# Patient Record
Sex: MALE | Race: WHITE | Employment: OTHER | URBAN - METROPOLITAN AREA
[De-identification: names, ages, dates, MRNs, and addresses within clinical notes are randomized per-mention and may not be internally consistent; named-entity substitution may affect disease eponyms.]

---

## 2017-02-24 ENCOUNTER — APPOINTMENT (OUTPATIENT)
Dept: CT IMAGING | Facility: CLINIC | Age: 82
DRG: 315 | End: 2017-02-24
Attending: EMERGENCY MEDICINE
Payer: MEDICARE

## 2017-02-24 ENCOUNTER — APPOINTMENT (OUTPATIENT)
Dept: GENERAL RADIOLOGY | Facility: CLINIC | Age: 82
DRG: 315 | End: 2017-02-24
Attending: EMERGENCY MEDICINE
Payer: MEDICARE

## 2017-02-24 ENCOUNTER — TRANSFERRED RECORDS (OUTPATIENT)
Dept: HEALTH INFORMATION MANAGEMENT | Facility: CLINIC | Age: 82
End: 2017-02-24

## 2017-02-24 ENCOUNTER — HOSPITAL ENCOUNTER (INPATIENT)
Facility: CLINIC | Age: 82
LOS: 3 days | Discharge: HOME | DRG: 315 | End: 2017-02-28
Attending: EMERGENCY MEDICINE | Admitting: INTERNAL MEDICINE
Payer: MEDICARE

## 2017-02-24 DIAGNOSIS — I48.0 PAROXYSMAL ATRIAL FIBRILLATION (H): ICD-10-CM

## 2017-02-24 DIAGNOSIS — R06.09 DYSPNEA ON EXERTION: ICD-10-CM

## 2017-02-24 LAB
ABO + RH BLD: NORMAL
ABO + RH BLD: NORMAL
ALBUMIN SERPL-MCNC: 3.5 G/DL (ref 3.4–5)
ALBUMIN UR-MCNC: 10 MG/DL
ALP SERPL-CCNC: 71 U/L (ref 40–150)
ALT SERPL W P-5'-P-CCNC: 15 U/L (ref 0–70)
ANION GAP SERPL CALCULATED.3IONS-SCNC: 8 MMOL/L (ref 3–14)
APPEARANCE UR: CLEAR
AST SERPL W P-5'-P-CCNC: 23 U/L (ref 0–45)
BASOPHILS # BLD AUTO: 0 10E9/L (ref 0–0.2)
BASOPHILS NFR BLD AUTO: 0.6 %
BILIRUB SERPL-MCNC: 1.3 MG/DL (ref 0.2–1.3)
BILIRUB UR QL STRIP: NEGATIVE
BLD GP AB SCN SERPL QL: NORMAL
BLOOD BANK CMNT PATIENT-IMP: NORMAL
BUN SERPL-MCNC: 17 MG/DL (ref 7–30)
CALCIUM SERPL-MCNC: 9 MG/DL (ref 8.5–10.1)
CHLORIDE SERPL-SCNC: 108 MMOL/L (ref 94–109)
CO2 SERPL-SCNC: 25 MMOL/L (ref 20–32)
COLOR UR AUTO: YELLOW
CREAT BLD-MCNC: 0.9 MG/DL (ref 0.66–1.25)
CREAT SERPL-MCNC: 0.9 MG/DL (ref 0.66–1.25)
D DIMER PPP FEU-MCNC: 1 UG/ML FEU (ref 0–0.5)
DIFFERENTIAL METHOD BLD: ABNORMAL
EOSINOPHIL # BLD AUTO: 0.2 10E9/L (ref 0–0.7)
EOSINOPHIL NFR BLD AUTO: 3 %
ERYTHROCYTE [DISTWIDTH] IN BLOOD BY AUTOMATED COUNT: 15.8 % (ref 10–15)
GFR SERPL CREATININE-BSD FRML MDRD: 81 ML/MIN/1.7M2
GFR SERPL CREATININE-BSD FRML MDRD: 81 ML/MIN/1.7M2
GLUCOSE SERPL-MCNC: 84 MG/DL (ref 70–99)
GLUCOSE UR STRIP-MCNC: NEGATIVE MG/DL
HCT VFR BLD AUTO: 35.7 % (ref 40–53)
HGB BLD-MCNC: 11.2 G/DL (ref 13.3–17.7)
HGB UR QL STRIP: NEGATIVE
IMM GRANULOCYTES # BLD: 0 10E9/L (ref 0–0.4)
IMM GRANULOCYTES NFR BLD: 0.4 %
INR PPP: 2.69 (ref 0.86–1.14)
INTERPRETATION ECG - MUSE: NORMAL
KETONES UR STRIP-MCNC: NEGATIVE MG/DL
LEUKOCYTE ESTERASE UR QL STRIP: ABNORMAL
LYMPHOCYTES # BLD AUTO: 0.9 10E9/L (ref 0.8–5.3)
LYMPHOCYTES NFR BLD AUTO: 17.2 %
MCH RBC QN AUTO: 30.1 PG (ref 26.5–33)
MCHC RBC AUTO-ENTMCNC: 31.4 G/DL (ref 31.5–36.5)
MCV RBC AUTO: 96 FL (ref 78–100)
MONOCYTES # BLD AUTO: 0.5 10E9/L (ref 0–1.3)
MONOCYTES NFR BLD AUTO: 10.3 %
NEUTROPHILS # BLD AUTO: 3.4 10E9/L (ref 1.6–8.3)
NEUTROPHILS NFR BLD AUTO: 68.5 %
NITRATE UR QL: NEGATIVE
NRBC # BLD AUTO: 0 10*3/UL
NRBC BLD AUTO-RTO: 0 /100
NT-PROBNP SERPL-MCNC: 787 PG/ML (ref 0–1800)
PH UR STRIP: 6.5 PH (ref 5–7)
PLATELET # BLD AUTO: 124 10E9/L (ref 150–450)
POTASSIUM SERPL-SCNC: 4 MMOL/L (ref 3.4–5.3)
PROT SERPL-MCNC: 7.9 G/DL (ref 6.8–8.8)
RBC # BLD AUTO: 3.72 10E12/L (ref 4.4–5.9)
RBC #/AREA URNS AUTO: 4 /HPF (ref 0–2)
SODIUM SERPL-SCNC: 141 MMOL/L (ref 133–144)
SP GR UR STRIP: 1 (ref 1–1.03)
SPECIMEN EXP DATE BLD: NORMAL
SQUAMOUS #/AREA URNS AUTO: <1 /HPF (ref 0–1)
TROPONIN I BLD-MCNC: 0.01 UG/L (ref 0–0.1)
TROPONIN I SERPL-MCNC: NORMAL UG/L (ref 0–0.04)
TROPONIN I SERPL-MCNC: NORMAL UG/L (ref 0–0.04)
TSH SERPL DL<=0.005 MIU/L-ACNC: 2.52 MU/L (ref 0.4–4)
URN SPEC COLLECT METH UR: ABNORMAL
UROBILINOGEN UR STRIP-MCNC: 2 MG/DL (ref 0–2)
WBC # BLD AUTO: 4.9 10E9/L (ref 4–11)
WBC #/AREA URNS AUTO: 5 /HPF (ref 0–2)

## 2017-02-24 PROCEDURE — 94640 AIRWAY INHALATION TREATMENT: CPT

## 2017-02-24 PROCEDURE — 25000125 ZZHC RX 250: Performed by: EMERGENCY MEDICINE

## 2017-02-24 PROCEDURE — 84443 ASSAY THYROID STIM HORMONE: CPT | Performed by: EMERGENCY MEDICINE

## 2017-02-24 PROCEDURE — 84484 ASSAY OF TROPONIN QUANT: CPT

## 2017-02-24 PROCEDURE — 25000132 ZZH RX MED GY IP 250 OP 250 PS 637: Mod: GY | Performed by: EMERGENCY MEDICINE

## 2017-02-24 PROCEDURE — 86901 BLOOD TYPING SEROLOGIC RH(D): CPT | Performed by: EMERGENCY MEDICINE

## 2017-02-24 PROCEDURE — 86850 RBC ANTIBODY SCREEN: CPT | Performed by: EMERGENCY MEDICINE

## 2017-02-24 PROCEDURE — A9270 NON-COVERED ITEM OR SERVICE: HCPCS | Mod: GY | Performed by: EMERGENCY MEDICINE

## 2017-02-24 PROCEDURE — 83880 ASSAY OF NATRIURETIC PEPTIDE: CPT | Performed by: EMERGENCY MEDICINE

## 2017-02-24 PROCEDURE — 99285 EMERGENCY DEPT VISIT HI MDM: CPT | Mod: 25

## 2017-02-24 PROCEDURE — 25500064 ZZH RX 255 OP 636: Performed by: EMERGENCY MEDICINE

## 2017-02-24 PROCEDURE — 85025 COMPLETE CBC W/AUTO DIFF WBC: CPT | Performed by: EMERGENCY MEDICINE

## 2017-02-24 PROCEDURE — 40000275 ZZH STATISTIC RCP TIME EA 10 MIN

## 2017-02-24 PROCEDURE — 71260 CT THORAX DX C+: CPT

## 2017-02-24 PROCEDURE — G0378 HOSPITAL OBSERVATION PER HR: HCPCS

## 2017-02-24 PROCEDURE — 84484 ASSAY OF TROPONIN QUANT: CPT | Performed by: EMERGENCY MEDICINE

## 2017-02-24 PROCEDURE — 81001 URINALYSIS AUTO W/SCOPE: CPT | Performed by: EMERGENCY MEDICINE

## 2017-02-24 PROCEDURE — 25000128 H RX IP 250 OP 636: Performed by: EMERGENCY MEDICINE

## 2017-02-24 PROCEDURE — A9270 NON-COVERED ITEM OR SERVICE: HCPCS | Performed by: HOSPITALIST

## 2017-02-24 PROCEDURE — 85610 PROTHROMBIN TIME: CPT | Performed by: EMERGENCY MEDICINE

## 2017-02-24 PROCEDURE — 85379 FIBRIN DEGRADATION QUANT: CPT | Performed by: EMERGENCY MEDICINE

## 2017-02-24 PROCEDURE — 93005 ELECTROCARDIOGRAM TRACING: CPT

## 2017-02-24 PROCEDURE — 86900 BLOOD TYPING SEROLOGIC ABO: CPT | Performed by: EMERGENCY MEDICINE

## 2017-02-24 PROCEDURE — A9270 NON-COVERED ITEM OR SERVICE: HCPCS | Mod: GY | Performed by: PHYSICIAN ASSISTANT

## 2017-02-24 PROCEDURE — 25000132 ZZH RX MED GY IP 250 OP 250 PS 637: Mod: GY | Performed by: PHYSICIAN ASSISTANT

## 2017-02-24 PROCEDURE — 84484 ASSAY OF TROPONIN QUANT: CPT | Performed by: PHYSICIAN ASSISTANT

## 2017-02-24 PROCEDURE — 82565 ASSAY OF CREATININE: CPT

## 2017-02-24 PROCEDURE — 71020 XR CHEST 2 VW: CPT

## 2017-02-24 PROCEDURE — 25000132 ZZH RX MED GY IP 250 OP 250 PS 637: Performed by: HOSPITALIST

## 2017-02-24 PROCEDURE — 80053 COMPREHEN METABOLIC PANEL: CPT | Performed by: EMERGENCY MEDICINE

## 2017-02-24 PROCEDURE — 36415 COLL VENOUS BLD VENIPUNCTURE: CPT | Performed by: PHYSICIAN ASSISTANT

## 2017-02-24 PROCEDURE — 84443 ASSAY THYROID STIM HORMONE: CPT | Performed by: PHYSICIAN ASSISTANT

## 2017-02-24 PROCEDURE — 99220 ZZC INITIAL OBSERVATION CARE,LEVL III: CPT | Performed by: PHYSICIAN ASSISTANT

## 2017-02-24 RX ORDER — METOPROLOL SUCCINATE 25 MG/1
25 TABLET, EXTENDED RELEASE ORAL 2 TIMES DAILY
Status: DISCONTINUED | OUTPATIENT
Start: 2017-02-24 | End: 2017-02-25

## 2017-02-24 RX ORDER — DIVALPROEX SODIUM 250 MG/1
250 TABLET, DELAYED RELEASE ORAL AT BEDTIME
Status: DISCONTINUED | OUTPATIENT
Start: 2017-02-24 | End: 2017-02-28 | Stop reason: HOSPADM

## 2017-02-24 RX ORDER — ALUMINA, MAGNESIA, AND SIMETHICONE 2400; 2400; 240 MG/30ML; MG/30ML; MG/30ML
15-30 SUSPENSION ORAL EVERY 4 HOURS PRN
Status: DISCONTINUED | OUTPATIENT
Start: 2017-02-24 | End: 2017-02-28 | Stop reason: HOSPADM

## 2017-02-24 RX ORDER — TORSEMIDE 10 MG/1
10 TABLET ORAL DAILY
Status: DISCONTINUED | OUTPATIENT
Start: 2017-02-25 | End: 2017-02-28 | Stop reason: HOSPADM

## 2017-02-24 RX ORDER — LIDOCAINE 40 MG/G
CREAM TOPICAL
Status: DISCONTINUED | OUTPATIENT
Start: 2017-02-24 | End: 2017-02-28 | Stop reason: HOSPADM

## 2017-02-24 RX ORDER — NALOXONE HYDROCHLORIDE 0.4 MG/ML
.1-.4 INJECTION, SOLUTION INTRAMUSCULAR; INTRAVENOUS; SUBCUTANEOUS
Status: DISCONTINUED | OUTPATIENT
Start: 2017-02-24 | End: 2017-02-28 | Stop reason: HOSPADM

## 2017-02-24 RX ORDER — ACETAMINOPHEN 325 MG/1
650 TABLET ORAL EVERY 4 HOURS PRN
Status: DISCONTINUED | OUTPATIENT
Start: 2017-02-24 | End: 2017-02-28 | Stop reason: HOSPADM

## 2017-02-24 RX ORDER — LIDOCAINE 40 MG/G
CREAM TOPICAL
Status: DISCONTINUED | OUTPATIENT
Start: 2017-02-24 | End: 2017-02-24

## 2017-02-24 RX ORDER — DIVALPROEX SODIUM 250 MG/1
250 TABLET, DELAYED RELEASE ORAL AT BEDTIME
COMMUNITY
End: 2018-11-04

## 2017-02-24 RX ORDER — IPRATROPIUM BROMIDE AND ALBUTEROL SULFATE 2.5; .5 MG/3ML; MG/3ML
3 SOLUTION RESPIRATORY (INHALATION) ONCE
Status: COMPLETED | OUTPATIENT
Start: 2017-02-24 | End: 2017-02-24

## 2017-02-24 RX ORDER — ASPIRIN 81 MG/1
81 TABLET ORAL DAILY
Status: DISCONTINUED | OUTPATIENT
Start: 2017-02-25 | End: 2017-02-25

## 2017-02-24 RX ORDER — ASPIRIN 81 MG/1
162 TABLET, CHEWABLE ORAL ONCE
Status: COMPLETED | OUTPATIENT
Start: 2017-02-24 | End: 2017-02-24

## 2017-02-24 RX ORDER — LEVOTHYROXINE SODIUM 75 UG/1
150 TABLET ORAL
Status: DISCONTINUED | OUTPATIENT
Start: 2017-02-25 | End: 2017-02-28 | Stop reason: HOSPADM

## 2017-02-24 RX ORDER — TERAZOSIN 5 MG/1
5 CAPSULE ORAL
Status: DISCONTINUED | OUTPATIENT
Start: 2017-02-24 | End: 2017-02-26

## 2017-02-24 RX ORDER — MULTIPLE VITAMINS W/ MINERALS TAB 9MG-400MCG
1 TAB ORAL 2 TIMES DAILY
COMMUNITY
End: 2018-09-01

## 2017-02-24 RX ORDER — SIMVASTATIN 20 MG
40 TABLET ORAL
Status: DISCONTINUED | OUTPATIENT
Start: 2017-02-24 | End: 2017-02-28 | Stop reason: HOSPADM

## 2017-02-24 RX ORDER — IOPAMIDOL 755 MG/ML
500 INJECTION, SOLUTION INTRAVASCULAR ONCE
Status: COMPLETED | OUTPATIENT
Start: 2017-02-24 | End: 2017-02-24

## 2017-02-24 RX ORDER — NITROGLYCERIN 0.4 MG/1
0.4 TABLET SUBLINGUAL EVERY 5 MIN PRN
Status: DISCONTINUED | OUTPATIENT
Start: 2017-02-24 | End: 2017-02-28 | Stop reason: HOSPADM

## 2017-02-24 RX ORDER — ACETAMINOPHEN 650 MG/1
650 SUPPOSITORY RECTAL EVERY 4 HOURS PRN
Status: DISCONTINUED | OUTPATIENT
Start: 2017-02-24 | End: 2017-02-28 | Stop reason: HOSPADM

## 2017-02-24 RX ADMIN — OMEPRAZOLE 40 MG: 20 CAPSULE, DELAYED RELEASE ORAL at 21:15

## 2017-02-24 RX ADMIN — IPRATROPIUM BROMIDE AND ALBUTEROL SULFATE 3 ML: .5; 3 SOLUTION RESPIRATORY (INHALATION) at 13:11

## 2017-02-24 RX ADMIN — RANITIDINE HYDROCHLORIDE 150 MG: 150 TABLET, FILM COATED ORAL at 12:11

## 2017-02-24 RX ADMIN — TERAZOSIN HYDROCHLORIDE ANHYDROUS 5 MG: 5 CAPSULE ORAL at 21:15

## 2017-02-24 RX ADMIN — METOPROLOL SUCCINATE 25 MG: 25 TABLET, EXTENDED RELEASE ORAL at 21:15

## 2017-02-24 RX ADMIN — SIMVASTATIN 40 MG: 20 TABLET, FILM COATED ORAL at 21:15

## 2017-02-24 RX ADMIN — METFORMIN HYDROCHLORIDE 1000 MG: 500 TABLET ORAL at 21:14

## 2017-02-24 RX ADMIN — WARFARIN SODIUM 1.5 MG: 3 TABLET ORAL at 21:19

## 2017-02-24 RX ADMIN — IOPAMIDOL 76 ML: 755 INJECTION, SOLUTION INTRAVENOUS at 12:38

## 2017-02-24 RX ADMIN — ASPIRIN 81 MG 162 MG: 81 TABLET ORAL at 18:59

## 2017-02-24 RX ADMIN — SODIUM CHLORIDE 88 ML: 9 INJECTION, SOLUTION INTRAVENOUS at 12:39

## 2017-02-24 ASSESSMENT — ENCOUNTER SYMPTOMS
VOMITING: 0
DIZZINESS: 1
DIFFICULTY URINATING: 0
BLOOD IN STOOL: 0
SHORTNESS OF BREATH: 1
CHEST TIGHTNESS: 1
COUGH: 0

## 2017-02-24 NOTE — ED NOTES
Went to [facility] to discuss why feeling so tired with dyspnea upon exertion recently.  Feeling weak, SOB for weeks. C/O abdominal gas and belching. C/O chest pressure. Clinic suggested that patient go to ED to evaluation.

## 2017-02-24 NOTE — ED NOTES
Pt ambulated to bathroom with assist of 2 to obtain a UA.  Very weak.  SOB. Pale.  Sats 94% on average post walk.

## 2017-02-24 NOTE — ED PROVIDER NOTES
History     Chief Complaint:  Shortness of breath    HPI   Sean Brunner is a 82 year old anticoagulated male who presents with fatigue and shortness of breath with exertion. He is here today because the shortness of breath with exertion now limits ability to perform activities of daily living. The patient states that he has been feeling fatigued for the past month, and states that he has been having increased chest tightness and pressure that causes him to belch. This has been worsening lately, and he states that it is worse with laying down. It is unclear if this symptom is aggravated with exertion however he did report this to his PMD leading to referral to the ED. His physician told him to use Gas X, and he states this works for a few days at a time.  Over the past week the patient states that he becomes short of breath with walking around his apartment. He states that if he stands up too fast he becomes dizzy and has to grab something so he does not fall over. The patient also reports chronic leg swelling that has been worsening over the last few days but that it is not unusual for this to improve and worsen. Today, the patient presented to his clinic for evaluation of these symptoms and after receiving an EKG he was sent to the emergency department. He denies any vomiting, cough, black or bloody stools, new urinary symptoms, or any other pertinent symptoms. He also denies any recent changes in medication.     Allergies:  The patient has no known drug allergies.    Medications:    Norco  Colace  Metformin  Prilosec  Demadex  Vitamin B12  Toprol  Synthroid  Hytrin  Zocor     Past Medical History:    A Fib  Diabetes  Thyroid disease  Cellulitis  PE  AGE  SBO  Cor pulmonale  STEWART  Cardiomyopathy  Morbid obesity  Vitamin B 12 deficiency  LV dysfunction  Low BP    Past Surgical History:    History reviewed.  No significant past surgical history.     Family History:    History reviewed.  No significant family  history.     Social History:  Relationship status:   Tobacco use: Neg  Alcohol use: Neg  The patient presents with his wife.      Review of Systems   Respiratory: Positive for chest tightness and shortness of breath. Negative for cough.    Cardiovascular: Positive for leg swelling.   Gastrointestinal: Negative for blood in stool and vomiting.   Genitourinary: Negative for difficulty urinating.   Neurological: Positive for dizziness.   All other systems reviewed and are negative.    Physical Exam   First Vitals:  BP: 119/72  Pulse: 75  Temp: 98.1  F (36.7  C)  Resp: 20  SpO2: 98 %    Physical Exam    Nursing note and vitals reviewed.    Constitutional: Pleasant and well groomed.          HENT: No facial asymmetry   Mouth/Throat: Oropharynx is without swelling or erythema. Oral mucosa moist.    Eyes: Conjunctivae normal are normal. No scleral icterus. Both pupils are round and reactive but his right is a smaller than his left    Neck: Neck supple.   Cardiovascular: Split S2, Systolic murmur. Normal rate, irregular rhythm, intact distal pulses.    Pulmonary/Chest: Effort normal, diminished breath sounds.   Abdominal: Soft.  No distension. There is no tenderness.   Musculoskeletal:  No edema, No calf tenderness. Asymmetric lower extremity edema, left greater than right with hyperpigment. Upper and lower extremities strength intact.   Neurological:Alert. Coordination normal.   Skin: Skin is warm and dry.   Psychiatric: Normal mood and affect.     Emergency Department Course   ECG @ 1057  Indication: Shortness of breath  Rate 68 bpm.   HI interval * ms.   QRS duration 98 ms.   QT/QTc 390/414 ms.   P-R-T axes 49.  Notes:  Atrial fibrillation. Low voltage QRS. Cannot rule out anterior infarct, age undetermined. Abnormal ECG. No STEMI @ 1100  Time read 1100    Imaging:  Radiographic findings were communicated with the patient, family and Admitting MD who voiced understanding of the findings.  Chest CT,  With contrast  only, per radiology:  1. There is no pulmonary embolus, aortic aneurysm or dissection.  2. Coronary artery atherosclerotic calcification. Cardiomegaly.  3. Small bilateral pleural effusions.  4. Probable hepatic cirrhosis. Small amount of ascites.    Chest XR, per radiology:  Mildly enlarged cardiac silhouette. No focal airspace disease. No significant pleural effusion. No pneumothorax.    Laboratory:  CBC: WBC 4.9, HGB 11.2 (L),  (L)   CMP: WNL (Creatinine 0.90)  1109: Troponin I: <0.015   1114: Troponin POCT: 0.01  1109: INR: 2.69 (H)  UA: Clear, yellow urine: Albumin: 10 (A), Leukocyte esterase: Small (A), WBC: 5 (H), RBC: 4 (H), o/w WNL  BNP: 787  ABO/Rh type and screen: ABO: A, Rh: Pos  Creatinine POCT: Creatinine 0.9, GFR 81  D dimer: 1.0 (H)    Interventions:  1211: Zantac, 150 mg, PO  1311: Duoneb, 3 mL, Nebulization    Emergency Department Course:  Nursing notes and vitals reviewed.  I performed an exam of the patient as documented above.  The above workup was undertaken.  1236: I rechecked the patient and discussed results.  1307: I rechecked the patient.   1400: I rechecked the patient who has no change in his lung exam.   1422: I consulted Dr. Lara of the hospital service who agreed to admission.     Findings and plan explained to the Patient and spouse who consents to admission. Discussed the patient with Dr. Lara, who will admit the patient to a obs bed for further monitoring, evaluation, and treatment.    Impression & Plan      Medical Decision Making:  Sean Brunner is a 82 year old male who presents with worsening dyspnea on exertion and fatigue over the last few weeks with significant worsening of the dyspnea on exertion over the last few days such that activities of daily living are limited. Differential diagnosis was broad and included but was not limited to anginal equivalent, acute coronary syndrome, pneumonia, pulmonary embolism, congestive heart failure, and occult infection. ED  evaluation is as noted above and thus far non diagnostic. I did tri a neb in the emergency department without any improvement in his symptoms. He performed an attempted ambulation which he became obviously extremely dyspneic again although not significantly hypoxic. I think transfer to observation for continued evaluation, monitoring , and management is warranted with serial troponin for possible anginal equivalent or acute coronary syndrome. Spoke with Bertin Lara who has accepted ongoing care of the patient.     Diagnosis:    ICD-10-CM    1. Dyspnea on exertion R06.09 ABO/Rh type and screen     UA with Microscopic   2. Paroxysmal atrial fibrillation (H) I48.0        Disposition:  Admit to a obs bed under the care of Dr. Lara.    ISanket, am serving as a scribe on 2/24/2017 at 11:08 AM to personally document services performed by Danelle Block, based on my observations and the provider's statements to me.    [facility] EMERGENCY DEPARTMENT       Danelle Block MD  02/25/17 1629

## 2017-02-24 NOTE — IP AVS SNAPSHOT
[facility] [address] [facility]    [address]    [facility] [address]    Phone:  [phone]    Fax:  [phone]                                       After Visit Summary   2/24/2017    Sean Brunner    MRN: [id number]           After Visit Summary Signature Page     I have received my discharge instructions, and my questions have been answered. I have discussed any challenges I see with this plan with the nurse or doctor.    ..........................................................................................................................................  Patient/Patient Representative Signature      ..........................................................................................................................................  Patient Representative Print Name and Relationship to Patient    ..................................................               ................................................  Date                                            Time    ..........................................................................................................................................  Reviewed by Signature/Title    ...................................................              ..............................................  Date                                                            Time

## 2017-02-24 NOTE — IP AVS SNAPSHOT
MRN:[id number]                      After Visit Summary   2/24/2017    Sean Brunner    MRN: [id number]           Thank you!     Thank you for choosing [facility] for your care. Our goal is always to provide you with excellent care. Hearing back from our patients is one way we can continue to improve our services. Please take a few minutes to complete the written survey that you may receive in the mail after you visit. If you would like to speak to someone directly about your visit please contact Patient Relations at [phone]. Thank you!          Patient Information     Date Of Birth          9/13/1934        About your hospital stay     You were admitted on:  February 24, 2017 You last received care in the:  [place] [address] [facility]    You were discharged on:  February 28, 2017        Reason for your hospital stay       Admitted for SOB secondary to Afib and cor pulmonale                  Who to Call     For medical emergencies, please call 911.  For non-urgent questions about your medical care, please call your primary care provider or clinic, [phone]          Attending Provider     Provider Specialty    Danelle Block MD Emergency Medicine    Bertin Lara MD Internal Medicine    Layla Ospina DO Internal Medicine       Primary Care Provider Office Phone # Fax #    Oscar Parekh [phone]       [place] [address]        Follow-up Appointments     Follow-up and recommended labs and tests        Follow up with primary care provider, Oscar Parekh, within 7 days to evaluate medication change, to evaluate treatment change and for hospital follow- up.  The following labs/tests are recommended: repeat INR.  Follow up with cardiology as schedule with Dr Joyce and Dr Adkins for R side HEart evaluation, pulmonary hypertension and cor pulmonale as  outlined by Dr Nielsen in earlier consultation note                  Your next 10 appointments already scheduled     Apr 04, 2017  9:00 AM CDT   Pulmonary Hypertension Return with Julian Joyce MD   [facility] ([facility])    [address] [place] [id number] [phone]              Further instructions from your care team       Wound care to LLE: daily or every other day depending on amount of drainage  1. Shower and/ or cleanse wound with MicroKlenz and pat dry  2. Apply moisturizing cream around wound bed and leg  3. Cut strip of AquaCel Ag to cover the wounds  4. Cover with ABD or other dry pad and secure with stretch stockinette or roll gauze/ tape  5. When wounds heal and dry, discontinue use of AquaCel Ag and keep clean and store dry for future use      Warfarin Instruction     You have started taking a medicine called warfarin. This is a blood-thinning medicine (anticoagulant). It helps prevent and treat blood clots.      Before leaving the hospital, make sure you know how much to take and how long to take it.      You will need regular blood tests to make sure your blood is clotting safely. It is very important to see your doctor for regular blood tests.    Talk to your doctor before taking any new medicine (this includes over-the-counter drugs and herbal products). Many medicines can interact with warfarin. This may cause more bleeding or too much clotting.     Eating a lot of vitamin K--found in green, leafy vegetables--can change the way warfarin works in your body. Do NOT avoid these foods. Instead, try to eat the same amount each day.     Bleeding is the most common side-effect of warfarin. You may notice bleeding gums, a bloody nose, bruises and bleeding longer when you cut yourself. See a doctor at once if:   o You cough up blood  o You find blood in your stool (poop)  o You have a deep cut, or a cut that bleeds longer  "than 10 minutes   o You have a bad cut, hard fall, accident or hit your head (go to urgent care or the emergency room).    For women who can get pregnant: This medicine can harm an unborn baby. Be very careful not to get pregnant while taking this medicine. If you think you might be pregnant, call your doctor right away.    For more information, read \"Guide to Warfarin Therapy,  the booklet you received in the hospital.        Pending Results     No orders found from 2017 to 2017.            Statement of Approval     Ordered          17 1038  I have reviewed and agree with all the recommendations and orders detailed in this document.  EFFECTIVE NOW     Approved and electronically signed by:  Armando Daugherty MD             Admission Information     Date & Time Provider Department Dept. Phone    2017 Layla Ospina,  [place] [address] [facility] [phone]      Your Vitals Were     Blood Pressure Pulse Temperature Respirations Height Weight    109/58 (BP Location: Right arm) 71 98.1  F (36.7  C) (Axillary) 16 1.753 m (5' 9\") 100.9 kg (222 lb 6 oz)    Pulse Oximetry BMI (Body Mass Index)                96% 32.84 kg/m2          MyChart Information     [employer]! lets you send messages to your doctor, view your test results, renew your prescriptions, schedule appointments and more. To sign up, go to [email or web address] . Click on \"Log in\" on the left side of the screen, which will take you to the Welcome page. Then click on \"Sign up Now\" on the right side of the page.     You will be asked to enter the access code listed below, as well as some personal information. Please follow the directions to create your username and password.     Your access code is: [email or web address]  Expires: 2017 10:45 AM     Your access code will  in 90 days. If you need help or a new code, please call your [place] clinic or [phone].        Care EveryWhere ID     This is your Care " EveryWhere ID. This could be used by other organizations to access your [place] medical records  [id number]           Review of your medicines      START taking        Dose / Directions    digoxin 125 MCG tablet   Commonly known as:  LANOXIN   Used for:  Paroxysmal atrial fibrillation (H)        Dose:  125 mcg   Take 1 tablet (125 mcg) by mouth daily   Quantity:  30 tablet   Refills:  0         CONTINUE these medicines which have NOT CHANGED        Dose / Directions    blood glucose monitoring lancets   Used for:  Diabetes mellitus, type 2 (H)        by Lancet route 2 times daily. Use to test blood sugar twice daily or as directed.   Quantity:  102 each   Refills:  prn       cyanocobalamin 1000 MCG tablet   Commonly known as:  vitamin  B-12        Dose:  3000 mcg   Take 3,000 mcg by mouth every morning   Refills:  0       divalproex 250 MG EC tablet   Commonly known as:  DEPAKOTE        Dose:  250 mg   Take 250 mg by mouth At Bedtime   Refills:  0       METFORMIN HCL PO        Dose:  1000 mg   Take 1,000 mg by mouth daily (with dinner)   Refills:  0       metoprolol 25 MG 24 hr tablet   Commonly known as:  TOPROL-XL        Dose:  25 mg   Take 25 mg by mouth 2 times daily   Refills:  0       multivitamin, therapeutic with minerals Tabs tablet        Dose:  1 tablet   Take 1 tablet by mouth 2 times daily   Refills:  0       OMEPRAZOLE PO        Dose:  40 mg   Take 40 mg by mouth every evening   Refills:  0       simvastatin 40 MG tablet   Commonly known as:  ZOCOR        Dose:  40 mg   Take 40 mg by mouth daily (with dinner)   Refills:  0       SYNTHROID 150 MCG tablet   Generic drug:  levothyroxine        Dose:  150 mcg   Take 150 mcg by mouth every morning (before breakfast)   Refills:  0       terazosin 5 MG capsule   Commonly known as:  HYTRIN        Dose:  5 mg   Take 5 mg by mouth daily (with dinner)   Refills:  0       torsemide 10 MG tablet   Commonly known as:  DEMADEX   Used for:  Primary pulmonary  hypertension (H)        Dose:  10 mg   Take 1 tablet (10 mg) by mouth daily   Quantity:  90 tablet   Refills:  0       WARFARIN SODIUM PO        Take by mouth every evening 1 mg Mon, Thu, and 1.5 mg all other days   Refills:  0            Where to get your medicines      These medications were sent to [facility] [place], [place] - [address] [facility]  [address] [facility] [address]     Phone:  [phone]     digoxin 125 MCG tablet                Protect others around you: Learn how to safely use, store and throw away your medicines at www.disposemymeds.org.             Medication List: This is a list of all your medications and when to take them. Check marks below indicate your daily home schedule. Keep this list as a reference.      Medications           Morning Afternoon Evening Bedtime As Needed    blood glucose monitoring lancets   by Lancet route 2 times daily. Use to test blood sugar twice daily or as directed.                                      cyanocobalamin 1000 MCG tablet   Commonly known as:  vitamin  B-12   Take 3,000 mcg by mouth every morning                                   digoxin 125 MCG tablet   Commonly known as:  LANOXIN   Take 1 tablet (125 mcg) by mouth daily   Last time this was given:  125 mcg on 2/28/2017 10:18 AM                                   divalproex 250 MG EC tablet   Commonly known as:  DEPAKOTE   Take 250 mg by mouth At Bedtime   Last time this was given:  250 mg on 2/27/2017  9:17 PM                                   METFORMIN HCL PO   Take 1,000 mg by mouth daily (with dinner)   Last time this was given:  1,000 mg on 2/27/2017  4:07 PM                                   metoprolol 25 MG 24 hr tablet   Commonly known as:  TOPROL-XL   Take 25 mg by mouth 2 times daily   Last time this was given:  25 mg on 2/28/2017  8:11 AM                                      multivitamin, therapeutic with minerals Tabs tablet   Take 1 tablet by mouth 2 times  daily                                      OMEPRAZOLE PO   Take 40 mg by mouth every evening   Last time this was given:  40 mg on 2/27/2017  7:45 PM                                   simvastatin 40 MG tablet   Commonly known as:  ZOCOR   Take 40 mg by mouth daily (with dinner)   Last time this was given:  40 mg on 2/27/2017  4:07 PM                                   SYNTHROID 150 MCG tablet   Take 150 mcg by mouth every morning (before breakfast)   Last time this was given:  150 mcg on 2/28/2017  5:53 AM   Generic drug:  levothyroxine                                   terazosin 5 MG capsule   Commonly known as:  HYTRIN   Take 5 mg by mouth daily (with dinner)   Last time this was given:  5 mg on 2/26/2017  6:11 PM                                   torsemide 10 MG tablet   Commonly known as:  DEMADEX   Take 1 tablet (10 mg) by mouth daily   Last time this was given:  10 mg on 2/28/2017  8:11 AM                                   WARFARIN SODIUM PO   Take by mouth every evening 1 mg Mon, Thu, and 1.5 mg all other days   Last time this was given:  1 mg on 2/27/2017  5:18 PM                                             More Information        Patient Education    Digoxin Oral capsule, liquid filled    Digoxin Oral solution    Digoxin Oral tablet    Digoxin Solution for injection  Digoxin Oral tablet  What is this medicine?  DIGOXIN (di JOX in) is used to treat congestive heart failure and heart rhythm problems.  This medicine may be used for other purposes; ask your health care provider or pharmacist if you have questions.  What should I tell my health care provider before I take this medicine?  They need to know if you have any of these conditions:    certain heart rhythm disorders    heart disease or recent heart attack    kidney or liver disease    an unusual or allergic reaction to digoxin, other medicines, foods, dyes, or preservatives    pregnant or trying to get pregnant    breast-feeding  How should I use this  medicine?  Take this medicine by mouth with a glass of water. Follow the directions on the prescription label. Take your doses at regular intervals. Do not take your medicine more often than directed.  Talk to your pediatrician regarding the use of this medicine in children. Special care may be needed.  Overdosage: If you think you have taken too much of this medicine contact a poison control center or emergency room at once.  NOTE: This medicine is only for you. Do not share this medicine with others.  What if I miss a dose?  If you miss a dose, take it as soon as you can. If it is almost time for your next dose, take only that dose. Do not take double or extra doses.  What may interact with this medicine?    activated charcoal    albuterol    alprazolam    antacids    antiviral medicines for HIV or AIDS like ritonavir and saquinavir    calcium    certain antibiotics like azithromycin, clarithromycin, erythromycin, gentamicin, neomycin, trimethoprim, and tetracycline    certain medicines for blood pressure, heart disease, irregular heart beat    certain medicines for cancer    certain medicines for cholesterol like atorvastatin, cholestyramine, and colestipol    certain medicines for diabetes, like acarbose, exenatide, miglitol, and metformin    certain medicines for fungal infections like ketoconazole and itraconazole    certain medicines for stomach problems like omeprazole, esomeprazole, lansoprazole, rabeprazole, metoclopramide, and sucralfate    cyclosporine    diphenoxylate    epinephrine    kaolin; pectin    nefazodone    NSAIDS, medicines for pain and inflammation, like celecoxib, ibuprofen, or naproxen    penicillamine    phenytoin    propantheline    quinine    phenytoin    rifampin    succinylcholine    Chavez's Wort    sulfasalazine    teriparatide    thyroid hormones    tolvaptan  This list may not describe all possible interactions. Give your health care provider a list of all the medicines, herbs,  non-prescription drugs, or dietary supplements you use. Also tell them if you smoke, drink alcohol, or use illegal drugs. Some items may interact with your medicine.  What should I watch for while using this medicine?  Visit your doctor or health care professional for regular checks on your progress. Do not stop taking this medicine without the advice of your doctor or health care professional, even if you feel better. Do not change the brand you are taking, other brands may affect you differently.  Check your heart rate and blood pressure regularly while you are taking this medicine. Ask your doctor or health care professional what your heart rate and blood pressure should be, and when you should contact him or her. Your doctor or health care professional also may schedule regular blood tests and electrocardiograms to check your progress.  Watch your diet. Less digoxin may be absorbed from the stomach if you have a diet high in bran fiber.  Do not treat yourself for coughs, colds or allergies without asking your doctor or health care professional for advice. Some ingredients can increase possible side effects.  What side effects may I notice from receiving this medicine?  Side effects that you should report to your doctor or health care professional as soon as possible:    allergic reactions like skin rash, itching or hives, swelling of the face, lips, or tongue    changes in behavior, mood, or mental ability    changes in vision    confusion    fast, irregular heartbeat    feeling faint or lightheaded, falls    headache    nausea, vomiting    unusual bleeding, bruising    unusually weak or tired  Side effects that usually do not require medical attention (report to your doctor or health care professional if they continue or are bothersome):    breast enlargement in men and women    diarrhea  This list may not describe all possible side effects. Call your doctor for medical advice about side effects. You may report  side effects to FDA at 5-897-FDA-9202.  Where should I keep my medicine?  Keep out of the reach of children.  Store at room temperature between 15 and 30 degrees C (59 and 86 degrees F). Protect from light and moisture. Throw away any unused medicine after the expiration date.  NOTE:This sheet is a summary. It may not cover all possible information. If you have questions about this medicine, talk to your doctor, pharmacist, or health care provider. Copyright  2016 [employer] Standard                Right-Sided Congestive Heart Failure    The heart is a large muscle that pumps blood throughout the body. Blood carries oxygen to all the organs (including the brain), muscles, and skin. After the body takes the oxygen out of the blood, the blood returns to the heart. The right side of the heart collects that blood and pumps it to the lungs to get fresh oxygen. This oxygen-rich blood from the lungs then returns to the left side of the heart, where it is pumped back out to the rest of the body and the brain, starting the process all over.  Heart failure (HF) occurs when the heart muscle is weakened. This can occur after a heart attack or with disease of the coronary arteries that affects the heart over time, and in turn affects the pumping action of the heart.  When the right side of the heart is weakened, it can t handle the blood it is getting from the rest of the body. This blood returns to the heart through veins. When too much pressure builds up in the veins, fluid leaks out into the tissues. Gravity then causes that fluid to spread to those parts of the body that are the lowest. So one of the first symptoms of heart failure is swelling in the feet and ankles. If the condition worsens, the swelling can even go up past the knees. In more severe cases, the liver may also become congested with extra fluid.  Causes of right-sided heart failure    Coronary artery disease    Heart attack in the past (heart attack is also known as  acute myocardial infarction, or AMI)    High blood pressure, particularly in the pulmonary circulation    Damaged heart valve    Diabetes    Obesity    Cigarette smoking    Alcohol abuse    Increased pressure of the lungs weakening the right side of the heart  Treatment  Heart failure is a chronic condition. There is no cure. The purpose of medical treatment is to improve the pumping action of the heart, and remove excess water and fluid from the body. A number of medicines can help reach this goal, improve symptoms, and prevent the heart from becoming weaker. In some cases of severe heart failure, mechanical devices can be implanted to help with the heart's pumping function. Another major goal is to better treat the causes of heart failure, such as diabetes, high blood pressure, and your lifestyle.  Home care    Check your weight every day. A sudden increase in weight gain could mean worsening heart failure.    Use the same scale every day.    Weigh yourself at the same time every day.    Make sure the scale is on the floor, not on a rug.    Keep a record of your weight every day so your healthcare provider can see it. If you are not given a log sheet for this, keep a separate journal for this purpose.     Cut back on how much salt (sodium) you eat:    Your healthcare provider will tell you  what level of salt you can have daily, usually 2,000 mg or less.    Avoid high-salt foods. These include olives, pickles, smoked meats, processed foods, and salted potato chips.    Don't add salt to your food at the table. Use only small amounts of salt when cooking.    Follow your healthcare provider's recommendations about how much fluid you should have.    Stop smoking.    Cut back on the amount of alcohol you drink.    Lose weight if you are overweight. The excess weight adds a lot of stress on the workload of the heart.    Stay active. Talk with your provider about an exercise program that is safe for your heart.    Keep  your feet elevated to reduce swelling. Ask your provider about support hose as a preventive treatment for daytime leg swelling.    Follow your healthcare provider's instructions closely.  Besides taking your medicine as instructed, an important part of treatment is lifestyle changes. These include diet, physical activity, stopping smoking, and weight control.  Improve your diet. Often in the hospital, people are given a heart healthy diet. This includes more fresh foods, lower fat, less processed foods, and lower salt.  Follow-up care  Follow up with your healthcare provider, or as advised. Make sure to keep any appointments that were made for you. This can help better control heart failure.  If an X-ray was done, you will be told of any new findings that may affect your care.  Call 911  Call 911 if you:    Become severely short of breath    Feel lightheaded, or feel like you might pass out or faint    Have chest pain or discomfort that's different than usual, the medicines your provider told you to use for this don't help, or the pain lasts longer than 10 to 15 minutes    You suddenly develop a rapid heart rate  When to seek medical advice  Call your healthcare provider right away if you have any of these signs. They may mean your heart failure is getting worse:    Sudden weight gain. This means more than 2 or more pounds in 1 day or 5 pounds in 1 week, or whatever weight gain you were told to report by your provider.    Trouble breathing not related to being active    New or increased swelling of your legs or ankles    Swelling or pain in your abdomen    Breathing trouble at night. This means waking up short of breath or needing more pillows to elevate your upper body to breathe.    Frequent coughing that doesn t go away    Feeling much more tired than usual    8610-9915 [employer]. [address] [place] [address], [place] [address]. All rights reserved. This information is not intended as a substitute for  professional medical care. Always follow your healthcare professional's instructions.

## 2017-02-25 ENCOUNTER — APPOINTMENT (OUTPATIENT)
Dept: CARDIOLOGY | Facility: CLINIC | Age: 82
DRG: 315 | End: 2017-02-25
Attending: PHYSICIAN ASSISTANT
Payer: MEDICARE

## 2017-02-25 LAB
ANION GAP SERPL CALCULATED.3IONS-SCNC: 9 MMOL/L (ref 3–14)
BUN SERPL-MCNC: 18 MG/DL (ref 7–30)
CALCIUM SERPL-MCNC: 8.8 MG/DL (ref 8.5–10.1)
CHLORIDE SERPL-SCNC: 106 MMOL/L (ref 94–109)
CO2 SERPL-SCNC: 24 MMOL/L (ref 20–32)
CREAT SERPL-MCNC: 0.95 MG/DL (ref 0.66–1.25)
GFR SERPL CREATININE-BSD FRML MDRD: 76 ML/MIN/1.7M2
GLUCOSE SERPL-MCNC: 141 MG/DL (ref 70–99)
HGB BLD-MCNC: 10.3 G/DL (ref 13.3–17.7)
INR PPP: 2.95 (ref 0.86–1.14)
MAGNESIUM SERPL-MCNC: 1.8 MG/DL (ref 1.6–2.3)
POTASSIUM SERPL-SCNC: 4.3 MMOL/L (ref 3.4–5.3)
SODIUM SERPL-SCNC: 139 MMOL/L (ref 133–144)

## 2017-02-25 PROCEDURE — 80048 BASIC METABOLIC PNL TOTAL CA: CPT | Performed by: INTERNAL MEDICINE

## 2017-02-25 PROCEDURE — 85018 HEMOGLOBIN: CPT | Performed by: INTERNAL MEDICINE

## 2017-02-25 PROCEDURE — 25500064 ZZH RX 255 OP 636: Performed by: INTERNAL MEDICINE

## 2017-02-25 PROCEDURE — A9270 NON-COVERED ITEM OR SERVICE: HCPCS | Mod: GY | Performed by: INTERNAL MEDICINE

## 2017-02-25 PROCEDURE — 25000132 ZZH RX MED GY IP 250 OP 250 PS 637: Mod: GY | Performed by: PHYSICIAN ASSISTANT

## 2017-02-25 PROCEDURE — A9270 NON-COVERED ITEM OR SERVICE: HCPCS | Mod: GY | Performed by: PHYSICIAN ASSISTANT

## 2017-02-25 PROCEDURE — 25000132 ZZH RX MED GY IP 250 OP 250 PS 637: Mod: GY | Performed by: INTERNAL MEDICINE

## 2017-02-25 PROCEDURE — 83735 ASSAY OF MAGNESIUM: CPT | Performed by: INTERNAL MEDICINE

## 2017-02-25 PROCEDURE — 25000128 H RX IP 250 OP 636: Performed by: INTERNAL MEDICINE

## 2017-02-25 PROCEDURE — 36415 COLL VENOUS BLD VENIPUNCTURE: CPT | Performed by: PHYSICIAN ASSISTANT

## 2017-02-25 PROCEDURE — 36415 COLL VENOUS BLD VENIPUNCTURE: CPT | Performed by: INTERNAL MEDICINE

## 2017-02-25 PROCEDURE — 12000000 ZZH R&B MED SURG/OB

## 2017-02-25 PROCEDURE — 96374 THER/PROPH/DIAG INJ IV PUSH: CPT

## 2017-02-25 PROCEDURE — 99233 SBSQ HOSP IP/OBS HIGH 50: CPT | Performed by: INTERNAL MEDICINE

## 2017-02-25 PROCEDURE — 93306 TTE W/DOPPLER COMPLETE: CPT | Mod: 26 | Performed by: INTERNAL MEDICINE

## 2017-02-25 PROCEDURE — G0378 HOSPITAL OBSERVATION PER HR: HCPCS

## 2017-02-25 PROCEDURE — 40000264 ECHO COMPLETE WITH OPTISON

## 2017-02-25 PROCEDURE — 85610 PROTHROMBIN TIME: CPT | Performed by: PHYSICIAN ASSISTANT

## 2017-02-25 RX ORDER — METOPROLOL SUCCINATE 25 MG/1
25 TABLET, EXTENDED RELEASE ORAL EVERY MORNING
Status: DISCONTINUED | OUTPATIENT
Start: 2017-02-26 | End: 2017-02-28 | Stop reason: HOSPADM

## 2017-02-25 RX ORDER — METOPROLOL SUCCINATE 25 MG/1
25 TABLET, EXTENDED RELEASE ORAL ONCE
Status: DISCONTINUED | OUTPATIENT
Start: 2017-02-25 | End: 2017-02-25

## 2017-02-25 RX ORDER — TRAMADOL HYDROCHLORIDE 50 MG/1
50 TABLET ORAL EVERY 6 HOURS PRN
Status: DISCONTINUED | OUTPATIENT
Start: 2017-02-25 | End: 2017-02-28 | Stop reason: HOSPADM

## 2017-02-25 RX ORDER — METOPROLOL SUCCINATE 25 MG/1
25 TABLET, EXTENDED RELEASE ORAL 2 TIMES DAILY
Status: DISCONTINUED | OUTPATIENT
Start: 2017-02-25 | End: 2017-02-25

## 2017-02-25 RX ORDER — DIGOXIN 0.25 MG/ML
250 INJECTION INTRAMUSCULAR; INTRAVENOUS
Status: DISCONTINUED | OUTPATIENT
Start: 2017-02-25 | End: 2017-02-26

## 2017-02-25 RX ORDER — DIGOXIN 125 MCG
125 TABLET ORAL DAILY
Status: DISCONTINUED | OUTPATIENT
Start: 2017-02-26 | End: 2017-02-26

## 2017-02-25 RX ORDER — DIGOXIN 0.25 MG/ML
500 INJECTION INTRAMUSCULAR; INTRAVENOUS ONCE
Status: COMPLETED | OUTPATIENT
Start: 2017-02-25 | End: 2017-02-25

## 2017-02-25 RX ORDER — METOPROLOL SUCCINATE 50 MG/1
50 TABLET, EXTENDED RELEASE ORAL DAILY
Status: DISCONTINUED | OUTPATIENT
Start: 2017-02-26 | End: 2017-02-25

## 2017-02-25 RX ORDER — METOPROLOL SUCCINATE 50 MG/1
50 TABLET, EXTENDED RELEASE ORAL EVERY EVENING
Status: DISCONTINUED | OUTPATIENT
Start: 2017-02-25 | End: 2017-02-28 | Stop reason: HOSPADM

## 2017-02-25 RX ORDER — METOPROLOL SUCCINATE 25 MG/1
25 TABLET, EXTENDED RELEASE ORAL DAILY
Status: DISCONTINUED | OUTPATIENT
Start: 2017-02-25 | End: 2017-02-25

## 2017-02-25 RX ADMIN — METOPROLOL SUCCINATE 50 MG: 50 TABLET, EXTENDED RELEASE ORAL at 20:11

## 2017-02-25 RX ADMIN — LEVOTHYROXINE SODIUM 150 MCG: 75 TABLET ORAL at 09:02

## 2017-02-25 RX ADMIN — DIGOXIN 250 MCG: 0.25 INJECTION INTRAMUSCULAR; INTRAVENOUS at 21:57

## 2017-02-25 RX ADMIN — DIVALPROEX SODIUM 250 MG: 250 TABLET, DELAYED RELEASE ORAL at 21:57

## 2017-02-25 RX ADMIN — METFORMIN HYDROCHLORIDE 1000 MG: 500 TABLET ORAL at 17:16

## 2017-02-25 RX ADMIN — OMEPRAZOLE 40 MG: 20 CAPSULE, DELAYED RELEASE ORAL at 20:11

## 2017-02-25 RX ADMIN — SIMVASTATIN 40 MG: 20 TABLET, FILM COATED ORAL at 17:16

## 2017-02-25 RX ADMIN — Medication 1.5 MG: at 17:16

## 2017-02-25 RX ADMIN — TERAZOSIN HYDROCHLORIDE ANHYDROUS 5 MG: 5 CAPSULE ORAL at 17:16

## 2017-02-25 RX ADMIN — METOPROLOL SUCCINATE 25 MG: 25 TABLET, EXTENDED RELEASE ORAL at 09:05

## 2017-02-25 RX ADMIN — TRAMADOL HYDROCHLORIDE 50 MG: 50 TABLET, COATED ORAL at 05:05

## 2017-02-25 RX ADMIN — HUMAN ALBUMIN MICROSPHERES AND PERFLUTREN 4 ML: 10; .22 INJECTION, SOLUTION INTRAVENOUS at 11:36

## 2017-02-25 RX ADMIN — DIGOXIN 500 MCG: 0.25 INJECTION INTRAMUSCULAR; INTRAVENOUS at 15:51

## 2017-02-25 RX ADMIN — TRAMADOL HYDROCHLORIDE 50 MG: 50 TABLET, COATED ORAL at 15:49

## 2017-02-25 RX ADMIN — TORSEMIDE 10 MG: 10 TABLET ORAL at 09:02

## 2017-02-25 NOTE — PROGRESS NOTES
[facility]  Hospitalist Progress Note  Name: Sean Brunner    MRN: [id number]  Provider:  Layla Ospina DO    Initial presenting complaint/issue to hospital (Diagnosis): SOB with exertion x 1 month         Assessment and Plan:      Summary of Stay: Sean Brunner is a 82 year old male admitted on 2/24/2017 with SOB x 1 month     Problem List:   1.  SOB with exertion  Most likely d/t  afib with RVR - SOB with exertion  When he walked today he was SOB and his HR was in the 130's  No PE or PNA on CT of the chest     Echo reviewed minimally changed from 2012 - chronic right heart failure  No clear angina symptoms - but still also in the differential and would probably benefit from an outpt  Troponin on admission negative  Stress test (lexiscan), as well, once rate has been stabilized.  Will need outpt cardiology f/u  Home diuretic change to daily since admission - probably has some mild pulmonary congestion  From #2    2.  A fib with RVR  Chronic a fib - heart rate controlled at rest and uncontrolled (and symptomatic)  with ambulation  The fast rate appears to be contributing to his symptoms and not the rhythm itself.  SBP is low-normal ---so will start digoxin (IV digoxin load and then daily dose in am)  Continue current metoprolol dose  Continue to walk with nursing tid in halls and monitor hr and symptoms    If able to improve rate-control with new digoxin, anticipate d/c home tomorrow with close outpt   F/u.  If still poorly controlled, will consider cardiology consult.    3.  Hiatal hernia and symptomatic GERD  Elevate head of bed  Continue home meds    4.  Chronic anti-coagulation - secondary to # 2  Pharmacy to help manage chronic daily couamdin dosing.    5.  Chronic headaches  Continue home meds    6.  DM  Continue metformin and switch to diabetic diet    DVT Prophylaxis:  -  coumadin  Code Status: Full Code  Discharge Dispo: home   Estimated Disch Date / # of Days until Discharge:  "hopefully 2/26/17        Interval History:      Pt seen with wife in room.  Has been SOB with exertion and activity for \"about a month\".  Went to clinic and found to be in a fib at rest (rate-controlled) and then sent to ER.  He feels well at rest----had reproducible symptoms that correlate with RVR on tele this am.  No CP.  No f/c, n/v.  A lot of \"burping\" and epigastric discomfort when he sits down.                  Physical Exam:      Last Vital Signs:  Temp: 96.1  F (35.6  C) Temp src: Oral BP: 112/61 Pulse: 68 Heart Rate: 80 Resp: 24 SpO2: 95 % O2 Device: None (Room air)       GEN:  Alert, oriented x 3, comfortable, NAD, [place]  HEENT:  Normocephalic/atraumatic, PERRL, no scleral icterus, no nasal discharge, mouth moist, no oral ulcers or thrush noted.  NECK: no clear thyromegaly or JVD  LA: none palpable in the cervical/clavicular area bilaterally  CV:  Distant, irregular rate and rhythm, no loud murmur to ausc.  S1 + S2 noted, no S3 or S4.  LUNGS: mild bibasilar rales.  No clear rhonchi or wheezing auscultated bilaterally.  No costal retractions bilaterally.  Symmetric chest rise on inhalation noted.  ABD:  Active bowel sounds, soft, obese, non-tender/non-distended.  No rebound/guarding/rigidity.  No masses palpated.  No obvious HSM to exam.  EXT:  Chronic venous status discoloration bilaterally and a chronic stasis ulcer on the left.  Left leg chronically>right leg.  Trace edema bilaterally, no clear cyanosis bilaterally. No joint synovitis noted.  No calf-tenderness or asymmetry noted.  SKIN:  Dry to touch, no rashes or jaundice noted.  PSYCH:  Mood appropriate, Not tearful or depressed.  Maintains direct eye contact.         Medications:      All current medications were reviewed.         Data:      All new lab and imaging data was reviewed.   Labs:    Recent Labs  Lab 02/24/17  1131 02/24/17  1109   NA  --  141   POTASSIUM  --  4.0   CHLORIDE  --  108   CO2  --  25   ANIONGAP  --  8   GLC  --  84   BUN  --  " 17   CR  --  0.90   GFRESTIMATED 81 81   GFRESTBLACK >90 >90African American GFR Calc   TYSON  --  9.0   magnesium - pending    Recent Labs  Lab 02/24/17  1109   DD 1.0*       Recent Labs  Lab 02/25/17  0640 02/24/17  1109   INR 2.95* 2.69*       Recent Labs  Lab 02/24/17  1716 02/24/17  1114 02/24/17  1109   TROPONIN  --  0.01  --    TROPI <0.015The 99th percentile for upper reference range is 0.045 ug/L.  Troponin values in the range of 0.045 - 0.120 ug/L may be associated with risks of adverse clinical events.  --  <0.015The 99th percentile for upper reference range is 0.045 ug/L.  Troponin values in the range of 0.045 - 0.120 ug/L may be associated with risks of adverse clinical events.       Recent Labs  Lab 02/24/17  1109   TSH 2.52       Recent Labs  Lab 02/24/17  1340   COLOR Yellow   APPEARANCE Clear   URINEGLC Negative   URINEBILI Negative   URINEKETONE Negative   SG 1.005   UBLD Negative   URINEPH 6.5   PROTEIN 10*   NITRITE Negative   LEUKEST Small*   RBCU 4*   WBCU 5*      Recent Imaging:   No results found for this or any previous visit (from the past 24 hour(s)).

## 2017-02-25 NOTE — PLAN OF CARE
Problem: Discharge Planning  Goal: Discharge Planning (Adult, OB, Behavioral, Peds)  Outcome: Improving  PRIMARY DIAGNOSIS: Chest tightness  OUTPATIENT/OBSERVATION GOALS TO BE MET BEFORE DISCHARGE:     1. Negative Serial Troponin Yes, negative x2  2. Resolution of chest pain Yes  3. Pain status: Denies chest pain but c/o migraine  4. Negative stress test Yes  5. Stable vital signs Yes  6. ADLs back to baseline?  Yes  7. Activity and level of assistance: Up with standby assistance.  8. Barriers to discharge noted No  9.Interpretation of rhythm per telemetry tech: Afib rate controlled HR 74     Vitals stable, telemetry monitoring. IV Digoxin given. Echo unremarkable. Prn Tramadol given for migraine. Will ambulate patient after dinner tonight and monitor HR.

## 2017-02-25 NOTE — UTILIZATION REVIEW
[facility] Utilization Review  Admission Status; Secondary Review Determination     Admission Date: 2/24/2017 10:51 AM      Under the authority of the Utilization Management Committee, the utilization review process indicated a secondary review on the above patient.  The review outcome is based on review of the medical records, discussions with staff, and applying clinical experience noted on the date of the review.        (X)      Inpatient Status Appropriate - This patient's medical care is consistent with medical management for inpatient care and reasonable inpatient medical practice.          RATIONALE FOR DETERMINATION   Sean Brunner is a 82 year old male with past medical history of coronary artery disease and atrial fibrillation, who presented to the emergency room with complaints of shortness of breath. He is registered to observation with atrial fibrillation with rapid ventricular response for rate control, rule out acute coronary syndrome and supportive cares. However, his heart rate remains in 130s with minimal ambulation, therefore therapy is being advanced to intravenous digoxin load followed by orals since his blood pressure remains borderline. Given co-morbidities and coronary artery disease, he remains high risk for adverse outcome. The severity of illness, intensity of service provided, expected length of stay more than two midnights and risk for adverse outcome make the care complex, high risk and appropriate for hospital admission. Jennifer Walton PA-C will kindly admit as inpatient.             The definitions of Inpatient Status and Observation Status used in making the determination above are those provided in the CMS Coverage Manual, Chapter 1 and Chapter 6, section 70.4.      Sincerely,       Cha Ruiz MD, MS  Physician Advisor  Utilization Review-[place]    Phone: [phone]

## 2017-02-25 NOTE — PROGRESS NOTES
ROOM #225    Living Situation (if not independent, order SW consult): House with wife  Facility name:     Activity level at baseline:Ind  Activity level on admit: SBA with walker    Is patient a falls risk? Yes  Reason for falls risk:  Mobility  Falls armband on? YES,   Within Arm's Reach? Yes   Bed alarm turned on?   YES,  Personal alarm in place and turned on?   Not applicable, in bed    Patient registered to observation; given Patient Bill of Rights; given the opportunity to ask questions about observation status and their plan of care.  Patient has been oriented to the observation room, bathroom and call light is in place.    : Dona wife or Mp son, see demographics

## 2017-02-25 NOTE — PLAN OF CARE
Problem: Discharge Planning  Goal: Discharge Planning (Adult, OB, Behavioral, Peds)  Outcome: Improving  PRIMARY DIAGNOSIS: IYER/Chest tightness  OUTPATIENT/OBSERVATION GOALS TO BE MET BEFORE DISCHARGE:     1. Negative Serial Troponin Yes, negative x 2  2. Resolution of chest pain Yes  3. Pain status: Pain free.  4. Negative stress test N/A  5. Stable vital signs Yes  6. ADLs back to baseline?  Yes  7. Activity and level of assistance: Up with standby assistance.  8. Barriers to discharge noted No  9.Interpretation of rhythm per telemetry tech: A-fib W CVR  HR in 70s         Is patient a falls risk? Yes Reason for falls risk: Mobility  Falls armband on? YES,   Within Arm's Reach? Yes   Bed alarm turned on? YES,  Personal alarm in place and turned on? Not applicable, in bed     Patient resting in bed, continues to deny chest tightness/pain.  Denies SOB at rest.  Continues to have IYER.  VSS.  Ambulates with SBA, but tends to hunch over. Walker recommended for safety. Patient refused walker when ambulating to the bathroom.  LLE continues to have edema +2, chronic issue. Tele monitoring in place. Echo test scheduled. Will continue to monitor, assess, and offer supportive cares.

## 2017-02-25 NOTE — PLAN OF CARE
Problem: Discharge Planning  Goal: Discharge Planning (Adult, OB, Behavioral, Peds)  PRIMARY DIAGNOSIS: IYER/Chest tightness  OUTPATIENT/OBSERVATION GOALS TO BE MET BEFORE DISCHARGE:     1. Negative Serial Troponin Yes, negative x 2  2. Resolution of chest pain Yes  3. Pain status: Pain free.  4. Negative stress test N/A  5. Stable vital signs Yes  6. ADLs back to baseline?  Yes  7. Activity and level of assistance: Up with standby assistance.  8. Barriers to discharge noted No  9.Interpretation of rhythm per telemetry tech: A-fib in 70s     Continues to deny chest tightness.  Denies SOB at rest.  Continues to have IYER.  Sating 95-97% on RA.  Ambulates with SBA, but tends to hunch over, so needs walker for safety.  LLE continues to have edema +2, chronic issue.
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
## 2017-02-25 NOTE — PHARMACY-ADMISSION MEDICATION HISTORY
Admission medication history interview status for this patient is complete. See [facility] admission navigator for allergy information, prior to admission medications and immunization status.     Medication history interview source(s):Patient and wife  Medication history resources (including written lists, pill bottles, clinic record): wife read off pill bottle info from home via phone  Primary pharmacy:[facility]    Changes made to PTA medication list:  Added: Depakote (per pt is for migraines), MVI w/ minerals, and warfarin  Deleted: docusate, Norco, Lactobacillus  Changed: n/a    Actions taken by pharmacist (provider contacted, etc):None     Additional medication history information:None    Medication reconciliation/reorder completed by provider prior to medication history? Yes    For patients on insulin therapy: No       Prior to Admission medications    Medication Sig Last Dose Taking? Auth Provider   OMEPRAZOLE PO Take 40 mg by mouth every evening 2/23/2017 at pm Yes Unknown, Entered By History   multivitamin, therapeutic with minerals (THERA-VIT-M) TABS tablet Take 1 tablet by mouth 2 times daily 2/24/2017 at am Yes Unknown, Entered By History   WARFARIN SODIUM PO Take by mouth every evening 1 mg Mon, Thu, and 1.5 mg all other days 2/23/2017 at pm Yes Unknown, Entered By History   divalproex (DEPAKOTE) 250 MG EC tablet Take 250 mg by mouth At Bedtime 2/23/2017 at hs Yes Unknown, Entered By History   METFORMIN HCL PO Take 1,000 mg by mouth daily (with dinner) 2/23/2017 at pm Yes Unknown, Entered By History   torsemide (DEMADEX) 10 MG tablet Take 1 tablet (10 mg) by mouth daily 2/24/2017 at am Yes Julian Joyce MD   cyanocolbalamin (VITAMIN  B-12) 1000 MCG tablet Take 3,000 mcg by mouth every morning 2/24/2017 at am Yes Unknown, Entered By History   metoprolol (TOPROL-XL) 25 MG 24 hr tablet Take 25 mg by mouth 2 times daily 2/24/2017 at am Yes Unknown, Entered By History   levothyroxine  (SYNTHROID) 150 MCG tablet Take 150 mcg by mouth every morning (before breakfast)  2/24/2017 at am Yes Reported, Patient   terazosin (HYTRIN) 5 MG capsule Take 5 mg by mouth daily (with dinner)  2/23/2017 at HS Yes Reported, Patient   simvastatin (ZOCOR) 40 MG tablet Take 40 mg by mouth daily (with dinner)  2/23/2017 at HS Yes Reported, Patient   ACCU-CHEK MULTICLIX LANCETS MISC by Lancet route 2 times daily. Use to test blood sugar twice daily or as directed.   Oscar Parekh MD

## 2017-02-25 NOTE — PLAN OF CARE
Problem: Discharge Planning  Goal: Discharge Planning (Adult, OB, Behavioral, Peds)  Outcome: Improving  PRIMARY DIAGNOSIS: IYER/Chest tightness  OUTPATIENT/OBSERVATION GOALS TO BE MET BEFORE DISCHARGE:     1. Negative Serial Troponin Yes, negative x 2  2. Resolution of chest pain Yes  3. Pain status: NO chest pain but has a migraine  4. Negative stress test: Echo scheduled  5. Stable vital signs Yes  6. ADLs back to baseline?  Yes  7. Activity and level of assistance: Up with standby assistance.  8. Barriers to discharge noted No  9.Interpretation of rhythm per telemetry tech: A-fib   HR in 70s         Is patient a falls risk? Yes Reason for falls risk: Mobility  Falls armband on? YES,   Within Arm's Reach? Yes   Bed alarm turned on? YES,  Personal alarm in place and turned on? Not applicable, in bed     Patient continues to deny chest tightness/pain, and SOB at rest. VSS.  Ambulates with SBA,  LLE continues to have edema +2, chronic issue. Tele monitoring in place. Echo test scheduled. C/o of migraine. Hospitalist paged to see if patient can get an order for tramadol. Patient states this is what he takes at home. Will continue to monitor, assess, and offer supportive cares.

## 2017-02-25 NOTE — PLAN OF CARE
Problem: Goal Outcome Summary  Goal: Goal Outcome Summary  Outcome: No Change  Problem: Discharge Planning  Goal: Discharge Planning (Adult, OB, Behavioral, Peds)  Outcome: Improving  PRIMARY DIAGNOSIS: IYER/Chest tightness  OUTPATIENT/OBSERVATION GOALS TO BE MET BEFORE DISCHARGE:      1. Negative Serial Troponin Yes, negative x 2  2. Resolution of chest pain Yes  3. Pain status: NO chest pain but has a migraine  4. Negative stress test: Echo scheduled  5. Stable vital signs Yes  6. ADLs back to baseline? Yes  7. Activity and level of assistance: Up with standby assistance.  8. Barriers to discharge noted No  9.Interpretation of rhythm per telemetry tech: A-fib HR in 70s          Is patient a falls risk? Yes Reason for falls risk: Mobility  Falls armband on? YES,   Within Arm's Reach? Yes   Bed alarm turned on? YES,  Personal alarm in place and turned on? Not applicable, in bed      Patient continues to deny chest tightness/pain, and SOB at rest. VSS. Ambulates with SBA, LLE continues to have edema +2, chronic issue. Tele monitoring in place. Echo test scheduled. Patient states this is what he takes at home. Will continue to monitor, assess, and offer supportive cares.

## 2017-02-26 LAB
ANION GAP SERPL CALCULATED.3IONS-SCNC: 9 MMOL/L (ref 3–14)
BUN SERPL-MCNC: 19 MG/DL (ref 7–30)
CALCIUM SERPL-MCNC: 8.7 MG/DL (ref 8.5–10.1)
CHLORIDE SERPL-SCNC: 104 MMOL/L (ref 94–109)
CO2 SERPL-SCNC: 28 MMOL/L (ref 20–32)
CREAT SERPL-MCNC: 1.03 MG/DL (ref 0.66–1.25)
GFR SERPL CREATININE-BSD FRML MDRD: 69 ML/MIN/1.7M2
GLUCOSE BLDC GLUCOMTR-MCNC: 118 MG/DL (ref 70–99)
GLUCOSE BLDC GLUCOMTR-MCNC: 120 MG/DL (ref 70–99)
GLUCOSE BLDC GLUCOMTR-MCNC: 86 MG/DL (ref 70–99)
GLUCOSE SERPL-MCNC: 85 MG/DL (ref 70–99)
INR PPP: 2.92 (ref 0.86–1.14)
POTASSIUM SERPL-SCNC: 4.2 MMOL/L (ref 3.4–5.3)
SODIUM SERPL-SCNC: 141 MMOL/L (ref 133–144)

## 2017-02-26 PROCEDURE — 00000146 ZZHCL STATISTIC GLUCOSE BY METER IP

## 2017-02-26 PROCEDURE — 36415 COLL VENOUS BLD VENIPUNCTURE: CPT | Performed by: INTERNAL MEDICINE

## 2017-02-26 PROCEDURE — A9270 NON-COVERED ITEM OR SERVICE: HCPCS | Mod: GY | Performed by: PHYSICIAN ASSISTANT

## 2017-02-26 PROCEDURE — 80048 BASIC METABOLIC PNL TOTAL CA: CPT | Performed by: INTERNAL MEDICINE

## 2017-02-26 PROCEDURE — 85610 PROTHROMBIN TIME: CPT | Performed by: INTERNAL MEDICINE

## 2017-02-26 PROCEDURE — 25000132 ZZH RX MED GY IP 250 OP 250 PS 637: Mod: GY | Performed by: INTERNAL MEDICINE

## 2017-02-26 PROCEDURE — 12000007 ZZH R&B INTERMEDIATE

## 2017-02-26 PROCEDURE — A9270 NON-COVERED ITEM OR SERVICE: HCPCS | Mod: GY | Performed by: INTERNAL MEDICINE

## 2017-02-26 PROCEDURE — 25000132 ZZH RX MED GY IP 250 OP 250 PS 637: Mod: GY | Performed by: PHYSICIAN ASSISTANT

## 2017-02-26 PROCEDURE — 99233 SBSQ HOSP IP/OBS HIGH 50: CPT | Performed by: INTERNAL MEDICINE

## 2017-02-26 RX ORDER — DEXTROSE MONOHYDRATE 25 G/50ML
25-50 INJECTION, SOLUTION INTRAVENOUS
Status: DISCONTINUED | OUTPATIENT
Start: 2017-02-26 | End: 2017-02-28 | Stop reason: HOSPADM

## 2017-02-26 RX ORDER — DIGOXIN 125 MCG
125 TABLET ORAL DAILY
Status: DISCONTINUED | OUTPATIENT
Start: 2017-02-27 | End: 2017-02-28 | Stop reason: HOSPADM

## 2017-02-26 RX ORDER — TERAZOSIN 5 MG/1
5 CAPSULE ORAL
Status: DISCONTINUED | OUTPATIENT
Start: 2017-02-26 | End: 2017-02-28 | Stop reason: HOSPADM

## 2017-02-26 RX ORDER — NICOTINE POLACRILEX 4 MG
15-30 LOZENGE BUCCAL
Status: DISCONTINUED | OUTPATIENT
Start: 2017-02-26 | End: 2017-02-28 | Stop reason: HOSPADM

## 2017-02-26 RX ADMIN — METOPROLOL SUCCINATE 25 MG: 25 TABLET, EXTENDED RELEASE ORAL at 08:28

## 2017-02-26 RX ADMIN — TERAZOSIN HYDROCHLORIDE ANHYDROUS 5 MG: 5 CAPSULE ORAL at 18:11

## 2017-02-26 RX ADMIN — DIGOXIN 125 MCG: 0.12 TABLET ORAL at 08:28

## 2017-02-26 RX ADMIN — METFORMIN HYDROCHLORIDE 1000 MG: 500 TABLET ORAL at 18:11

## 2017-02-26 RX ADMIN — SIMVASTATIN 40 MG: 20 TABLET, FILM COATED ORAL at 18:11

## 2017-02-26 RX ADMIN — Medication 1.5 MG: at 18:11

## 2017-02-26 RX ADMIN — OMEPRAZOLE 40 MG: 20 CAPSULE, DELAYED RELEASE ORAL at 20:08

## 2017-02-26 RX ADMIN — DIVALPROEX SODIUM 250 MG: 250 TABLET, DELAYED RELEASE ORAL at 21:24

## 2017-02-26 RX ADMIN — TORSEMIDE 10 MG: 10 TABLET ORAL at 08:28

## 2017-02-26 RX ADMIN — LEVOTHYROXINE SODIUM 150 MCG: 75 TABLET ORAL at 08:28

## 2017-02-26 ASSESSMENT — ACTIVITIES OF DAILY LIVING (ADL)
DRESS: 0-->INDEPENDENT
RETIRED_COMMUNICATION: 0-->UNDERSTANDS/COMMUNICATES WITHOUT DIFFICULTY
AMBULATION: 0-->INDEPENDENT
FALL_HISTORY_WITHIN_LAST_SIX_MONTHS: YES
RETIRED_EATING: 0-->INDEPENDENT
TOILETING: 0-->INDEPENDENT
NUMBER_OF_TIMES_PATIENT_HAS_FALLEN_WITHIN_LAST_SIX_MONTHS: 1
COGNITION: 0 - NO COGNITION ISSUES REPORTED
SWALLOWING: 0-->SWALLOWS FOODS/LIQUIDS WITHOUT DIFFICULTY
TRANSFERRING: 0-->INDEPENDENT
BATHING: 0-->INDEPENDENT

## 2017-02-26 NOTE — PLAN OF CARE
Problem: Discharge Planning  Goal: Discharge Planning (Adult, OB, Behavioral, Peds)  Outcome: Improving  PRIMARY DIAGNOSIS: Chest tightness  OUTPATIENT/OBSERVATION GOALS TO BE MET BEFORE DISCHARGE:      1. Negative Serial Troponin Yes, negative x2  2. Resolution of chest pain Yes  3. Pain status: Pain free  4. Negative stress test Yes  5. Stable vital signs Yes  6. ADLs back to baseline? Yes  7. Activity and level of assistance: Up with standby assistance.  8. Barriers to discharge noted No  9.Interpretation of rhythm per telemetry tech: Afib rate controlled HR 75      Vitals stable, on telemetry monitoring, afib rate controlled. Denies pain. Walked in halls, pt still feeling winded and tired while walking, HR varied between 59-96, mainly staying in the 70's/80's. Patient is now inpatient status. Plan for a few more doses of IV Digoxin and start oral Digoxin in the morning.

## 2017-02-26 NOTE — PLAN OF CARE
Problem: Discharge Planning  Goal: Discharge Planning (Adult, OB, Behavioral, Peds)  Outcome: No Change  PRIMARY DIAGNOSIS: Chest tightness  OUTPATIENT/OBSERVATION GOALS TO BE MET BEFORE DISCHARGE:      1. Negative Serial Troponin Yes, negative x2  2. Resolution of chest pain Yes  3. Pain status: Pain free  4. Negative stress test Yes  5. Stable vital signs Yes  6. ADLs back to baseline? Yes  7. Activity and level of assistance: Up with standby assistance.  8. Barriers to discharge noted No  9.Interpretation of rhythm per telemetry tech: Afib rate controlled HR 75      Patients vitals stable, on telemetry monitoring, afib rate controlled. Denies pain. Patient still feeling winded and tired while walking long distance.  HR varied between 70-85.. Patient is now inpatient status.  IV Digoxin 0200 scheduled does held by hospitalist. Patient HR 73. Patient denies SOB at rest and HR has been stable in the 70's. Will continue with scheduled dose at 0800. Will continue to monitor, assess, and offer supportive cares.

## 2017-02-26 NOTE — PROGRESS NOTES
[facility]  Hospitalist Progress Note  Name: Sean Brunner    MRN: [id number]  Provider:  Layla Ospina DO    Initial presenting complaint/issue to hospital (Diagnosis): SOB with exertion         Assessment and Plan:      Summary of Stay: Sean Brunner is a 82 year old male admitted on 2/24/2017 with SOB with exertion--progressively worsening in the last 3-4 wks    Problem List:   1.  SOB with exertion  Most likely d/t afib with RVR - SOB with exertion  When he walked yesterday he was SOB and his HR was in the 130's  I have asked nursing to walk again this am and monitor HR and symtpoms  No PE or PNA on CT of the chest     It is possible that his symptoms are stable angina.  I have reviewed CTA - 2011---normal coronary arteries, mild-moderate CAD in the LAD  Order placed for lexiscan cardiac stress test in the am      2.  A fib with RVR  Chronic a fib - heart rate controlled at rest and uncontrolled (and symptomatic)  with ambulation yesterday  Electrolytes and TSH within normal limits  He was loaded with IV digoxin yesterday and started on po digoxin this am  Will order digoxin level in the am    His home metoprolol dose was also increased yesterday from toprol xl 25mg po bid to   25mg am/50mg pm.     He has tolerated this new medication and increased dose of chronic med well overnight  Will continue to monitor HR/BP closely on tele and in the hospital setting  He does follow with Dr. Leary, [facility] heart ---but has not seen since 2012 -at that time was on toprol xl 50mg/25mg    The fast rate appears to be contributing to his symptoms and not the rhythm itself.    He is on chronic anti-coagulation with warfarin    Echo yesterday reviewed - see report below - normal EF, severe right heart dilatation, bilateral atrial enlargement, and RV pressure and volume overload.    3.  Hiatal hernia and symptomatic GERD  Elevate head of bed  Continue home meds    4.  Chronic anti-coagulation -  "secondary to # 2  Pharmacy to help manage chronic daily couamdin dosing.    5.  Chronic headaches  Continue home meds    6.  DM  Continue metformin and switch to diabetic diet  Will add SSI prn    7.  Chronic left leg edema  Secondary to crush ankle injury on that side  No acute change per family or patient    DVT Prophylaxis:  -  coumadin  Code Status: Full Code  Discharge Dispo: home  Estimated Disch Date / # of Days until Discharge: 2-3 days        Interval History:      \"I am tired\".  His SOB with exertion is much improved last night.  NO CP.  Still tired with prolonged ambulation---but dyspnea better and he was able to ambulate for further distances.  Not yet up in halls today. NO F/C, N/V - appetite good.  HA \"ok\".  No new visual changes or other complaints         Physical Exam:      Last Vital Signs:  Temp: 98.4  F (36.9  C) Temp src: Oral BP: 104/53 Pulse: 74 Heart Rate: 74 Resp: 16 SpO2: 94 % O2 Device: None (Room air)       GEN: more fatigued appearing today---but easily arousable. Alert, oriented x 3, comfortable, NAD, [place]  HEENT:  Normocephalic/atraumatic, PERRL, no scleral icterus, no nasal discharge, mouth moist.  NECK: no clear thyromegaly or JVD  LA: none palpable in the cervical/clavicular area bilaterally  CV:  Distant, irregular rate and rhythm, 2/6 sys murmur to ausc.  S1 + S2 noted, no S3 or S4.  LUNGS: less extensive bibasilar rales.  No clear rhonchi or wheezing auscultated bilaterally.  No costal retractions bilaterally.  Symmetric chest rise on inhalation noted.  ABD:  Very active bowel sounds, soft, obese, non-tender/non-distended.  No rebound/guarding/rigidity.  No masses palpated.  No obvious HSM to exam.  EXT:  Chronic venous status discoloration bilaterally and a chronic stasis ulcer on the left (covered today).  Left leg chronically>right leg.  Trace edema bilaterally, no clear cyanosis bilaterally. No joint synovitis noted.  No calf-tenderness or asymmetry noted.  SKIN:  Dry to " touch, no rashes or jaundice noted.  PSYCH:  Mood appropriate, Not tearful or depressed.  Maintains direct eye contact.         Medications:      All current medications were reviewed.         Data:      All new lab and imaging data was reviewed.   Labs:    Recent Labs  Lab 02/26/17  0630 02/25/17  1410 02/24/17  1131 02/24/17  1109    139  --  141   POTASSIUM 4.2 4.3  --  4.0   CHLORIDE 104 106  --  108   CO2 28 24  --  25   ANIONGAP 9 9  --  8   GLC 85 141*  --  84   BUN 19 18  --  17   CR 1.03 0.95  --  0.90   GFRESTIMATED 69 76 81 81   GFRESTBLACK 84 >90African American GFR Calc >90 >90African American GFR Calc   TYSON 8.7 8.8  --  9.0   magnesium - pending    Recent Labs  Lab 02/24/17  1109   DD 1.0*       Recent Labs  Lab 02/26/17  0630 02/25/17  0640 02/24/17  1109   INR 2.92* 2.95* 2.69*       Recent Labs  Lab 02/24/17  1716 02/24/17  1114 02/24/17  1109   TROPONIN  --  0.01  --    TROPI <0.015The 99th percentile for upper reference range is 0.045 ug/L.  Troponin values in the range of 0.045 - 0.120 ug/L may be associated with risks of adverse clinical events.  --  <0.015The 99th percentile for upper reference range is 0.045 ug/L.  Troponin values in the range of 0.045 - 0.120 ug/L may be associated with risks of adverse clinical events.       Recent Labs  Lab 02/24/17  1109   TSH 2.52       Recent Labs  Lab 02/24/17  1340   COLOR Yellow   APPEARANCE Clear   URINEGLC Negative   URINEBILI Negative   URINEKETONE Negative   SG 1.005   UBLD Negative   URINEPH 6.5   PROTEIN 10*   NITRITE Negative   LEUKEST Small*   RBCU 4*   WBCU 5*      Recent Imaging:   ECHO - 2/25/17  The left ventricle is normal in size. There is normal left ventricular wall  thickness. Left ventricular systolic function is normal. The visual ejection  fraction is estimated at 55-60%. Flattened septum is consistent with RV  pressure/volume overload. There is no thrombus seen in the left ventricle.  The right ventricle is severely dilated.  The right ventricular systolic  function is mild to moderately reduced.  The left atrium is moderately dilated. The right atrium is severely dilated.  There is severe (4+) tricuspid regurgitation. The right ventricular systolic  pressure is approximated at 30.9 mmHg plus the right atrial pressure. This  most likely represents an underestimation of the true RVSP.  Mild aortic stenosis.  In direct comparison to the previous study dated 11/07/2012, there has been a  mild interval increase in right ventricular size and deterioration in right  ventricular systolic function.  No results found for this or any previous visit (from the past 24 hour(s)).

## 2017-02-26 NOTE — PLAN OF CARE
Problem: Goal Outcome Summary  Goal: Goal Outcome Summary  Outcome: No Change  Problem: Goal Outcome Summary  Goal: Goal Outcome Summary  Outcome: No Change  Problem: Discharge Planning  Goal: Discharge Planning (Adult, OB, Behavioral, Peds)  Outcome: No Change  PRIMARY DIAGNOSIS: Chest tightness  OUTPATIENT/OBSERVATION GOALS TO BE MET BEFORE DISCHARGE:      1. Negative Serial Troponin Yes, negative x2  2. Resolution of chest pain Yes  3. Pain status: Pain free  4. Negative stress test Yes  5. Stable vital signs Yes  6. ADLs back to baseline? Yes  7. Activity and level of assistance: Up with standby assistance.  8. Barriers to discharge noted No  9.Interpretation of rhythm per telemetry tech: Afib rate controlled HR 75      Patients vitals stable, on telemetry monitoring, afib rate controlled. Denies pain. Patient still feeling winded and tired while walking long distance. Patient is now inpatient status. Patient denies SOB at rest and HR has been stable in the 70's while at rest. HR did dip to mid 50s with ambulation in [place]. Oral digoxin given. Will continue to monitor, assess, and offer supportive cares.

## 2017-02-26 NOTE — PLAN OF CARE
Problem: Discharge Planning  Goal: Discharge Planning (Adult, OB, Behavioral, Peds)  Outcome: Improving  PRIMARY DIAGNOSIS: Chest tightness  OUTPATIENT/OBSERVATION GOALS TO BE MET BEFORE DISCHARGE:      1. Negative Serial Troponin Yes, negative x2  2. Resolution of chest pain Yes  3. Pain status: Pain free  4. Negative stress test Yes  5. Stable vital signs Yes  6. ADLs back to baseline? Yes  7. Activity and level of assistance: Up with standby assistance.  8. Barriers to discharge noted No  9.Interpretation of rhythm per telemetry tech: Afib rate controlled HR 75      Patients vitals stable, on telemetry monitoring, afib rate controlled. Denies pain. Patient still feeling winded and tired while walking long distance.  HR varied between 70-85.. Patient is now inpatient status. Plan for a few more doses of IV Digoxin and start oral Digoxin in the morning. Will continue to monitor, assess, and offer supportive cares.

## 2017-02-26 NOTE — PROGRESS NOTES
Patient ambulated in halls, oxygen sats 95-96% on room air, HR varying between 59 and 96, for the most part stayed in the 70s-80's. Pt still feeling winded and tired with activity.

## 2017-02-26 NOTE — PROGRESS NOTES
Walked with patient in hallways, Sba with portable pulse ox.   Pt became sob, tired, hanging on to the wall.   O2 94% heart rate was 80's for the most part but did drop to 56 once's. Pt stated he did better yesterday. Rn notified

## 2017-02-26 NOTE — PLAN OF CARE
Problem: Goal Outcome Summary  Goal: Goal Outcome Summary  Outcome: No Change  Problem: Discharge Planning  Goal: Discharge Planning (Adult, OB, Behavioral, Peds)  Outcome: No Change  PRIMARY DIAGNOSIS: Chest tightness  OUTPATIENT/OBSERVATION GOALS TO BE MET BEFORE DISCHARGE:      1. Negative Serial Troponin Yes, negative x2  2. Resolution of chest pain Yes  3. Pain status: Pain free  4. Negative stress test Yes  5. Stable vital signs Yes  6. ADLs back to baseline? Yes  7. Activity and level of assistance: Up with standby assistance.  8. Barriers to discharge noted No  9.Interpretation of rhythm per telemetry tech: Afib rate controlled HR 75      Patients vitals stable, on telemetry monitoring, afib rate controlled. Denies pain. Patient still feeling winded and tired while walking long distance. HR varied between 70-85.. Patient is now inpatient status. Patient denies SOB at rest and HR has been stable in the 70's. Oral digoxin given. Will continue to monitor, assess, and offer supportive cares.

## 2017-02-27 ENCOUNTER — APPOINTMENT (OUTPATIENT)
Dept: NUCLEAR MEDICINE | Facility: CLINIC | Age: 82
DRG: 315 | End: 2017-02-27
Attending: INTERNAL MEDICINE
Payer: MEDICARE

## 2017-02-27 LAB
ANION GAP SERPL CALCULATED.3IONS-SCNC: 9 MMOL/L (ref 3–14)
BASOPHILS # BLD AUTO: 0 10E9/L (ref 0–0.2)
BASOPHILS NFR BLD AUTO: 0.7 %
BUN SERPL-MCNC: 17 MG/DL (ref 7–30)
CALCIUM SERPL-MCNC: 9 MG/DL (ref 8.5–10.1)
CHLORIDE SERPL-SCNC: 102 MMOL/L (ref 94–109)
CO2 SERPL-SCNC: 30 MMOL/L (ref 20–32)
CREAT SERPL-MCNC: 0.93 MG/DL (ref 0.66–1.25)
DIFFERENTIAL METHOD BLD: ABNORMAL
DIGOXIN SERPL-MCNC: 1 UG/L (ref 0.5–2)
EOSINOPHIL # BLD AUTO: 0.1 10E9/L (ref 0–0.7)
EOSINOPHIL NFR BLD AUTO: 4.3 %
ERYTHROCYTE [DISTWIDTH] IN BLOOD BY AUTOMATED COUNT: 15.2 % (ref 10–15)
GFR SERPL CREATININE-BSD FRML MDRD: 78 ML/MIN/1.7M2
GLUCOSE BLDC GLUCOMTR-MCNC: 100 MG/DL (ref 70–99)
GLUCOSE BLDC GLUCOMTR-MCNC: 82 MG/DL (ref 70–99)
GLUCOSE BLDC GLUCOMTR-MCNC: 91 MG/DL (ref 70–99)
GLUCOSE BLDC GLUCOMTR-MCNC: 98 MG/DL (ref 70–99)
GLUCOSE SERPL-MCNC: 79 MG/DL (ref 70–99)
HCT VFR BLD AUTO: 33.7 % (ref 40–53)
HGB BLD-MCNC: 11 G/DL (ref 13.3–17.7)
IMM GRANULOCYTES # BLD: 0 10E9/L (ref 0–0.4)
IMM GRANULOCYTES NFR BLD: 0.3 %
INR PPP: 2.93 (ref 0.86–1.14)
LYMPHOCYTES # BLD AUTO: 0.8 10E9/L (ref 0.8–5.3)
LYMPHOCYTES NFR BLD AUTO: 26.9 %
MCH RBC QN AUTO: 30.6 PG (ref 26.5–33)
MCHC RBC AUTO-ENTMCNC: 32.6 G/DL (ref 31.5–36.5)
MCV RBC AUTO: 94 FL (ref 78–100)
MONOCYTES # BLD AUTO: 0.5 10E9/L (ref 0–1.3)
MONOCYTES NFR BLD AUTO: 15.1 %
NEUTROPHILS # BLD AUTO: 1.6 10E9/L (ref 1.6–8.3)
NEUTROPHILS NFR BLD AUTO: 52.7 %
NRBC # BLD AUTO: 0 10*3/UL
NRBC BLD AUTO-RTO: 0 /100
PLATELET # BLD AUTO: 98 10E9/L (ref 150–450)
POTASSIUM SERPL-SCNC: 3.7 MMOL/L (ref 3.4–5.3)
RBC # BLD AUTO: 3.59 10E12/L (ref 4.4–5.9)
SODIUM SERPL-SCNC: 141 MMOL/L (ref 133–144)
WBC # BLD AUTO: 3.1 10E9/L (ref 4–11)

## 2017-02-27 PROCEDURE — 93017 CV STRESS TEST TRACING ONLY: CPT

## 2017-02-27 PROCEDURE — 93018 CV STRESS TEST I&R ONLY: CPT | Performed by: INTERNAL MEDICINE

## 2017-02-27 PROCEDURE — A9270 NON-COVERED ITEM OR SERVICE: HCPCS | Mod: GY | Performed by: INTERNAL MEDICINE

## 2017-02-27 PROCEDURE — 25000132 ZZH RX MED GY IP 250 OP 250 PS 637: Mod: GY | Performed by: INTERNAL MEDICINE

## 2017-02-27 PROCEDURE — A9270 NON-COVERED ITEM OR SERVICE: HCPCS | Mod: GY | Performed by: PHYSICIAN ASSISTANT

## 2017-02-27 PROCEDURE — 12000007 ZZH R&B INTERMEDIATE

## 2017-02-27 PROCEDURE — 80048 BASIC METABOLIC PNL TOTAL CA: CPT | Performed by: INTERNAL MEDICINE

## 2017-02-27 PROCEDURE — 80162 ASSAY OF DIGOXIN TOTAL: CPT | Performed by: INTERNAL MEDICINE

## 2017-02-27 PROCEDURE — 99222 1ST HOSP IP/OBS MODERATE 55: CPT | Performed by: INTERNAL MEDICINE

## 2017-02-27 PROCEDURE — 25000128 H RX IP 250 OP 636: Performed by: INTERNAL MEDICINE

## 2017-02-27 PROCEDURE — 25000132 ZZH RX MED GY IP 250 OP 250 PS 637: Mod: GY | Performed by: PHYSICIAN ASSISTANT

## 2017-02-27 PROCEDURE — 99232 SBSQ HOSP IP/OBS MODERATE 35: CPT | Performed by: INTERNAL MEDICINE

## 2017-02-27 PROCEDURE — 78452 HT MUSCLE IMAGE SPECT MULT: CPT | Mod: 26 | Performed by: INTERNAL MEDICINE

## 2017-02-27 PROCEDURE — 85610 PROTHROMBIN TIME: CPT | Performed by: INTERNAL MEDICINE

## 2017-02-27 PROCEDURE — 34300033 ZZH RX 343: Performed by: INTERNAL MEDICINE

## 2017-02-27 PROCEDURE — 36415 COLL VENOUS BLD VENIPUNCTURE: CPT | Performed by: INTERNAL MEDICINE

## 2017-02-27 PROCEDURE — 85025 COMPLETE CBC W/AUTO DIFF WBC: CPT | Performed by: INTERNAL MEDICINE

## 2017-02-27 PROCEDURE — 78452 HT MUSCLE IMAGE SPECT MULT: CPT

## 2017-02-27 PROCEDURE — 00000146 ZZHCL STATISTIC GLUCOSE BY METER IP

## 2017-02-27 PROCEDURE — 93016 CV STRESS TEST SUPVJ ONLY: CPT | Performed by: INTERNAL MEDICINE

## 2017-02-27 PROCEDURE — A9502 TC99M TETROFOSMIN: HCPCS | Performed by: INTERNAL MEDICINE

## 2017-02-27 RX ORDER — WARFARIN SODIUM 1 MG/1
1 TABLET ORAL
Status: COMPLETED | OUTPATIENT
Start: 2017-02-27 | End: 2017-02-27

## 2017-02-27 RX ORDER — REGADENOSON 0.08 MG/ML
INJECTION, SOLUTION INTRAVENOUS
Status: DISPENSED
Start: 2017-02-27 | End: 2017-02-27

## 2017-02-27 RX ORDER — REGADENOSON 0.08 MG/ML
0.4 INJECTION, SOLUTION INTRAVENOUS ONCE
Status: COMPLETED | OUTPATIENT
Start: 2017-02-27 | End: 2017-02-27

## 2017-02-27 RX ADMIN — REGADENOSON 0.4 MG: 0.08 INJECTION, SOLUTION INTRAVENOUS at 08:46

## 2017-02-27 RX ADMIN — DIVALPROEX SODIUM 250 MG: 250 TABLET, DELAYED RELEASE ORAL at 21:17

## 2017-02-27 RX ADMIN — OMEPRAZOLE 40 MG: 20 CAPSULE, DELAYED RELEASE ORAL at 19:45

## 2017-02-27 RX ADMIN — METFORMIN HYDROCHLORIDE 1000 MG: 500 TABLET ORAL at 16:07

## 2017-02-27 RX ADMIN — DIGOXIN 125 MCG: 125 TABLET ORAL at 10:43

## 2017-02-27 RX ADMIN — SIMVASTATIN 40 MG: 20 TABLET, FILM COATED ORAL at 16:07

## 2017-02-27 RX ADMIN — TETROFOSMIN 11 MCI.: 0.23 INJECTION, POWDER, LYOPHILIZED, FOR SOLUTION INTRAVENOUS at 07:12

## 2017-02-27 RX ADMIN — METOPROLOL SUCCINATE 50 MG: 50 TABLET, EXTENDED RELEASE ORAL at 19:45

## 2017-02-27 RX ADMIN — TETROFOSMIN 32 MCI.: 0.23 INJECTION, POWDER, LYOPHILIZED, FOR SOLUTION INTRAVENOUS at 09:41

## 2017-02-27 RX ADMIN — METOPROLOL SUCCINATE 25 MG: 25 TABLET, EXTENDED RELEASE ORAL at 10:42

## 2017-02-27 RX ADMIN — LEVOTHYROXINE SODIUM 150 MCG: 75 TABLET ORAL at 10:46

## 2017-02-27 RX ADMIN — TORSEMIDE 10 MG: 10 TABLET ORAL at 10:42

## 2017-02-27 RX ADMIN — WARFARIN SODIUM 1 MG: 1 TABLET ORAL at 17:18

## 2017-02-27 NOTE — PLAN OF CARE
Problem: Goal Outcome Summary  Goal: Goal Outcome Summary  4878-0141: VSS, afebrile.  Denies pain.  AAOx4. Tolerates diet, denies nausea. Up SBA.  Tele reads afib CVR.  Evening BG- 120. PIV SL. Urinating well. 3+  chronic edema to LLE r/t crush injury, small wound present to L shin covered with foam mepilex, pt states he recently scraped or hit something with the extremity. Weak distal pulses to LLE, greater than 3 second cap refil, warm to touch. LS- diminished. Continue with POC.      3927-4609: VSS, afebrile.  Denies pain, denies nausea. Tele reads afib CVR.  0200 BG- 91.  LS-diminished.  Foam mepilex intact, CMS intact to LLE. No other changes overnight. Plan for lexiscan today.

## 2017-02-27 NOTE — CONSULTS
CARDIOLOGY CONSULTATION      CONSULTATION REQUESTED BY:  Dr. Daugherty.      REASON FOR CARDIOLOGY CONSULT:  Shortness of breath with exertion, severe TR.      CHIEF COMPLAINT:  Dyspnea on exertion.      HISTORY OF PRESENT ILLNESS:  Mr. Sean Brunner is a very pleasant 82-year-old gentleman with past medical history of known moderately severe tricuspid regurgitation, cor pulmonale, moderate nonobstructive coronary artery disease on coronary CT angiogram in 2011, chronic atrial fibrillation, on Coumadin for stroke prophylaxis, history of pulmonary embolism, history of morbid obesity and has lost about 100 pounds over the last 4-5 years, obstructive sleep apnea on intermittent CPAP, hypothyroidism, diabetes, BPH who has been admitted with dyspnea on exertion for the last 2-3 months.  Cardiology was consulted because echocardiogram showing severe tricuspid regurgitation.  To be noted, the patient has been seen by Dr. Adkins and then by Dr. Joyce mainly for pulmonary hypertension and significant tricuspid regurgitation.  He has been found to have cor pulmonale.  He was found to have moderately obstructive lung disease on PFTs in 2011.  Dr. Joyce has seen him in the past and at that time it was decided that he does not need right heart catheterization.  The patient tells me for the last 2-3 months he has been having dyspnea on exertion; for example, climbing stairs or walking more than a block he gets short of breath.  No chest discomfort.  No orthopnea.  He may have noticed about 10 pounds of weight gain over the last 2 months or so.  He intermittently takes torsemide, but has not been very compliant with it.  He also has sleep apnea but tells me that he has issues with the CPAP mask and he feels as if the air is rushing out of his left ear so he has not been using it often.  He had a CT chest done this admission, did not show any evidence of pulmonary embolism.  Echocardiogram done this admission as well as in  2012 were personally reviewed by me.  In both echocardiogram tricuspid regurgitation is 3-4+ with moderately dilated RV( now appears severely dilated) and RV function appears mild to moderately reduced in both studies.  IVC is quite enlarged in present study.  RVSP is estimated but TR jet is about 31 mmHg plus RA.  LVEF appears normal and there is flattened septum consistent with both RV pressure and volume overload.  Remarkably on labs, his kidney functions appear normal and liver enzymes also appear normal.      REVIEW OF SYSTEMS:  A complete review of systems was performed and was negative except as above in HPI.      PAST MEDICAL HISTORY:   1.  History of already known moderately severe tricuspid regurgitation, moderately enlarged RV with mildly reduced RV systolic function.   2.  Cor pulmonale.   3.  Moderately obstructive disease on PFTs in 2011.   4.  Moderate coronary artery disease on CT coronary angiogram in 2011.   5.  Chronic atrial fibrillation on Coumadin.   6.  History of PE.   7.  Obstructive sleep apnea with intermittent use of CPAP   8.  Obesity and according to patient, he has lost about 100 pounds over the last 4-5 years.      SOCIAL HISTORY:  No tobacco abuse.  The patient used to work in construction, lives in [place].      FAMILY HISTORY:  Reviewed and noncontributory.      ALLERGIES:  No known drug allergies.      MEDICATIONS:  Digoxin 125 mcg daily, Levothyroxine 150 mcg daily, metformin, Toprol-XL 25 mg in the morning and 50 mg in the evening, torsemide 10 mg daily, Coumadin, Terazosin.      PHYSICAL EXAMINATION:   VITAL SIGNS:  Blood pressure 126/65, heart rate 77 irregular, respiratory rate 16, 95% on room air.   GENERAL:  The patient appears pleasant, comfortable.   NECK:  JVP up to angle of jaw.   CARDIOVASCULAR:  There is grade 2/6 systolic murmur heard over the left lower sternal border, no S3, S4, rub or gallop.     RESPIRATORY:  Bilaterally decreased air entry.   ABDOMEN:   Obese, soft, nontender.   EXTREMITIES:  Chronic venous stasis changes bilaterally with about 1+ pitting pedal edema.   NEUROLOGIC:  Alert.   PSYCH:  Normal affect.   SKIN:  Chronic venous stasis changes on lower extremity.   HEENT:  No pallor or icterus.        EKG was personally reviewed by me and it shows atrial fibrillation with ventricular rate of 68 beats per minute.   Echocardiogram.  Images personally reviewed and compared to 2012 echocardiogram.  Overall, both significant TR and enlarged RV was seen previous study with evidence of RV volume and pressure overload in both studies.  RV appears slightly bigger than compared to last study and RV systolic function appears slightly reduced as compared to last study.  Normal LVEF.      CT chest without any evidence of PE.      BMP appears normal.  CBC shows WBC of 3.1, hemoglobin of 11, platelets of 98,000, troponin less than 0.015 twice, NT proBNP within normal limits of 787.  INR 2.93.      Lexiscan stress test done today shows normal perfusion with LVEF of 76%.      ASSESSMENT AND PLAN:  A very delightful 82-year-old gentleman with a history of cor pulmonale with history of known significant tricuspid regurgitation and RV dilatation with reduced RV systolic function with normal LVEF.  Other comorbidities of obstructive sleep apnea with intermittent use of CPAP, obesity with about 100 pounds weight loss over the last 5-6 years or so, chronic atrial fibrillation with [id number] score of 4 on Coumadin for stroke prophylaxis, history of PE, moderate nonobstructive coronary artery disease on coronary CT angiogram with negative Lexiscan stress test this admission who is now admitted with progressive dyspnea on exertion for the last few months.  These symptoms can certainly be from cor pulmonale/lung disease/sleep apnea.  The patient has been seen by Dr. Adkins and Dr. Joyce.  I recommend that patient use his CPAP in a more compliant way and also recommend  outpatient PFT to evaluate for lung disease and I think the patient may benefit from a right heart catheterization and I will set up a followup with Dr. Joyce to discuss this.  I recommend continuing torsemide given the evidence of elevated CVP, both on examination with elevated JVP as well as dilated IVC on echocardiogram.  His ventricular rates appear reasonably well controlled on digoxin and beta blocker.   1.  Progressive dyspnea on exertion could be due to underlying cor pulmonale/right sided congestive heart failure in the setting of cor pulmonale/severe TR.  He has been seen by Dr. Adkins and then Dr. Joyce for this.   2.  Chronic atrial fibrillation.  Ventricular rate is well controlled on Coumadin for stroke prophylaxis.   3.  Moderate coronary artery disease with a negative Lexiscan stress test.   4.  Obesity.  Has lost about 100 pounds in the last several years.   5.  History of pulmonary embolism with recent CT chest showing no PE.   6.  Hiatal hernia.      RECOMMENDATIONS:   1.  Continue torsemide.   2.  Recommend more compliance with CPAP for sleep apnea.   3.  Outpatient PFTs.   4.  Follow up with his primary cardiologist, Dr. Joyce to consider right heart catheterization to more accurately estimate PA pressure, and to see if he can qualify for any PH therapy.  In case of absence of pulmonary hypertension tricuspid valve surgery can be considered, although clinically it looks like that he has history of cor pulmonale with evidence of pulmonary hypertension in the past.   5.  Continue Coumadin and beta blocker.   6.  Plan was extensively discussed with patient.      Thank you for involving me in the care of Mr. Brunner.  Cardiology will sign off.  Please call with any questions.         DANNI DE LEON MD             D: 2017 12:45   T: 2017 13:35   MT:       Name:     MAYITO BRUNNER   MRN:      [id number]        Account:       [id number]   :      1934            Consult Date:  02/27/2017      Document: [id number]       cc: rAmando Daugherty MD

## 2017-02-27 NOTE — PLAN OF CARE
Problem: Goal Outcome Summary  Goal: Goal Outcome Summary  Outcome: Improving  Pt progressing well. VSS. A&OX4. Tele A-fib CVR. Had baltazar scan, results in chart. Blood sugar was 79 and 98. 3+ edema to left leg, 2+ edema to right leg. Ambulates with standby assist.

## 2017-02-27 NOTE — PLAN OF CARE
Problem: Goal Outcome Summary  Goal: Goal Outcome Summary  Outcome: No Change  3p - 7p Tx from obs on day shift. A fib RVR. Now HR around 60. Plan for lexiscan tomorrow. C/o being tired. SOB with exertion.

## 2017-02-27 NOTE — PROGRESS NOTES
Pre-procedure:    Initial vital signs: /64, HR 72, RR 18  Allergies: reviewed   Rhythm: A. fib  Medications taken within 48 hours of procedure: none   Last Caffeine: nighttime  Lung sounds: CTA, no wheezing, crackles or rtx  Health History (COPD, Asthma, etc): hx of PE    Procedure: Lexiscan  Reaction/symptoms after receiving Zoë injection: Shortness of breath    Vital Signs:/58, HR 84, RR 20    Reversal agent: N/A    Post:   Resolution of symptoms?: YES  Vital signs: /54, HR 73, RR 18  Walk: NO  Return to Radiology

## 2017-02-27 NOTE — CONSULTS
Consult dictated (#[id number])    1. H/o cor pulmonale with know 3-4+ TR and RV dysfunction in past- slight increase in RV size and decline in RV systolic function compared to 2011 echo  2. Chronic afib- VR well controlled, on coumadin  3. Moderate obstruction on PFT 2011  4. STEWART- no compliant with CPAP  5. H/o PE- no PE on CT chest this admission  6. Non obstructive CAD CT cor angio 2011, negative stress test this admission  7. Obesity- lost 100 lbs over last several years    Recommendations  - outpatient PFT  - more compliance with cpap  -outpatient review with primary cardiologist Dr Joyce for consideration of RHC to evaluate pulmonary hypertension  - continue torsemide, coumadin, BB.    Discussed with patient.  Cardiology will sign off. Please call with questions.

## 2017-02-27 NOTE — PROGRESS NOTES
"[facility]  Hospitalist Progress Note  Armando Daugherty MD, MD 02/27/2017  (Text Page)  Reason for Stay (Diagnosis): increasing SOB         Assessment and Plan:      Summary of Stay: Sean Brunner is a 82 year old male admitted on 2/24/2017 with increasing SOB upon exertion for the past several weeks    Problem List:   1. SOB upon exertion- earlier thought process was possible angina equivalent. LExiscan today showed no definitive signs of ischemia. He was digitalized and now on daily digoxin as it was noted that his Afib gets RVR during ambulation  2. Severe TR 4+ and severe RV dilatation with moderately reduced RV systolic function- likely this is the cause of his worsening SOB. Will ask formal cardiology opinion about further management if any.  3. Afib on coumadin- now on digoxin and on BB  4. Chronic leg edema from prior crush ankle injury- on chronic diuretics  5. DM type 2- on metformin  6. Hiatal hernia and GERD    Continue inpatient tele-monitor care.  Monitor glucose levels.    DVT Prophylaxis: Warfarin  Code Status: Full Code  Discharge Dispo: home  Estimated Disch Date / # of Days until Disch: 24-36 hours        Interval History (Subjective):      Assumed care today.  Seen and examined  He has no ongoing complaints. Slept ok.  Tolerated lexisan earlier.  Remained afebrile                  Physical Exam:      Last Vital Signs:  /65 (BP Location: Right arm)  Pulse 77  Temp 98.1  F (36.7  C) (Oral)  Resp 16  Ht 1.753 m (5' 9\")  Wt 100.9 kg (222 lb 6 oz)  SpO2 95%  BMI 32.84 kg/m2       Wt Readings from Last 1 Encounters:   02/27/17 100.9 kg (222 lb 6 oz)     Vitals:    02/24/17 1528 02/27/17 0650   Weight: 107.2 kg (236 lb 4.8 oz) 100.9 kg (222 lb 6 oz)       Constitutional: Awake, alert, cooperative, no apparent distress   Respiratory: Clear to auscultation bilaterally, no crackles or wheezing   Cardiovascular: Regular rate and rhythm, normal S1 and S2, and no murmur noted "   Abdomen: Normal bowel sounds, soft, non-distended, non-tender   Skin: No rashes, no cyanosis, dry to touch   Neuro: Alert and oriented x3, no weakness, spontaneous and coherent speech   Extremities: No edema, normal range of motion   Other(s): Euthymic mood, not agitated       All other systems: Negative          Medications:      All current medications were reviewed with changes reflected in problem list.         Data:      All new lab and imaging data was reviewed.   Labs:    Recent Labs  Lab 02/27/17  0630 02/26/17  0630 02/25/17  1410    141 139   POTASSIUM 3.7 4.2 4.3   CHLORIDE 102 104 106   CO2 30 28 24   ANIONGAP 9 9 9   GLC 79 85 141*   BUN 17 19 18   CR 0.93 1.03 0.95   GFRESTIMATED 78 69 76   GFRESTBLACK >90African American GFR Calc 84 >90African American GFR Calc   TYSON 9.0 8.7 8.8       Recent Labs  Lab 02/27/17  0630 02/25/17  1410 02/24/17  1109   WBC 3.1*  --  4.9   HGB 11.0* 10.3* 11.2*   HCT 33.7*  --  35.7*   MCV 94  --  96   PLT 98*  --  124*       Recent Labs  Lab 02/27/17  1101 02/27/17  0630 02/27/17  0210 02/26/17  2123 02/26/17  1806 02/26/17  1155 02/26/17  0630 02/25/17  1410 02/24/17  1109   GLC  --  79  --   --   --   --  85 141* 84   BGM 98  --  91 120* 86 118*  --   --   --        Recent Labs  Lab 02/27/17  0630 02/26/17  0630 02/25/17  0640   INR 2.93* 2.92* 2.95*       Recent Labs  Lab 02/24/17  1716 02/24/17  1114 02/24/17  1109   TROPONIN  --  0.01  --    TROPI <0.015The 99th percentile for upper reference range is 0.045 ug/L.  Troponin values in the range of 0.045 - 0.120 ug/L may be associated with risks of adverse clinical events.  --  <0.015The 99th percentile for upper reference range is 0.045 ug/L.  Troponin values in the range of 0.045 - 0.120 ug/L may be associated with risks of adverse clinical events.      Imaging:   Results for orders placed or performed during the hospital encounter of 02/24/17   XR Chest 2 Views    Narrative    XR CHEST 2 VW 2/24/2017 12:07  PM    COMPARISON: 1/6/2015    HISTORY: Chest pain and dyspnea.      Impression    IMPRESSION: Mildly enlarged cardiac silhouette. No focal airspace  disease. No significant pleural effusion. No pneumothorax.    JENIFFER JULIEN   CT Chest Pulmonary Embolism w Contrast    Narrative    CT CHEST PULMONARY EMBOLISM WITH CONTRAST 2/24/2017 12:48 PM      HISTORY: Chest pain.     TECHNIQUE: CT chest with intravenous contrast using the pulmonary  embolus protocol. Radiation dose for this scan was reduced using  automated exposure control, adjustment of the mA and/or kV according  to patient size, or iterative reconstruction technique. 76 mL  Isovue-370     COMPARISON: 4/13/2011    FINDINGS: Evaluation of the pulmonary arterial system shows no  evidence of embolus. The thoracic aorta is calcified and slightly  tortuous. No aortic aneurysm or dissection. There are coronary artery  atherosclerotic calcifications. The heart is enlarged. There is no  mediastinal, hilar or axillary lymph node enlargement. There are a few  tiny calcified granulomas in the right upper lobe. Mild dependent  atelectasis bilaterally. A few bands of scar or atelectasis  bilaterally. Small bilateral pleural effusions. Images through the  upper abdomen are remarkable for small amount of ascites. The inferior  vena cava is very large which may be due to right heart dysfunction.  The liver has a slightly nodular contour suggesting cirrhosis.      Impression    IMPRESSION:  1. There is no pulmonary embolus, aortic aneurysm or dissection.  2. Coronary artery atherosclerotic calcification. Cardiomegaly.  3. Small bilateral pleural effusions.  4. Probable hepatic cirrhosis. Small amount of ascites.    JEANNINE WONG MD   NM MPI w Lexiscan    Narrative    GATED MYOCARDIAL PERFUSION SCINTIGRAPHY WITH INTRAVENOUS PHARMACOLOGIC  VASODILATATION LEXISCAN -ONE DAY STUDY     2/27/2017 9:42 AM  MAYITO ANSARI  82 years  Male  9/13/1934.    Indication/Clinical History:  Shortness of breath, chest pain    Impression  1.  Myocardial perfusion imaging using single isotope technique  demonstrated no evidence of ischemia or infarction.   2. Gated images demonstrated normal size left ventricle with normal  wall motion.  The left ventricular systolic function is normal at 76%.  3. Compared to the prior study from  .    Procedure  Pharmacologic stress testing was performed with Lexiscan at a rate of  0.08 mg/ml rapid bolus injection, for 15 seconds, 0.4 mg/5ml  intravenously. Low-level exercise was not performed along with the  vasodilator infusion.  The heart rate was 6767 at baseline and bj to  82 beats per minute during the Lexiscan infusion. The rest blood  pressure was 118/64 mmHg and was 118/57 mm Hg during Lexiscan  infusion. The patient experienced no chest pain  during the test.    Myocardial perfusion imaging was performed at rest, approximately 45  minutes after the injection intravenously of 11 mCi of Tc-99m Myoview.  At peak pharmacologic effect, 10-20 seconds after Lexiscan,  the  patient was injected intravenously with 32 mCi of  Tc-99m Myoview. The  post-stress tomographic imaging was performed approximately 60 minutes  after stress.    EKG Findings  The resting EKG demonstrated sinus rhythm with poor R wave progression  in the low voltage pattern.  The stress EKG demonstrated no EKG  changes suggestive of ischemia.    Tomographic Findings  Overall, the study quality is fair. Mild visceral tracer artifact is  noted . On the stress images, mild inferior count reduction is noted.  On the rest images, mild inferior count reduction is seen and likely  due to diaphragmatic attenuation and visceral tracer artifact . Gated  images demonstrated normal size left ventricle with normal wall  motion. The left ventricular ejection fraction was calculated to be  76%. TID was absent.    AFIA WEST MD

## 2017-02-28 VITALS
BODY MASS INDEX: 32.94 KG/M2 | HEIGHT: 69 IN | SYSTOLIC BLOOD PRESSURE: 109 MMHG | OXYGEN SATURATION: 96 % | RESPIRATION RATE: 16 BRPM | TEMPERATURE: 98.1 F | WEIGHT: 222.38 LBS | DIASTOLIC BLOOD PRESSURE: 58 MMHG | HEART RATE: 71 BPM

## 2017-02-28 LAB
GLUCOSE BLDC GLUCOMTR-MCNC: 112 MG/DL (ref 70–99)
GLUCOSE BLDC GLUCOMTR-MCNC: 141 MG/DL (ref 70–99)
GLUCOSE BLDC GLUCOMTR-MCNC: 93 MG/DL (ref 70–99)
INR PPP: 3.04 (ref 0.86–1.14)

## 2017-02-28 PROCEDURE — 36415 COLL VENOUS BLD VENIPUNCTURE: CPT | Performed by: INTERNAL MEDICINE

## 2017-02-28 PROCEDURE — 40000901 ZZH STATISTIC WOC PT EDUCATION, 0-15 MIN

## 2017-02-28 PROCEDURE — 99212 OFFICE O/P EST SF 10 MIN: CPT

## 2017-02-28 PROCEDURE — A9270 NON-COVERED ITEM OR SERVICE: HCPCS | Mod: GY | Performed by: INTERNAL MEDICINE

## 2017-02-28 PROCEDURE — 25000132 ZZH RX MED GY IP 250 OP 250 PS 637: Mod: GY | Performed by: INTERNAL MEDICINE

## 2017-02-28 PROCEDURE — 00000146 ZZHCL STATISTIC GLUCOSE BY METER IP

## 2017-02-28 PROCEDURE — 40000893 ZZH STATISTIC PT IP EVAL DEFER: Performed by: PHYSICAL THERAPIST

## 2017-02-28 PROCEDURE — A9270 NON-COVERED ITEM OR SERVICE: HCPCS | Mod: GY | Performed by: PHYSICIAN ASSISTANT

## 2017-02-28 PROCEDURE — 25000132 ZZH RX MED GY IP 250 OP 250 PS 637: Mod: GY | Performed by: PHYSICIAN ASSISTANT

## 2017-02-28 PROCEDURE — 99239 HOSP IP/OBS DSCHRG MGMT >30: CPT | Performed by: INTERNAL MEDICINE

## 2017-02-28 PROCEDURE — 85610 PROTHROMBIN TIME: CPT | Performed by: INTERNAL MEDICINE

## 2017-02-28 RX ORDER — DIGOXIN 125 MCG
125 TABLET ORAL DAILY
Qty: 30 TABLET | Refills: 0 | Status: SHIPPED | OUTPATIENT
Start: 2017-02-28 | End: 2017-04-04

## 2017-02-28 RX ORDER — WARFARIN SODIUM 1 MG/1
1 TABLET ORAL
Status: DISCONTINUED | OUTPATIENT
Start: 2017-02-28 | End: 2017-02-28 | Stop reason: HOSPADM

## 2017-02-28 RX ADMIN — LEVOTHYROXINE SODIUM 150 MCG: 75 TABLET ORAL at 05:53

## 2017-02-28 RX ADMIN — METOPROLOL SUCCINATE 25 MG: 25 TABLET, EXTENDED RELEASE ORAL at 08:11

## 2017-02-28 RX ADMIN — ACETAMINOPHEN 650 MG: 325 TABLET, FILM COATED ORAL at 08:20

## 2017-02-28 RX ADMIN — DIGOXIN 125 MCG: 125 TABLET ORAL at 10:18

## 2017-02-28 RX ADMIN — TORSEMIDE 10 MG: 10 TABLET ORAL at 08:11

## 2017-02-28 RX ADMIN — TRAMADOL HYDROCHLORIDE 50 MG: 50 TABLET, COATED ORAL at 05:53

## 2017-02-28 NOTE — PLAN OF CARE
Problem: Goal Outcome Summary  Goal: Goal Outcome Summary  Outcome: No Change  VSS, AAOx4. SBA.  LS-dim, IYER.  Cardiology recommending outpatient PFTs, compliance with CPAP, outpatient review with primary cardiologist, continue torsemide, couamdin and BB.  Education provided to pt regarding digoxin.  Discharge in 24-36h.  Pt had lexiscan td. Tele- afib CVR.

## 2017-02-28 NOTE — PLAN OF CARE
Problem: Cardiac: Heart Failure (Adult)  Goal: Signs and Symptoms of Listed Potential Problems Will be Absent or Manageable (Cardiac: Heart Failure)  Signs and symptoms of listed potential problems will be absent or manageable by discharge/transition of care (reference Cardiac: Heart Failure (Adult) CPG).  Outcome: Adequate for Discharge Date Met:  02/28/17  VSS, A/O x4, Afib CVR, SOB upon exertion, Sats stable on RA, Heart failure and Dig education reviewed with patient and spouse, WOC RN consulted and addressed LLE wound, HA resolved this AM with tramadol and Tylenol, Cards FU 2 weeks after DC for evaluation of Right sided HF and Pulmonary hypertension.       This note along with Assessment was reviewed and verified with this RN CJC. Agree with assessment and d/c       Medication Dig filled here, given to patient.  Pt instructed to follow up with sleep doctor regarding fit of CPAP mask.

## 2017-02-28 NOTE — PROGRESS NOTES
Focus: LLE wound  D: per MD note: 82 year old male with a PMH significant for chronic atrial fibrillation on coumadin, hx of cor pulmonale complicated by tricuspid regurgitation and venous stasis, mild to moderate CAD (noted in LAD but not cx or RCA on cardiac CTA in 2012), hypothyroidism, remote hx of DVT and PE, HLD, BPH, and likely STEWART who presents with chest pressure and IYER.   WOC nurse consulted for wound care to LLE anterior leg from old crush injury. Wound mid anterior has 5 small superficial open wounds each about 1 x1 x 0.1-0.2cm pink soft mushy red dermis, drainage is yellow to green tinged, no odor after cleansing, no pain, hemosiderin staining from foot to knee.   I: 30 min for assessment and dressing, 15 min education and discussion POC  A/P: chronic wound, new traumatic injury PTA to LLE, chronic changes consistent with traumatic vascular injury and new acute traumatic injury present on admission. Drainage had green to yellow color, mushy wound bed recommend silver alginate or hydrofiber dressing.    Wound care to LLE: daily or every other day depending on drainage  1. Shower or cleanse wound with MicroKlenz and pat dry  2. Apply moisturizing cream around wound bed and leg  3. Cut strip of AquaCel Ag to cover the wounds  4. Cover with ABD or other dry pad and secure with stretch stockinette or roll gauze/ tape  5. When wounds heal and dry, discontinue use of AquaCel Ag

## 2017-02-28 NOTE — DISCHARGE SUMMARY
[facility]  Discharge Summary  Name: Sean Brunner    MRN: [id number]  YOB: 1934    Age: 82 year old  Date of Discharge:  2/28/2017  2:13 PM  Date of Admission: 2/24/2017  Primary Care Provider: Oscar Parekh  Discharge Physician:  Armando Daugherty MD  Discharging Service:  Hospitalist      Discharge Diagnosis:  Shortness of breath upon exertion secondary to acute on chronic R sided Heart failure with cor pulmonale  Severe TR  Chronic Afib on anticoagulation  CAD  DM type 2  GERD     Other Diagnosis:  Past Medical History   Diagnosis Date     A-fib (H)      Diabetes mellitus (H)      Low BP      Thyroid disease           Discharge Disposition:  Discharged to home     Allergies:  No Known Allergies     Discharge Medications:   Discharge Medication List as of 2/28/2017  1:31 PM      START taking these medications    Details   digoxin (LANOXIN) 125 MCG tablet Take 1 tablet (125 mcg) by mouth daily, Disp-30 tablet, R-0, E-Prescribe         CONTINUE these medications which have NOT CHANGED    Details   OMEPRAZOLE PO Take 40 mg by mouth every evening, Historical      multivitamin, therapeutic with minerals (THERA-VIT-M) TABS tablet Take 1 tablet by mouth 2 times daily, Historical      WARFARIN SODIUM PO Take by mouth every evening 1 mg Mon, Thu, and 1.5 mg all other days, Historical      divalproex (DEPAKOTE) 250 MG EC tablet Take 250 mg by mouth At Bedtime, Historical      METFORMIN HCL PO Take 1,000 mg by mouth daily (with dinner), Historical      torsemide (DEMADEX) 10 MG tablet Take 1 tablet (10 mg) by mouth daily, Disp-90 tablet, R-0, E-PrescribeFuture refill must come from PMD. Dr Oscar Parekh      cyanocolbalamin (VITAMIN  B-12) 1000 MCG tablet Take 3,000 mcg by mouth every morning, Historical      metoprolol (TOPROL-XL) 25 MG 24 hr tablet Take 25 mg by mouth 2 times daily, Historical      levothyroxine (SYNTHROID) 150 MCG tablet Take 150 mcg by mouth every  "morning (before breakfast) , Historical      terazosin (HYTRIN) 5 MG capsule Take 5 mg by mouth daily (with dinner) , Historical      simvastatin (ZOCOR) 40 MG tablet Take 40 mg by mouth daily (with dinner) , Historical      ACCU-CHEK MULTICLIX LANCETS MISC by Lancet route 2 times daily. Use to test blood sugar twice daily or as directed., Disp-102 each, R-prn, E-Prescribe              Condition on Discharge:  Discharge condition: Stable   Discharge vitals: Blood pressure 109/58, pulse 71, temperature 98.1  F (36.7  C), temperature source Axillary, resp. rate 16, height 1.753 m (5' 9\"), weight 100.9 kg (222 lb 6 oz), SpO2 96 %.   Code status on discharge: Full Code     History of Present Illness:  See detailed admission note for full details.        Significant Physical Exam Findings Day of Discharge:  HEENT; Atraumatic, normocephalic, pinkish conjuctiva, pupils bilateral reactive   Skin: warm and moist, no rashes  Lungs: equal chest expansion, clear to auscultation, no wheezes, no stridor, no crackles,   Heart: normal rate, normal rhythm, no rubs or gallops. Systolic murmur 2/6  Abdomen: normal bowel sounds, no tenderness, no peritoneal signs, no guarding  Extremities: no deformities,wounds seen on left anterior shin, venous stasis on both LE  Neuro; follow commands, alert and oriented x3, spontaneous speech, coherent, moves all extremities spontaneously  Psych; no hallucination, euthymic mood, not agitated        Procedures other than Imaging:  none     Imaging:  Results for orders placed or performed during the hospital encounter of 02/24/17   XR Chest 2 Views    Narrative    XR CHEST 2 VW 2/24/2017 12:07 PM    COMPARISON: 1/6/2015    HISTORY: Chest pain and dyspnea.      Impression    IMPRESSION: Mildly enlarged cardiac silhouette. No focal airspace  disease. No significant pleural effusion. No pneumothorax.    JENIFFER JULIEN   CT Chest Pulmonary Embolism w Contrast    Narrative    CT CHEST PULMONARY EMBOLISM WITH " CONTRAST 2/24/2017 12:48 PM      HISTORY: Chest pain.     TECHNIQUE: CT chest with intravenous contrast using the pulmonary  embolus protocol. Radiation dose for this scan was reduced using  automated exposure control, adjustment of the mA and/or kV according  to patient size, or iterative reconstruction technique. 76 mL  Isovue-370     COMPARISON: 4/13/2011    FINDINGS: Evaluation of the pulmonary arterial system shows no  evidence of embolus. The thoracic aorta is calcified and slightly  tortuous. No aortic aneurysm or dissection. There are coronary artery  atherosclerotic calcifications. The heart is enlarged. There is no  mediastinal, hilar or axillary lymph node enlargement. There are a few  tiny calcified granulomas in the right upper lobe. Mild dependent  atelectasis bilaterally. A few bands of scar or atelectasis  bilaterally. Small bilateral pleural effusions. Images through the  upper abdomen are remarkable for small amount of ascites. The inferior  vena cava is very large which may be due to right heart dysfunction.  The liver has a slightly nodular contour suggesting cirrhosis.      Impression    IMPRESSION:  1. There is no pulmonary embolus, aortic aneurysm or dissection.  2. Coronary artery atherosclerotic calcification. Cardiomegaly.  3. Small bilateral pleural effusions.  4. Probable hepatic cirrhosis. Small amount of ascites.    JEANNINE WONG MD   NM MPI w Lexiscan    Narrative    GATED MYOCARDIAL PERFUSION SCINTIGRAPHY WITH INTRAVENOUS PHARMACOLOGIC  VASODILATATION LEXISCAN -ONE DAY STUDY     2/27/2017 9:42 AM  MAYITO ANSARI  82 years  Male  9/13/1934.    Indication/Clinical History: Shortness of breath, chest pain    Impression  1.  Myocardial perfusion imaging using single isotope technique  demonstrated no evidence of ischemia or infarction.   2. Gated images demonstrated normal size left ventricle with normal  wall motion.  The left ventricular systolic function is normal at 76%.  3.  Compared to the prior study from  .    Procedure  Pharmacologic stress testing was performed with Lexiscan at a rate of  0.08 mg/ml rapid bolus injection, for 15 seconds, 0.4 mg/5ml  intravenously. Low-level exercise was not performed along with the  vasodilator infusion.  The heart rate was 6767 at baseline and bj to  82 beats per minute during the Lexiscan infusion. The rest blood  pressure was 118/64 mmHg and was 118/57 mm Hg during Lexiscan  infusion. The patient experienced no chest pain  during the test.    Myocardial perfusion imaging was performed at rest, approximately 45  minutes after the injection intravenously of 11 mCi of Tc-99m Myoview.  At peak pharmacologic effect, 10-20 seconds after Lexiscan,  the  patient was injected intravenously with 32 mCi of  Tc-99m Myoview. The  post-stress tomographic imaging was performed approximately 60 minutes  after stress.    EKG Findings  The resting EKG demonstrated sinus rhythm with poor R wave progression  in the low voltage pattern.  The stress EKG demonstrated no EKG  changes suggestive of ischemia.    Tomographic Findings  Overall, the study quality is fair. Mild visceral tracer artifact is  noted . On the stress images, mild inferior count reduction is noted.  On the rest images, mild inferior count reduction is seen and likely  due to diaphragmatic attenuation and visceral tracer artifact . Gated  images demonstrated normal size left ventricle with normal wall  motion. The left ventricular ejection fraction was calculated to be  76%. TID was absent.    AFIA WEST MD        Consultations:  Consultation during this admission received from cardiology.     Recent Lab Results:    Recent Labs  Lab 02/27/17  0630 02/25/17  1410 02/24/17  1109   WBC 3.1*  --  4.9   HGB 11.0* 10.3* 11.2*   HCT 33.7*  --  35.7*   MCV 94  --  96   PLT 98*  --  124*     No results for input(s): CULT in the last 168 hours.    Recent Labs  Lab 02/27/17  0630 02/26/17  0630  02/25/17  1410  02/24/17  1109    141 139  --  141   POTASSIUM 3.7 4.2 4.3  --  4.0   CHLORIDE 102 104 106  --  108   CO2 30 28 24  --  25   ANIONGAP 9 9 9  --  8   GLC 79 85 141*  --  84   BUN 17 19 18  --  17   CR 0.93 1.03 0.95  --  0.90   GFRESTIMATED 78 69 76  < > 81   GFRESTBLACK >90African American GFR Calc 84 >90African American GFR Calc  < > >90African American GFR Calc   TYSON 9.0 8.7 8.8  --  9.0   MAG  --   --  1.8  --   --    PROTTOTAL  --   --   --   --  7.9   ALBUMIN  --   --   --   --  3.5   BILITOTAL  --   --   --   --  1.3   ALKPHOS  --   --   --   --  71   AST  --   --   --   --  23   ALT  --   --   --   --  15   < > = values in this interval not displayed.    Recent Labs  Lab 02/28/17  1137 02/28/17  0741 02/28/17  0229 02/27/17  2115 02/27/17  1715  02/27/17  0630  02/26/17  0630 02/25/17  1410 02/24/17  1109   GLC  --   --   --   --   --   --  79  --  85 141* 84   BGM 93 141* 112* 82 100*  < >  --   < >  --   --   --    < > = values in this interval not displayed.  No results for input(s): LACT in the last 168 hours.    Recent Labs  Lab 02/24/17  1716 02/24/17  1114 02/24/17  1109   TROPONIN  --  0.01  --    TROPI <0.015The 99th percentile for upper reference range is 0.045 ug/L.  Troponin values in the range of 0.045 - 0.120 ug/L may be associated with risks of adverse clinical events.  --  <0.015The 99th percentile for upper reference range is 0.045 ug/L.  Troponin values in the range of 0.045 - 0.120 ug/L may be associated with risks of adverse clinical events.       Recent Labs  Lab 02/24/17  1340   COLOR Yellow   APPEARANCE Clear   URINEGLC Negative   URINEBILI Negative   URINEKETONE Negative   SG 1.005   UBLD Negative   URINEPH 6.5   PROTEIN 10*   NITRITE Negative   LEUKEST Small*   RBCU 4*   WBCU 5*          Pending Results:    Unresulted Labs Ordered in the Past 30 Days of this Admission     No orders found from 12/26/2016 to 2/25/2017.           Discharge Instructions and  Follow-Up:   Discharge diet:   Active Diet Order      Combination Diet 4656-9917 Calories: Moderate Consistent CHO (4-6 CHO units/meal); Low Saturated Fat Na <2400mg Diet   Discharge activity: Activity as tolerated   Discharge follow-up: 1-2 weeks with PCP, as scheduled with cardiology as planned with Dr Adkins and Dr Joyce regarding R sided HF, pulmonary hypertension   Outpatient therapy: None    Other instructions: None      Hospital Course:  82 year old male with known hx of chronic Afib, hypertension and cor pulmonale presented for increasing SOB most pronounced during exertion. Earlier was admitted in the observation unit and transferred to inpatient telemetry unit.  1. SOB upon exertion- earlier thought process was possible angina equivalent. LExiscan today showed no definitive signs of ischemia. He was digitalized and now on daily digoxin as it was noted that his Afib gets RVR during ambulation. I believe this is most likely from acute on chronic R sided HF with cor pulmonale. He is known to have this finding several years ago. PE was ruled out. Appreciate cardiology evaluation. F/u care set-up as outlined in the cardiology consultation notes. He has no ongoing complaints and no hypoxia seen. He feel much better and requesting for home discharge.  2. Severe TR 4+ and severe RV dilatation with moderately reduced RV systolic function- likely this is the cause of his worsening SOB.   3. Afib on coumadin- now on digoxin (sent home with it) and continue on his home BB  4. Chronic leg edema from prior crush ankle injury- on chronic diuretics  5. DM type 2- on metformin  6. Hiatal hernia and GERD     Total time spent in face to face contact with the patient and coordinating discharge was:  > 30 Minutes.

## 2017-02-28 NOTE — PHARMACY-ANTICOAGULATION SERVICE
Clinical Pharmacy- Warfarin Discharge Note  This patient is currently on warfarin for the treatment of Atrial fibrillation.  INR Goal= 2-3      Anticoagulation Dose History     Recent Dosing and Labs Latest Ref Rng & Units 1/7/2015 1/8/2015 2/24/2017 2/25/2017 2/26/2017 2/27/2017 2/28/2017    Warfarin 1 mg - - - - - - 1 mg -    Warfarin 1.5 mg - - - 1.5 mg 1.5 mg 1.5 mg - -    INR 0.86 - 1.14 1.48(H) 1.61(H) 2.69(H) 2.95(H) 2.92(H) 2.93(H) 3.04(H)            Agree with discharging the patient on pta warfarin regimen of 1 mg MonThur, 1.5 mg [id number].   INR to be checked within the week.

## 2017-02-28 NOTE — PLAN OF CARE
"Problem: Goal Outcome Summary  Goal: Goal Outcome Summary  PT: Orders received, PT screen completed. Pt IND with bed mobility, transfers and ambulation x200'.  Denies falls history.  Unable to assess O2 sats as sensor cord missing in room and at machine in hallway.  Pt c/o of \"slight shortness of breath\" last ~25' but reported feeling back to baseline upon returning to bed.  No further skilled PT needs identified.  Will complete order.    Pt may benefit from OP Cardiac Rehab to increase activity tolerance.  "

## 2017-02-28 NOTE — DISCHARGE INSTRUCTIONS
Wound care to LLE: daily or every other day depending on amount of drainage  1. Shower and/ or cleanse wound with MicroKlenz and pat dry  2. Apply moisturizing cream around wound bed and leg  3. Cut strip of AquaCel Ag to cover the wounds  4. Cover with ABD or other dry pad and secure with stretch stockinette or roll gauze/ tape  5. When wounds heal and dry, discontinue use of AquaCel Ag and keep clean and store dry for future use

## 2017-03-01 ENCOUNTER — CARE COORDINATION (OUTPATIENT)
Dept: CARDIOLOGY | Facility: CLINIC | Age: 82
End: 2017-03-01

## 2017-03-01 NOTE — PROGRESS NOTES
Called patient to discuss any post hospital d/c questions he may have, review medication changes, and confirm f/u appts. Patient denied any questions regarding new medications or changes to some of his current medications that he was taking prior to admission. Patient denied any SOB, chest pain, or light headedness. RN confirmed with patient that he has an apt scheduled on 4/4/17 with Dr. Joyce. Patient advised to call clinic with any cardiac related questions or concerns prior to his apt on 4/4/17. Patient verbalized understanding and agreed with plan.

## 2017-03-13 ENCOUNTER — CARE COORDINATION (OUTPATIENT)
Dept: CARDIOLOGY | Facility: CLINIC | Age: 82
End: 2017-03-13

## 2017-03-13 NOTE — PROGRESS NOTES
"Patient's wife called as patient has been \"dizzy\" and having \"shaking.\"  He was admitted to [facility] on 2/24/17 and discharged on 3/2/17.  He was admitted for SOB upon exertion.  Patient was discharged on digoxin.  This is a new medication for the patient.  Asked wife if patient takes his blood pressure at home.  She states yes.  Asked what it was running.  She states \"it is always low.\"  Asked her how low was low?  She could not find his documentation and the patient was sleeping.  Wife was not able to find documentation of patient's current heart rates.  She was not able to coordinate if the dizziness was associated with the \"shaking.\"  Discussed with her that the patient should be evaluated for side effects of the new medication, digoxin.  She will have patient go back to [facility] as it is closer to their home.  I will forward to Dr. Joyce's office for review.  "

## 2017-04-01 NOTE — PROGRESS NOTES
Julian Joyce M.D.  Cardiovascular Medicine    I personally saw and examined this patient, discussed care with housestaff and other consultants, reviewed current laboratories and imaging studies, and conveyed impression and diagnostic/therapeutic plan to patient.      April 4, 2017      Dear Dr. Parekh    We had the opportunity to see Mr. Brunner subsequent to his recent discharge.        Problem List  1. H/o cor pulmonale with know 3-4+ TR and RV dysfunction in past- slight increase in RV size and decline in RV systolic function compared to 2011 echo  2. Chronic afib- VR well controlled, on coumadin  3. Moderate obstruction on PFT 2011  4. STEWART- no compliant with CPAP  5. H/o PE- no PE on CT chest this admission  6. Non obstructive CAD CT cor angio 2011, negative stress test this admission  7. Obesity- lost 100 lbs over last several years    PAST MEDICAL HISTORY:  Past Medical History:   Diagnosis Date     A-fib (H)      Diabetes mellitus (H)      Low BP      Thyroid disease      Wt Readings from Last 24 Encounters:   04/04/17 93 kg (205 lb 1.6 oz)   02/27/17 100.9 kg (222 lb 6 oz)   01/08/15 96.6 kg (213 lb)   12/13/14 98.7 kg (217 lb 8 oz)   12/27/13 100.7 kg (222 lb)   05/17/12 117.9 kg (260 lb)       FAMILY HISTORY:  No family history on file.      SOCIAL HISTORY:  Social History     Social History     Marital status:      Spouse name: N/A     Number of children: N/A     Years of education: N/A     Social History Main Topics     Smoking status: Never Smoker     Smokeless tobacco: Never Used     Alcohol use No     Drug use: No     Sexual activity: Not on file     Other Topics Concern     Not on file     Social History Narrative       CURRENT MEDICATIONS:  Current Outpatient Prescriptions   Medication Sig Dispense Refill     digoxin (LANOXIN) 125 MCG tablet Take 1 tablet (125 mcg) by mouth daily 30 tablet 0     OMEPRAZOLE PO Take 40 mg by mouth every evening       multivitamin, therapeutic with minerals  (THERA-VIT-M) TABS tablet Take 1 tablet by mouth 2 times daily       WARFARIN SODIUM PO Take by mouth every evening 1 mg Mon, Thu, and 1.5 mg all other days       divalproex (DEPAKOTE) 250 MG EC tablet Take 250 mg by mouth At Bedtime       METFORMIN HCL PO Take 1,000 mg by mouth daily (with dinner)       torsemide (DEMADEX) 10 MG tablet Take 1 tablet (10 mg) by mouth daily 90 tablet 0     cyanocolbalamin (VITAMIN  B-12) 1000 MCG tablet Take 3,000 mcg by mouth every morning       metoprolol (TOPROL-XL) 25 MG 24 hr tablet Take 25 mg by mouth 2 times daily       levothyroxine (SYNTHROID) 150 MCG tablet Take 150 mcg by mouth every morning (before breakfast)        terazosin (HYTRIN) 5 MG capsule Take 5 mg by mouth daily (with dinner)        simvastatin (ZOCOR) 40 MG tablet Take 40 mg by mouth daily (with dinner)        ACCU-CHEK MULTICLIX LANCETS MISC by Lancet route 2 times daily. Use to test blood sugar twice daily or as directed. 102 each prn     Wt Readings from Last 24 Encounters:   02/27/17 100.9 kg (222 lb 6 oz)   01/08/15 96.6 kg (213 lb)   12/13/14 98.7 kg (217 lb 8 oz)   12/27/13 100.7 kg (222 lb)   05/17/12 117.9 kg (260 lb)     ROS:   10 point ROS negative except HPI    EXAM:  There were no vitals taken for this visit.  Home weight  General: appears comfortable, alert and articulate  Head: normocephalic, atraumatic  Eyes: anicteric sclera, EOMI  Neck: no adenopathy  Orophyarynx: moist mucosa, no lesions, dentition intact  Heart: regular, S1/S2, murmur TR, gallop, rub, estimated JVP 8  Lungs: clear, no rales or wheezing  Abdomen: soft, non-tender, bowel sounds present, no hepatosplenomegaly  Extremities: no clubbing, cyanosis or edema  Neurological: normal speech and affect, no gross motor deficits    Labs:  CBC RESULTS:  Lab Results   Component Value Date    WBC 3.1 (L) 02/27/2017    RBC 3.59 (L) 02/27/2017    HGB 11.0 (L) 02/27/2017    HCT 33.7 (L) 02/27/2017    MCV 94 02/27/2017    MCH 30.6 02/27/2017     MCHC 32.6 02/27/2017    RDW 15.2 (H) 02/27/2017    PLT 98 (L) 02/27/2017       CMP RESULTS:  Lab Results   Component Value Date     02/27/2017    POTASSIUM 3.7 02/27/2017    CHLORIDE 102 02/27/2017    CO2 30 02/27/2017    ANIONGAP 9 02/27/2017    GLC 79 02/27/2017    BUN 17 02/27/2017    CR 0.93 02/27/2017    GFRESTIMATED 78 02/27/2017    GFRESTBLACK >90   GFR Calc   02/27/2017    TYSON 9.0 02/27/2017    BILITOTAL 1.3 02/24/2017    ALBUMIN 3.5 02/24/2017    ALKPHOS 71 02/24/2017    ALT 15 02/24/2017    AST 23 02/24/2017        INR RESULTS:  Lab Results   Component Value Date    INR 3.04 (H) 02/28/2017       No components found for: CK  Lab Results   Component Value Date    MAG 1.8 02/25/2017     Lab Results   Component Value Date    NTBNPI 787 02/24/2017       Echocardiogram (2/24/2017):  Interpretation Summary     The left ventricle is normal in size. There is normal left ventricular wall  thickness. Left ventricular systolic function is normal. The visual ejection  fraction is estimated at 55-60%. Flattened septum is consistent with RV  pressure/volume overload. There is no thrombus seen in the left ventricle.  The right ventricle is severely dilated. The right ventricular systolic  function is mild to moderately reduced.  The left atrium is moderately dilated. The right atrium is severely dilated.  There is severe (4+) tricuspid regurgitation. The right ventricular systolic  pressure is approximated at 30.9 mmHg plus the right atrial pressure. This  most likely represents an underestimation of the true RVSP.  Mild aortic stenosis.  In direct comparison to the previous study dated 11/07/2012, there has been a  mild interval increase in right ventricular size and deterioration in right  ventricular systolic function.    Left Ventricle  The left ventricle is normal in size. There is normal left ventricular wall  thickness. Left ventricular systolic function is normal. The visual  ejection  fraction is estimated at 55-60%. Flattened septum is consistent with RV  pressure/volume overload. There is no thrombus seen in the left ventricle.     Right Ventricle  The right ventricle is severely dilated. The right ventricular systolic  function is mild to moderately reduced.     Atria  The left atrium is moderately dilated. The right atrium is severely dilated.     Mitral Valve  The mitral valve leaflets are mildly thickened. There is mild mitral annular  calcification. There is trace to mild mitral regurgitation.        Tricuspid Valve  There is severe (4+) tricuspid regurgitation. The right ventricular systolic  pressure is approximated at 30.9 mmHg plus the right atrial pressure. This  most likely represents an underestimation of the true RVSP.     Aortic Valve  There is moderate trileaflet aortic sclerosis. There is trace aortic  regurgitation. Mild valvular aortic stenosis. The peak AoV pressure gradient  is 19.2 mmHg. The mean AoV pressure gradient is 11.0 mmHg. The calculated  aortic valve are is 1.5 cm^2.     Pulmonic Valve  There is mild (1+) pulmonic valvular regurgitation. There is no pulmonic  valvular stenosis.     Vessels  The aortic root is normal size. Dilated inferior vena cava.      NM MPI w Lexiscan (2/24/2017):  Indication/Clinical History: Shortness of breath, chest pain     Impression  1. Myocardial perfusion imaging using single isotope technique  demonstrated no evidence of ischemia or infarction.   2. Gated images demonstrated normal size left ventricle with normal  wall motion. The left ventricular systolic function is normal at 76%.  3. Compared to the prior study from  .    EKG Findings  The resting EKG demonstrated sinus rhythm with poor R wave progression  in the low voltage pattern. The stress EKG demonstrated no EKG  changes suggestive of ischemia.     Tomographic Findings  Overall, the study quality is fair. Mild visceral tracer artifact is  noted . On the stress  images, mild inferior count reduction is noted.  On the rest images, mild inferior count reduction is seen and likely  due to diaphragmatic attenuation and visceral tracer artifact . Gated  images demonstrated normal size left ventricle with normal wall  motion. The left ventricular ejection fraction was calculated to be  76%. TID was absent.      CT Abdomen and Pelvis (5/16/2016):  IMPRESSION:   1. A mildly enlarged right inguinal lymph node has increased in size  since the previous exam.  2. Previously noted mesenteric and retroperitoneal lymph nodes have  decreased slightly in size since the previous exam.  3. Scattered nodular and curvilinear opacities in the right lower lobe  of the lung are new since the previous exam and could be related to  atelectasis or infection. Please clinically correlate. Followup is  recommended to ensure resolution.  4. No other significant interval change compared to 4/23/2015.     Radiation dose for this scan was reduced using automated exposure  control, adjustment of the mA and/or kV according to patient size, or  iterative reconstruction technique.      Assessment and Plan:       Positional dizziness likely related to ears and terazosin     Poor compliance with STEWART secondary to blowing out of ears - seems unlike as no history of TM perforation, however, concerned about voice and ears and may wish to see ENT    Restore digoxin 0.125 QOD    Labs drawn today    Discussed VQ lung scan and right heart catherization to assess cor pulmonale, potential PH, and qualification for PH medications and or balloon pulmonary artery dilation.    CC: Dieter

## 2017-04-04 ENCOUNTER — TELEPHONE (OUTPATIENT)
Dept: CARDIOLOGY | Facility: CLINIC | Age: 82
End: 2017-04-04

## 2017-04-04 ENCOUNTER — OFFICE VISIT (OUTPATIENT)
Dept: CARDIOLOGY | Facility: CLINIC | Age: 82
End: 2017-04-04
Payer: COMMERCIAL

## 2017-04-04 VITALS
SYSTOLIC BLOOD PRESSURE: 112 MMHG | DIASTOLIC BLOOD PRESSURE: 62 MMHG | WEIGHT: 205.1 LBS | HEART RATE: 72 BPM | BODY MASS INDEX: 30.38 KG/M2 | OXYGEN SATURATION: 100 % | HEIGHT: 69 IN

## 2017-04-04 DIAGNOSIS — I36.1 NON-RHEUMATIC TRICUSPID VALVE INSUFFICIENCY: ICD-10-CM

## 2017-04-04 DIAGNOSIS — I42.9 CARDIOMYOPATHY (H): ICD-10-CM

## 2017-04-04 DIAGNOSIS — I27.81 COR PULMONALE (H): ICD-10-CM

## 2017-04-04 DIAGNOSIS — I48.91 ATRIAL FIBRILLATION (H): Primary | ICD-10-CM

## 2017-04-04 DIAGNOSIS — I50.23 ACUTE ON CHRONIC SYSTOLIC CONGESTIVE HEART FAILURE (H): ICD-10-CM

## 2017-04-04 DIAGNOSIS — I27.20 PULMONARY HYPERTENSION (H): Primary | ICD-10-CM

## 2017-04-04 LAB
ALBUMIN SERPL-MCNC: 3.4 G/DL (ref 3.4–5)
ALP SERPL-CCNC: 81 U/L (ref 40–150)
ALT SERPL W P-5'-P-CCNC: 22 U/L (ref 0–70)
ANION GAP SERPL CALCULATED.3IONS-SCNC: 7 MMOL/L (ref 3–14)
AST SERPL W P-5'-P-CCNC: 28 U/L (ref 0–45)
BILIRUB SERPL-MCNC: 0.7 MG/DL (ref 0.2–1.3)
BUN SERPL-MCNC: 16 MG/DL (ref 7–30)
CALCIUM SERPL-MCNC: 9.4 MG/DL (ref 8.5–10.1)
CHLORIDE SERPL-SCNC: 103 MMOL/L (ref 94–109)
CO2 SERPL-SCNC: 31 MMOL/L (ref 20–32)
CREAT SERPL-MCNC: 1.04 MG/DL (ref 0.66–1.25)
GFR SERPL CREATININE-BSD FRML MDRD: 68 ML/MIN/1.7M2
GLUCOSE SERPL-MCNC: 95 MG/DL (ref 70–99)
INR PPP: 3.08 (ref 0.86–1.14)
NT-PROBNP SERPL-MCNC: 1357 PG/ML (ref 0–450)
POTASSIUM SERPL-SCNC: 4 MMOL/L (ref 3.4–5.3)
PROT SERPL-MCNC: 8.3 G/DL (ref 6.8–8.8)
SODIUM SERPL-SCNC: 141 MMOL/L (ref 133–144)

## 2017-04-04 PROCEDURE — 85610 PROTHROMBIN TIME: CPT | Performed by: INTERNAL MEDICINE

## 2017-04-04 PROCEDURE — 36415 COLL VENOUS BLD VENIPUNCTURE: CPT | Performed by: INTERNAL MEDICINE

## 2017-04-04 PROCEDURE — 99204 OFFICE O/P NEW MOD 45 MIN: CPT | Performed by: INTERNAL MEDICINE

## 2017-04-04 PROCEDURE — 80053 COMPREHEN METABOLIC PANEL: CPT | Performed by: INTERNAL MEDICINE

## 2017-04-04 PROCEDURE — 83880 ASSAY OF NATRIURETIC PEPTIDE: CPT | Performed by: INTERNAL MEDICINE

## 2017-04-04 RX ORDER — POTASSIUM CHLORIDE 1500 MG/1
20 TABLET, EXTENDED RELEASE ORAL
Status: CANCELLED | OUTPATIENT
Start: 2017-04-04

## 2017-04-04 RX ORDER — LIDOCAINE 40 MG/G
CREAM TOPICAL
Status: CANCELLED | OUTPATIENT
Start: 2017-04-04

## 2017-04-04 RX ORDER — DIGOXIN 125 MCG
TABLET ORAL
Qty: 30 TABLET | Refills: 1 | Status: SHIPPED | OUTPATIENT
Start: 2017-04-04 | End: 2017-07-26

## 2017-04-04 RX ORDER — SODIUM CHLORIDE 9 MG/ML
1000 INJECTION, SOLUTION INTRAVENOUS CONTINUOUS
Status: CANCELLED | OUTPATIENT
Start: 2017-04-04

## 2017-04-04 NOTE — LETTER
April 7, 2017       TO: Sean Brunner  [address] [facility] [id number]       Dear Mr. Brunner,    We are writing to inform you of your test results.    Your test results fall within the expected range(s) or remain unchanged from previous results.  Please continue with current treatment plan.    Resulted Orders   Comprehensive metabolic panel   Result Value Ref Range    Sodium 141 133 - 144 mmol/L    Potassium 4.0 3.4 - 5.3 mmol/L    Chloride 103 94 - 109 mmol/L    Carbon Dioxide 31 20 - 32 mmol/L    Anion Gap 7 3 - 14 mmol/L    Glucose 95 70 - 99 mg/dL    Urea Nitrogen 16 7 - 30 mg/dL    Creatinine 1.04 0.66 - 1.25 mg/dL    GFR Estimate 68 >60 mL/min/1.7m2      Comment:      Non  GFR Calc    GFR Estimate If Black 83 >60 mL/min/1.7m2      Comment:       GFR Calc    Calcium 9.4 8.5 - 10.1 mg/dL    Bilirubin Total 0.7 0.2 - 1.3 mg/dL    Albumin 3.4 3.4 - 5.0 g/dL    Protein Total 8.3 6.8 - 8.8 g/dL    Alkaline Phosphatase 81 40 - 150 U/L    ALT 22 0 - 70 U/L    AST 28 0 - 45 U/L   N terminal pro BNP outpatient   Result Value Ref Range    N-Terminal Pro Bnp 1357 (H) 0 - 450 pg/mL      Comment:      Reference range shown and results flagged as abnormal are for the outpatient,   non acute settings. Establishing a baseline value for each individual patient   is useful for follow-up.  Suggested inpatient cut points for confirming diagnosis of CHF in an acute   setting are:   >450 pg/mL (age 18 to less than 50)   >900 pg/mL (age 50 to less than 75)   >1800 pg/mL (75 yrs and older)  An inpatient or emergency department NT-proPBNP <300 pg/mL effectively rules   out   acute CHF, with 99% negative predictive value.     INR   Result Value Ref Range    INR 3.08 (H) 0.86 - 1.14       Julian Joyce MD

## 2017-04-04 NOTE — TELEPHONE ENCOUNTER
Called Raimundo at [facility] back and varafied the dose. Dr. Joyce aware of dose sent and aware of interactions.

## 2017-04-04 NOTE — LETTER
4/4/2017    Oscar Parekh MD  [address] [address]    RE: Sean GIL Myesha       Dear Colleague,    I had the pleasure of seeing Sean Brunner in the [facility].    Julian Joyce M.D.  Cardiovascular Medicine    I personally saw and examined this patient, discussed care with housestaff and other consultants, reviewed current laboratories and imaging studies, and conveyed impression and diagnostic/therapeutic plan to patient.      April 4, 2017      Dear Dr. Parekh    We had the opportunity to see Mr. Brunner subsequent to his recent discharge.        Problem List  1. H/o cor pulmonale with know 3-4+ TR and RV dysfunction in past- slight increase in RV size and decline in RV systolic function compared to 2011 echo  2. Chronic afib- VR well controlled, on coumadin  3. Moderate obstruction on PFT 2011  4. STEWART- no compliant with CPAP  5. H/o PE- no PE on CT chest this admission  6. Non obstructive CAD CT cor angio 2011, negative stress test this admission  7. Obesity- lost 100 lbs over last several years    PAST MEDICAL HISTORY:  Past Medical History:   Diagnosis Date     A-fib (H)      Diabetes mellitus (H)      Low BP      Thyroid disease      Wt Readings from Last 24 Encounters:   04/04/17 93 kg (205 lb 1.6 oz)   02/27/17 100.9 kg (222 lb 6 oz)   01/08/15 96.6 kg (213 lb)   12/13/14 98.7 kg (217 lb 8 oz)   12/27/13 100.7 kg (222 lb)   05/17/12 117.9 kg (260 lb)       FAMILY HISTORY:  No family history on file.      SOCIAL HISTORY:  Social History     Social History     Marital status:      Spouse name: N/A     Number of children: N/A     Years of education: N/A     Social History Main Topics     Smoking status: Never Smoker     Smokeless tobacco: Never Used     Alcohol use No     Drug use: No     Sexual activity: Not on file     Other Topics Concern     Not on file     Social History Narrative       CURRENT  MEDICATIONS:  Current Outpatient Prescriptions   Medication Sig Dispense Refill     digoxin (LANOXIN) 125 MCG tablet Take 1 tablet (125 mcg) by mouth daily 30 tablet 0     OMEPRAZOLE PO Take 40 mg by mouth every evening       multivitamin, therapeutic with minerals (THERA-VIT-M) TABS tablet Take 1 tablet by mouth 2 times daily       WARFARIN SODIUM PO Take by mouth every evening 1 mg Mon, Thu, and 1.5 mg all other days       divalproex (DEPAKOTE) 250 MG EC tablet Take 250 mg by mouth At Bedtime       METFORMIN HCL PO Take 1,000 mg by mouth daily (with dinner)       torsemide (DEMADEX) 10 MG tablet Take 1 tablet (10 mg) by mouth daily 90 tablet 0     cyanocolbalamin (VITAMIN  B-12) 1000 MCG tablet Take 3,000 mcg by mouth every morning       metoprolol (TOPROL-XL) 25 MG 24 hr tablet Take 25 mg by mouth 2 times daily       levothyroxine (SYNTHROID) 150 MCG tablet Take 150 mcg by mouth every morning (before breakfast)        terazosin (HYTRIN) 5 MG capsule Take 5 mg by mouth daily (with dinner)        simvastatin (ZOCOR) 40 MG tablet Take 40 mg by mouth daily (with dinner)        ACCU-CHEK MULTICLIX LANCETS MISC by Lancet route 2 times daily. Use to test blood sugar twice daily or as directed. 102 each prn     Wt Readings from Last 24 Encounters:   02/27/17 100.9 kg (222 lb 6 oz)   01/08/15 96.6 kg (213 lb)   12/13/14 98.7 kg (217 lb 8 oz)   12/27/13 100.7 kg (222 lb)   05/17/12 117.9 kg (260 lb)     ROS:   10 point ROS negative except HPI    EXAM:  There were no vitals taken for this visit.  Home weight  General: appears comfortable, alert and articulate  Head: normocephalic, atraumatic  Eyes: anicteric sclera, EOMI  Neck: no adenopathy  Orophyarynx: moist mucosa, no lesions, dentition intact  Heart: regular, S1/S2, murmur TR, gallop, rub, estimated JVP 8  Lungs: clear, no rales or wheezing  Abdomen: soft, non-tender, bowel sounds present, no hepatosplenomegaly  Extremities: no clubbing, cyanosis or  edema  Neurological: normal speech and affect, no gross motor deficits    Labs:  CBC RESULTS:  Lab Results   Component Value Date    WBC 3.1 (L) 02/27/2017    RBC 3.59 (L) 02/27/2017    HGB 11.0 (L) 02/27/2017    HCT 33.7 (L) 02/27/2017    MCV 94 02/27/2017    MCH 30.6 02/27/2017    MCHC 32.6 02/27/2017    RDW 15.2 (H) 02/27/2017    PLT 98 (L) 02/27/2017       CMP RESULTS:  Lab Results   Component Value Date     02/27/2017    POTASSIUM 3.7 02/27/2017    CHLORIDE 102 02/27/2017    CO2 30 02/27/2017    ANIONGAP 9 02/27/2017    GLC 79 02/27/2017    BUN 17 02/27/2017    CR 0.93 02/27/2017    GFRESTIMATED 78 02/27/2017    GFRESTBLACK >90   GFR Calc   02/27/2017    TYSON 9.0 02/27/2017    BILITOTAL 1.3 02/24/2017    ALBUMIN 3.5 02/24/2017    ALKPHOS 71 02/24/2017    ALT 15 02/24/2017    AST 23 02/24/2017        INR RESULTS:  Lab Results   Component Value Date    INR 3.04 (H) 02/28/2017       No components found for: CK  Lab Results   Component Value Date    MAG 1.8 02/25/2017     Lab Results   Component Value Date    NTBNPI 787 02/24/2017       Echocardiogram (2/24/2017):  Interpretation Summary     The left ventricle is normal in size. There is normal left ventricular wall  thickness. Left ventricular systolic function is normal. The visual ejection  fraction is estimated at 55-60%. Flattened septum is consistent with RV  pressure/volume overload. There is no thrombus seen in the left ventricle.  The right ventricle is severely dilated. The right ventricular systolic  function is mild to moderately reduced.  The left atrium is moderately dilated. The right atrium is severely dilated.  There is severe (4+) tricuspid regurgitation. The right ventricular systolic  pressure is approximated at 30.9 mmHg plus the right atrial pressure. This  most likely represents an underestimation of the true RVSP.  Mild aortic stenosis.  In direct comparison to the previous study dated 11/07/2012, there has been a  mild  interval increase in right ventricular size and deterioration in right  ventricular systolic function.    Left Ventricle  The left ventricle is normal in size. There is normal left ventricular wall  thickness. Left ventricular systolic function is normal. The visual ejection  fraction is estimated at 55-60%. Flattened septum is consistent with RV  pressure/volume overload. There is no thrombus seen in the left ventricle.     Right Ventricle  The right ventricle is severely dilated. The right ventricular systolic  function is mild to moderately reduced.     Atria  The left atrium is moderately dilated. The right atrium is severely dilated.     Mitral Valve  The mitral valve leaflets are mildly thickened. There is mild mitral annular  calcification. There is trace to mild mitral regurgitation.        Tricuspid Valve  There is severe (4+) tricuspid regurgitation. The right ventricular systolic  pressure is approximated at 30.9 mmHg plus the right atrial pressure. This  most likely represents an underestimation of the true RVSP.     Aortic Valve  There is moderate trileaflet aortic sclerosis. There is trace aortic  regurgitation. Mild valvular aortic stenosis. The peak AoV pressure gradient  is 19.2 mmHg. The mean AoV pressure gradient is 11.0 mmHg. The calculated  aortic valve are is 1.5 cm^2.     Pulmonic Valve  There is mild (1+) pulmonic valvular regurgitation. There is no pulmonic  valvular stenosis.     Vessels  The aortic root is normal size. Dilated inferior vena cava.      NM MPI w Lexiscan (2/24/2017):  Indication/Clinical History: Shortness of breath, chest pain     Impression  1. Myocardial perfusion imaging using single isotope technique  demonstrated no evidence of ischemia or infarction.   2. Gated images demonstrated normal size left ventricle with normal  wall motion. The left ventricular systolic function is normal at 76%.  3. Compared to the prior study from  .    EKG Findings  The resting EKG  demonstrated sinus rhythm with poor R wave progression  in the low voltage pattern. The stress EKG demonstrated no EKG  changes suggestive of ischemia.     Tomographic Findings  Overall, the study quality is fair. Mild visceral tracer artifact is  noted . On the stress images, mild inferior count reduction is noted.  On the rest images, mild inferior count reduction is seen and likely  due to diaphragmatic attenuation and visceral tracer artifact . Gated  images demonstrated normal size left ventricle with normal wall  motion. The left ventricular ejection fraction was calculated to be  76%. TID was absent.      CT Abdomen and Pelvis (5/16/2016):  IMPRESSION:   1. A mildly enlarged right inguinal lymph node has increased in size  since the previous exam.  2. Previously noted mesenteric and retroperitoneal lymph nodes have  decreased slightly in size since the previous exam.  3. Scattered nodular and curvilinear opacities in the right lower lobe  of the lung are new since the previous exam and could be related to  atelectasis or infection. Please clinically correlate. Followup is  recommended to ensure resolution.  4. No other significant interval change compared to 4/23/2015.     Radiation dose for this scan was reduced using automated exposure  control, adjustment of the mA and/or kV according to patient size, or  iterative reconstruction technique.      Assessment and Plan:       Positional dizziness likely related to ears and terazosin     Poor compliance with STEWART secondary to blowing out of ears - seems unlike as no history of TM perforation, however, concerned about voice and ears and may wish to see ENT    Restore digoxin 0.125 QOD    Labs drawn today    Discussed VQ lung scan and right heart catherization to assess cor pulmonale, potential PH, and qualification for PH medications and or balloon pulmonary artery dilation.    CC: Dieter    Thank you for allowing me to participate in the care of  your patient.    Sincerely,     Julian Joyce MD     [facility]

## 2017-04-04 NOTE — PATIENT INSTRUCTIONS
Medication Changes:  Digoxin (Lanoxin) 125 mcg every other day    Patient Instructions:    Check-In  Time Check-In Location Estimated Length Procedure   Name         60-90 minutes Right Heart Catheterization**     Procedure Preparations & Instructions     This is an invasive procedure that DOES require preparation:    - Nothing to eat for 6 hours   - You may have clear liquids up until the time of your procedure  - A ride should be arranged for you in the instance you are unable to drive home, however you should be able to function as you normally would after the procedure     *For Patients with Diabetes: ? DO NOT take any oral diabetic medication, short-acting diabetes medications/insulin, humalog or regular insulin the morning of your test  ? Take   dose of long-acting insulin (Lantus, Levemir) the day of your test  ? Remember to  bring your glucometer and insulin with you to take after your test if needed     *For Patients on anticoagulants: ? Hold Coumadin 3 days prior to procedure       Check-In  Time Check-In Location Estimated Length Procedure   Name         60 minutes VQ/Lung Perfusion Scan**   Procedure Preparations & Instructions     This is a non-invasive procedure and does NOT require any preparation       Follow up Appointment Information:  1 month follow up    For scheduling at [facility] please call [phone]  For scheduling at the [facility] please call [phone]  We are located on the second floor [address] at the [facility].  Our address is     [facility] [address],   [address]  [place]  [address]    Thank you for allowing us to be a part of your care here at the [facility]    If you have questions or concerns please contact us at:    Porsha Dawn, RN      Nurse Coordinator       Pulmonary Hypertension     [facility]   [phone])[phone]  (F)[phone]    ** Please note that you will NOT receive a reminder call regarding your  scheduled testing, reminder calls are for provider appointments only.  If you are scheduled for testing within the [facility] system you may receive a call regarding pre-registration for billing purposes only.**     Remember to weigh yourself daily after voiding and before you consume any food or beverages and log the numbers.  If you have gained/lost 2 pounds overnight or 5 pounds in a week contact us immediately for medication adjustments or further instructions.

## 2017-04-04 NOTE — MR AVS SNAPSHOT
After Visit Summary   4/4/2017    Sean Brunner    MRN: [id number]           Patient Information     Date Of Birth          9/13/1934        Visit Information        Provider Department      4/4/2017 9:00 AM Julian Joyce MD [facility] [place]        Today's Diagnoses     Pulmonary hypertension (H)    -  1    Cardiomyopathy (H)        Cor pulmonale (H)        Acute on chronic systolic congestive heart failure (H)        Non-rheumatic tricuspid valve insufficiency          Care Instructions    Medication Changes:  Digoxin (Lanoxin) 125 mcg every other day    Patient Instructions:    Check-In  Time Check-In Location Estimated Length Procedure   Name         60-90 minutes Right Heart Catheterization**     Procedure Preparations & Instructions     This is an invasive procedure that DOES require preparation:    - Nothing to eat for 6 hours   - You may have clear liquids up until the time of your procedure  - A ride should be arranged for you in the instance you are unable to drive home, however you should be able to function as you normally would after the procedure     *For Patients with Diabetes: ? DO NOT take any oral diabetic medication, short-acting diabetes medications/insulin, humalog or regular insulin the morning of your test  ? Take   dose of long-acting insulin (Lantus, Levemir) the day of your test  ? Remember to  bring your glucometer and insulin with you to take after your test if needed     *For Patients on anticoagulants: ? Hold Coumadin 3 days prior to procedure       Check-In  Time Check-In Location Estimated Length Procedure   Name         60 minutes VQ/Lung Perfusion Scan**   Procedure Preparations & Instructions     This is a non-invasive procedure and does NOT require any preparation       Follow up Appointment Information:  1 month follow up    For scheduling at [facility] please call [phone]  For scheduling at the [facility] please  call [phone]  We are located on the second floor [address] at the [facility].  Our address is     [address],   [address]  [place]  [address]    Thank you for allowing us to be a part of your care here at the [facility]    If you have questions or concerns please contact us at:    Porsha Dawn, RN      Nurse Coordinator       Pulmonary Hypertension     [facility]   [phone])[phone]  (F)[phone]    ** Please note that you will NOT receive a reminder call regarding your scheduled testing, reminder calls are for provider appointments only.  If you are scheduled for testing within the [facility] system you may receive a call regarding pre-registration for billing purposes only.**     Remember to weigh yourself daily after voiding and before you consume any food or beverages and log the numbers.  If you have gained/lost 2 pounds overnight or 5 pounds in a week contact us immediately for medication adjustments or further instructions.         Follow-ups after your visit        Follow-up notes from your care team     Return in about 4 weeks (around 5/2/2017) for with Isiah, Return PH.      Future tests that were ordered for you today     Open Future Orders        Priority Expected Expires Ordered    V/Q Scan STAT 4/7/2017 4/4/2018 4/4/2017    XR Chest 2 Views Routine 4/4/2017 4/4/2018 4/4/2017    Heart Cath Right heart cath Routine  4/4/2018 4/4/2017            Who to contact     If you have questions or need follow up information about today's clinic visit or your schedule please contact [facility] [place] directly at [phone].  Normal or non-critical lab and imaging results will be communicated to you by MyChart, letter or phone within 4 business days after the clinic has received the results. If you do not hear from us within 7 days, please contact the clinic through MyChart or phone. If you have a  "critical or abnormal lab result, we will notify you by phone as soon as possible.  Submit refill requests through [employer] or call your pharmacy and they will forward the refill request to us. Please allow 3 business days for your refill to be completed.          Additional Information About Your Visit        [employer] Information     [employer] lets you send messages to your doctor, view your test results, renew your prescriptions, schedule appointments and more. To sign up, go to [email or web address] . Click on \"Log in\" on the left side of the screen, which will take you to the Welcome page. Then click on \"Sign up Now\" on the right side of the page.     You will be asked to enter the access code listed below, as well as some personal information. Please follow the directions to create your username and password.     Your access code is: [email or web address]  Expires: 2017 11:45 AM     Your access code will  in 90 days. If you need help or a new code, please call your [place] clinic or [phone].        Care EveryWhere ID     This is your Care EveryWhere ID. This could be used by other organizations to access your [place] medical records  [id number]        Your Vitals Were     Pulse Height Pulse Oximetry BMI (Body Mass Index)          72 1.753 m (5' 9\") 100% 30.29 kg/m2         Blood Pressure from Last 3 Encounters:   17 112/62   17 109/58   01/08/15 (!) 88/53    Weight from Last 3 Encounters:   17 93 kg (205 lb 1.6 oz)   17 100.9 kg (222 lb 6 oz)   01/08/15 96.6 kg (213 lb)              We Performed the Following     Comprehensive metabolic panel     INR     N terminal pro BNP outpatient          Today's Medication Changes          These changes are accurate as of: 17 10:13 AM.  If you have any questions, ask your nurse or doctor.               Start taking these medicines.        Dose/Directions    digoxin 125 MCG tablet   Commonly known as:  LANOXIN   Used for:  Cardiomyopathy " (H), Cor pulmonale (H), Acute on chronic systolic congestive heart failure (H), Non-rheumatic tricuspid valve insufficiency   Started by:  Julian Joyce MD        Take one pill every other day.   Quantity:  30 tablet   Refills:  1            Where to get your medicines      These medications were sent to [facility] - [place], [place] - [address]  [address], [facility] [address]     Phone:  [phone]     digoxin 125 MCG tablet                Primary Care Provider Office Phone # Fax #    Bunnytom Cory Parekh [phone]       [address] [address]        Thank you!     Thank you for choosing [facility] [place]  for your care. Our goal is always to provide you with excellent care. Hearing back from our patients is one way we can continue to improve our services. Please take a few minutes to complete the written survey that you may receive in the mail after your visit with us. Thank you!             Your Updated Medication List - Protect others around you: Learn how to safely use, store and throw away your medicines at www.disposemymeds.org.          This list is accurate as of: 4/4/17 10:13 AM.  Always use your most recent med list.                   Brand Name Dispense Instructions for use    blood glucose monitoring lancets     102 each    by Lancet route 2 times daily. Use to test blood sugar twice daily or as directed.       cyanocobalamin 1000 MCG tablet    vitamin  B-12     Take 3,000 mcg by mouth every morning       digoxin 125 MCG tablet    LANOXIN    30 tablet    Take one pill every other day.       divalproex 250 MG EC tablet    DEPAKOTE     Take 250 mg by mouth At Bedtime       METFORMIN HCL PO      Take 1,000 mg by mouth daily (with dinner)       metoprolol 25 MG 24 hr tablet    TOPROL-XL     Take 25 mg by mouth 2 times daily       multivitamin, therapeutic with  minerals Tabs tablet      Take 1 tablet by mouth 2 times daily       OMEPRAZOLE PO      Take 40 mg by mouth every evening       simvastatin 40 MG tablet    ZOCOR     Take 40 mg by mouth daily (with dinner)       SYNTHROID 150 MCG tablet   Generic drug:  levothyroxine      Take 150 mcg by mouth every morning (before breakfast)       terazosin 5 MG capsule    HYTRIN     Take 5 mg by mouth daily (with dinner)       torsemide 10 MG tablet    DEMADEX    90 tablet    Take 1 tablet (10 mg) by mouth daily       WARFARIN SODIUM PO      Take by mouth every evening 1 mg Mon, Thu, and 1.5 mg all other days

## 2017-04-04 NOTE — NURSING NOTE
Med Reconcile: Reviewed and verified all current medications with the patient. The updated medication list was printed and given to the patient.  Return Appointment: Patient given instructions regarding scheduling next clinic visit. Patient demonstrated understanding of this information and agreed to call with further questions or concerns.  Right Heart Catheterization: Patient was instructed regarding right heart catheterization, including discussion of the procedure, preparation, intra-procedural steps, and recovery at home. Patient demonstrated understanding of this information and agreed to call with further questions or concerns.  Patient stated he understood all health information given and agreed to call with further questions or concerns.     Medication Changes:  Digoxin (Lanoxin) 125 mcg every other day    Patient Instructions:    Check-In  Time Check-In Location Estimated Length Procedure   Name         60-90 minutes Right Heart Catheterization**     Procedure Preparations & Instructions     This is an invasive procedure that DOES require preparation:    - Nothing to eat for 6 hours   - You may have clear liquids up until the time of your procedure  - A ride should be arranged for you in the instance you are unable to drive home, however you should be able to function as you normally would after the procedure     *For Patients with Diabetes: ? DO NOT take any oral diabetic medication, short-acting diabetes medications/insulin, humalog or regular insulin the morning of your test  ? Take   dose of long-acting insulin (Lantus, Levemir) the day of your test  ? Remember to  bring your glucometer and insulin with you to take after your test if needed     *For Patients on anticoagulants: ? Hold Coumadin 3 days prior to procedure       Check-In  Time Check-In Location Estimated Length Procedure   Name         60 minutes VQ/Lung Perfusion Scan**   Procedure Preparations & Instructions     This is a non-invasive  procedure and does NOT require any preparation       Follow up Appointment Information:  1 month follow up

## 2017-04-04 NOTE — TELEPHONE ENCOUNTER
Type of call: Medication Question    There are multiple medication questions:  Please advise.      Medication: Digoxin  Pharmacy: [facility] ([facility])  bhanu Eubanks, [facility] pharmacy ([facility] [employer])  [phone]    Medication Questions:  Digoxin:  1. The current dose listed is considered a low dose:  Was this intentional?    Interactions:  1. Torsemide: increased risk of arrhythmia  2. Zocor: increased levels of dig  3.  Synthroid:  Decreased effects of dig    Is Dr. Joyce aware of these?    Message routed to Dr. Joyce's nurse.

## 2017-04-05 ENCOUNTER — HOSPITAL ENCOUNTER (OUTPATIENT)
Dept: NUCLEAR MEDICINE | Facility: CLINIC | Age: 82
Setting detail: NUCLEAR MEDICINE
End: 2017-04-05
Attending: INTERNAL MEDICINE
Payer: MEDICARE

## 2017-04-05 ENCOUNTER — HOSPITAL ENCOUNTER (OUTPATIENT)
Dept: GENERAL RADIOLOGY | Facility: CLINIC | Age: 82
Discharge: HOME | End: 2017-04-05
Attending: INTERNAL MEDICINE | Admitting: INTERNAL MEDICINE
Payer: MEDICARE

## 2017-04-05 DIAGNOSIS — I27.20 PULMONARY HYPERTENSION (H): ICD-10-CM

## 2017-04-05 PROCEDURE — 27210210 NM LUNG SCAN VENTILATION AND PERFUSION

## 2017-04-05 PROCEDURE — A9540 TC99M MAA: HCPCS | Performed by: INTERNAL MEDICINE

## 2017-04-05 PROCEDURE — A9567 TECHNETIUM TC-99M AEROSOL: HCPCS | Performed by: INTERNAL MEDICINE

## 2017-04-05 PROCEDURE — 34300033 ZZH RX 343: Performed by: INTERNAL MEDICINE

## 2017-04-05 RX ADMIN — KIT FOR THE PREPARATION OF TECHNETIUM TC 99M PENTETATE 69 MCI.: 20 INJECTION, POWDER, LYOPHILIZED, FOR SOLUTION INTRAVENOUS; RESPIRATORY (INHALATION) at 14:46

## 2017-04-05 RX ADMIN — Medication 3.3 MCI.: at 14:03

## 2017-04-11 ENCOUNTER — APPOINTMENT (OUTPATIENT)
Dept: CARDIOLOGY | Facility: CLINIC | Age: 82
End: 2017-04-11
Attending: INTERNAL MEDICINE
Payer: MEDICARE

## 2017-04-11 ENCOUNTER — HOSPITAL ENCOUNTER (OUTPATIENT)
Facility: CLINIC | Age: 82
Discharge: HOME | End: 2017-04-11
Attending: INTERNAL MEDICINE | Admitting: INTERNAL MEDICINE
Payer: MEDICARE

## 2017-04-11 VITALS
OXYGEN SATURATION: 97 % | DIASTOLIC BLOOD PRESSURE: 58 MMHG | RESPIRATION RATE: 14 BRPM | WEIGHT: 201.94 LBS | HEIGHT: 69 IN | TEMPERATURE: 98.3 F | BODY MASS INDEX: 29.91 KG/M2 | SYSTOLIC BLOOD PRESSURE: 101 MMHG

## 2017-04-11 DIAGNOSIS — I27.20 PULMONARY HYPERTENSION (H): ICD-10-CM

## 2017-04-11 DIAGNOSIS — Z98.890 STATUS POST CORONARY ANGIOGRAM: Primary | ICD-10-CM

## 2017-04-11 LAB
CO2 BLD-SCNC: 27 MMOL/L (ref 21–28)
INR PPP: 2.36 (ref 0.86–1.14)
LACTATE BLD-SCNC: 0.4 MMOL/L (ref 0.7–2.1)
OXYHGB MFR BLD: 70 %
PCO2 BLD: 43 MM HG (ref 35–45)
PH BLD: 7.4 PH (ref 7.35–7.45)
PO2 BLD: 35 MM HG (ref 80–105)
SAO2 % BLDA FROM PO2: 67 % (ref 92–100)
SPECIMEN SITE: NORMAL

## 2017-04-11 PROCEDURE — 99153 MOD SED SAME PHYS/QHP EA: CPT | Performed by: INTERNAL MEDICINE

## 2017-04-11 PROCEDURE — 85610 PROTHROMBIN TIME: CPT | Performed by: INTERNAL MEDICINE

## 2017-04-11 PROCEDURE — 93451 RIGHT HEART CATH: CPT

## 2017-04-11 PROCEDURE — 99153 MOD SED SAME PHYS/QHP EA: CPT

## 2017-04-11 PROCEDURE — 25000125 ZZHC RX 250

## 2017-04-11 PROCEDURE — 82803 BLOOD GASES ANY COMBINATION: CPT | Mod: XU

## 2017-04-11 PROCEDURE — 25000128 H RX IP 250 OP 636

## 2017-04-11 PROCEDURE — 83605 ASSAY OF LACTIC ACID: CPT

## 2017-04-11 PROCEDURE — 99152 MOD SED SAME PHYS/QHP 5/>YRS: CPT

## 2017-04-11 PROCEDURE — 99152 MOD SED SAME PHYS/QHP 5/>YRS: CPT | Performed by: INTERNAL MEDICINE

## 2017-04-11 PROCEDURE — 27210946 ZZH KIT HC TOTES DISP CR8

## 2017-04-11 PROCEDURE — 27210827 ZZH KIT ACIST INJECTOR CR6

## 2017-04-11 PROCEDURE — 4A023N6 MEASUREMENT OF CARDIAC SAMPLING AND PRESSURE, RIGHT HEART, PERCUTANEOUS APPROACH: ICD-10-PCS | Performed by: INTERNAL MEDICINE

## 2017-04-11 PROCEDURE — 82810 BLOOD GASES O2 SAT ONLY: CPT | Performed by: INTERNAL MEDICINE

## 2017-04-11 PROCEDURE — 93451 RIGHT HEART CATH: CPT | Mod: 26 | Performed by: INTERNAL MEDICINE

## 2017-04-11 PROCEDURE — 27210741 ZZH BALLOON TIP PRESSURE CR6

## 2017-04-11 PROCEDURE — 25000125 ZZHC RX 250: Performed by: INTERNAL MEDICINE

## 2017-04-11 PROCEDURE — 25000128 H RX IP 250 OP 636: Performed by: INTERNAL MEDICINE

## 2017-04-11 RX ORDER — NICARDIPINE HYDROCHLORIDE 2.5 MG/ML
100 INJECTION INTRAVENOUS
Status: DISCONTINUED | OUTPATIENT
Start: 2017-04-11 | End: 2017-04-11 | Stop reason: HOSPADM

## 2017-04-11 RX ORDER — DOPAMINE HYDROCHLORIDE 160 MG/100ML
2-20 INJECTION, SOLUTION INTRAVENOUS CONTINUOUS PRN
Status: DISCONTINUED | OUTPATIENT
Start: 2017-04-11 | End: 2017-04-11 | Stop reason: HOSPADM

## 2017-04-11 RX ORDER — LIDOCAINE 40 MG/G
CREAM TOPICAL
Status: DISCONTINUED | OUTPATIENT
Start: 2017-04-11 | End: 2017-04-11 | Stop reason: HOSPADM

## 2017-04-11 RX ORDER — BUPIVACAINE HYDROCHLORIDE 2.5 MG/ML
1-10 INJECTION, SOLUTION EPIDURAL; INFILTRATION; INTRACAUDAL
Status: DISCONTINUED | OUTPATIENT
Start: 2017-04-11 | End: 2017-04-11 | Stop reason: HOSPADM

## 2017-04-11 RX ORDER — LORAZEPAM 2 MG/ML
.5-2 INJECTION INTRAMUSCULAR EVERY 4 HOURS PRN
Status: DISCONTINUED | OUTPATIENT
Start: 2017-04-11 | End: 2017-04-11 | Stop reason: HOSPADM

## 2017-04-11 RX ORDER — ONDANSETRON 2 MG/ML
4 INJECTION INTRAMUSCULAR; INTRAVENOUS EVERY 4 HOURS PRN
Status: DISCONTINUED | OUTPATIENT
Start: 2017-04-11 | End: 2017-04-11 | Stop reason: HOSPADM

## 2017-04-11 RX ORDER — VERAPAMIL HYDROCHLORIDE 2.5 MG/ML
1-2.5 INJECTION, SOLUTION INTRAVENOUS
Status: DISCONTINUED | OUTPATIENT
Start: 2017-04-11 | End: 2017-04-11 | Stop reason: HOSPADM

## 2017-04-11 RX ORDER — POTASSIUM CHLORIDE 7.45 MG/ML
10 INJECTION INTRAVENOUS
Status: DISCONTINUED | OUTPATIENT
Start: 2017-04-11 | End: 2017-04-11 | Stop reason: HOSPADM

## 2017-04-11 RX ORDER — ACETAMINOPHEN 325 MG/1
325-650 TABLET ORAL EVERY 4 HOURS PRN
Status: DISCONTINUED | OUTPATIENT
Start: 2017-04-11 | End: 2017-04-11 | Stop reason: HOSPADM

## 2017-04-11 RX ORDER — LIDOCAINE HYDROCHLORIDE 10 MG/ML
30 INJECTION, SOLUTION EPIDURAL; INFILTRATION; INTRACAUDAL; PERINEURAL
Status: COMPLETED | OUTPATIENT
Start: 2017-04-11 | End: 2017-04-11

## 2017-04-11 RX ORDER — NITROGLYCERIN 5 MG/ML
100-200 VIAL (ML) INTRAVENOUS
Status: DISCONTINUED | OUTPATIENT
Start: 2017-04-11 | End: 2017-04-11 | Stop reason: HOSPADM

## 2017-04-11 RX ORDER — PHENYLEPHRINE HCL IN 0.9% NACL 1 MG/10 ML
20-100 SYRINGE (ML) INTRAVENOUS
Status: DISCONTINUED | OUTPATIENT
Start: 2017-04-11 | End: 2017-04-11 | Stop reason: HOSPADM

## 2017-04-11 RX ORDER — DIPHENHYDRAMINE HYDROCHLORIDE 50 MG/ML
25-50 INJECTION INTRAMUSCULAR; INTRAVENOUS
Status: DISCONTINUED | OUTPATIENT
Start: 2017-04-11 | End: 2017-04-11 | Stop reason: HOSPADM

## 2017-04-11 RX ORDER — POTASSIUM CHLORIDE 1500 MG/1
20 TABLET, EXTENDED RELEASE ORAL
Status: DISCONTINUED | OUTPATIENT
Start: 2017-04-11 | End: 2017-04-11 | Stop reason: HOSPADM

## 2017-04-11 RX ORDER — NALOXONE HYDROCHLORIDE 0.4 MG/ML
.1-.4 INJECTION, SOLUTION INTRAMUSCULAR; INTRAVENOUS; SUBCUTANEOUS
Status: DISCONTINUED | OUTPATIENT
Start: 2017-04-11 | End: 2017-04-11 | Stop reason: HOSPADM

## 2017-04-11 RX ORDER — DEXTROSE MONOHYDRATE 25 G/50ML
12.5-5 INJECTION, SOLUTION INTRAVENOUS EVERY 30 MIN PRN
Status: DISCONTINUED | OUTPATIENT
Start: 2017-04-11 | End: 2017-04-11 | Stop reason: HOSPADM

## 2017-04-11 RX ORDER — ASPIRIN 325 MG
325 TABLET ORAL
Status: DISCONTINUED | OUTPATIENT
Start: 2017-04-11 | End: 2017-04-11 | Stop reason: HOSPADM

## 2017-04-11 RX ORDER — FUROSEMIDE 10 MG/ML
20-100 INJECTION INTRAMUSCULAR; INTRAVENOUS
Status: DISCONTINUED | OUTPATIENT
Start: 2017-04-11 | End: 2017-04-11 | Stop reason: HOSPADM

## 2017-04-11 RX ORDER — SODIUM CHLORIDE 9 MG/ML
1000 INJECTION, SOLUTION INTRAVENOUS CONTINUOUS
Status: DISCONTINUED | OUTPATIENT
Start: 2017-04-11 | End: 2017-04-11 | Stop reason: HOSPADM

## 2017-04-11 RX ORDER — EPTIFIBATIDE 2 MG/ML
180 INJECTION, SOLUTION INTRAVENOUS EVERY 10 MIN PRN
Status: DISCONTINUED | OUTPATIENT
Start: 2017-04-11 | End: 2017-04-11 | Stop reason: HOSPADM

## 2017-04-11 RX ORDER — NALOXONE HYDROCHLORIDE 0.4 MG/ML
0.4 INJECTION, SOLUTION INTRAMUSCULAR; INTRAVENOUS; SUBCUTANEOUS EVERY 5 MIN PRN
Status: DISCONTINUED | OUTPATIENT
Start: 2017-04-11 | End: 2017-04-11 | Stop reason: HOSPADM

## 2017-04-11 RX ORDER — HEPARIN SODIUM 1000 [USP'U]/ML
1000-10000 INJECTION, SOLUTION INTRAVENOUS; SUBCUTANEOUS EVERY 5 MIN PRN
Status: DISCONTINUED | OUTPATIENT
Start: 2017-04-11 | End: 2017-04-11 | Stop reason: HOSPADM

## 2017-04-11 RX ORDER — LIDOCAINE HYDROCHLORIDE 10 MG/ML
1-10 INJECTION, SOLUTION INFILTRATION; PERINEURAL
Status: DISCONTINUED | OUTPATIENT
Start: 2017-04-11 | End: 2017-04-11 | Stop reason: HOSPADM

## 2017-04-11 RX ORDER — METOPROLOL TARTRATE 1 MG/ML
5 INJECTION, SOLUTION INTRAVENOUS EVERY 5 MIN PRN
Status: DISCONTINUED | OUTPATIENT
Start: 2017-04-11 | End: 2017-04-11 | Stop reason: HOSPADM

## 2017-04-11 RX ORDER — EPTIFIBATIDE 2 MG/ML
2 INJECTION, SOLUTION INTRAVENOUS CONTINUOUS PRN
Status: DISCONTINUED | OUTPATIENT
Start: 2017-04-11 | End: 2017-04-11 | Stop reason: HOSPADM

## 2017-04-11 RX ORDER — NALOXONE HYDROCHLORIDE 0.4 MG/ML
.2-.4 INJECTION, SOLUTION INTRAMUSCULAR; INTRAVENOUS; SUBCUTANEOUS
Status: DISCONTINUED | OUTPATIENT
Start: 2017-04-11 | End: 2017-04-11 | Stop reason: HOSPADM

## 2017-04-11 RX ORDER — FENTANYL CITRATE 50 UG/ML
25-50 INJECTION, SOLUTION INTRAMUSCULAR; INTRAVENOUS
Status: DISCONTINUED | OUTPATIENT
Start: 2017-04-11 | End: 2017-04-11 | Stop reason: HOSPADM

## 2017-04-11 RX ORDER — ATROPINE SULFATE 0.1 MG/ML
.5-1 INJECTION INTRAVENOUS
Status: DISCONTINUED | OUTPATIENT
Start: 2017-04-11 | End: 2017-04-11 | Stop reason: HOSPADM

## 2017-04-11 RX ORDER — ADENOSINE 3 MG/ML
12-12000 INJECTION, SOLUTION INTRAVENOUS
Status: DISCONTINUED | OUTPATIENT
Start: 2017-04-11 | End: 2017-04-11 | Stop reason: HOSPADM

## 2017-04-11 RX ORDER — NIFEDIPINE 10 MG/1
10 CAPSULE ORAL
Status: DISCONTINUED | OUTPATIENT
Start: 2017-04-11 | End: 2017-04-11 | Stop reason: HOSPADM

## 2017-04-11 RX ORDER — MORPHINE SULFATE 4 MG/ML
1-2 INJECTION, SOLUTION INTRAMUSCULAR; INTRAVENOUS EVERY 5 MIN PRN
Status: DISCONTINUED | OUTPATIENT
Start: 2017-04-11 | End: 2017-04-11 | Stop reason: HOSPADM

## 2017-04-11 RX ORDER — ATROPINE SULFATE 0.1 MG/ML
0.5 INJECTION INTRAVENOUS EVERY 5 MIN PRN
Status: DISCONTINUED | OUTPATIENT
Start: 2017-04-11 | End: 2017-04-11 | Stop reason: HOSPADM

## 2017-04-11 RX ORDER — DOBUTAMINE HYDROCHLORIDE 200 MG/100ML
2-20 INJECTION INTRAVENOUS CONTINUOUS PRN
Status: DISCONTINUED | OUTPATIENT
Start: 2017-04-11 | End: 2017-04-11 | Stop reason: HOSPADM

## 2017-04-11 RX ORDER — METHYLPREDNISOLONE SODIUM SUCCINATE 125 MG/2ML
125 INJECTION, POWDER, LYOPHILIZED, FOR SOLUTION INTRAMUSCULAR; INTRAVENOUS
Status: DISCONTINUED | OUTPATIENT
Start: 2017-04-11 | End: 2017-04-11 | Stop reason: HOSPADM

## 2017-04-11 RX ORDER — NITROGLYCERIN 0.4 MG/1
0.4 TABLET SUBLINGUAL EVERY 5 MIN PRN
Status: DISCONTINUED | OUTPATIENT
Start: 2017-04-11 | End: 2017-04-11 | Stop reason: HOSPADM

## 2017-04-11 RX ORDER — HEPARIN SODIUM 1000 [USP'U]/ML
INJECTION, SOLUTION INTRAVENOUS; SUBCUTANEOUS
Status: DISCONTINUED
Start: 2017-04-11 | End: 2017-04-11 | Stop reason: HOSPADM

## 2017-04-11 RX ORDER — POTASSIUM CHLORIDE 29.8 MG/ML
20 INJECTION INTRAVENOUS
Status: DISCONTINUED | OUTPATIENT
Start: 2017-04-11 | End: 2017-04-11 | Stop reason: HOSPADM

## 2017-04-11 RX ORDER — PRASUGREL 10 MG/1
10-60 TABLET, FILM COATED ORAL
Status: DISCONTINUED | OUTPATIENT
Start: 2017-04-11 | End: 2017-04-11 | Stop reason: HOSPADM

## 2017-04-11 RX ORDER — FENTANYL CITRATE 50 UG/ML
INJECTION, SOLUTION INTRAMUSCULAR; INTRAVENOUS
Status: DISCONTINUED
Start: 2017-04-11 | End: 2017-04-11 | Stop reason: HOSPADM

## 2017-04-11 RX ORDER — FLUMAZENIL 0.1 MG/ML
0.2 INJECTION, SOLUTION INTRAVENOUS
Status: DISCONTINUED | OUTPATIENT
Start: 2017-04-11 | End: 2017-04-11 | Stop reason: HOSPADM

## 2017-04-11 RX ORDER — HYDROCODONE BITARTRATE AND ACETAMINOPHEN 5; 325 MG/1; MG/1
1-2 TABLET ORAL EVERY 4 HOURS PRN
Status: DISCONTINUED | OUTPATIENT
Start: 2017-04-11 | End: 2017-04-11 | Stop reason: HOSPADM

## 2017-04-11 RX ORDER — SODIUM NITROPRUSSIDE 25 MG/ML
100-200 INJECTION INTRAVENOUS
Status: DISCONTINUED | OUTPATIENT
Start: 2017-04-11 | End: 2017-04-11 | Stop reason: HOSPADM

## 2017-04-11 RX ORDER — CLOPIDOGREL 300 MG/1
300-600 TABLET, FILM COATED ORAL
Status: DISCONTINUED | OUTPATIENT
Start: 2017-04-11 | End: 2017-04-11 | Stop reason: HOSPADM

## 2017-04-11 RX ORDER — NITROGLYCERIN 5 MG/ML
100-500 VIAL (ML) INTRAVENOUS
Status: DISCONTINUED | OUTPATIENT
Start: 2017-04-11 | End: 2017-04-11 | Stop reason: HOSPADM

## 2017-04-11 RX ORDER — ASPIRIN 81 MG/1
81-324 TABLET, CHEWABLE ORAL
Status: DISCONTINUED | OUTPATIENT
Start: 2017-04-11 | End: 2017-04-11 | Stop reason: HOSPADM

## 2017-04-11 RX ORDER — ENALAPRILAT 1.25 MG/ML
1.25-2.5 INJECTION INTRAVENOUS
Status: DISCONTINUED | OUTPATIENT
Start: 2017-04-11 | End: 2017-04-11 | Stop reason: HOSPADM

## 2017-04-11 RX ORDER — NITROGLYCERIN 20 MG/100ML
.07-2 INJECTION INTRAVENOUS CONTINUOUS PRN
Status: DISCONTINUED | OUTPATIENT
Start: 2017-04-11 | End: 2017-04-11 | Stop reason: HOSPADM

## 2017-04-11 RX ORDER — PROTAMINE SULFATE 10 MG/ML
1-5 INJECTION, SOLUTION INTRAVENOUS
Status: DISCONTINUED | OUTPATIENT
Start: 2017-04-11 | End: 2017-04-11 | Stop reason: HOSPADM

## 2017-04-11 RX ORDER — PROMETHAZINE HYDROCHLORIDE 25 MG/ML
6.25-25 INJECTION, SOLUTION INTRAMUSCULAR; INTRAVENOUS EVERY 4 HOURS PRN
Status: DISCONTINUED | OUTPATIENT
Start: 2017-04-11 | End: 2017-04-11 | Stop reason: HOSPADM

## 2017-04-11 RX ORDER — PROTAMINE SULFATE 10 MG/ML
25-100 INJECTION, SOLUTION INTRAVENOUS EVERY 5 MIN PRN
Status: DISCONTINUED | OUTPATIENT
Start: 2017-04-11 | End: 2017-04-11 | Stop reason: HOSPADM

## 2017-04-11 RX ORDER — CLOPIDOGREL BISULFATE 75 MG/1
75 TABLET ORAL
Status: DISCONTINUED | OUTPATIENT
Start: 2017-04-11 | End: 2017-04-11 | Stop reason: HOSPADM

## 2017-04-11 RX ORDER — HYDRALAZINE HYDROCHLORIDE 20 MG/ML
10-20 INJECTION INTRAMUSCULAR; INTRAVENOUS
Status: DISCONTINUED | OUTPATIENT
Start: 2017-04-11 | End: 2017-04-11 | Stop reason: HOSPADM

## 2017-04-11 RX ADMIN — FENTANYL CITRATE 25 MCG: 50 INJECTION INTRAMUSCULAR; INTRAVENOUS at 12:36

## 2017-04-11 RX ADMIN — MIDAZOLAM 0.5 MG: 1 INJECTION INTRAMUSCULAR; INTRAVENOUS at 12:36

## 2017-04-11 RX ADMIN — SODIUM CHLORIDE 1000 ML: 9 INJECTION, SOLUTION INTRAVENOUS at 10:08

## 2017-04-11 RX ADMIN — FENTANYL CITRATE 25 MCG: 50 INJECTION, SOLUTION INTRAMUSCULAR; INTRAVENOUS at 12:36

## 2017-04-11 RX ADMIN — LIDOCAINE HYDROCHLORIDE 40 MG: 10 INJECTION, SOLUTION EPIDURAL; INFILTRATION; INTRACAUDAL; PERINEURAL at 12:40

## 2017-04-11 NOTE — DISCHARGE INSTRUCTIONS
Going Home after Right Heart Catherization        Name: Sean Brunner  Medical Record Number:  [id number]  Today's Date: April 11, 2017        For 24 hours:         Have an adult stay with you for 24 hours.         Relax and take it easy.         Drink plenty of fluids.         You may eat your normal diet, unless your doctor tells you otherwise.         Do NOT make any important or legal decisions.         Do NOT drive or operate machines at home or at work.         Do NOT drink alcohol.      Do NOT smoke.     Medicines:         If you have begun Plavix (clopidogrel), Effient (prasugrel), or Brilinta (ticagrelor), do not stop taking it until you talk to your heart doctor (cardiologist).         If you are on metformin (Glucophage), do not restart it until you have blood tests (within 2 to 3 days after discharge). When your doctor tells you it is safe, you may restart the metformin.         If you have stopped any other medicines, check with your nurse or provider about when to restart them.    Care of neck site:         It is normal to have soreness at the puncture site and mild tingling in your hand for up to 3 days.           Remove the Band-Aid after 24 hours. If there is minor oozing, apply another Band-aid and remove it after 12 hours.          Do NOT take a bath, or use a hot tub or pool for the next 48 hours. You may shower.          It is normal to have a small bruise.  There should not be a lump at the site.         Do not scrub the site.         Do not use lotion or powder near the puncture site for 3 days.         For 2 days, do not use your hand or arm to support your weight (such as rising from a chair) or bend your wrist (such as lifting a garage door).         For 2 days, do not lift more than 5 pounds or exercise your arm (tennis, golf or bowling).    If you start bleeding from the site in your neck: Sit down and press firmly on the site with your fingers for 10 minutes. Call your doctor as soon  as you can.      Call 911 right away if you have bleeding that is heavy or does not stop.     Call your doctor if:         You have a large or growing hard lump around the site.         The site is red, swollen, hot or tender.         Blood or fluid is draining from the site.         You have chills or a fever greater than 101 F (38 C).         Your leg or arm turns bluish, feels numb or cool.         You have hives, a rash or unusual itching.       [facility] [place]:   [phone] (7 days a week)      Cardiology Fellow on call (24 hours per day) at [facility]:   [phone] (ask for Cardiology Fellow on call)      Number where we can reach you:  [phone]____________

## 2017-04-11 NOTE — IP AVS SNAPSHOT
[facility]    [address]    [facility] [address]    Phone:  [phone]                                       After Visit Summary   4/11/2017    Sean Brunner    MRN: [id number]           After Visit Summary Signature Page     I have received my discharge instructions, and my questions have been answered. I have discussed any challenges I see with this plan with the nurse or doctor.    ..........................................................................................................................................  Patient/Patient Representative Signature      ..........................................................................................................................................  Patient Representative Print Name and Relationship to Patient    ..................................................               ................................................  Date                                            Time    ..........................................................................................................................................  Reviewed by Signature/Title    ...................................................              ..............................................  Date                                                            Time

## 2017-04-11 NOTE — IP AVS SNAPSHOT
MRN:[id number]                      After Visit Summary   4/11/2017    Sean Brunner    MRN: [id number]           Visit Information        Department      4/11/2017  9:18 AM [facility]          Review of your medicines      CONTINUE these medicines which have NOT CHANGED        Dose / Directions    blood glucose monitoring lancets   Used for:  Diabetes mellitus, type 2 (H)        by Lancet route 2 times daily. Use to test blood sugar twice daily or as directed.   Quantity:  102 each   Refills:  prn       cyanocobalamin 1000 MCG tablet   Commonly known as:  vitamin  B-12        Dose:  3000 mcg   Take 3,000 mcg by mouth every morning   Refills:  0       digoxin 125 MCG tablet   Commonly known as:  LANOXIN   Used for:  Cardiomyopathy (H), Cor pulmonale (H), Acute on chronic systolic congestive heart failure (H), Non-rheumatic tricuspid valve insufficiency        Take one pill every other day.   Quantity:  30 tablet   Refills:  1       divalproex 250 MG EC tablet   Commonly known as:  DEPAKOTE        Dose:  250 mg   Take 250 mg by mouth At Bedtime   Refills:  0       METFORMIN HCL PO        Dose:  1000 mg   Take 1,000 mg by mouth daily (with dinner)   Refills:  0       metoprolol 25 MG 24 hr tablet   Commonly known as:  TOPROL-XL        Dose:  25 mg   Take 25 mg by mouth 2 times daily   Refills:  0       multivitamin, therapeutic with minerals Tabs tablet        Dose:  1 tablet   Take 1 tablet by mouth 2 times daily   Refills:  0       OMEPRAZOLE PO        Dose:  40 mg   Take 40 mg by mouth every evening   Refills:  0       simvastatin 40 MG tablet   Commonly known as:  ZOCOR        Dose:  40 mg   Take 40 mg by mouth daily (with dinner)   Refills:  0       SYNTHROID 150 MCG tablet   Generic drug:  levothyroxine        Dose:  150 mcg   Take 150 mcg by mouth every morning (before breakfast)   Refills:  0       terazosin 5 MG capsule   Commonly known as:  HYTRIN        Dose:  5 mg   Take  5 mg by mouth daily (with dinner)   Refills:  0       torsemide 10 MG tablet   Commonly known as:  DEMADEX   Used for:  Primary pulmonary hypertension (H)        Dose:  10 mg   Take 1 tablet (10 mg) by mouth daily   Quantity:  90 tablet   Refills:  0       WARFARIN SODIUM PO        Take by mouth every evening 1 mg Mon, Thu, and 1.5 mg all other days   Refills:  0                Protect others around you: Learn how to safely use, store and throw away your medicines at www.disposemymeds.org.         Follow-ups after your visit        Your next 10 appointments already scheduled     Apr 18, 2017  1:30 PM CDT   Pulmonary Hypertension Return with Julian Joyce MD   [facility] ([facility])    [address] [place] [id number] [phone]               Care Instructions        After Care Instructions     Discharge Instructions - IF on Metformin (Glucophage or Glucovance) or Metformin containing medications       IF on Metformin (Glucophage or Glucovance) or Metformin containing medications , schedule a Basic Metabolic Panel at [facility] or Primary Clinic in 48 - 72 hours post procedure and PRIOR TO resuming the Metformin or Metformin containing medications.  Hold Metformin (Glucophage or Glucovance) or Metformin containing medications until after the Basic Metabolic Panel on the 2nd or 3rd day following the procedure.  May resume after blood draw is complete.                  Further instructions from your care team       Going Home after Right Heart Catherization        Name: Sean Brunner  Medical Record Number:  [id number]  Today's Date: April 11, 2017        For 24 hours:         Have an adult stay with you for 24 hours.         Relax and take it easy.         Drink plenty of fluids.         You may eat your normal diet, unless your doctor tells you otherwise.         Do NOT make any important or legal decisions.         Do NOT drive or  operate machines at home or at work.         Do NOT drink alcohol.      Do NOT smoke.     Medicines:         If you have begun Plavix (clopidogrel), Effient (prasugrel), or Brilinta (ticagrelor), do not stop taking it until you talk to your heart doctor (cardiologist).         If you are on metformin (Glucophage), do not restart it until you have blood tests (within 2 to 3 days after discharge). When your doctor tells you it is safe, you may restart the metformin.         If you have stopped any other medicines, check with your nurse or provider about when to restart them.    Care of neck site:         It is normal to have soreness at the puncture site and mild tingling in your hand for up to 3 days.           Remove the Band-Aid after 24 hours. If there is minor oozing, apply another Band-aid and remove it after 12 hours.          Do NOT take a bath, or use a hot tub or pool for the next 48 hours. You may shower.          It is normal to have a small bruise.  There should not be a lump at the site.         Do not scrub the site.         Do not use lotion or powder near the puncture site for 3 days.         For 2 days, do not use your hand or arm to support your weight (such as rising from a chair) or bend your wrist (such as lifting a garage door).         For 2 days, do not lift more than 5 pounds or exercise your arm (tennis, golf or bowling).    If you start bleeding from the site in your neck: Sit down and press firmly on the site with your fingers for 10 minutes. Call your doctor as soon as you can.      Call 911 right away if you have bleeding that is heavy or does not stop.     Call your doctor if:         You have a large or growing hard lump around the site.         The site is red, swollen, hot or tender.         Blood or fluid is draining from the site.         You have chills or a fever greater than 101 F (38 C).         Your leg or arm turns bluish, feels numb or cool.         You have hives, a rash or  "unusual itching.       [facility] [place]:   [phone] (7 days a week)      Cardiology Fellow on call (24 hours per day) at [facility], [place]:   [phone] (ask for Cardiology Fellow on call)      Number where we can reach you:  [phone]____________          Additional Information About Your Visit        [employer] Information     [employer] lets you send messages to your doctor, view your test results, renew your prescriptions, schedule appointments and more. To sign up, go to [email or web address] . Click on \"Log in\" on the left side of the screen, which will take you to the Welcome page. Then click on \"Sign up Now\" on the right side of the page.     You will be asked to enter the access code listed below, as well as some personal information. Please follow the directions to create your username and password.     Your access code is: [email or web address]  Expires: 2017 11:45 AM     Your access code will  in 90 days. If you need help or a new code, please call your [place] clinic or [phone].        Care EveryWhere ID     This is your Care EveryWhere ID. This could be used by other organizations to access your [place] medical records  [id number]        Your Vitals Were     Blood Pressure Temperature Respirations Height Weight Pulse Oximetry    100/62 (BP Location: Left arm, Cuff Size: Adult Regular) 98.3  F (36.8  C) (Temporal) 14 1.753 m (5' 9.02\") 91.6 kg (201 lb 15.1 oz) 98%    BMI (Body Mass Index)                   29.81 kg/m2            Primary Care Provider Office Phone # Fax #    Oscar Parekh [phone]      Thank you!     Thank you for choosing [facility] for your care. Our goal is always to provide you with excellent care. Hearing back from our patients is one way we can continue to improve our services. Please take a few minutes to complete the written survey that you may receive in the mail after you visit. If " you would like to speak to someone directly about your visit please contact Patient Relations at [phone]. Thank you!               Medication List: This is a list of all your medications and when to take them. Check marks below indicate your daily home schedule. Keep this list as a reference.      Medications           Morning Afternoon Evening Bedtime As Needed    blood glucose monitoring lancets   by Lancet route 2 times daily. Use to test blood sugar twice daily or as directed.                                cyanocobalamin 1000 MCG tablet   Commonly known as:  vitamin  B-12   Take 3,000 mcg by mouth every morning                                digoxin 125 MCG tablet   Commonly known as:  LANOXIN   Take one pill every other day.                                divalproex 250 MG EC tablet   Commonly known as:  DEPAKOTE   Take 250 mg by mouth At Bedtime                                METFORMIN HCL PO   Take 1,000 mg by mouth daily (with dinner)                                metoprolol 25 MG 24 hr tablet   Commonly known as:  TOPROL-XL   Take 25 mg by mouth 2 times daily                                multivitamin, therapeutic with minerals Tabs tablet   Take 1 tablet by mouth 2 times daily                                OMEPRAZOLE PO   Take 40 mg by mouth every evening                                simvastatin 40 MG tablet   Commonly known as:  ZOCOR   Take 40 mg by mouth daily (with dinner)                                SYNTHROID 150 MCG tablet   Take 150 mcg by mouth every morning (before breakfast)   Generic drug:  levothyroxine                                terazosin 5 MG capsule   Commonly known as:  HYTRIN   Take 5 mg by mouth daily (with dinner)                                torsemide 10 MG tablet   Commonly known as:  DEMADEX   Take 1 tablet (10 mg) by mouth daily                                WARFARIN SODIUM PO   Take by mouth every evening 1 mg Mon, Thu, and 1.5 mg all other days

## 2017-04-17 NOTE — PROGRESS NOTES
Problem List:    1. H/o cor pulmonale with know 3-4+ TR and RV dysfunction in past- slight increase in RV size and decline in RV systolic function compared to 2011 echo  2. Chronic afib- VR well controlled, on coumadin  3. Moderate obstruction on PFT 2011  4. STEWART- no compliant with CPAP  5. H/o PE- no PE on CT chest this admission  6. Non obstructive CAD CT cor angio 2011, negative stress test this admission  7. Obesity- lost 100 lbs over last several years      April 18, 2017      Dear Doctor Iglesia,    We had the pleasure of seeing Mr. Brunner at [facility] today.     Today he is here for follow-up of a V/Q scan, chest Xray, and right heart catheterization, accompanied by his wife.  He has recently been sick according to his wife, but he states he is feeling well.  He and his wife are both experiencing stress over the wellbeing of their disabled son, Kvng.  He has persistent lower leg swelling, the left greater than the right, but he relates the left swelling to a previous injury.  He reports shortness of breath with extended distances.      PAST MEDICAL HISTORY:  Past Medical History:   Diagnosis Date     A-fib (H)      Diabetes mellitus (H)      Low BP      Thyroid disease        FAMILY HISTORY:  No family history on file.      SOCIAL HISTORY:  Social History     Social History     Marital status:      Spouse name: N/A     Number of children: N/A     Years of education: N/A     Social History Main Topics     Smoking status: Never Smoker     Smokeless tobacco: Never Used     Alcohol use No     Drug use: No     Sexual activity: Not on file     Other Topics Concern     Not on file     Social History Narrative       CURRENT MEDICATIONS:  Current Outpatient Prescriptions   Medication Sig Dispense Refill     digoxin (LANOXIN) 125 MCG tablet Take one pill every other day. 30 tablet 1     OMEPRAZOLE PO Take 40 mg by mouth every evening       multivitamin,  "therapeutic with minerals (THERA-VIT-M) TABS tablet Take 1 tablet by mouth 2 times daily       WARFARIN SODIUM PO Take by mouth every evening 1 mg Mon, Thu, and 1.5 mg all other days       divalproex (DEPAKOTE) 250 MG EC tablet Take 250 mg by mouth At Bedtime       METFORMIN HCL PO Take 1,000 mg by mouth daily (with dinner)       torsemide (DEMADEX) 10 MG tablet Take 1 tablet (10 mg) by mouth daily 90 tablet 0     cyanocolbalamin (VITAMIN  B-12) 1000 MCG tablet Take 3,000 mcg by mouth every morning       metoprolol (TOPROL-XL) 25 MG 24 hr tablet Take 25 mg by mouth 2 times daily       levothyroxine (SYNTHROID) 150 MCG tablet Take 150 mcg by mouth every morning (before breakfast)        terazosin (HYTRIN) 5 MG capsule Take 5 mg by mouth daily (with dinner)        simvastatin (ZOCOR) 40 MG tablet Take 40 mg by mouth daily (with dinner)        ACCU-CHEK MULTICLIX LANCETS MISC by Lancet route 2 times daily. Use to test blood sugar twice daily or as directed. 102 each prn       ROS:   10 point ROS negative except HPI    EXAM:  BP 96/53  Pulse 72  Ht 1.753 m (5' 9\")  Wt 94.3 kg (208 lb)  SpO2 97%  BMI 30.72 kg/m2  Home weight  General: appears comfortable, alert and articulate  Head: normocephalic, atraumatic  Eyes: anicteric sclera, EOMI  Neck: no adenopathy  Orophyarynx: moist mucosa, no lesions, dentition intact  Heart: regular, S1/S2, no murmur, gallop, rub, estimated JVP WNL  Lungs: clear, no rales or wheezing  Abdomen: soft, non-tender, bowel sounds present, no hepatosplenomegaly  Extremities: no clubbing, cyanosis or edema  Neurological: normal speech and affect, no gross motor deficits      Wt Readings from Last 10 Encounters:   04/18/17 94.3 kg (208 lb)   04/11/17 91.6 kg (201 lb 15.1 oz)   04/04/17 93 kg (205 lb 1.6 oz)   02/27/17 100.9 kg (222 lb 6 oz)   01/08/15 96.6 kg (213 lb)   12/13/14 98.7 kg (217 lb 8 oz)   12/27/13 100.7 kg (222 lb)   05/17/12 117.9 kg (260 lb)       Labs:    Results for COTY, " MAYITO GIL (MRN [id number]) as of 4/18/2017 17:54   Ref. Range 4/18/2017 15:17   Sodium Latest Ref Range: 133 - 144 mmol/L 139   Potassium Latest Ref Range: 3.4 - 5.3 mmol/L 4.3   Chloride Latest Ref Range: 94 - 109 mmol/L 103   Carbon Dioxide Latest Ref Range: 20 - 32 mmol/L 30   Urea Nitrogen Latest Ref Range: 7 - 30 mg/dL 17   Creatinine Latest Ref Range: 0.66 - 1.25 mg/dL 0.93   GFR Estimate Latest Ref Range: >60 mL/min/1.7m2 77   GFR Estimate If Black Latest Ref Range: >60 mL/min/1.7m2 >90...   Calcium Latest Ref Range: 8.5 - 10.1 mg/dL 9.6   Anion Gap Latest Ref Range: 3 - 14 mmol/L 6   Albumin Latest Ref Range: 3.4 - 5.0 g/dL 3.2 (L)   Protein Total Latest Ref Range: 6.8 - 8.8 g/dL 8.0   Bilirubin Total Latest Ref Range: 0.2 - 1.3 mg/dL 0.7   Alkaline Phosphatase Latest Ref Range: 40 - 150 U/L 75   ALT Latest Ref Range: 0 - 70 U/L 15   AST Latest Ref Range: 0 - 45 U/L 20   Iron Latest Ref Range: 35 - 180 ug/dL 68   Iron Binding Cap Latest Ref Range: 240 - 430 ug/dL 246   Iron Saturation Index Latest Ref Range: 15 - 46 % 28   N-Terminal Pro Bnp Latest Ref Range: 0 - 450 pg/mL 1288 (H)   Glucose Latest Ref Range: 70 - 99 mg/dL 110 (H)   WBC Latest Ref Range: 4.0 - 11.0 10e9/L 3.8 (L)   Hemoglobin Latest Ref Range: 13.3 - 17.7 g/dL 11.4 (L)   Hematocrit Latest Ref Range: 40.0 - 53.0 % 35.1 (L)   Platelet Count Latest Ref Range: 150 - 450 10e9/L 147 (L)   RBC Count Latest Ref Range: 4.4 - 5.9 10e12/L 3.70 (L)   MCV Latest Ref Range: 78 - 100 fl 95   MCH Latest Ref Range: 26.5 - 33.0 pg 30.8   MCHC Latest Ref Range: 31.5 - 36.5 g/dL 32.5   RDW Latest Ref Range: 10.0 - 15.0 % 15.2 (H)   INR Latest Ref Range: 0.86 - 1.14  1.82 (H)       CBC RESULTS:  Lab Results   Component Value Date    WBC 3.1 (L) 02/27/2017    RBC 3.59 (L) 02/27/2017    HGB 11.0 (L) 02/27/2017    HCT 33.7 (L) 02/27/2017    MCV 94 02/27/2017    MCH 30.6 02/27/2017    MCHC 32.6 02/27/2017    RDW 15.2 (H) 02/27/2017    PLT 98 (L) 02/27/2017       CMP  RESULTS:  Lab Results   Component Value Date     04/04/2017    POTASSIUM 4.0 04/04/2017    CHLORIDE 103 04/04/2017    CO2 31 04/04/2017    ANIONGAP 7 04/04/2017    GLC 95 04/04/2017    BUN 16 04/04/2017    CR 1.04 04/04/2017    GFRESTIMATED 68 04/04/2017    GFRESTBLACK 83 04/04/2017    TYSON 9.4 04/04/2017    BILITOTAL 0.7 04/04/2017    ALBUMIN 3.4 04/04/2017    ALKPHOS 81 04/04/2017    ALT 22 04/04/2017    AST 28 04/04/2017        INR RESULTS:  Lab Results   Component Value Date    INR 2.36 (H) 04/11/2017       No components found for: CK  Lab Results   Component Value Date    MAG 1.8 02/25/2017     Lab Results   Component Value Date    NTBNPI 787 02/24/2017       Echocardiogram (2/25/2017):  Interpretation Summary  The left ventricle is normal in size. There is normal left ventricular wall  thickness. Left ventricular systolic function is normal. The visual ejection  fraction is estimated at 55-60%. Flattened septum is consistent with RV  pressure/volume overload. There is no thrombus seen in the left ventricle.  The right ventricle is severely dilated. The right ventricular systolic  function is mild to moderately reduced.  The left atrium is moderately dilated. The right atrium is severely dilated.  There is severe (4+) tricuspid regurgitation. The right ventricular systolic  pressure is approximated at 30.9 mmHg plus the right atrial pressure. This  most likely represents an underestimation of the true RVSP.  Mild aortic stenosis.  In direct comparison to the previous study dated 11/07/2012, there has been a  mild interval increase in right ventricular size and deterioration in right  ventricular systolic function.      CT Chest Pulmonary (2/24/2017):  COMPARISON: 4/13/2011     FINDINGS: Evaluation of the pulmonary arterial system shows no  evidence of embolus. The thoracic aorta is calcified and slightly  tortuous. No aortic aneurysm or dissection. There are coronary artery  atherosclerotic calcifications.  The heart is enlarged. There is no  mediastinal, hilar or axillary lymph node enlargement. There are a few  tiny calcified granulomas in the right upper lobe. Mild dependent  atelectasis bilaterally. A few bands of scar or atelectasis  bilaterally. Small bilateral pleural effusions. Images through the  upper abdomen are remarkable for small amount of ascites. The inferior  vena cava is very large which may be due to right heart dysfunction.  The liver has a slightly nodular contour suggesting cirrhosis.     IMPRESSION:  1. There is no pulmonary embolus, aortic aneurysm or dissection.  2. Coronary artery atherosclerotic calcification. Cardiomegaly.  3. Small bilateral pleural effusions.  4. Probable hepatic cirrhosis. Small amount of ascites.      XR Chest 2 Views (4/5/2017):  HISTORY: Other secondary pulmonary hypertension  COMPARISON: 2/24/2017.  IMPRESSION: No acute abnormality.       Right Heart Catheterization (4/11/2017):  82-year-old gentleman with unexplained pulmonary hypertension, severe  right heart failure and right ventricular dysfunction, and severe  tricuspid regurgitation is referred for further evaluation of his  pulmonary hypertension etiology 2 guide further evaluation and  treatment.    Catheterization results  Baseline with thermal dilution cardiac output  -RA pressure mean = 12. Patient is defibrillation  -RV pressure-49/11  -Pulmonary artery pressure 48/21 (31)  -Pulmonary capillary wedge pressure -/24 (15)  -Thermal dilution cardiac output/index 4.9/2.4  -Calculated pulmonary vascular arteriolar resistance 3.2 Woods units    With estimated Rachael cardiac output  -Pulmonary pressure 47/19 (31)  -Pulmonary capillary wedge pressure-/27 (17)  -Pulmonary artery oxygen saturation 67% aortic oxygen saturation 98  percent on room air  -Estimated Rachael cardiac output/index 5.1/2.5  -Pulmonary vascular arteriolar resistance 2.6 Woods units       VQ Scan (4/5/2017):  IMPRESSION:   1. Central airways  deposition, suggestive of chronic obstructive  pulmonary disease.  2. Mild diffusely heterogeneous pulmonary perfusion, similar to that  of the ventilation images. These findings most likely relate to  underlying pulmonary disease. There are no focal pulmonary perfusion  defects.       NM MPI w Lexiscan (2/24/2017):  Indication/Clinical History: Shortness of breath, chest pain     Impression  1. Myocardial perfusion imaging using single isotope technique  demonstrated no evidence of ischemia or infarction.   2. Gated images demonstrated normal size left ventricle with normal  wall motion. The left ventricular systolic function is normal at 76%.  3. Compared to the prior study from  .     Procedure  Pharmacologic stress testing was performed with Lexiscan at a rate of  0.08 mg/ml rapid bolus injection, for 15 seconds, 0.4 mg/5ml  intravenously. Low-level exercise was not performed along with the  vasodilator infusion. The heart rate was 6767 at baseline and bj to  82 beats per minute during the Lexiscan infusion. The rest blood  pressure was 118/64 mmHg and was 118/57 mm Hg during Lexiscan  infusion. The patient experienced no chest pain during the test.     Myocardial perfusion imaging was performed at rest, approximately 45  minutes after the injection intravenously of 11 mCi of Tc-99m Myoview.  At peak pharmacologic effect, 10-20 seconds after Lexiscan, the  patient was injected intravenously with 32 mCi of Tc-99m Myoview. The  post-stress tomographic imaging was performed approximately 60 minutes  after stress.     EKG Findings  The resting EKG demonstrated sinus rhythm with poor R wave progression  in the low voltage pattern. The stress EKG demonstrated no EKG  changes suggestive of ischemia.     Tomographic Findings  Overall, the study quality is fair. Mild visceral tracer artifact is  noted . On the stress images, mild inferior count reduction is noted.  On the rest images, mild inferior count reduction is  seen and likely  due to diaphragmatic attenuation and visceral tracer artifact . Gated  images demonstrated normal size left ventricle with normal wall  motion. The left ventricular ejection fraction was calculated to be  76%. TID was absent.      Assessment and Plan:   1. Laboratories - INR, BNP, CMP, CBC, Iron  2. Encouraged to start incorporating regular exercise and activity, such as walking outside.  3. Follow up with Dr. Parekh about fatigue  4. Return to clinic in 4-6 months        Danelle Hoffman APRN, CNP.    I personally saw and examined the patient and agree with note above. Function capacity improved.  BNP still somewhat high.

## 2017-04-18 ENCOUNTER — OFFICE VISIT (OUTPATIENT)
Dept: CARDIOLOGY | Facility: CLINIC | Age: 82
End: 2017-04-18
Payer: COMMERCIAL

## 2017-04-18 VITALS
WEIGHT: 208 LBS | HEIGHT: 69 IN | HEART RATE: 72 BPM | DIASTOLIC BLOOD PRESSURE: 53 MMHG | SYSTOLIC BLOOD PRESSURE: 96 MMHG | OXYGEN SATURATION: 97 % | BODY MASS INDEX: 30.81 KG/M2

## 2017-04-18 DIAGNOSIS — R06.09 DYSPNEA ON EXERTION: Primary | ICD-10-CM

## 2017-04-18 DIAGNOSIS — I42.9 CARDIOMYOPATHY (H): ICD-10-CM

## 2017-04-18 DIAGNOSIS — R06.09 DYSPNEA ON EXERTION: ICD-10-CM

## 2017-04-18 DIAGNOSIS — I48.91 ATRIAL FIBRILLATION (H): ICD-10-CM

## 2017-04-18 LAB
ALBUMIN SERPL-MCNC: 3.2 G/DL (ref 3.4–5)
ALP SERPL-CCNC: 75 U/L (ref 40–150)
ALT SERPL W P-5'-P-CCNC: 15 U/L (ref 0–70)
ANION GAP SERPL CALCULATED.3IONS-SCNC: 6 MMOL/L (ref 3–14)
AST SERPL W P-5'-P-CCNC: 20 U/L (ref 0–45)
BILIRUB SERPL-MCNC: 0.7 MG/DL (ref 0.2–1.3)
BUN SERPL-MCNC: 17 MG/DL (ref 7–30)
CALCIUM SERPL-MCNC: 9.6 MG/DL (ref 8.5–10.1)
CHLORIDE SERPL-SCNC: 103 MMOL/L (ref 94–109)
CO2 SERPL-SCNC: 30 MMOL/L (ref 20–32)
CREAT SERPL-MCNC: 0.93 MG/DL (ref 0.66–1.25)
ERYTHROCYTE [DISTWIDTH] IN BLOOD BY AUTOMATED COUNT: 15.2 % (ref 10–15)
GFR SERPL CREATININE-BSD FRML MDRD: 77 ML/MIN/1.7M2
GLUCOSE SERPL-MCNC: 110 MG/DL (ref 70–99)
HCT VFR BLD AUTO: 35.1 % (ref 40–53)
HGB BLD-MCNC: 11.4 G/DL (ref 13.3–17.7)
INR PPP: 1.82 (ref 0.86–1.14)
IRON SATN MFR SERPL: 28 % (ref 15–46)
IRON SERPL-MCNC: 68 UG/DL (ref 35–180)
MCH RBC QN AUTO: 30.8 PG (ref 26.5–33)
MCHC RBC AUTO-ENTMCNC: 32.5 G/DL (ref 31.5–36.5)
MCV RBC AUTO: 95 FL (ref 78–100)
NT-PROBNP SERPL-MCNC: 1288 PG/ML (ref 0–450)
PLATELET # BLD AUTO: 147 10E9/L (ref 150–450)
POTASSIUM SERPL-SCNC: 4.3 MMOL/L (ref 3.4–5.3)
PROT SERPL-MCNC: 8 G/DL (ref 6.8–8.8)
RBC # BLD AUTO: 3.7 10E12/L (ref 4.4–5.9)
SODIUM SERPL-SCNC: 139 MMOL/L (ref 133–144)
TIBC SERPL-MCNC: 246 UG/DL (ref 240–430)
WBC # BLD AUTO: 3.8 10E9/L (ref 4–11)

## 2017-04-18 PROCEDURE — 36415 COLL VENOUS BLD VENIPUNCTURE: CPT | Performed by: INTERNAL MEDICINE

## 2017-04-18 PROCEDURE — 85027 COMPLETE CBC AUTOMATED: CPT | Performed by: INTERNAL MEDICINE

## 2017-04-18 PROCEDURE — 80053 COMPREHEN METABOLIC PANEL: CPT | Performed by: INTERNAL MEDICINE

## 2017-04-18 PROCEDURE — 83880 ASSAY OF NATRIURETIC PEPTIDE: CPT | Performed by: INTERNAL MEDICINE

## 2017-04-18 PROCEDURE — 85610 PROTHROMBIN TIME: CPT | Performed by: INTERNAL MEDICINE

## 2017-04-18 PROCEDURE — 83540 ASSAY OF IRON: CPT | Performed by: INTERNAL MEDICINE

## 2017-04-18 PROCEDURE — 99214 OFFICE O/P EST MOD 30 MIN: CPT | Performed by: INTERNAL MEDICINE

## 2017-04-18 PROCEDURE — 83550 IRON BINDING TEST: CPT | Performed by: INTERNAL MEDICINE

## 2017-04-18 NOTE — MR AVS SNAPSHOT
After Visit Summary   4/18/2017    Sean Brunner    MRN: [id number]           Patient Information     Date Of Birth          9/13/1934        Visit Information        Provider Department      4/18/2017 1:30 PM Julian Joyce MD [facility] AT [place]        Today's Diagnoses     Dyspnea on exertion    -  1    Cardiomyopathy (H)          Care Instructions    Medication Changes:  No medication changes at this time. Please continue current medication regiment.     Patient Instructions:  Labs    Follow up Appointment Information:  6 months    For scheduling at [facility] please call [phone]  For scheduling at the [facility] please call [phone]  We are located on the second floor [address] at the [facility].  Our address is     [address] [place] [address]   [address]  [place]  [address]    Thank you for allowing us to be a part of your care here at the [facility]    If you have questions or concerns please contact us at:    Porsha Dawn RN      Nurse Coordinator       Pulmonary Hypertension     [facility]   [phone])[phone]  (F)[phone]    ** Please note that you will NOT receive a reminder call regarding your scheduled testing, reminder calls are for provider appointments only.  If you are scheduled for testing within the [facility] system you may receive a call regarding pre-registration for billing purposes only.**     Remember to weigh yourself daily after voiding and before you consume any food or beverages and log the numbers.  If you have gained/lost 2 pounds overnight or 5 pounds in a week contact us immediately for medication adjustments or further instructions.        Follow-ups after your visit        Additional Services     Follow-Up with Pulmonary Hypertension Clinic                 Follow-up notes from your care team     Return in about 6 months (around 10/18/2017) for with Isiah  "Return PH, with, Labs.      Future tests that were ordered for you today     Open Future Orders        Priority Expected Expires Ordered    Follow-Up with Pulmonary Hypertension Clinic Routine 10/18/2017 2018 2017            Who to contact     If you have questions or need follow up information about today's clinic visit or your schedule please contact [facility] AT [place] directly at [phone].  Normal or non-critical lab and imaging results will be communicated to you by [employer], letter or phone within 4 business days after the clinic has received the results. If you do not hear from us within 7 days, please contact the clinic through [employer] or phone. If you have a critical or abnormal lab result, we will notify you by phone as soon as possible.  Submit refill requests through [employer] or call your pharmacy and they will forward the refill request to us. Please allow 3 business days for your refill to be completed.          Additional Information About Your Visit        [employer] Information     [employer] lets you send messages to your doctor, view your test results, renew your prescriptions, schedule appointments and more. To sign up, go to [email or web address] . Click on \"Log in\" on the left side of the screen, which will take you to the Welcome page. Then click on \"Sign up Now\" on the right side of the page.     You will be asked to enter the access code listed below, as well as some personal information. Please follow the directions to create your username and password.     Your access code is: [email or web address]  Expires: 2017 11:45 AM     Your access code will  in 90 days. If you need help or a new code, please call your [facility] clinic or [phone].        Care EveryWhere ID     This is your Care EveryWhere ID. This could be used by other organizations to access your [facility] medical records  [id number]        Your Vitals Were     Pulse Height Pulse " "Oximetry BMI (Body Mass Index)          72 1.753 m (5' 9\") 97% 30.72 kg/m2         Blood Pressure from Last 3 Encounters:   04/18/17 96/53   04/11/17 101/58   04/04/17 112/62    Weight from Last 3 Encounters:   04/18/17 94.3 kg (208 lb)   04/11/17 91.6 kg (201 lb 15.1 oz)   04/04/17 93 kg (205 lb 1.6 oz)               Primary Care Provider Office Phone # Fax #    Dereckjordana Cory Parekh [phone]       [address] [address]        Thank you!     Thank you for choosing [facility] AT [place]  for your care. Our goal is always to provide you with excellent care. Hearing back from our patients is one way we can continue to improve our services. Please take a few minutes to complete the written survey that you may receive in the mail after your visit with us. Thank you!             Your Updated Medication List - Protect others around you: Learn how to safely use, store and throw away your medicines at www.disposemymeds.org.          This list is accurate as of: 4/18/17  5:56 PM.  Always use your most recent med list.                   Brand Name Dispense Instructions for use    blood glucose monitoring lancets     102 each    by Lancet route 2 times daily. Use to test blood sugar twice daily or as directed.       cyanocobalamin 1000 MCG tablet    vitamin  B-12     Take 3,000 mcg by mouth every morning       digoxin 125 MCG tablet    LANOXIN    30 tablet    Take one pill every other day.       divalproex 250 MG EC tablet    DEPAKOTE     Take 250 mg by mouth At Bedtime       METFORMIN HCL PO      Take 1,000 mg by mouth daily (with dinner)       metoprolol 25 MG 24 hr tablet    TOPROL-XL     Take 25 mg by mouth 2 times daily       multivitamin, therapeutic with minerals Tabs tablet      Take 1 tablet by mouth 2 times daily       OMEPRAZOLE PO      Take 40 mg by mouth every evening       simvastatin 40 MG tablet    ZOCOR     " Take 40 mg by mouth daily (with dinner)       SYNTHROID 150 MCG tablet   Generic drug:  levothyroxine      Take 150 mcg by mouth every morning (before breakfast)       terazosin 5 MG capsule    HYTRIN     Take 5 mg by mouth daily (with dinner)       torsemide 10 MG tablet    DEMADEX    90 tablet    Take 1 tablet (10 mg) by mouth daily       WARFARIN SODIUM PO      Take by mouth every evening 1 mg Mon, Thu, and 1.5 mg all other days

## 2017-04-18 NOTE — LETTER
4/18/2017    Oscar Parekh MD  [facility]   [address] [address]    RE: Sean Brunenr       Dear Colleague,    I had the pleasure of seeing Sean Brunner in the [facility].    Problem List:    1. H/o cor pulmonale with know 3-4+ TR and RV dysfunction in past- slight increase in RV size and decline in RV systolic function compared to 2011 echo  2. Chronic afib- VR well controlled, on coumadin  3. Moderate obstruction on PFT 2011  4. STEWART- no compliant with CPAP  5. H/o PE- no PE on CT chest this admission  6. Non obstructive CAD CT cor angio 2011, negative stress test this admission  7. Obesity- lost 100 lbs over last several years      April 18, 2017      Dear Doctor Iglesia,    We had the pleasure of seeing Mr. Brunner at The [facility] today.     Today he is here for follow-up of a V/Q scan, chest Xray, and right heart catheterization, accompanied by his wife.  He has recently been sick according to his wife, but he states he is feeling well.  He and his wife are both experiencing stress over the wellbeing of their disabled son, Kvng.  He has persistent lower leg swelling, the left greater than the right, but he relates the left swelling to a previous injury.  He reports shortness of breath with extended distances.      PAST MEDICAL HISTORY:  Past Medical History:   Diagnosis Date     A-fib (H)      Diabetes mellitus (H)      Low BP      Thyroid disease        FAMILY HISTORY:  No family history on file.      SOCIAL HISTORY:  Social History     Social History     Marital status:      Spouse name: N/A     Number of children: N/A     Years of education: N/A     Social History Main Topics     Smoking status: Never Smoker     Smokeless tobacco: Never Used     Alcohol use No     Drug use: No     Sexual activity: Not on file     Other Topics Concern     Not on file     Social History  "Narrative       CURRENT MEDICATIONS:  Current Outpatient Prescriptions   Medication Sig Dispense Refill     digoxin (LANOXIN) 125 MCG tablet Take one pill every other day. 30 tablet 1     OMEPRAZOLE PO Take 40 mg by mouth every evening       multivitamin, therapeutic with minerals (THERA-VIT-M) TABS tablet Take 1 tablet by mouth 2 times daily       WARFARIN SODIUM PO Take by mouth every evening 1 mg Mon, Thu, and 1.5 mg all other days       divalproex (DEPAKOTE) 250 MG EC tablet Take 250 mg by mouth At Bedtime       METFORMIN HCL PO Take 1,000 mg by mouth daily (with dinner)       torsemide (DEMADEX) 10 MG tablet Take 1 tablet (10 mg) by mouth daily 90 tablet 0     cyanocolbalamin (VITAMIN  B-12) 1000 MCG tablet Take 3,000 mcg by mouth every morning       metoprolol (TOPROL-XL) 25 MG 24 hr tablet Take 25 mg by mouth 2 times daily       levothyroxine (SYNTHROID) 150 MCG tablet Take 150 mcg by mouth every morning (before breakfast)        terazosin (HYTRIN) 5 MG capsule Take 5 mg by mouth daily (with dinner)        simvastatin (ZOCOR) 40 MG tablet Take 40 mg by mouth daily (with dinner)        ACCU-CHEK MULTICLIX LANCETS MISC by Lancet route 2 times daily. Use to test blood sugar twice daily or as directed. 102 each prn       ROS:   10 point ROS negative except HPI    EXAM:  BP 96/53  Pulse 72  Ht 1.753 m (5' 9\")  Wt 94.3 kg (208 lb)  SpO2 97%  BMI 30.72 kg/m2  Home weight  General: appears comfortable, alert and articulate  Head: normocephalic, atraumatic  Eyes: anicteric sclera, EOMI  Neck: no adenopathy  Orophyarynx: moist mucosa, no lesions, dentition intact  Heart: regular, S1/S2, no murmur, gallop, rub, estimated JVP WNL  Lungs: clear, no rales or wheezing  Abdomen: soft, non-tender, bowel sounds present, no hepatosplenomegaly  Extremities: no clubbing, cyanosis or edema  Neurological: normal speech and affect, no gross motor deficits      Wt Readings from Last 10 Encounters:   04/18/17 94.3 kg (208 lb) "   04/11/17 91.6 kg (201 lb 15.1 oz)   04/04/17 93 kg (205 lb 1.6 oz)   02/27/17 100.9 kg (222 lb 6 oz)   01/08/15 96.6 kg (213 lb)   12/13/14 98.7 kg (217 lb 8 oz)   12/27/13 100.7 kg (222 lb)   05/17/12 117.9 kg (260 lb)       Labs:    Results for MAYITO ANSARI (MRN [id number]) as of 4/18/2017 17:54   Ref. Range 4/18/2017 15:17   Sodium Latest Ref Range: 133 - 144 mmol/L 139   Potassium Latest Ref Range: 3.4 - 5.3 mmol/L 4.3   Chloride Latest Ref Range: 94 - 109 mmol/L 103   Carbon Dioxide Latest Ref Range: 20 - 32 mmol/L 30   Urea Nitrogen Latest Ref Range: 7 - 30 mg/dL 17   Creatinine Latest Ref Range: 0.66 - 1.25 mg/dL 0.93   GFR Estimate Latest Ref Range: >60 mL/min/1.7m2 77   GFR Estimate If Black Latest Ref Range: >60 mL/min/1.7m2 >90...   Calcium Latest Ref Range: 8.5 - 10.1 mg/dL 9.6   Anion Gap Latest Ref Range: 3 - 14 mmol/L 6   Albumin Latest Ref Range: 3.4 - 5.0 g/dL 3.2 (L)   Protein Total Latest Ref Range: 6.8 - 8.8 g/dL 8.0   Bilirubin Total Latest Ref Range: 0.2 - 1.3 mg/dL 0.7   Alkaline Phosphatase Latest Ref Range: 40 - 150 U/L 75   ALT Latest Ref Range: 0 - 70 U/L 15   AST Latest Ref Range: 0 - 45 U/L 20   Iron Latest Ref Range: 35 - 180 ug/dL 68   Iron Binding Cap Latest Ref Range: 240 - 430 ug/dL 246   Iron Saturation Index Latest Ref Range: 15 - 46 % 28   N-Terminal Pro Bnp Latest Ref Range: 0 - 450 pg/mL 1288 (H)   Glucose Latest Ref Range: 70 - 99 mg/dL 110 (H)   WBC Latest Ref Range: 4.0 - 11.0 10e9/L 3.8 (L)   Hemoglobin Latest Ref Range: 13.3 - 17.7 g/dL 11.4 (L)   Hematocrit Latest Ref Range: 40.0 - 53.0 % 35.1 (L)   Platelet Count Latest Ref Range: 150 - 450 10e9/L 147 (L)   RBC Count Latest Ref Range: 4.4 - 5.9 10e12/L 3.70 (L)   MCV Latest Ref Range: 78 - 100 fl 95   MCH Latest Ref Range: 26.5 - 33.0 pg 30.8   MCHC Latest Ref Range: 31.5 - 36.5 g/dL 32.5   RDW Latest Ref Range: 10.0 - 15.0 % 15.2 (H)   INR Latest Ref Range: 0.86 - 1.14  1.82 (H)       CBC RESULTS:  Lab Results    Component Value Date    WBC 3.1 (L) 02/27/2017    RBC 3.59 (L) 02/27/2017    HGB 11.0 (L) 02/27/2017    HCT 33.7 (L) 02/27/2017    MCV 94 02/27/2017    MCH 30.6 02/27/2017    MCHC 32.6 02/27/2017    RDW 15.2 (H) 02/27/2017    PLT 98 (L) 02/27/2017       CMP RESULTS:  Lab Results   Component Value Date     04/04/2017    POTASSIUM 4.0 04/04/2017    CHLORIDE 103 04/04/2017    CO2 31 04/04/2017    ANIONGAP 7 04/04/2017    GLC 95 04/04/2017    BUN 16 04/04/2017    CR 1.04 04/04/2017    GFRESTIMATED 68 04/04/2017    GFRESTBLACK 83 04/04/2017    TYSON 9.4 04/04/2017    BILITOTAL 0.7 04/04/2017    ALBUMIN 3.4 04/04/2017    ALKPHOS 81 04/04/2017    ALT 22 04/04/2017    AST 28 04/04/2017        INR RESULTS:  Lab Results   Component Value Date    INR 2.36 (H) 04/11/2017       No components found for: CK  Lab Results   Component Value Date    MAG 1.8 02/25/2017     Lab Results   Component Value Date    NTBNPI 787 02/24/2017       Echocardiogram (2/25/2017):  Interpretation Summary  The left ventricle is normal in size. There is normal left ventricular wall  thickness. Left ventricular systolic function is normal. The visual ejection  fraction is estimated at 55-60%. Flattened septum is consistent with RV  pressure/volume overload. There is no thrombus seen in the left ventricle.  The right ventricle is severely dilated. The right ventricular systolic  function is mild to moderately reduced.  The left atrium is moderately dilated. The right atrium is severely dilated.  There is severe (4+) tricuspid regurgitation. The right ventricular systolic  pressure is approximated at 30.9 mmHg plus the right atrial pressure. This  most likely represents an underestimation of the true RVSP.  Mild aortic stenosis.  In direct comparison to the previous study dated 11/07/2012, there has been a  mild interval increase in right ventricular size and deterioration in right  ventricular systolic function.      CT Chest Pulmonary  (2/24/2017):  COMPARISON: 4/13/2011     FINDINGS: Evaluation of the pulmonary arterial system shows no  evidence of embolus. The thoracic aorta is calcified and slightly  tortuous. No aortic aneurysm or dissection. There are coronary artery  atherosclerotic calcifications. The heart is enlarged. There is no  mediastinal, hilar or axillary lymph node enlargement. There are a few  tiny calcified granulomas in the right upper lobe. Mild dependent  atelectasis bilaterally. A few bands of scar or atelectasis  bilaterally. Small bilateral pleural effusions. Images through the  upper abdomen are remarkable for small amount of ascites. The inferior  vena cava is very large which may be due to right heart dysfunction.  The liver has a slightly nodular contour suggesting cirrhosis.     IMPRESSION:  1. There is no pulmonary embolus, aortic aneurysm or dissection.  2. Coronary artery atherosclerotic calcification. Cardiomegaly.  3. Small bilateral pleural effusions.  4. Probable hepatic cirrhosis. Small amount of ascites.      XR Chest 2 Views (4/5/2017):  HISTORY: Other secondary pulmonary hypertension  COMPARISON: 2/24/2017.  IMPRESSION: No acute abnormality.       Right Heart Catheterization (4/11/2017):  82-year-old gentleman with unexplained pulmonary hypertension, severe  right heart failure and right ventricular dysfunction, and severe  tricuspid regurgitation is referred for further evaluation of his  pulmonary hypertension etiology 2 guide further evaluation and  treatment.    Catheterization results  Baseline with thermal dilution cardiac output  -RA pressure mean = 12. Patient is defibrillation  -RV pressure-49/11  -Pulmonary artery pressure 48/21 (31)  -Pulmonary capillary wedge pressure -/24 (15)  -Thermal dilution cardiac output/index 4.9/2.4  -Calculated pulmonary vascular arteriolar resistance 3.2 Woods units    With estimated Rachael cardiac output  -Pulmonary pressure 47/19 (31)  -Pulmonary capillary wedge  pressure-/27 (17)  -Pulmonary artery oxygen saturation 67% aortic oxygen saturation 98  percent on room air  -Estimated Rachael cardiac output/index 5.1/2.5  -Pulmonary vascular arteriolar resistance 2.6 Woods units       VQ Scan (4/5/2017):  IMPRESSION:   1. Central airways deposition, suggestive of chronic obstructive  pulmonary disease.  2. Mild diffusely heterogeneous pulmonary perfusion, similar to that  of the ventilation images. These findings most likely relate to  underlying pulmonary disease. There are no focal pulmonary perfusion  defects.       NM MPI w Lexiscan (2/24/2017):  Indication/Clinical History: Shortness of breath, chest pain     Impression  1. Myocardial perfusion imaging using single isotope technique  demonstrated no evidence of ischemia or infarction.   2. Gated images demonstrated normal size left ventricle with normal  wall motion. The left ventricular systolic function is normal at 76%.  3. Compared to the prior study from  .     Procedure  Pharmacologic stress testing was performed with Lexiscan at a rate of  0.08 mg/ml rapid bolus injection, for 15 seconds, 0.4 mg/5ml  intravenously. Low-level exercise was not performed along with the  vasodilator infusion. The heart rate was 6767 at baseline and bj to  82 beats per minute during the Lexiscan infusion. The rest blood  pressure was 118/64 mmHg and was 118/57 mm Hg during Lexiscan  infusion. The patient experienced no chest pain during the test.     Myocardial perfusion imaging was performed at rest, approximately 45  minutes after the injection intravenously of 11 mCi of Tc-99m Myoview.  At peak pharmacologic effect, 10-20 seconds after Lexiscan, the  patient was injected intravenously with 32 mCi of Tc-99m Myoview. The  post-stress tomographic imaging was performed approximately 60 minutes  after stress.     EKG Findings  The resting EKG demonstrated sinus rhythm with poor R wave progression  in the low voltage pattern. The stress EKG  demonstrated no EKG  changes suggestive of ischemia.     Tomographic Findings  Overall, the study quality is fair. Mild visceral tracer artifact is  noted . On the stress images, mild inferior count reduction is noted.  On the rest images, mild inferior count reduction is seen and likely  due to diaphragmatic attenuation and visceral tracer artifact . Gated  images demonstrated normal size left ventricle with normal wall  motion. The left ventricular ejection fraction was calculated to be  76%. TID was absent.      Assessment and Plan:   1. Laboratories - INR, BNP, CMP, CBC, Iron  2. Encouraged to start incorporating regular exercise and activity, such as walking outside.  3. Follow up with Dr. Parekh about fatigue  4. Return to clinic in 4-6 months        Danelle Hoffman, APRN, CNP.    I personally saw and examined the patient and agree with note above. Function capacity improved.  BNP still somewhat high.      Thank you for allowing me to participate in the care of your patient.    Sincerely,     Julian Joyce MD     [facility]

## 2017-04-18 NOTE — PATIENT INSTRUCTIONS
Medication Changes:  No medication changes at this time. Please continue current medication regiment.     Patient Instructions:  Labs    Follow up Appointment Information:  6 months    For scheduling at [facility] please call [phone]  For scheduling at the [facility] please call [phone]  We are located on the second floor [address] at the [facility].  Our address is     [facility] [address],   [address]  [place]  [address]    Thank you for allowing us to be a part of your care here at the [facility]    If you have questions or concerns please contact us at:    Porsha Dawn, RN      Nurse Coordinator       Pulmonary Hypertension     [facility]   (P)[phone]  (F)[phone]    ** Please note that you will NOT receive a reminder call regarding your scheduled testing, reminder calls are for provider appointments only.  If you are scheduled for testing within the [facility] system you may receive a call regarding pre-registration for billing purposes only.**     Remember to weigh yourself daily after voiding and before you consume any food or beverages and log the numbers.  If you have gained/lost 2 pounds overnight or 5 pounds in a week contact us immediately for medication adjustments or further instructions.

## 2017-04-18 NOTE — NURSING NOTE
Med Reconcile: Reviewed and verified all current medications with the patient. The updated medication list was printed and given to the patient.  Return Appointment: Patient given instructions regarding scheduling next clinic visit. Patient demonstrated understanding of this information and agreed to call with further questions or concerns.  Medication Change: Patient was educated regarding prescribed medication change, including discussion of the indication, administration, side effects, and when to report to MD or RN. Patient demonstrated understanding of this information and agreed to call with further questions or concerns.  Patient stated he understood all health information given and agreed to call with further questions or concerns.     Medication Changes:  No medication changes at this time. Please continue current medication regiment.     Patient Instructions:  Labs    Follow up Appointment Information:  6 months

## 2017-06-04 ENCOUNTER — HOSPITAL ENCOUNTER (EMERGENCY)
Facility: CLINIC | Age: 82
Discharge: HOME | End: 2017-06-04
Attending: EMERGENCY MEDICINE | Admitting: EMERGENCY MEDICINE
Payer: MEDICARE

## 2017-06-04 ENCOUNTER — APPOINTMENT (OUTPATIENT)
Dept: CT IMAGING | Facility: CLINIC | Age: 82
End: 2017-06-04
Attending: EMERGENCY MEDICINE
Payer: MEDICARE

## 2017-06-04 VITALS
WEIGHT: 195 LBS | OXYGEN SATURATION: 99 % | DIASTOLIC BLOOD PRESSURE: 70 MMHG | TEMPERATURE: 97.9 F | SYSTOLIC BLOOD PRESSURE: 118 MMHG | BODY MASS INDEX: 28.8 KG/M2 | RESPIRATION RATE: 16 BRPM | HEART RATE: 70 BPM

## 2017-06-04 DIAGNOSIS — K04.7 ABSCESS, DENTAL: ICD-10-CM

## 2017-06-04 DIAGNOSIS — R51.9 FACIAL PAIN: ICD-10-CM

## 2017-06-04 LAB
ANION GAP SERPL CALCULATED.3IONS-SCNC: 4 MMOL/L (ref 3–14)
BASOPHILS # BLD AUTO: 0 10E9/L (ref 0–0.2)
BASOPHILS NFR BLD AUTO: 0.5 %
BUN SERPL-MCNC: 13 MG/DL (ref 7–30)
CALCIUM SERPL-MCNC: 9.7 MG/DL (ref 8.5–10.1)
CHLORIDE SERPL-SCNC: 103 MMOL/L (ref 94–109)
CO2 SERPL-SCNC: 30 MMOL/L (ref 20–32)
CREAT SERPL-MCNC: 0.87 MG/DL (ref 0.66–1.25)
DIFFERENTIAL METHOD BLD: ABNORMAL
EOSINOPHIL # BLD AUTO: 0.2 10E9/L (ref 0–0.7)
EOSINOPHIL NFR BLD AUTO: 2.4 %
ERYTHROCYTE [DISTWIDTH] IN BLOOD BY AUTOMATED COUNT: 14.1 % (ref 10–15)
GFR SERPL CREATININE-BSD FRML MDRD: 83 ML/MIN/1.7M2
GLUCOSE SERPL-MCNC: 92 MG/DL (ref 70–99)
HCT VFR BLD AUTO: 38.5 % (ref 40–53)
HGB BLD-MCNC: 12.7 G/DL (ref 13.3–17.7)
IMM GRANULOCYTES # BLD: 0 10E9/L (ref 0–0.4)
IMM GRANULOCYTES NFR BLD: 0.5 %
INR PPP: 2.56 (ref 0.86–1.14)
LYMPHOCYTES # BLD AUTO: 1.5 10E9/L (ref 0.8–5.3)
LYMPHOCYTES NFR BLD AUTO: 24.8 %
MCH RBC QN AUTO: 31.1 PG (ref 26.5–33)
MCHC RBC AUTO-ENTMCNC: 33 G/DL (ref 31.5–36.5)
MCV RBC AUTO: 94 FL (ref 78–100)
MONOCYTES # BLD AUTO: 0.7 10E9/L (ref 0–1.3)
MONOCYTES NFR BLD AUTO: 11.1 %
NEUTROPHILS # BLD AUTO: 3.7 10E9/L (ref 1.6–8.3)
NEUTROPHILS NFR BLD AUTO: 60.7 %
NRBC # BLD AUTO: 0 10*3/UL
NRBC BLD AUTO-RTO: 0 /100
PLATELET # BLD AUTO: 179 10E9/L (ref 150–450)
POTASSIUM SERPL-SCNC: 3.9 MMOL/L (ref 3.4–5.3)
RBC # BLD AUTO: 4.08 10E12/L (ref 4.4–5.9)
SODIUM SERPL-SCNC: 137 MMOL/L (ref 133–144)
WBC # BLD AUTO: 6.1 10E9/L (ref 4–11)

## 2017-06-04 PROCEDURE — 96375 TX/PRO/DX INJ NEW DRUG ADDON: CPT

## 2017-06-04 PROCEDURE — 25000128 H RX IP 250 OP 636: Performed by: EMERGENCY MEDICINE

## 2017-06-04 PROCEDURE — A9270 NON-COVERED ITEM OR SERVICE: HCPCS | Mod: GY | Performed by: EMERGENCY MEDICINE

## 2017-06-04 PROCEDURE — 25000132 ZZH RX MED GY IP 250 OP 250 PS 637: Mod: GY | Performed by: EMERGENCY MEDICINE

## 2017-06-04 PROCEDURE — 96374 THER/PROPH/DIAG INJ IV PUSH: CPT

## 2017-06-04 PROCEDURE — 85025 COMPLETE CBC W/AUTO DIFF WBC: CPT | Performed by: EMERGENCY MEDICINE

## 2017-06-04 PROCEDURE — 70486 CT MAXILLOFACIAL W/O DYE: CPT

## 2017-06-04 PROCEDURE — 85610 PROTHROMBIN TIME: CPT | Performed by: EMERGENCY MEDICINE

## 2017-06-04 PROCEDURE — 99285 EMERGENCY DEPT VISIT HI MDM: CPT | Mod: 25

## 2017-06-04 PROCEDURE — 80048 BASIC METABOLIC PNL TOTAL CA: CPT | Performed by: EMERGENCY MEDICINE

## 2017-06-04 RX ORDER — MORPHINE SULFATE 4 MG/ML
4 INJECTION, SOLUTION INTRAMUSCULAR; INTRAVENOUS ONCE
Status: COMPLETED | OUTPATIENT
Start: 2017-06-04 | End: 2017-06-04

## 2017-06-04 RX ORDER — ONDANSETRON 2 MG/ML
4 INJECTION INTRAMUSCULAR; INTRAVENOUS ONCE
Status: COMPLETED | OUTPATIENT
Start: 2017-06-04 | End: 2017-06-04

## 2017-06-04 RX ORDER — OXYCODONE AND ACETAMINOPHEN 5; 325 MG/1; MG/1
1-2 TABLET ORAL EVERY 4 HOURS PRN
Qty: 15 TABLET | Refills: 0 | Status: SHIPPED | OUTPATIENT
Start: 2017-06-04 | End: 2018-09-01

## 2017-06-04 RX ORDER — OXYCODONE AND ACETAMINOPHEN 5; 325 MG/1; MG/1
2 TABLET ORAL ONCE
Status: COMPLETED | OUTPATIENT
Start: 2017-06-04 | End: 2017-06-04

## 2017-06-04 RX ORDER — LIDOCAINE 40 MG/G
CREAM TOPICAL
Status: DISCONTINUED | OUTPATIENT
Start: 2017-06-04 | End: 2017-06-04 | Stop reason: HOSPADM

## 2017-06-04 RX ADMIN — ONDANSETRON 4 MG: 2 INJECTION INTRAMUSCULAR; INTRAVENOUS at 14:13

## 2017-06-04 RX ADMIN — OXYCODONE HYDROCHLORIDE AND ACETAMINOPHEN 2 TABLET: 5; 325 TABLET ORAL at 15:32

## 2017-06-04 RX ADMIN — MORPHINE SULFATE 4 MG: 4 INJECTION, SOLUTION INTRAMUSCULAR; INTRAVENOUS at 14:14

## 2017-06-04 RX ADMIN — MORPHINE SULFATE 4 MG: 4 INJECTION, SOLUTION INTRAMUSCULAR; INTRAVENOUS at 14:42

## 2017-06-04 NOTE — ED NOTES
Left facial swelling for 2-3 weeks.  He does have two broken teeth, but the swelling was present before then and he has seen his dentist who does not believe the swelling is related to his teeth.  He has an appointment to see ENT, but is unable to control the pain at home despite taking Tramadol.  He has been told he this is swelling from a salivary gland.  ABCs intact.  Patient is alert and oriented x3.

## 2017-06-04 NOTE — ED AVS SNAPSHOT
" [facility] Emergency Department    [address]    [facility] [address]    Phone:  [phone]    Fax:  [phone]                                       Sean Brunner   MRN: [id number]    Department:  [facility] Emergency Department   Date of Visit:  6/4/2017           Patient Information     Date Of Birth          9/13/1934        Your diagnoses for this visit were:     Abscess, dental multiple    Facial pain        You were seen by Barbara Sosa MD.      Follow-up Information     Follow up with Your dentist or maxillofacial surgeon.    Why:  within 3 days for reassessment        Follow up with [facility] Emergency Department.    Specialty:  EMERGENCY MEDICINE    Why:  As needed, If symptoms worsen    Contact information:    [address] [facility] [phone]        Discharge Instructions         Dental Abscess    An abscess is a pocket of pus at the tip of a tooth root in your jaw bone. It is caused by an infection at the root of the tooth. It can cause pain and swelling of the gum, cheek, or jaw. Pain may spread from the tooth to your ear or the area of your jaw on the same side. If the abscess isn t treated, it spreads to the gum near the tooth. This causes more swelling and pain. More serious infections cause your face to swell.  A dental abscess usually starts with a crack or cavity in the tooth. The pain is often made worse by drinking hot or cold fluids, or biting on hard foods.  Home care  Follow these guidelines when caring for yourself at home:    Avoid hot and cold foods and drinks. Your tooth may be sensitive to changes in temperature. Don t chew on the side of the infected tooth.    If your tooth is chipped or cracked, or if there is a large open cavity, put oil of cloves directly on the tooth to relieve pain. You can buy oil of cloves at drugstores. Some pharmacies carry an over-the-counter \"toothache " "kit.\" This contains a paste that you can put on the exposed tooth to make it less sensitive.    Put a cold pack on your jaw over the sore area to help reduce pain.    You may use acetaminophen or ibuprofen to ease pain, unless another medicine was prescribed. Note: If you have chronic liver or kidney disease, talk with your health care provider before using these medicines. Also talk with your provider if you ve had a stomach ulcer or GI bleeding.    An antibiotic will be prescribed. Take it until finished, even if you are feeling better after a few days.  Follow-up care  Follow up as directed with your dentist or an oral surgeon. Once an infection occurs in a tooth, it will continue to be a problem until the infection is drained. This is done through surgery or a root canal. Or you may need to have your tooth pulled.  When to seek medical advice  Call your health care provider right away if any of these occur:    Your face becomes more swollen or red    Your eyelids become swollen    Pain gets worse or spreads to your neck    Fever over 100.4  F (38.0  C)    Unusual drowsiness    Headache or stiff neck    Weakness or fainting    Pus drains from the tooth    Difficulty swallowing or breathing    5486-1066 The "[employer]. [address] [place] [address]. All rights reserved. This information is not intended as a substitute for professional medical care. Always follow your healthcare professional's instructions.    Use probiotics while on antibiotics to help prevent diarrhea.      24 Hour Appointment Hotline       To make an appointment at any [facility], call [phone] ([phone]). If you don't have a family doctor or clinic, we will help you find one. [facility] are conveniently located to serve the needs of you and your family.             Review of your medicines      START taking        Dose / Directions Last dose taken    amoxicillin-clavulanate 875-125 MG per tablet "   Commonly known as:  AUGMENTIN   Dose:  1 tablet   Quantity:  14 tablet        Take 1 tablet by mouth 2 times daily for 7 days   Refills:  0        oxyCODONE-acetaminophen 5-325 MG per tablet   Commonly known as:  PERCOCET   Dose:  1-2 tablet   Quantity:  15 tablet        Take 1-2 tablets by mouth every 4 hours as needed for pain   Refills:  0          Our records show that you are taking the medicines listed below. If these are incorrect, please call your family doctor or clinic.        Dose / Directions Last dose taken    blood glucose monitoring lancets   Quantity:  102 each        by Lancet route 2 times daily. Use to test blood sugar twice daily or as directed.   Refills:  prn        cyanocobalamin 1000 MCG tablet   Commonly known as:  vitamin  B-12   Dose:  3000 mcg        Take 3,000 mcg by mouth every morning   Refills:  0        digoxin 125 MCG tablet   Commonly known as:  LANOXIN   Quantity:  30 tablet        Take one pill every other day.   Refills:  1        divalproex 250 MG EC tablet   Commonly known as:  DEPAKOTE   Dose:  250 mg        Take 250 mg by mouth At Bedtime   Refills:  0        METFORMIN HCL PO   Dose:  1000 mg        Take 1,000 mg by mouth daily (with dinner)   Refills:  0        metoprolol 25 MG 24 hr tablet   Commonly known as:  TOPROL-XL   Dose:  25 mg        Take 25 mg by mouth 2 times daily   Refills:  0        multivitamin, therapeutic with minerals Tabs tablet   Dose:  1 tablet        Take 1 tablet by mouth 2 times daily   Refills:  0        OMEPRAZOLE PO   Dose:  40 mg        Take 40 mg by mouth every evening   Refills:  0        simvastatin 40 MG tablet   Commonly known as:  ZOCOR   Dose:  40 mg        Take 40 mg by mouth daily (with dinner)   Refills:  0        SYNTHROID 150 MCG tablet   Dose:  150 mcg   Generic drug:  levothyroxine        Take 150 mcg by mouth every morning (before breakfast)   Refills:  0        torsemide 10 MG tablet   Commonly known as:  DEMADEX   Dose:  10 mg    Quantity:  90 tablet        Take 1 tablet (10 mg) by mouth daily   Refills:  0        WARFARIN SODIUM PO        Take by mouth every evening 1 mg Mon, Thu, and 1.5 mg all other days   Refills:  0          STOP taking        Dose Reason for stopping Comments    AMOXICILLIN PO                      Prescriptions were sent or printed at these locations (2 Prescriptions)                   Other Prescriptions                Printed at Department/Unit printer (2 of 2)         oxyCODONE-acetaminophen (PERCOCET) 5-325 MG per tablet               amoxicillin-clavulanate (AUGMENTIN) 875-125 MG per tablet                Procedures and tests performed during your visit     Basic metabolic panel    CBC with platelets differential    INR    Maxillofacial  CT w/o contrast    Peripheral IV catheter      Orders Needing Specimen Collection     None      Pending Results     Date and Time Order Name Status Description    6/4/2017 1346 Maxillofacial  CT w/o contrast Preliminary             Pending Culture Results     No orders found from 6/2/2017 to 6/5/2017.            Pending Results Instructions     If you had any lab results that were not finalized at the time of your Discharge, you can call the ED Lab Result RN at [phone]. You will be contacted by this team for any positive Lab results or changes in treatment. The nurses are available 7 days a week from 10A to 6:30P.  You can leave a message 24 hours per day and they will return your call.        Test Results From Your Hospital Stay        6/4/2017  2:10 PM      Component Results     Component Value Ref Range & Units Status    WBC 6.1 4.0 - 11.0 10e9/L Final    RBC Count 4.08 (L) 4.4 - 5.9 10e12/L Final    Hemoglobin 12.7 (L) 13.3 - 17.7 g/dL Final    Hematocrit 38.5 (L) 40.0 - 53.0 % Final    MCV 94 78 - 100 fl Final    MCH 31.1 26.5 - 33.0 pg Final    MCHC 33.0 31.5 - 36.5 g/dL Final    RDW 14.1 10.0 - 15.0 % Final    Platelet Count 179 150 - 450 10e9/L Final    Diff Method  Automated Method  Final    % Neutrophils 60.7 % Final    % Lymphocytes 24.8 % Final    % Monocytes 11.1 % Final    % Eosinophils 2.4 % Final    % Basophils 0.5 % Final    % Immature Granulocytes 0.5 % Final    Nucleated RBCs 0 0 /100 Final    Absolute Neutrophil 3.7 1.6 - 8.3 10e9/L Final    Absolute Lymphocytes 1.5 0.8 - 5.3 10e9/L Final    Absolute Monocytes 0.7 0.0 - 1.3 10e9/L Final    Absolute Eosinophils 0.2 0.0 - 0.7 10e9/L Final    Absolute Basophils 0.0 0.0 - 0.2 10e9/L Final    Abs Immature Granulocytes 0.0 0 - 0.4 10e9/L Final    Absolute Nucleated RBC 0.0  Final         6/4/2017  2:19 PM      Component Results     Component Value Ref Range & Units Status    INR 2.56 (H) 0.86 - 1.14 Final         6/4/2017  2:29 PM      Component Results     Component Value Ref Range & Units Status    Sodium 137 133 - 144 mmol/L Final    Potassium 3.9 3.4 - 5.3 mmol/L Final    Chloride 103 94 - 109 mmol/L Final    Carbon Dioxide 30 20 - 32 mmol/L Final    Anion Gap 4 3 - 14 mmol/L Final    Glucose 92 70 - 99 mg/dL Final    Urea Nitrogen 13 7 - 30 mg/dL Final    Creatinine 0.87 0.66 - 1.25 mg/dL Final    GFR Estimate 83 >60 mL/min/1.7m2 Final    Non  GFR Calc    GFR Estimate If Black >90   GFR Calc   >60 mL/min/1.7m2 Final    Calcium 9.7 8.5 - 10.1 mg/dL Final         6/4/2017  2:50 PM      Narrative     CT MAXILLOFACIAL WITHOUT CONTRAST 6/4/2017 2:41 PM     HISTORY: Left jaw pain, swelling.    Radiation dose for this scan was reduced using automated exposure  control, adjustment of the mA and/or kV according to patient size, or  iterative reconstruction technique.    FINDINGS: The paranasal sinuses are clear. No air-fluid levels are  demonstrated. The maxillary infundibula are bilaterally patent. The  nasal septum is midline. Osseous margins appear intact. Within the  mandible, there are periapical lucencies involving multiple incisors  and the right canine compatible with periapical  abscesses. No fracture  is demonstrated.        Impression     IMPRESSION:  1. Mandibular periapical abscesses involving multiple incisors and the  right canine.  2. Paranasal sinuses appear clear.                Clinical Quality Measure: Blood Pressure Screening     Your blood pressure was checked while you were in the emergency department today. The last reading we obtained was  BP: 118/78 . Please read the guidelines below about what these numbers mean and what you should do about them.  If your systolic blood pressure (the top number) is less than 120 and your diastolic blood pressure (the bottom number) is less than 80, then your blood pressure is normal. There is nothing more that you need to do about it.  If your systolic blood pressure (the top number) is 120-139 or your diastolic blood pressure (the bottom number) is 80-89, your blood pressure may be higher than it should be. You should have your blood pressure rechecked within a year by a primary care provider.  If your systolic blood pressure (the top number) is 140 or greater or your diastolic blood pressure (the bottom number) is 90 or greater, you may have high blood pressure. High blood pressure is treatable, but if left untreated over time it can put you at risk for heart attack, stroke, or kidney failure. You should have your blood pressure rechecked by a primary care provider within the next 4 weeks.  If your provider in the emergency department today gave you specific instructions to follow-up with your doctor or provider even sooner than that, you should follow that instruction and not wait for up to 4 weeks for your follow-up visit.        Thank you for choosing [facility]       Thank you for choosing [facility] for your care. Our goal is always to provide you with excellent care. Hearing back from our patients is one way we can continue to improve our services. Please take a few minutes to complete the written survey that you may receive in the  "mail after you visit with us. Thank you!        [facility] Information     [employer] lets you send messages to your doctor, view your test results, renew your prescriptions, schedule appointments and more. To sign up, go to [email or web address] . Click on \"Log in\" on the left side of the screen, which will take you to the Welcome page. Then click on \"Sign up Now\" on the right side of the page.     You will be asked to enter the access code listed below, as well as some personal information. Please follow the directions to create your username and password.     Your access code is: [email or web address]  Expires: 2017  3:32 PM     Your access code will  in 90 days. If you need help or a new code, please call your [facility] clinic or [phone].        Care EveryWhere ID     This is your Care EveryWhere ID. This could be used by other organizations to access your [facility] medical records  [id number]        After Visit Summary       This is your record. Keep this with you and show to your community pharmacist(s) and doctor(s) at your next visit.                  "

## 2017-06-04 NOTE — ED AVS SNAPSHOT
[facility] Emergency Department    [address]    [facility] [address]    Phone:  [phone]    Fax:  [phone]                                       Sean Brunner   MRN: [id number]    Department:  [facility] Emergency Department   Date of Visit:  6/4/2017           After Visit Summary Signature Page     I have received my discharge instructions, and my questions have been answered. I have discussed any challenges I see with this plan with the nurse or doctor.    ..........................................................................................................................................  Patient/Patient Representative Signature      ..........................................................................................................................................  Patient Representative Print Name and Relationship to Patient    ..................................................               ................................................  Date                                            Time    ..........................................................................................................................................  Reviewed by Signature/Title    ...................................................              ..............................................  Date                                                            Time

## 2017-06-04 NOTE — DISCHARGE INSTRUCTIONS
"  Dental Abscess    An abscess is a pocket of pus at the tip of a tooth root in your jaw bone. It is caused by an infection at the root of the tooth. It can cause pain and swelling of the gum, cheek, or jaw. Pain may spread from the tooth to your ear or the area of your jaw on the same side. If the abscess isn t treated, it spreads to the gum near the tooth. This causes more swelling and pain. More serious infections cause your face to swell.  A dental abscess usually starts with a crack or cavity in the tooth. The pain is often made worse by drinking hot or cold fluids, or biting on hard foods.  Home care  Follow these guidelines when caring for yourself at home:    Avoid hot and cold foods and drinks. Your tooth may be sensitive to changes in temperature. Don t chew on the side of the infected tooth.    If your tooth is chipped or cracked, or if there is a large open cavity, put oil of cloves directly on the tooth to relieve pain. You can buy oil of cloves at drugstores. Some pharmacies carry an over-the-counter \"toothache kit.\" This contains a paste that you can put on the exposed tooth to make it less sensitive.    Put a cold pack on your jaw over the sore area to help reduce pain.    You may use acetaminophen or ibuprofen to ease pain, unless another medicine was prescribed. Note: If you have chronic liver or kidney disease, talk with your health care provider before using these medicines. Also talk with your provider if you ve had a stomach ulcer or GI bleeding.    An antibiotic will be prescribed. Take it until finished, even if you are feeling better after a few days.  Follow-up care  Follow up as directed with your dentist or an oral surgeon. Once an infection occurs in a tooth, it will continue to be a problem until the infection is drained. This is done through surgery or a root canal. Or you may need to have your tooth pulled.  When to seek medical advice  Call your health care provider right away if any " of these occur:    Your face becomes more swollen or red    Your eyelids become swollen    Pain gets worse or spreads to your neck    Fever over 100.4  F (38.0  C)    Unusual drowsiness    Headache or stiff neck    Weakness or fainting    Pus drains from the tooth    Difficulty swallowing or breathing    2506-1794 [employer]. [address] [place] [address]. All rights reserved. This information is not intended as a substitute for professional medical care. Always follow your healthcare professional's instructions.    Use probiotics while on antibiotics to help prevent diarrhea.

## 2017-06-04 NOTE — ED NOTES
Patient has noted some stinging and swelling at IV site.  Pressure applied.  This was a difficult IV start.

## 2017-06-05 NOTE — ED PROVIDER NOTES
CHIEF COMPLAINT:  Facial pain and swelling.      HISTORY OF PRESENT ILLNESS:  Sean Brunner is an 82-year-old male who presents to the emergency department with more than 2 weeks of swelling of the left side of his face and facial pain that has slowly worsened over this time.  He notes he was seen around the same time by his oral maxillofacial surgeon for dental extraction of tooth on the left lower jaw that.  Due to the swelling the physician thought he had parotiditis and he was sent home on amoxicillin for which he has now had 2 5-day courses.  He has not had significant improvement and so he is to followup with ENT on Tuesday.  Apparently the dentist did not think that the swelling is related to his tooth.  He notes that he has unfortunately had increasing pain over the last couple days with Tramadol not working.      ALLERGIES:  None.      MEDICATIONS:  Levothyroxine, Celexa, simvastatin, cyanocobalamin, metoprolol, torsemide, omeprazole, multivitamin, warfarin, Depakote, metformin, digoxin, amoxicillin.      PAST MEDICAL HISTORY:  Atrial fibrillation, cor pulmonale, STEWART, cardiomyopathy, morbid obesity, vitamin B12 deficiency, left ventricular dysfunction, small-bowel obstruction, acute gastroenteritis, cellulitis, pulmonary embolism.      SOCIAL HISTORY:  The patient does not smoke.  He does not use alcohol here.  He is here with his wife.      FAMILY HISTORY:  Noncontributory.      REVIEW OF SYSTEMS:  As noted in history present illness.  All other systems are reviewed and negative.      PHYSICAL EXAMINATION:   VITAL SIGNS:  Blood pressure is 133/81, temperature 97.9 Fahrenheit oral, pulse is 70, respiratory rate 20, oxygen saturation 99% on room air.   GENERAL:  Reveals an elderly male sitting upright.   EYES:  Pupils are equally round, react to light.  Conjunctivae are pink.   ENT:  Moist mucous membranes.  Oropharynx has multiple dental caries and the teeth are nonmobile and nontender to percussion.   There are no intraoral masses or intraoral edema or lesions visible including over the left oral buccal surface.  He has tenderness to palpation of the left jaw externally and there is a palpable firm nodular mass in the left parotid gland region.  No overlying erythema.  The area is visibly asymmetric compared to the right.  TMs are clear bilaterally.   NECK:  No lymphadenopathy.  No rigidity.   CARDIOVASCULAR:  Normal S1, S2.  Regular rate and rhythm.   RESPIRATORY:  Clear to auscultation bilaterally, no wheezes, rales, rhonchi.   GASTROINTESTINAL:  Soft, nontender, nondistended.   MUSCULOSKELETAL:  Moves all extremities equally.  No extremity edema.   SKIN:  Warm and dry.  No rashes, lesions or ecchymoses.   NEUROLOGIC:  Alert and oriented x3.  No focal neurologic findings.  Sensation intact to light touch over the bilateral feet and face.   PSYCHIATRIC:  Normal affect.      LABORATORY AND DIAGNOSTICS:  CBC with differential shows a red blood cell count decreased at 4.08, hemoglobin decreased at 12.7, hematocrit decreased at 38.5, otherwise normal limits.  An INR is elevated at 2.56.      Basic metabolic panel is within normal limits.      Maxillofacial CT without contrast.   Impression:   1.  Mandibular periapical involving multiple incisors in the right canine.   2.  Paranasal sinuses appear clear.   Reading per Radiology.      INTERVENTIONS:   1.  Morphine 4 mg IV.   2.  Morphine 4 mg IV.   3.  Zofran 4 mg IV.      EMERGENCY DEPARTMENT COURSE:  The patient roomed and examined by me.      Peripheral IV started blood drawn and sent to lab.        The patient sent for CT scan maxillofacial.        The patient reassessed.  He is feeling improved after pain medications.        EMERGENCY DEPARTMENT COURSE:  The patient's findings both laboratory and radiographic analysis were discussed.  The patient notes he is feeling comfortable with the plan for discharge.  Recommended Augmentin instead of amoxicillin and follow  up with his maxillofacial surgeon within 3 days.  He understands the importance of return and followup.        MEDICAL DECISION MAKING:  Mayito Brunner is an 82-year-old male on Coumadin for atrial fibrillation and a remote history of PE, who presents to the emergency room with concerns for a left-sided facial swelling and pain.  He has been evaluated by Oral Maxillofacial Surgery who felt as though he had a parotid gland issue and were treating that with amoxicillin and has followup with ENT.  It does, however seem to be a primarily dental issue with multiple abscesses.  No indication for emergent drainage but would benefit from reassessment within 3 days. There is no complication that seems involved with his anticoagulation.  Discharged on medications as above.  Recommend probiotics while on antibiotics.  Return immediately to the Emergency Department should symptoms worsen or new symptoms or concerns such as difficulty swallowing or breathing occur.  He and his wife verbalized understanding of the plan and is discharged home in improved condition.     (K04.7) Abscess, dental    (R51) Facial pain    Warfarin anticoagulation            DHEERAJ DC MD             D: 2017 15:29   T: 2017 12:49   MT: STEVIE#[id number]      Name:     MAYITO BRUNNER   MRN:      [id number]        Account:      [id number]   :      1934           Visit Date:   2017      Document: [id number]

## 2017-07-26 DIAGNOSIS — I36.1 NON-RHEUMATIC TRICUSPID VALVE INSUFFICIENCY: ICD-10-CM

## 2017-07-26 DIAGNOSIS — I42.9 CARDIOMYOPATHY (H): ICD-10-CM

## 2017-07-26 DIAGNOSIS — I27.81 COR PULMONALE (H): ICD-10-CM

## 2017-07-26 DIAGNOSIS — I50.23 ACUTE ON CHRONIC SYSTOLIC CONGESTIVE HEART FAILURE (H): ICD-10-CM

## 2017-07-27 RX ORDER — DIGOXIN 125 MCG
TABLET ORAL
Qty: 45 TABLET | Refills: 3 | Status: SHIPPED | OUTPATIENT
Start: 2017-07-27 | End: 2017-10-17

## 2017-10-16 NOTE — PROGRESS NOTES
Problem List:    1. H/o cor pulmonale with know 3-4+ TR and RV dysfunction in past- slight increase in RV size and decline in RV systolic function compared to 2011 echo  2. Chronic afib- VR well controlled, on coumadin  3. Moderate obstruction on PFT 2011  4. STEWART- no compliant with CPAP  5. H/o PE- no PE on CT chest this admission  6. Non obstructive CAD CT cor angio 2011, negative stress test this admission  7. Obesity- lost 100 lbs over last several years      Dear  Narendra Silver had the pleasure of seeing Mr. Brunner at [facility] today.  He is generally doing well, however, has excessive daytime fatigue.  He is not using his CPAP secondary to facemask fitting issues.  He is willing to try an oral appliance.    He has no interim history of syncope, ascites, edema, orthopnea.    He has no bleeding or clotting abnormalities.      PAST MEDICAL HISTORY:  Past Medical History:   Diagnosis Date     A-fib (H)      Diabetes mellitus (H)      Low BP      Thyroid disease        FAMILY HISTORY:  No family history on file.      SOCIAL HISTORY:  Social History     Social History     Marital status:      Spouse name: N/A     Number of children: N/A     Years of education: N/A     Social History Main Topics     Smoking status: Never Smoker     Smokeless tobacco: Never Used     Alcohol use No     Drug use: No     Sexual activity: Not on file     Other Topics Concern     Not on file     Social History Narrative       CURRENT MEDICATIONS:  Current Outpatient Prescriptions   Medication Sig Dispense Refill     digoxin (LANOXIN) 125 MCG tablet TAKE 1 TABLET BY MOUTH EVERY OTHER DAY 45 tablet 3     oxyCODONE-acetaminophen (PERCOCET) 5-325 MG per tablet Take 1-2 tablets by mouth every 4 hours as needed for pain 15 tablet 0     OMEPRAZOLE PO Take 40 mg by mouth every evening       multivitamin, therapeutic with minerals (THERA-VIT-M) TABS tablet Take 1 tablet by mouth 2 times daily        WARFARIN SODIUM PO Take by mouth every evening 1 mg Mon, Thu, and 1.5 mg all other days       divalproex (DEPAKOTE) 250 MG EC tablet Take 250 mg by mouth At Bedtime       METFORMIN HCL PO Take 1,000 mg by mouth daily (with dinner)       torsemide (DEMADEX) 10 MG tablet Take 1 tablet (10 mg) by mouth daily 90 tablet 0     cyanocolbalamin (VITAMIN  B-12) 1000 MCG tablet Take 3,000 mcg by mouth every morning       metoprolol (TOPROL-XL) 25 MG 24 hr tablet Take 25 mg by mouth 2 times daily       levothyroxine (SYNTHROID) 150 MCG tablet Take 150 mcg by mouth every morning (before breakfast)        simvastatin (ZOCOR) 40 MG tablet Take 40 mg by mouth daily (with dinner)        ACCU-CHEK MULTICLIX LANCETS MISC by Lancet route 2 times daily. Use to test blood sugar twice daily or as directed. 102 each prn       ROS:   10 point ROS negative except HPI    EXAM:  There were no vitals taken for this visit.  Home weight  General: appears comfortable, alert and articulate  Head: normocephalic, atraumatic  Eyes: anicteric sclera, EOMI  Neck: no adenopathy  Orophyarynx: moist mucosa, no lesions, dentition intact  Heart: regular, S1/S2, no murmur, gallop, rub, estimated JVP WNL  Lungs: clear, no rales or wheezing  Abdomen: soft, non-tender, bowel sounds present, no hepatosplenomegaly  Extremities: no clubbing, cyanosis or edema  Neurological: normal speech and affect, no gross motor deficits      Wt Readings from Last 10 Encounters:   04/18/17 94.3 kg (208 lb)   04/11/17 91.6 kg (201 lb 15.1 oz)   04/04/17 93 kg (205 lb 1.6 oz)   02/27/17 100.9 kg (222 lb 6 oz)   01/08/15 96.6 kg (213 lb)   12/13/14 98.7 kg (217 lb 8 oz)   12/27/13 100.7 kg (222 lb)   05/17/12 117.9 kg (260 lb)       Labs:     Ref. Range 4/18/2017 15:17   Sodium Latest Ref Range: 133 - 144 mmol/L 139   Potassium Latest Ref Range: 3.4 - 5.3 mmol/L 4.3   Chloride Latest Ref Range: 94 - 109 mmol/L 103   Carbon Dioxide Latest Ref Range: 20 - 32 mmol/L 30   Urea Nitrogen  Latest Ref Range: 7 - 30 mg/dL 17   Creatinine Latest Ref Range: 0.66 - 1.25 mg/dL 0.93   GFR Estimate Latest Ref Range: >60 mL/min/1.7m2 77   GFR Estimate If Black Latest Ref Range: >60 mL/min/1.7m2 >90...   Calcium Latest Ref Range: 8.5 - 10.1 mg/dL 9.6   Anion Gap Latest Ref Range: 3 - 14 mmol/L 6   Albumin Latest Ref Range: 3.4 - 5.0 g/dL 3.2 (L)   Protein Total Latest Ref Range: 6.8 - 8.8 g/dL 8.0   Bilirubin Total Latest Ref Range: 0.2 - 1.3 mg/dL 0.7   Alkaline Phosphatase Latest Ref Range: 40 - 150 U/L 75   ALT Latest Ref Range: 0 - 70 U/L 15   AST Latest Ref Range: 0 - 45 U/L 20   Iron Latest Ref Range: 35 - 180 ug/dL 68   Iron Binding Cap Latest Ref Range: 240 - 430 ug/dL 246   Iron Saturation Index Latest Ref Range: 15 - 46 % 28   N-Terminal Pro Bnp Latest Ref Range: 0 - 450 pg/mL 1288 (H)   Glucose Latest Ref Range: 70 - 99 mg/dL 110 (H)   WBC Latest Ref Range: 4.0 - 11.0 10e9/L 3.8 (L)   Hemoglobin Latest Ref Range: 13.3 - 17.7 g/dL 11.4 (L)   Hematocrit Latest Ref Range: 40.0 - 53.0 % 35.1 (L)   Platelet Count Latest Ref Range: 150 - 450 10e9/L 147 (L)   RBC Count Latest Ref Range: 4.4 - 5.9 10e12/L 3.70 (L)   MCV Latest Ref Range: 78 - 100 fl 95   MCH Latest Ref Range: 26.5 - 33.0 pg 30.8   MCHC Latest Ref Range: 31.5 - 36.5 g/dL 32.5   RDW Latest Ref Range: 10.0 - 15.0 % 15.2 (H)   INR Latest Ref Range: 0.86 - 1.14  1.82 (H)       CBC RESULTS:  Lab Results   Component Value Date    WBC 6.1 06/04/2017    RBC 4.08 (L) 06/04/2017    HGB 12.7 (L) 06/04/2017    HCT 38.5 (L) 06/04/2017    MCV 94 06/04/2017    MCH 31.1 06/04/2017    MCHC 33.0 06/04/2017    RDW 14.1 06/04/2017     06/04/2017       CMP RESULTS:  Lab Results   Component Value Date     06/04/2017    POTASSIUM 3.9 06/04/2017    CHLORIDE 103 06/04/2017    CO2 30 06/04/2017    ANIONGAP 4 06/04/2017    GLC 92 06/04/2017    BUN 13 06/04/2017    CR 0.87 06/04/2017    GFRESTIMATED 83 06/04/2017    GFRESTBLACK >90   GFR  Calc   06/04/2017    TYSON 9.7 06/04/2017    BILITOTAL 0.7 04/18/2017    ALBUMIN 3.2 (L) 04/18/2017    ALKPHOS 75 04/18/2017    ALT 15 04/18/2017    AST 20 04/18/2017        INR RESULTS:  Lab Results   Component Value Date    INR 2.56 (H) 06/04/2017       No components found for: CK  Lab Results   Component Value Date    MAG 1.8 02/25/2017     Lab Results   Component Value Date    NTBNPI 787 02/24/2017       Echocardiogram (2/25/2017):  Interpretation Summary  The left ventricle is normal in size. There is normal left ventricular wall  thickness. Left ventricular systolic function is normal. The visual ejection  fraction is estimated at 55-60%. Flattened septum is consistent with RV  pressure/volume overload. There is no thrombus seen in the left ventricle.  The right ventricle is severely dilated. The right ventricular systolic  function is mild to moderately reduced.  The left atrium is moderately dilated. The right atrium is severely dilated.  There is severe (4+) tricuspid regurgitation. The right ventricular systolic  pressure is approximated at 30.9 mmHg plus the right atrial pressure. This  most likely represents an underestimation of the true RVSP.  Mild aortic stenosis.  In direct comparison to the previous study dated 11/07/2012, there has been a  mild interval increase in right ventricular size and deterioration in right  ventricular systolic function.      CT Chest Pulmonary (2/24/2017):  COMPARISON: 4/13/2011     FINDINGS: Evaluation of the pulmonary arterial system shows no  evidence of embolus. The thoracic aorta is calcified and slightly  tortuous. No aortic aneurysm or dissection. There are coronary artery  atherosclerotic calcifications. The heart is enlarged. There is no  mediastinal, hilar or axillary lymph node enlargement. There are a few  tiny calcified granulomas in the right upper lobe. Mild dependent  atelectasis bilaterally. A few bands of scar or atelectasis  bilaterally. Small bilateral  pleural effusions. Images through the  upper abdomen are remarkable for small amount of ascites. The inferior  vena cava is very large which may be due to right heart dysfunction.  The liver has a slightly nodular contour suggesting cirrhosis.     IMPRESSION:  1. There is no pulmonary embolus, aortic aneurysm or dissection.  2. Coronary artery atherosclerotic calcification. Cardiomegaly.  3. Small bilateral pleural effusions.  4. Probable hepatic cirrhosis. Small amount of ascites.      XR Chest 2 Views (4/5/2017):  HISTORY: Other secondary pulmonary hypertension  COMPARISON: 2/24/2017.  IMPRESSION: No acute abnormality.       Right Heart Catheterization (4/11/2017):  82-year-old gentleman with unexplained pulmonary hypertension, severe  right heart failure and right ventricular dysfunction, and severe  tricuspid regurgitation is referred for further evaluation of his  pulmonary hypertension etiology 2 guide further evaluation and  treatment.    Catheterization results  Baseline with thermal dilution cardiac output  -RA pressure mean = 12. Patient is defibrillation  -RV pressure-49/11  -Pulmonary artery pressure 48/21 (31)  -Pulmonary capillary wedge pressure -/24 (15)  -Thermal dilution cardiac output/index 4.9/2.4  -Calculated pulmonary vascular arteriolar resistance 3.2 Woods units    With estimated Rachael cardiac output  -Pulmonary pressure 47/19 (31)  -Pulmonary capillary wedge pressure-/27 (17)  -Pulmonary artery oxygen saturation 67% aortic oxygen saturation 98  percent on room air  -Estimated Rachael cardiac output/index 5.1/2.5  -Pulmonary vascular arteriolar resistance 2.6 Woods units       VQ Scan (4/5/2017):  IMPRESSION:   1. Central airways deposition, suggestive of chronic obstructive  pulmonary disease.  2. Mild diffusely heterogeneous pulmonary perfusion, similar to that  of the ventilation images. These findings most likely relate to  underlying pulmonary disease. There are no focal pulmonary  perfusion  defects.       NM MPI w Lexiscan (2/24/2017):  Indication/Clinical History: Shortness of breath, chest pain     Impression  1. Myocardial perfusion imaging using single isotope technique  demonstrated no evidence of ischemia or infarction.   2. Gated images demonstrated normal size left ventricle with normal  wall motion. The left ventricular systolic function is normal at 76%.  3. Compared to the prior study from  .     Procedure  Pharmacologic stress testing was performed with Lexiscan at a rate of  0.08 mg/ml rapid bolus injection, for 15 seconds, 0.4 mg/5ml  intravenously. Low-level exercise was not performed along with the  vasodilator infusion. The heart rate was 6767 at baseline and bj to  82 beats per minute during the Lexiscan infusion. The rest blood  pressure was 118/64 mmHg and was 118/57 mm Hg during Lexiscan  infusion. The patient experienced no chest pain during the test.     Myocardial perfusion imaging was performed at rest, approximately 45  minutes after the injection intravenously of 11 mCi of Tc-99m Myoview.  At peak pharmacologic effect, 10-20 seconds after Lexiscan, the  patient was injected intravenously with 32 mCi of Tc-99m Myoview. The  post-stress tomographic imaging was performed approximately 60 minutes  after stress.     EKG Findings  The resting EKG demonstrated sinus rhythm with poor R wave progression  in the low voltage pattern. The stress EKG demonstrated no EKG  changes suggestive of ischemia.     Tomographic Findings  Overall, the study quality is fair. Mild visceral tracer artifact is  noted . On the stress images, mild inferior count reduction is noted.  On the rest images, mild inferior count reduction is seen and likely  due to diaphragmatic attenuation and visceral tracer artifact . Gated  images demonstrated normal size left ventricle with normal wall  motion. The left ventricular ejection fraction was calculated to be  76%. TID was absent.      Assessment  and Plan:    See Hector Oliva DDS for oral appliance after overnight recording oximetry   Encouraged to start incorporating regular exercise and activity, such as walking outside.   Follow up with Dr. Parekh about fatigue   Return to clinic in 6-8 months            .

## 2017-10-17 ENCOUNTER — OFFICE VISIT (OUTPATIENT)
Dept: CARDIOLOGY | Facility: CLINIC | Age: 82
End: 2017-10-17
Attending: INTERNAL MEDICINE
Payer: COMMERCIAL

## 2017-10-17 VITALS
WEIGHT: 195.5 LBS | HEART RATE: 77 BPM | BODY MASS INDEX: 28.96 KG/M2 | HEIGHT: 69 IN | SYSTOLIC BLOOD PRESSURE: 115 MMHG | OXYGEN SATURATION: 100 % | DIASTOLIC BLOOD PRESSURE: 69 MMHG

## 2017-10-17 DIAGNOSIS — I51.9 LV DYSFUNCTION: Primary | ICD-10-CM

## 2017-10-17 DIAGNOSIS — I42.9 CARDIOMYOPATHY (H): ICD-10-CM

## 2017-10-17 DIAGNOSIS — I50.23 ACUTE ON CHRONIC SYSTOLIC CONGESTIVE HEART FAILURE (H): ICD-10-CM

## 2017-10-17 DIAGNOSIS — I27.81 COR PULMONALE (H): ICD-10-CM

## 2017-10-17 DIAGNOSIS — R06.09 DYSPNEA ON EXERTION: ICD-10-CM

## 2017-10-17 DIAGNOSIS — I36.1 NON-RHEUMATIC TRICUSPID VALVE INSUFFICIENCY: ICD-10-CM

## 2017-10-17 DIAGNOSIS — G47.33 OSA (OBSTRUCTIVE SLEEP APNEA): ICD-10-CM

## 2017-10-17 LAB
ERYTHROCYTE [DISTWIDTH] IN BLOOD BY AUTOMATED COUNT: 14.7 % (ref 10–15)
HCT VFR BLD AUTO: 35.4 % (ref 40–53)
HGB BLD-MCNC: 11.6 G/DL (ref 13.3–17.7)
MCH RBC QN AUTO: 31.4 PG (ref 26.5–33)
MCHC RBC AUTO-ENTMCNC: 32.8 G/DL (ref 31.5–36.5)
MCV RBC AUTO: 96 FL (ref 78–100)
PLATELET # BLD AUTO: 116 10E9/L (ref 150–450)
RBC # BLD AUTO: 3.69 10E12/L (ref 4.4–5.9)
TSH SERPL DL<=0.005 MIU/L-ACNC: 0.42 MU/L (ref 0.4–4)
WBC # BLD AUTO: 3.9 10E9/L (ref 4–11)

## 2017-10-17 PROCEDURE — 99214 OFFICE O/P EST MOD 30 MIN: CPT | Performed by: INTERNAL MEDICINE

## 2017-10-17 PROCEDURE — 84443 ASSAY THYROID STIM HORMONE: CPT | Performed by: INTERNAL MEDICINE

## 2017-10-17 PROCEDURE — 36415 COLL VENOUS BLD VENIPUNCTURE: CPT | Performed by: INTERNAL MEDICINE

## 2017-10-17 PROCEDURE — 85027 COMPLETE CBC AUTOMATED: CPT | Performed by: INTERNAL MEDICINE

## 2017-10-17 RX ORDER — TRAMADOL HYDROCHLORIDE 50 MG/1
50 TABLET ORAL EVERY 6 HOURS PRN
COMMUNITY

## 2017-10-17 RX ORDER — DIGOXIN 125 MCG
125 TABLET ORAL EVERY OTHER DAY
Qty: 45 TABLET | Refills: 3 | Status: ON HOLD | OUTPATIENT
Start: 2017-10-17 | End: 2018-09-06

## 2017-10-17 NOTE — PATIENT INSTRUCTIONS
Medication Changes:  No medication changes at this time. Please continue current medication regiment.     Patient Instructions:  Continue staying active, eating a low sodium, low fat diet.    **Labs today    Follow up Appointment Information:  May follow up    We are located [facility] the third [facility].  Our address is     [address] [place] [address] [place] [address] [place] [address]    Thank you for allowing us to be a part of your care here at the [facility]    If you have questions or concerns please contact us at:    Jesusita Montiel, RN, BSN   [facility] ([facility]  Nurse Coordinator     Clinic   Pulmonary Hypertension   Pulmonary Hypertension  [facility]  (P)[phone]    (P) [phone]        (F)[phone]    ** Please note that you will NOT receive a reminder call regarding your scheduled testing, reminder calls are for provider appointments only.  If you are scheduled for testing within the [facility] system you may receive a call regarding pre-registration for billing purposes only.**     Remember to weigh yourself daily after voiding and before you consume any food or beverages and log the numbers.  If you have gained/lost 2 pounds overnight or 5 pounds in a week contact us immediately for medication adjustments or further instructions.   **Please call us immediately if you have any syncope, chest pain, edema, or decline in your functional status.    Support Group:  Pulmonary Hypertension Association  [email or web address]    [facility] Support Group  [place], [place]  Leader: Andrae Ramos  Phone: [phone]  Email: [email or web address]

## 2017-10-17 NOTE — LETTER
10/17/2017    Oscar Parekh MD  [address] [address]    RE: Sean Brunner       Dear Colleague,    I had the pleasure of seeing Sean Brunner in the [facility].    Problem List:    1. H/o cor pulmonale with know 3-4+ TR and RV dysfunction in past- slight increase in RV size and decline in RV systolic function compared to 2011 echo  2. Chronic afib- VR well controlled, on coumadin  3. Moderate obstruction on PFT 2011  4. STEWART- no compliant with CPAP  5. H/o PE- no PE on CT chest this admission  6. Non obstructive CAD CT cor angio 2011, negative stress test this admission  7. Obesity- lost 100 lbs over last several years      Dear  Narendra    We had the pleasure of seeing Mr. Brunner at [facility] today.  He is generally doing well, however, has excessive daytime fatigue.  He is not using his CPAP secondary to facemask fitting issues.  He is willing to try an oral appliance.    He has no interim history of syncope, ascites, edema, orthopnea.    He has no bleeding or clotting abnormalities.      PAST MEDICAL HISTORY:  Past Medical History:   Diagnosis Date     A-fib (H)      Diabetes mellitus (H)      Low BP      Thyroid disease        FAMILY HISTORY:  No family history on file.      SOCIAL HISTORY:  Social History     Social History     Marital status:      Spouse name: N/A     Number of children: N/A     Years of education: N/A     Social History Main Topics     Smoking status: Never Smoker     Smokeless tobacco: Never Used     Alcohol use No     Drug use: No     Sexual activity: Not on file     Other Topics Concern     Not on file     Social History Narrative       CURRENT MEDICATIONS:  Current Outpatient Prescriptions   Medication Sig Dispense Refill     digoxin (LANOXIN) 125 MCG tablet TAKE 1 TABLET BY MOUTH EVERY OTHER DAY 45 tablet 3     oxyCODONE-acetaminophen  (PERCOCET) 5-325 MG per tablet Take 1-2 tablets by mouth every 4 hours as needed for pain 15 tablet 0     OMEPRAZOLE PO Take 40 mg by mouth every evening       multivitamin, therapeutic with minerals (THERA-VIT-M) TABS tablet Take 1 tablet by mouth 2 times daily       WARFARIN SODIUM PO Take by mouth every evening 1 mg Mon, Thu, and 1.5 mg all other days       divalproex (DEPAKOTE) 250 MG EC tablet Take 250 mg by mouth At Bedtime       METFORMIN HCL PO Take 1,000 mg by mouth daily (with dinner)       torsemide (DEMADEX) 10 MG tablet Take 1 tablet (10 mg) by mouth daily 90 tablet 0     cyanocolbalamin (VITAMIN  B-12) 1000 MCG tablet Take 3,000 mcg by mouth every morning       metoprolol (TOPROL-XL) 25 MG 24 hr tablet Take 25 mg by mouth 2 times daily       levothyroxine (SYNTHROID) 150 MCG tablet Take 150 mcg by mouth every morning (before breakfast)        simvastatin (ZOCOR) 40 MG tablet Take 40 mg by mouth daily (with dinner)        ACCU-CHEK MULTICLIX LANCETS MISC by Lancet route 2 times daily. Use to test blood sugar twice daily or as directed. 102 each prn       ROS:   10 point ROS negative except HPI    EXAM:  There were no vitals taken for this visit.  Home weight  General: appears comfortable, alert and articulate  Head: normocephalic, atraumatic  Eyes: anicteric sclera, EOMI  Neck: no adenopathy  Orophyarynx: moist mucosa, no lesions, dentition intact  Heart: regular, S1/S2, no murmur, gallop, rub, estimated JVP WNL  Lungs: clear, no rales or wheezing  Abdomen: soft, non-tender, bowel sounds present, no hepatosplenomegaly  Extremities: no clubbing, cyanosis or edema  Neurological: normal speech and affect, no gross motor deficits      Wt Readings from Last 10 Encounters:   04/18/17 94.3 kg (208 lb)   04/11/17 91.6 kg (201 lb 15.1 oz)   04/04/17 93 kg (205 lb 1.6 oz)   02/27/17 100.9 kg (222 lb 6 oz)   01/08/15 96.6 kg (213 lb)   12/13/14 98.7 kg (217 lb 8 oz)   12/27/13 100.7 kg (222 lb)   05/17/12 117.9 kg  (260 lb)       Labs:     Ref. Range 4/18/2017 15:17   Sodium Latest Ref Range: 133 - 144 mmol/L 139   Potassium Latest Ref Range: 3.4 - 5.3 mmol/L 4.3   Chloride Latest Ref Range: 94 - 109 mmol/L 103   Carbon Dioxide Latest Ref Range: 20 - 32 mmol/L 30   Urea Nitrogen Latest Ref Range: 7 - 30 mg/dL 17   Creatinine Latest Ref Range: 0.66 - 1.25 mg/dL 0.93   GFR Estimate Latest Ref Range: >60 mL/min/1.7m2 77   GFR Estimate If Black Latest Ref Range: >60 mL/min/1.7m2 >90...   Calcium Latest Ref Range: 8.5 - 10.1 mg/dL 9.6   Anion Gap Latest Ref Range: 3 - 14 mmol/L 6   Albumin Latest Ref Range: 3.4 - 5.0 g/dL 3.2 (L)   Protein Total Latest Ref Range: 6.8 - 8.8 g/dL 8.0   Bilirubin Total Latest Ref Range: 0.2 - 1.3 mg/dL 0.7   Alkaline Phosphatase Latest Ref Range: 40 - 150 U/L 75   ALT Latest Ref Range: 0 - 70 U/L 15   AST Latest Ref Range: 0 - 45 U/L 20   Iron Latest Ref Range: 35 - 180 ug/dL 68   Iron Binding Cap Latest Ref Range: 240 - 430 ug/dL 246   Iron Saturation Index Latest Ref Range: 15 - 46 % 28   N-Terminal Pro Bnp Latest Ref Range: 0 - 450 pg/mL 1288 (H)   Glucose Latest Ref Range: 70 - 99 mg/dL 110 (H)   WBC Latest Ref Range: 4.0 - 11.0 10e9/L 3.8 (L)   Hemoglobin Latest Ref Range: 13.3 - 17.7 g/dL 11.4 (L)   Hematocrit Latest Ref Range: 40.0 - 53.0 % 35.1 (L)   Platelet Count Latest Ref Range: 150 - 450 10e9/L 147 (L)   RBC Count Latest Ref Range: 4.4 - 5.9 10e12/L 3.70 (L)   MCV Latest Ref Range: 78 - 100 fl 95   MCH Latest Ref Range: 26.5 - 33.0 pg 30.8   MCHC Latest Ref Range: 31.5 - 36.5 g/dL 32.5   RDW Latest Ref Range: 10.0 - 15.0 % 15.2 (H)   INR Latest Ref Range: 0.86 - 1.14  1.82 (H)       CBC RESULTS:  Lab Results   Component Value Date    WBC 6.1 06/04/2017    RBC 4.08 (L) 06/04/2017    HGB 12.7 (L) 06/04/2017    HCT 38.5 (L) 06/04/2017    MCV 94 06/04/2017    MCH 31.1 06/04/2017    MCHC 33.0 06/04/2017    RDW 14.1 06/04/2017     06/04/2017       CMP RESULTS:  Lab Results   Component  Value Date     06/04/2017    POTASSIUM 3.9 06/04/2017    CHLORIDE 103 06/04/2017    CO2 30 06/04/2017    ANIONGAP 4 06/04/2017    GLC 92 06/04/2017    BUN 13 06/04/2017    CR 0.87 06/04/2017    GFRESTIMATED 83 06/04/2017    GFRESTBLACK >90   GFR Calc   06/04/2017    TYSON 9.7 06/04/2017    BILITOTAL 0.7 04/18/2017    ALBUMIN 3.2 (L) 04/18/2017    ALKPHOS 75 04/18/2017    ALT 15 04/18/2017    AST 20 04/18/2017        INR RESULTS:  Lab Results   Component Value Date    INR 2.56 (H) 06/04/2017       No components found for: CK  Lab Results   Component Value Date    MAG 1.8 02/25/2017     Lab Results   Component Value Date    NTBNPI 787 02/24/2017       Echocardiogram (2/25/2017):  Interpretation Summary  The left ventricle is normal in size. There is normal left ventricular wall  thickness. Left ventricular systolic function is normal. The visual ejection  fraction is estimated at 55-60%. Flattened septum is consistent with RV  pressure/volume overload. There is no thrombus seen in the left ventricle.  The right ventricle is severely dilated. The right ventricular systolic  function is mild to moderately reduced.  The left atrium is moderately dilated. The right atrium is severely dilated.  There is severe (4+) tricuspid regurgitation. The right ventricular systolic  pressure is approximated at 30.9 mmHg plus the right atrial pressure. This  most likely represents an underestimation of the true RVSP.  Mild aortic stenosis.  In direct comparison to the previous study dated 11/07/2012, there has been a  mild interval increase in right ventricular size and deterioration in right  ventricular systolic function.      CT Chest Pulmonary (2/24/2017):  COMPARISON: 4/13/2011     FINDINGS: Evaluation of the pulmonary arterial system shows no  evidence of embolus. The thoracic aorta is calcified and slightly  tortuous. No aortic aneurysm or dissection. There are coronary artery  atherosclerotic calcifications.  The heart is enlarged. There is no  mediastinal, hilar or axillary lymph node enlargement. There are a few  tiny calcified granulomas in the right upper lobe. Mild dependent  atelectasis bilaterally. A few bands of scar or atelectasis  bilaterally. Small bilateral pleural effusions. Images through the  upper abdomen are remarkable for small amount of ascites. The inferior  vena cava is very large which may be due to right heart dysfunction.  The liver has a slightly nodular contour suggesting cirrhosis.     IMPRESSION:  1. There is no pulmonary embolus, aortic aneurysm or dissection.  2. Coronary artery atherosclerotic calcification. Cardiomegaly.  3. Small bilateral pleural effusions.  4. Probable hepatic cirrhosis. Small amount of ascites.      XR Chest 2 Views (4/5/2017):  HISTORY: Other secondary pulmonary hypertension  COMPARISON: 2/24/2017.  IMPRESSION: No acute abnormality.       Right Heart Catheterization (4/11/2017):  82-year-old gentleman with unexplained pulmonary hypertension, severe  right heart failure and right ventricular dysfunction, and severe  tricuspid regurgitation is referred for further evaluation of his  pulmonary hypertension etiology 2 guide further evaluation and  treatment.    Catheterization results  Baseline with thermal dilution cardiac output  -RA pressure mean = 12. Patient is defibrillation  -RV pressure-49/11  -Pulmonary artery pressure 48/21 (31)  -Pulmonary capillary wedge pressure -/24 (15)  -Thermal dilution cardiac output/index 4.9/2.4  -Calculated pulmonary vascular arteriolar resistance 3.2 Woods units    With estimated Rachael cardiac output  -Pulmonary pressure 47/19 (31)  -Pulmonary capillary wedge pressure-/27 (17)  -Pulmonary artery oxygen saturation 67% aortic oxygen saturation 98  percent on room air  -Estimated Rachael cardiac output/index 5.1/2.5  -Pulmonary vascular arteriolar resistance 2.6 Woods units       VQ Scan (4/5/2017):  IMPRESSION:   1. Central airways  deposition, suggestive of chronic obstructive  pulmonary disease.  2. Mild diffusely heterogeneous pulmonary perfusion, similar to that  of the ventilation images. These findings most likely relate to  underlying pulmonary disease. There are no focal pulmonary perfusion  defects.       NM MPI w Lexiscan (2/24/2017):  Indication/Clinical History: Shortness of breath, chest pain     Impression  1. Myocardial perfusion imaging using single isotope technique  demonstrated no evidence of ischemia or infarction.   2. Gated images demonstrated normal size left ventricle with normal  wall motion. The left ventricular systolic function is normal at 76%.  3. Compared to the prior study from  .     Procedure  Pharmacologic stress testing was performed with Lexiscan at a rate of  0.08 mg/ml rapid bolus injection, for 15 seconds, 0.4 mg/5ml  intravenously. Low-level exercise was not performed along with the  vasodilator infusion. The heart rate was 6767 at baseline and bj to  82 beats per minute during the Lexiscan infusion. The rest blood  pressure was 118/64 mmHg and was 118/57 mm Hg during Lexiscan  infusion. The patient experienced no chest pain during the test.     Myocardial perfusion imaging was performed at rest, approximately 45  minutes after the injection intravenously of 11 mCi of Tc-99m Myoview.  At peak pharmacologic effect, 10-20 seconds after Lexiscan, the  patient was injected intravenously with 32 mCi of Tc-99m Myoview. The  post-stress tomographic imaging was performed approximately 60 minutes  after stress.     EKG Findings  The resting EKG demonstrated sinus rhythm with poor R wave progression  in the low voltage pattern. The stress EKG demonstrated no EKG  changes suggestive of ischemia.     Tomographic Findings  Overall, the study quality is fair. Mild visceral tracer artifact is  noted . On the stress images, mild inferior count reduction is noted.  On the rest images, mild inferior count reduction is  seen and likely  due to diaphragmatic attenuation and visceral tracer artifact . Gated  images demonstrated normal size left ventricle with normal wall  motion. The left ventricular ejection fraction was calculated to be  76%. TID was absent.      Assessment and Plan:    See Hector Oliva DDS for oral appliance after overnight recording oximetry   Encouraged to start incorporating regular exercise and activity, such as walking outside.   Follow up with Dr. Parekh about fatigue   Return to clinic in 6-8 months    Thank you for allowing me to participate in the care of your patient.    Sincerely,     Julian Joyce MD     [facility]

## 2017-10-17 NOTE — MR AVS SNAPSHOT
After Visit Summary   10/17/2017    Sean Brunner    MRN: [id number]           Patient Information     Date Of Birth          9/13/1934        Visit Information        Provider Department      10/17/2017 10:00 AM Julian Joyce MD [facility] [place]        Today's Diagnoses     LV dysfunction    -  1    Dyspnea on exertion        STEWART (obstructive sleep apnea)          Care Instructions    Medication Changes:  No medication changes at this time. Please continue current medication regiment.     Patient Instructions:  Continue staying active, eating a low sodium, low fat diet.    **Labs today    Follow up Appointment Information:  May follow up    We are located on the third floor of [facility] on the [facility].  Our address is     [address] [place] [address] [place] [address] [place] [address]    Thank you for allowing us to be a part of your care here at the [facility]    If you have questions or concerns please contact us at:    Jesusita Montiel RN, BSN   [facility] ([facility]  Nurse Coordinator     Clinic   Pulmonary Hypertension   Pulmonary Hypertension  [facility]  (P)[phone]    (P) [phone]        (F)[phone]    ** Please note that you will NOT receive a reminder call regarding your scheduled testing, reminder calls are for provider appointments only.  If you are scheduled for testing within the [place] system you may receive a call regarding pre-registration for billing purposes only.**     Remember to weigh yourself daily after voiding and before you consume any food or beverages and log the numbers.  If you have gained/lost 2 pounds overnight or 5 pounds in a week contact us immediately for medication adjustments or further instructions.   **Please call us  "immediately if you have any syncope, chest pain, edema, or decline in your functional status.    Support Group:  Pulmonary Hypertension Association  [email or web address]    [facility] Support Group  Breann Adams  Leader: Andrae Ramos  Phone: [phone]  Email: [email or web address]          Follow-ups after your visit        Who to contact     If you have questions or need follow up information about today's clinic visit or your schedule please contact [facility] AT [place] directly at [phone].  Normal or non-critical lab and imaging results will be communicated to you by [employer], letter or phone within 4 business days after the clinic has received the results. If you do not hear from us within 7 days, please contact the clinic through [employer] or phone. If you have a critical or abnormal lab result, we will notify you by phone as soon as possible.  Submit refill requests through [employer] or call your pharmacy and they will forward the refill request to us. Please allow 3 business days for your refill to be completed.          Additional Information About Your Visit        MyChart Information     [employer] lets you send messages to your doctor, view your test results, renew your prescriptions, schedule appointments and more. To sign up, go to [email or web address] . Click on \"Log in\" on the left side of the screen, which will take you to the Welcome page. Then click on \"Sign up Now\" on the right side of the page.     You will be asked to enter the access code listed below, as well as some personal information. Please follow the directions to create your username and password.     Your access code is: [email or web address]  Expires: 1/15/2018 10:52 AM     Your access code will  in 90 days. If you need help or a new code, please call your [place] clinic or [phone].        Care EveryWhere ID     This is your Care EveryWhere ID. This could be used by other " "organizations to access your [facility] medical records  [id number]        Your Vitals Were     Pulse Height Pulse Oximetry BMI (Body Mass Index)          77 1.753 m (5' 9\") 100% 28.87 kg/m2         Blood Pressure from Last 3 Encounters:   10/17/17 115/69   06/04/17 118/70   04/18/17 96/53    Weight from Last 3 Encounters:   10/17/17 88.7 kg (195 lb 8 oz)   06/04/17 88.5 kg (195 lb)   04/18/17 94.3 kg (208 lb)              We Performed the Following     CBC with platelets     Follow-Up with Pulmonary Hypertension Clinic     [facility]        Primary Care Provider Office Phone # Fax #    Dereckjordana Cory Parekh [phone]       [place] [address]        Equal Access to Services     [facility] Center[facility]ABARI : Hadii vj dwyer hadasho Soomaali, waaxda luqadaha, qaybta kaalmada adeegyada, ismael abel hayemmy bolivar . So [facility] [phone].    ATENCIÓN: Si habla español, tiene a ritchie disposición servicios gratuitos de asistencia lingüística. Erik al [phone].    We comply with applicable federal civil rights laws and [place] laws. We do not discriminate on the basis of race, color, national origin, age, disability, sex, sexual orientation, or gender identity.            Thank you!     Thank you for choosing [facility] HE[facility] AT [place]  for your care. Our goal is always to provide you with excellent care. Hearing back from our patients is one way we can continue to improve our services. Please take a few minutes to complete the written survey that you may receive in the mail after your visit with us. Thank you!             Your Updated Medication List - Protect others around you: Learn how to safely use, store and throw away your medicines at www.disposemymeds.org.          This list is accurate as of: 10/17/17 11:07 AM.  Always use your most recent med list.                   Brand Name Dispense Instructions for use Diagnosis    " blood glucose monitoring lancets     102 each    by Lancet route 2 times daily. Use to test blood sugar twice daily or as directed.    Diabetes mellitus, type 2 (H)       cyanocobalamin 1000 MCG tablet    vitamin  B-12     Take 3,000 mcg by mouth every morning        digoxin 125 MCG tablet    LANOXIN    45 tablet    TAKE 1 TABLET BY MOUTH EVERY OTHER DAY    Cardiomyopathy (H), Cor pulmonale (H), Acute on chronic systolic congestive heart failure (H), Non-rheumatic tricuspid valve insufficiency       divalproex 250 MG EC tablet    DEPAKOTE     Take 250 mg by mouth At Bedtime        METFORMIN HCL PO      Take 1,000 mg by mouth daily (with dinner)        metoprolol 25 MG 24 hr tablet    TOPROL-XL     Take 25 mg by mouth 2 times daily        multivitamin, therapeutic with minerals Tabs tablet      Take 1 tablet by mouth 2 times daily        OMEPRAZOLE PO      Take 40 mg by mouth every evening        oxyCODONE-acetaminophen 5-325 MG per tablet    PERCOCET    15 tablet    Take 1-2 tablets by mouth every 4 hours as needed for pain        simvastatin 40 MG tablet    ZOCOR     Take 40 mg by mouth daily (with dinner)        SYNTHROID 150 MCG tablet   Generic drug:  levothyroxine      Take 150 mcg by mouth every morning (before breakfast)        torsemide 10 MG tablet    DEMADEX    90 tablet    Take 1 tablet (10 mg) by mouth daily    Primary pulmonary hypertension (H)       traMADol 50 MG tablet    ULTRAM     Take by mouth every 6 hours as needed for moderate pain        WARFARIN SODIUM PO      Take by mouth every evening 1 mg Mon, Thu, and 1.5 mg all other days

## 2017-10-17 NOTE — NURSING NOTE
Procedures and/or Testing: Patient given instructions regarding  Overnight Oximetry. Discussed purpose, preparation, procedure and when to expect results reported back to the patient. Patient demonstrated understanding of this information and agreed to call with further questions or concerns.  Med Reconcile: Reviewed and verified all current medications with the patient. The updated medication list was printed and given to the patient.  Diet: Patient instructed regarding a heart healthy diet, including discussion of reduced fat and sodium intake. Patient demonstrated understanding of this information and agreed to call with further questions or concerns.  Return Appointment: Patient given instructions regarding scheduling next clinic visit. Patient demonstrated understanding of this information and agreed to call with further questions or concerns.  Patient stated he understood all health information given and agreed to call with further questions or concerns.     Medication Changes:  No medication changes at this time. Please continue current medication regiment.     Patient Instructions:  Continue staying active, eating a low sodium, low fat diet.    **Labs today    Follow up Appointment Information:  May follow up

## 2017-11-07 ENCOUNTER — CARE COORDINATION (OUTPATIENT)
Dept: CARDIOLOGY | Facility: CLINIC | Age: 82
End: 2017-11-07

## 2017-11-07 NOTE — PROGRESS NOTES
Spoke with wife regarding ordered overnight oximetry study. States that he completed the study and has received an oxygen supply system, but is uncertain of the proper way to use it. Has not yet made recommended appointment with Dr. Hector Oliva DDS who is a sleep apnea dental specialist.     Wife states that they will be going on vacation beginning this Thursday for one week and is wondering if overnight O2 is necessary. States that he has not been using his CPAP machine for some time. Writer educated on importance of supplemental oxygen at night if medically indicated and of the potential risks of going without.    Writer reached out to [facility] who says they will contact the patient and spouse about possible options for their vacation. Spouse states that they will contact Dr. Oliva's office and get an initial appointment scheduled to be assessed for a custom mask/mouth piece.    Spouse returned verbal understanding of all teaching and agrees to plan of care. She and/or the patient will call with further questions/concerns.

## 2018-05-15 ENCOUNTER — OFFICE VISIT (OUTPATIENT)
Dept: CARDIOLOGY | Facility: CLINIC | Age: 83
End: 2018-05-15
Attending: INTERNAL MEDICINE
Payer: COMMERCIAL

## 2018-05-15 VITALS
HEIGHT: 69 IN | SYSTOLIC BLOOD PRESSURE: 103 MMHG | OXYGEN SATURATION: 100 % | BODY MASS INDEX: 28.41 KG/M2 | HEART RATE: 76 BPM | WEIGHT: 191.8 LBS | DIASTOLIC BLOOD PRESSURE: 61 MMHG

## 2018-05-15 DIAGNOSIS — I50.23 ACUTE ON CHRONIC SYSTOLIC CONGESTIVE HEART FAILURE (H): ICD-10-CM

## 2018-05-15 DIAGNOSIS — I51.9 LV DYSFUNCTION: ICD-10-CM

## 2018-05-15 DIAGNOSIS — G47.33 OSA (OBSTRUCTIVE SLEEP APNEA): ICD-10-CM

## 2018-05-15 DIAGNOSIS — R06.09 DYSPNEA ON EXERTION: ICD-10-CM

## 2018-05-15 DIAGNOSIS — I27.81 COR PULMONALE (H): ICD-10-CM

## 2018-05-15 LAB
ALBUMIN SERPL-MCNC: 3.2 G/DL (ref 3.4–5)
ALP SERPL-CCNC: 66 U/L (ref 40–150)
ALT SERPL W P-5'-P-CCNC: 16 U/L (ref 0–70)
ANION GAP SERPL CALCULATED.3IONS-SCNC: 6 MMOL/L (ref 3–14)
AST SERPL W P-5'-P-CCNC: 24 U/L (ref 0–45)
BILIRUB SERPL-MCNC: 0.6 MG/DL (ref 0.2–1.3)
BUN SERPL-MCNC: 22 MG/DL (ref 7–30)
CALCIUM SERPL-MCNC: 9.4 MG/DL (ref 8.5–10.1)
CHLORIDE SERPL-SCNC: 108 MMOL/L (ref 94–109)
CO2 SERPL-SCNC: 27 MMOL/L (ref 20–32)
CREAT SERPL-MCNC: 0.86 MG/DL (ref 0.66–1.25)
ERYTHROCYTE [DISTWIDTH] IN BLOOD BY AUTOMATED COUNT: 14.6 % (ref 10–15)
GFR SERPL CREATININE-BSD FRML MDRD: 85 ML/MIN/1.7M2
GLUCOSE SERPL-MCNC: 87 MG/DL (ref 70–99)
HCT VFR BLD AUTO: 33 % (ref 40–53)
HGB BLD-MCNC: 10.5 G/DL (ref 13.3–17.7)
INR PPP: 2.42 (ref 0.86–1.14)
MCH RBC QN AUTO: 30.2 PG (ref 26.5–33)
MCHC RBC AUTO-ENTMCNC: 31.8 G/DL (ref 31.5–36.5)
MCV RBC AUTO: 95 FL (ref 78–100)
NT-PROBNP SERPL-MCNC: 1038 PG/ML (ref 0–450)
PLATELET # BLD AUTO: 129 10E9/L (ref 150–450)
POTASSIUM SERPL-SCNC: 4.6 MMOL/L (ref 3.4–5.3)
PROT SERPL-MCNC: 7.9 G/DL (ref 6.8–8.8)
RBC # BLD AUTO: 3.48 10E12/L (ref 4.4–5.9)
SODIUM SERPL-SCNC: 141 MMOL/L (ref 133–144)
WBC # BLD AUTO: 3 10E9/L (ref 4–11)

## 2018-05-15 PROCEDURE — 36415 COLL VENOUS BLD VENIPUNCTURE: CPT | Performed by: INTERNAL MEDICINE

## 2018-05-15 PROCEDURE — 80053 COMPREHEN METABOLIC PANEL: CPT | Performed by: INTERNAL MEDICINE

## 2018-05-15 PROCEDURE — 83880 ASSAY OF NATRIURETIC PEPTIDE: CPT | Performed by: INTERNAL MEDICINE

## 2018-05-15 PROCEDURE — 85610 PROTHROMBIN TIME: CPT | Performed by: INTERNAL MEDICINE

## 2018-05-15 PROCEDURE — 99214 OFFICE O/P EST MOD 30 MIN: CPT | Performed by: INTERNAL MEDICINE

## 2018-05-15 PROCEDURE — 85027 COMPLETE CBC AUTOMATED: CPT | Performed by: INTERNAL MEDICINE

## 2018-05-15 NOTE — PATIENT INSTRUCTIONS
Medication Changes:  No medication changes at this time. Please continue current medication regiment.     Patient Instructions:  Continue staying active and eat a heart healthy diet.    Labs today  Follow up Appointment Information:  January      For scheduling at [facility] please call [phone]  For scheduling at the [place] please call [phone]  We are located on the second floor [address] at the [facility].  Our address is     [facility] [address]   [address]  [place]  [address]    Thank you for allowing us to be a part of your care here at the [facility]    If you have questions or concerns please contact us at:    Porsha Dawn, RN, BSN      Nurse Coordinator       Pulmonary Hypertension     [facility]   [phone])[phone]  (F)[phone]    ** Please note that you will NOT receive a reminder call regarding your scheduled testing, reminder calls are for provider appointments only.  If you are scheduled for testing within the Mobile system you may receive a call regarding pre-registration for billing purposes only.**     Remember to weigh yourself daily after voiding and before you consume any food or beverages and log the numbers.  If you have gained/lost 2 pounds overnight or 5 pounds in a week contact us immediately for medication adjustments or further instructions.    Support Group:  Pulmonary Hypertension Association  Https://www.phassociation.org/  **Look at the Events Tab** They even have Support Groups that you can call into    [facility] PH Support Group  First Saturday of the Month from 1-3 PM   Location[facility] [address]  Leader: Andrae Ramos  Phone: [phone]  Email: [email or web address]

## 2018-05-15 NOTE — LETTER
5/15/2018    Oscar Parekh MD  [facility] [address] [address]    RE: Sean Brunner       Dear Colleague,    I had the pleasure of seeing Sean Brunner in the [facility].    Problem List:    1. H/o cor pulmonale with know 3-4+ TR and RV dysfunction in past- slight increase in RV size and decline in RV systolic function compared to 2011 echo  2. Chronic afib- VR well controlled, on coumadin  3. Moderate obstruction on PFT 2011  4. STEWART- no compliant with CPAP  5. H/o PE- no PE on CT chest this admission  6. Non obstructive CAD CT cor angio 2011, negative stress test this admission  7. Obesity- lost 100 lbs over last several years      Dear  Power     He returns now for follow-up, now having started utilizing oxygen.  It has made him markedly better with decreased shortness of breath, better nocturnal sleep and less shortness of breath.  He has no symptoms of right heart failure.         PAST MEDICAL HISTORY:  Past Medical History:   Diagnosis Date     A-fib (H)      Diabetes mellitus (H)      Low BP      Thyroid disease        FAMILY HISTORY:  History reviewed. No pertinent family history.      SOCIAL HISTORY:  Social History     Social History     Marital status:      Spouse name: N/A     Number of children: N/A     Years of education: N/A     Social History Main Topics     Smoking status: Never Smoker     Smokeless tobacco: Never Used     Alcohol use No     Drug use: No     Sexual activity: Not on file     Other Topics Concern     Not on file     Social History Narrative       CURRENT MEDICATIONS:  Current Outpatient Prescriptions   Medication Sig Dispense Refill     ACCU-CHEK MULTICLIX LANCETS MISC by Lancet route 2 times daily. Use to test blood sugar twice daily or as directed. 102 each prn     cyanocolbalamin (VITAMIN  B-12) 1000 MCG tablet Take 3,000 mcg by mouth every morning       digoxin (LANOXIN) 125 MCG tablet Take  1 tablet (125 mcg) by mouth every other day 45 tablet 3     divalproex (DEPAKOTE) 250 MG EC tablet Take 250 mg by mouth At Bedtime       levothyroxine (SYNTHROID) 150 MCG tablet Take 150 mcg by mouth every morning (before breakfast)        METFORMIN HCL PO Take 1,000 mg by mouth daily (with dinner) Pt on 500 mg daily       metoprolol (TOPROL-XL) 25 MG 24 hr tablet Take 50 mg by mouth daily        multivitamin, therapeutic with minerals (THERA-VIT-M) TABS tablet Take 1 tablet by mouth 2 times daily       OMEPRAZOLE PO Take 40 mg by mouth every evening       oxyCODONE-acetaminophen (PERCOCET) 5-325 MG per tablet Take 1-2 tablets by mouth every 4 hours as needed for pain 15 tablet 0     simvastatin (ZOCOR) 40 MG tablet Take 40 mg by mouth daily (with dinner)        torsemide (DEMADEX) 10 MG tablet Take 1 tablet (10 mg) by mouth daily 90 tablet 0     traMADol (ULTRAM) 50 MG tablet Take by mouth every 6 hours as needed for moderate pain       WARFARIN SODIUM PO Take by mouth every evening 1 mg Mon, Thu, and 1.5 mg all other days         ROS:   10 point ROS negative except HPI    EXAM:  Home weight  General: appears comfortable, alert and articulate  Head: normocephalic, atraumatic  Eyes: anicteric sclera, EOMI  Neck: no adenopathy  Orophyarynx: moist mucosa, no lesions, dentition intact  Heart: regular, S1/S2, no murmur, gallop, rub, estimated JVP WNL  Lungs: clear, no rales or wheezing  Abdomen: soft, non-tender, bowel sounds present, no hepatosplenomegaly  Extremities: no clubbing, cyanosis or edema  Neurological: normal speech and affect, no gross motor deficits      Wt Readings from Last 10 Encounters:   04/18/17 94.3 kg (208 lb)   04/11/17 91.6 kg (201 lb 15.1 oz)   04/04/17 93 kg (205 lb 1.6 oz)   02/27/17 100.9 kg (222 lb 6 oz)   01/08/15 96.6 kg (213 lb)   12/13/14 98.7 kg (217 lb 8 oz)   12/27/13 100.7 kg (222 lb)   05/17/12 117.9 kg (260 lb)       Labs:     Ref. Range 4/18/2017 15:17   Sodium Latest Ref Range:  133 - 144 mmol/L 139   Potassium Latest Ref Range: 3.4 - 5.3 mmol/L 4.3   Chloride Latest Ref Range: 94 - 109 mmol/L 103   Carbon Dioxide Latest Ref Range: 20 - 32 mmol/L 30   Urea Nitrogen Latest Ref Range: 7 - 30 mg/dL 17   Creatinine Latest Ref Range: 0.66 - 1.25 mg/dL 0.93   GFR Estimate Latest Ref Range: >60 mL/min/1.7m2 77   GFR Estimate If Black Latest Ref Range: >60 mL/min/1.7m2 >90...   Calcium Latest Ref Range: 8.5 - 10.1 mg/dL 9.6   Anion Gap Latest Ref Range: 3 - 14 mmol/L 6   Albumin Latest Ref Range: 3.4 - 5.0 g/dL 3.2 (L)   Protein Total Latest Ref Range: 6.8 - 8.8 g/dL 8.0   Bilirubin Total Latest Ref Range: 0.2 - 1.3 mg/dL 0.7   Alkaline Phosphatase Latest Ref Range: 40 - 150 U/L 75   ALT Latest Ref Range: 0 - 70 U/L 15   AST Latest Ref Range: 0 - 45 U/L 20   Iron Latest Ref Range: 35 - 180 ug/dL 68   Iron Binding Cap Latest Ref Range: 240 - 430 ug/dL 246   Iron Saturation Index Latest Ref Range: 15 - 46 % 28   N-Terminal Pro Bnp Latest Ref Range: 0 - 450 pg/mL 1288 (H)   Glucose Latest Ref Range: 70 - 99 mg/dL 110 (H)   WBC Latest Ref Range: 4.0 - 11.0 10e9/L 3.8 (L)   Hemoglobin Latest Ref Range: 13.3 - 17.7 g/dL 11.4 (L)   Hematocrit Latest Ref Range: 40.0 - 53.0 % 35.1 (L)   Platelet Count Latest Ref Range: 150 - 450 10e9/L 147 (L)   RBC Count Latest Ref Range: 4.4 - 5.9 10e12/L 3.70 (L)   MCV Latest Ref Range: 78 - 100 fl 95   MCH Latest Ref Range: 26.5 - 33.0 pg 30.8   MCHC Latest Ref Range: 31.5 - 36.5 g/dL 32.5   RDW Latest Ref Range: 10.0 - 15.0 % 15.2 (H)   INR Latest Ref Range: 0.86 - 1.14  1.82 (H)       CBC RESULTS:  Lab Results   Component Value Date    WBC 3.9 (L) 10/17/2017    RBC 3.69 (L) 10/17/2017    HGB 11.6 (L) 10/17/2017    HCT 35.4 (L) 10/17/2017    MCV 96 10/17/2017    MCH 31.4 10/17/2017    MCHC 32.8 10/17/2017    RDW 14.7 10/17/2017     (L) 10/17/2017       CMP RESULTS:  Lab Results   Component Value Date     06/04/2017    POTASSIUM 3.9 06/04/2017    CHLORIDE  103 06/04/2017    CO2 30 06/04/2017    ANIONGAP 4 06/04/2017    GLC 92 06/04/2017    BUN 13 06/04/2017    CR 0.87 06/04/2017    GFRESTIMATED 83 06/04/2017    GFRESTBLACK >90   GFR Calc   06/04/2017    TYSON 9.7 06/04/2017    BILITOTAL 0.7 04/18/2017    ALBUMIN 3.2 (L) 04/18/2017    ALKPHOS 75 04/18/2017    ALT 15 04/18/2017    AST 20 04/18/2017        INR RESULTS:  Lab Results   Component Value Date    INR 2.56 (H) 06/04/2017       No components found for: CK  Lab Results   Component Value Date    MAG 1.8 02/25/2017     Lab Results   Component Value Date    NTBNPI 787 02/24/2017       Echocardiogram (2/25/2017):  Interpretation Summary  The left ventricle is normal in size. There is normal left ventricular wall  thickness. Left ventricular systolic function is normal. The visual ejection  fraction is estimated at 55-60%. Flattened septum is consistent with RV  pressure/volume overload. There is no thrombus seen in the left ventricle.  The right ventricle is severely dilated. The right ventricular systolic  function is mild to moderately reduced.  The left atrium is moderately dilated. The right atrium is severely dilated.  There is severe (4+) tricuspid regurgitation. The right ventricular systolic  pressure is approximated at 30.9 mmHg plus the right atrial pressure. This  most likely represents an underestimation of the true RVSP.  Mild aortic stenosis.  In direct comparison to the previous study dated 11/07/2012, there has been a  mild interval increase in right ventricular size and deterioration in right  ventricular systolic function.      CT Chest Pulmonary (2/24/2017):  COMPARISON: 4/13/2011     FINDINGS: Evaluation of the pulmonary arterial system shows no  evidence of embolus. The thoracic aorta is calcified and slightly  tortuous. No aortic aneurysm or dissection. There are coronary artery  atherosclerotic calcifications. The heart is enlarged. There is no  mediastinal, hilar or axillary  lymph node enlargement. There are a few  tiny calcified granulomas in the right upper lobe. Mild dependent  atelectasis bilaterally. A few bands of scar or atelectasis  bilaterally. Small bilateral pleural effusions. Images through the  upper abdomen are remarkable for small amount of ascites. The inferior  vena cava is very large which may be due to right heart dysfunction.  The liver has a slightly nodular contour suggesting cirrhosis.     IMPRESSION:  1. There is no pulmonary embolus, aortic aneurysm or dissection.  2. Coronary artery atherosclerotic calcification. Cardiomegaly.  3. Small bilateral pleural effusions.  4. Probable hepatic cirrhosis. Small amount of ascites.      XR Chest 2 Views (4/5/2017):  HISTORY: Other secondary pulmonary hypertension  COMPARISON: 2/24/2017.  IMPRESSION: No acute abnormality.       Right Heart Catheterization (4/11/2017):  82-year-old gentleman with unexplained pulmonary hypertension, severe  right heart failure and right ventricular dysfunction, and severe  tricuspid regurgitation is referred for further evaluation of his  pulmonary hypertension etiology 2 guide further evaluation and  treatment.    Catheterization results  Baseline with thermal dilution cardiac output  -RA pressure mean = 12. Patient is defibrillation  -RV pressure-49/11  -Pulmonary artery pressure 48/21 (31)  -Pulmonary capillary wedge pressure -/24 (15)  -Thermal dilution cardiac output/index 4.9/2.4  -Calculated pulmonary vascular arteriolar resistance 3.2 Woods units    With estimated Rachael cardiac output  -Pulmonary pressure 47/19 (31)  -Pulmonary capillary wedge pressure-/27 (17)  -Pulmonary artery oxygen saturation 67% aortic oxygen saturation 98  percent on room air  -Estimated Rachael cardiac output/index 5.1/2.5  -Pulmonary vascular arteriolar resistance 2.6 Woods units       VQ Scan (4/5/2017):  IMPRESSION:   1. Central airways deposition, suggestive of chronic obstructive  pulmonary disease.  2. Mild  diffusely heterogeneous pulmonary perfusion, similar to that  of the ventilation images. These findings most likely relate to  underlying pulmonary disease. There are no focal pulmonary perfusion  defects.       NM MPI w Lexiscan (2/24/2017):  Indication/Clinical History: Shortness of breath, chest pain     Impression  1. Myocardial perfusion imaging using single isotope technique  demonstrated no evidence of ischemia or infarction.   2. Gated images demonstrated normal size left ventricle with normal  wall motion. The left ventricular systolic function is normal at 76%.  3. Compared to the prior study from  .     Procedure  Pharmacologic stress testing was performed with Lexiscan at a rate of  0.08 mg/ml rapid bolus injection, for 15 seconds, 0.4 mg/5ml  intravenously. Low-level exercise was not performed along with the  vasodilator infusion. The heart rate was 6767 at baseline and bj to  82 beats per minute during the Lexiscan infusion. The rest blood  pressure was 118/64 mmHg and was 118/57 mm Hg during Lexiscan  infusion. The patient experienced no chest pain during the test.     Myocardial perfusion imaging was performed at rest, approximately 45  minutes after the injection intravenously of 11 mCi of Tc-99m Myoview.  At peak pharmacologic effect, 10-20 seconds after Lexiscan, the  patient was injected intravenously with 32 mCi of Tc-99m Myoview. The  post-stress tomographic imaging was performed approximately 60 minutes  after stress.     EKG Findings  The resting EKG demonstrated sinus rhythm with poor R wave progression  in the low voltage pattern. The stress EKG demonstrated no EKG  changes suggestive of ischemia.     Tomographic Findings  Overall, the study quality is fair. Mild visceral tracer artifact is  noted . On the stress images, mild inferior count reduction is noted.  On the rest images, mild inferior count reduction is seen and likely  due to diaphragmatic attenuation and visceral tracer  artifact . Gated  images demonstrated normal size left ventricle with normal wall  motion. The left ventricular ejection fraction was calculated to be  76%. TID was absent.      Assessment and Plan:   1. Stable cardiopulmonary status  2. Improved sleep and daytime sleep with augmented oxygen  3. Laboratories : CBC, CMP, BNP  4. No signs of right heart failure            .                              Thank you for allowing me to participate in the care of your patient.      Sincerely,     Julian Joyce MD     [facility]

## 2018-05-15 NOTE — MR AVS SNAPSHOT
After Visit Summary   5/15/2018    Sean Brunner    MRN: [id number]           Patient Information     Date Of Birth          9/13/1934        Visit Information        Provider Department      5/15/2018 11:00 AM Julian Joyce MD [facility]        Today's Diagnoses     Dyspnea on exertion        LV dysfunction        STEWART (obstructive sleep apnea)        Cor pulmonale (H)        Acute on chronic systolic congestive heart failure (H)          Care Instructions    Medication Changes:  No medication changes at this time. Please continue current medication regiment.     Patient Instructions:  Continue staying active and eat a heart healthy diet.    Labs today  Follow up Appointment Information:  January      For scheduling at [facility] please call [phone]  For scheduling at the [place] please call [phone]  We are located on the second floor [address] at the [facility].  Our address is     [address] [place] [address]   [address]  [place]  [address]    Thank you for allowing us to be a part of your care here at the [facility]    If you have questions or concerns please contact us at:    Porsha Dawn RN, BSN      Nurse Coordinator       Pulmonary Hypertension     [facility]   [phone])[phone]  (F)[phone]    ** Please note that you will NOT receive a reminder call regarding your scheduled testing, reminder calls are for provider appointments only.  If you are scheduled for testing within the [place] system you may receive a call regarding pre-registration for billing purposes only.**     Remember to weigh yourself daily after voiding and before you consume any food or beverages and log the numbers.  If you have gained/lost 2 pounds overnight or 5 pounds in a week contact us immediately for medication adjustments or further instructions.    Support Group:  Pulmonary Hypertension  "Association  Https://www.phassociation.org/  **Look at the Events Tab** They even have Support Groups that you can call into    [facility] Support Group  First Saturday of the Month from 1-3 PM   Location: [facility] [address]  Leader: Andrae Ramos  Phone: [phone]  Email: [email or web address]          Follow-ups after your visit        Additional Services     Follow-Up with Pulmonary Hypertension Clinic                 Future tests that were ordered for you today     Open Future Orders        Priority Expected Expires Ordered    Comprehensive metabolic panel Routine 5/15/2018 5/15/2019 5/15/2018    N terminal pro BNP outpatient Routine 5/15/2018 5/15/2019 5/15/2018    CBC with platelets Routine 5/15/2018 5/15/2019 5/15/2018    INR Routine 5/15/2018 5/15/2019 5/15/2018    Follow-Up with Pulmonary Hypertension Clinic Routine 1/15/2019 5/15/2019 5/15/2018            Who to contact     If you have questions or need follow up information about today's clinic visit or your schedule please contact [facility] directly at [phone].  Normal or non-critical lab and imaging results will be communicated to you by MyChart, letter or phone within 4 business days after the clinic has received the results. If you do not hear from us within 7 days, please contact the clinic through [employer] or phone. If you have a critical or abnormal lab result, we will notify you by phone as soon as possible.  Submit refill requests through [employer] or call your pharmacy and they will forward the refill request to us. Please allow 3 business days for your refill to be completed.          Additional Information About Your Visit        [employer] Information     [employer] lets you send messages to your doctor, view your test results, renew your prescriptions, schedule appointments and more. To sign up, go to [email or web address] . Click on \"Log in\" on the left side of the screen, which " "will take you to the Welcome page. Then click on \"Sign up Now\" on the right side of the page.     You will be asked to enter the access code listed below, as well as some personal information. Please follow the directions to create your username and password.     Your access code is: [email or web address]  Expires: 2018 11:58 AM     Your access code will  in 90 days. If you need help or a new code, please call your [facility] or [phone].        Care EveryWhere ID     This is your Care EveryWhere ID. This could be used by other organizations to access your [place] medical records  [id number]        Your Vitals Were     Pulse Height Pulse Oximetry BMI (Body Mass Index)          76 1.753 m (5' 9\") 100% 28.32 kg/m2         Blood Pressure from Last 3 Encounters:   05/15/18 103/61   10/17/17 115/69   17 118/70    Weight from Last 3 Encounters:   05/15/18 87 kg (191 lb 12.8 oz)   10/17/17 88.7 kg (195 lb 8 oz)   17 88.5 kg (195 lb)              We Performed the Following     Follow-Up with Pulmonary Hypertension Clinic        Primary Care Provider Office Phone # Fax #    Oscar Parekh [phone]       [place] [address]        Equal Access to Services     [facility]: Hadii aad ku hadasho Soomaali, waaxda luqadaha, qaybta kaalmada adeegyada, ismael bolivar . So [facility] [phone].    ATENCIÓN: Si habla español, tiene a ritchie disposición servicios gratuitos de asistencia lingüística. Llame al [phone].    We comply with applicable federal civil rights laws and [place] laws. We do not discriminate on the basis of race, color, national origin, age, disability, sex, sexual orientation, or gender identity.            Thank you!     Thank you for choosing [facility]  for your care. Our goal is always to provide you with excellent care. Hearing back from our " patients is one way we can continue to improve our services. Please take a few minutes to complete the written survey that you may receive in the mail after your visit with us. Thank you!             Your Updated Medication List - Protect others around you: Learn how to safely use, store and throw away your medicines at www.disposemymeds.org.          This list is accurate as of 5/15/18 12:01 PM.  Always use your most recent med list.                   Brand Name Dispense Instructions for use Diagnosis    blood glucose monitoring lancets     102 each    by Lancet route 2 times daily. Use to test blood sugar twice daily or as directed.    Diabetes mellitus, type 2 (H)       cyanocobalamin 1000 MCG tablet    vitamin  B-12     Take 3,000 mcg by mouth every morning        digoxin 125 MCG tablet    LANOXIN    45 tablet    Take 1 tablet (125 mcg) by mouth every other day    Cardiomyopathy (H), Cor pulmonale (H), Acute on chronic systolic congestive heart failure (H), Non-rheumatic tricuspid valve insufficiency       divalproex sodium delayed-release 250 MG DR tablet    DEPAKOTE     Take 250 mg by mouth At Bedtime        METFORMIN HCL PO      Take 1,000 mg by mouth daily (with dinner) Pt on 500 mg daily        metoprolol succinate 25 MG 24 hr tablet    TOPROL-XL     Take 50 mg by mouth daily        multivitamin, therapeutic with minerals Tabs tablet      Take 1 tablet by mouth 2 times daily        OMEPRAZOLE PO      Take 40 mg by mouth every evening        oxyCODONE-acetaminophen 5-325 MG per tablet    PERCOCET    15 tablet    Take 1-2 tablets by mouth every 4 hours as needed for pain        simvastatin 40 MG tablet    ZOCOR     Take 40 mg by mouth daily (with dinner)        SYNTHROID 150 MCG tablet   Generic drug:  levothyroxine      Take 150 mcg by mouth every morning (before breakfast)        torsemide 10 MG tablet    DEMADEX    90 tablet    Take 1 tablet (10 mg) by mouth daily    Primary pulmonary hypertension (H)        traMADol 50 MG tablet    ULTRAM     Take by mouth every 6 hours as needed for moderate pain        WARFARIN SODIUM PO      Take by mouth every evening 1 mg Mon, Thu, and 1.5 mg all other days

## 2018-05-15 NOTE — PROGRESS NOTES
Problem List:    1. H/o cor pulmonale with know 3-4+ TR and RV dysfunction in past- slight increase in RV size and decline in RV systolic function compared to 2011 echo  2. Chronic afib- VR well controlled, on coumadin  3. Moderate obstruction on PFT 2011  4. STEWART- no compliant with CPAP  5. H/o PE- no PE on CT chest this admission  6. Non obstructive CAD CT cor angio 2011, negative stress test this admission  7. Obesity- lost 100 lbs over last several years      Dear  Narendra    He returns now for follow-up, now having started utilizing oxygen.  It has made him markedly better with decreased shortness of breath, better nocturnal sleep and less shortness of breath.  He has no symptoms of right heart failure.         PAST MEDICAL HISTORY:  Past Medical History:   Diagnosis Date     A-fib (H)      Diabetes mellitus (H)      Low BP      Thyroid disease        FAMILY HISTORY:  History reviewed. No pertinent family history.      SOCIAL HISTORY:  Social History     Social History     Marital status:      Spouse name: N/A     Number of children: N/A     Years of education: N/A     Social History Main Topics     Smoking status: Never Smoker     Smokeless tobacco: Never Used     Alcohol use No     Drug use: No     Sexual activity: Not on file     Other Topics Concern     Not on file     Social History Narrative       CURRENT MEDICATIONS:  Current Outpatient Prescriptions   Medication Sig Dispense Refill     ACCU-CHEK MULTICLIX LANCETS MISC by Lancet route 2 times daily. Use to test blood sugar twice daily or as directed. 102 each prn     cyanocolbalamin (VITAMIN  B-12) 1000 MCG tablet Take 3,000 mcg by mouth every morning       digoxin (LANOXIN) 125 MCG tablet Take 1 tablet (125 mcg) by mouth every other day 45 tablet 3     divalproex (DEPAKOTE) 250 MG EC tablet Take 250 mg by mouth At Bedtime       levothyroxine (SYNTHROID) 150 MCG tablet Take 150 mcg by mouth every morning (before breakfast)        METFORMIN HCL PO Take  1,000 mg by mouth daily (with dinner) Pt on 500 mg daily       metoprolol (TOPROL-XL) 25 MG 24 hr tablet Take 50 mg by mouth daily        multivitamin, therapeutic with minerals (THERA-VIT-M) TABS tablet Take 1 tablet by mouth 2 times daily       OMEPRAZOLE PO Take 40 mg by mouth every evening       oxyCODONE-acetaminophen (PERCOCET) 5-325 MG per tablet Take 1-2 tablets by mouth every 4 hours as needed for pain 15 tablet 0     simvastatin (ZOCOR) 40 MG tablet Take 40 mg by mouth daily (with dinner)        torsemide (DEMADEX) 10 MG tablet Take 1 tablet (10 mg) by mouth daily 90 tablet 0     traMADol (ULTRAM) 50 MG tablet Take by mouth every 6 hours as needed for moderate pain       WARFARIN SODIUM PO Take by mouth every evening 1 mg Mon, Thu, and 1.5 mg all other days         ROS:   10 point ROS negative except HPI    EXAM:  Home weight  General: appears comfortable, alert and articulate  Head: normocephalic, atraumatic  Eyes: anicteric sclera, EOMI  Neck: no adenopathy  Orophyarynx: moist mucosa, no lesions, dentition intact  Heart: regular, S1/S2, no murmur, gallop, rub, estimated JVP WNL  Lungs: clear, no rales or wheezing  Abdomen: soft, non-tender, bowel sounds present, no hepatosplenomegaly  Extremities: no clubbing, cyanosis or edema  Neurological: normal speech and affect, no gross motor deficits      Wt Readings from Last 10 Encounters:   04/18/17 94.3 kg (208 lb)   04/11/17 91.6 kg (201 lb 15.1 oz)   04/04/17 93 kg (205 lb 1.6 oz)   02/27/17 100.9 kg (222 lb 6 oz)   01/08/15 96.6 kg (213 lb)   12/13/14 98.7 kg (217 lb 8 oz)   12/27/13 100.7 kg (222 lb)   05/17/12 117.9 kg (260 lb)       Labs:     Ref. Range 4/18/2017 15:17   Sodium Latest Ref Range: 133 - 144 mmol/L 139   Potassium Latest Ref Range: 3.4 - 5.3 mmol/L 4.3   Chloride Latest Ref Range: 94 - 109 mmol/L 103   Carbon Dioxide Latest Ref Range: 20 - 32 mmol/L 30   Urea Nitrogen Latest Ref Range: 7 - 30 mg/dL 17   Creatinine Latest Ref Range: 0.66 -  1.25 mg/dL 0.93   GFR Estimate Latest Ref Range: >60 mL/min/1.7m2 77   GFR Estimate If Black Latest Ref Range: >60 mL/min/1.7m2 >90...   Calcium Latest Ref Range: 8.5 - 10.1 mg/dL 9.6   Anion Gap Latest Ref Range: 3 - 14 mmol/L 6   Albumin Latest Ref Range: 3.4 - 5.0 g/dL 3.2 (L)   Protein Total Latest Ref Range: 6.8 - 8.8 g/dL 8.0   Bilirubin Total Latest Ref Range: 0.2 - 1.3 mg/dL 0.7   Alkaline Phosphatase Latest Ref Range: 40 - 150 U/L 75   ALT Latest Ref Range: 0 - 70 U/L 15   AST Latest Ref Range: 0 - 45 U/L 20   Iron Latest Ref Range: 35 - 180 ug/dL 68   Iron Binding Cap Latest Ref Range: 240 - 430 ug/dL 246   Iron Saturation Index Latest Ref Range: 15 - 46 % 28   N-Terminal Pro Bnp Latest Ref Range: 0 - 450 pg/mL 1288 (H)   Glucose Latest Ref Range: 70 - 99 mg/dL 110 (H)   WBC Latest Ref Range: 4.0 - 11.0 10e9/L 3.8 (L)   Hemoglobin Latest Ref Range: 13.3 - 17.7 g/dL 11.4 (L)   Hematocrit Latest Ref Range: 40.0 - 53.0 % 35.1 (L)   Platelet Count Latest Ref Range: 150 - 450 10e9/L 147 (L)   RBC Count Latest Ref Range: 4.4 - 5.9 10e12/L 3.70 (L)   MCV Latest Ref Range: 78 - 100 fl 95   MCH Latest Ref Range: 26.5 - 33.0 pg 30.8   MCHC Latest Ref Range: 31.5 - 36.5 g/dL 32.5   RDW Latest Ref Range: 10.0 - 15.0 % 15.2 (H)   INR Latest Ref Range: 0.86 - 1.14  1.82 (H)       CBC RESULTS:  Lab Results   Component Value Date    WBC 3.9 (L) 10/17/2017    RBC 3.69 (L) 10/17/2017    HGB 11.6 (L) 10/17/2017    HCT 35.4 (L) 10/17/2017    MCV 96 10/17/2017    MCH 31.4 10/17/2017    MCHC 32.8 10/17/2017    RDW 14.7 10/17/2017     (L) 10/17/2017       CMP RESULTS:  Lab Results   Component Value Date     06/04/2017    POTASSIUM 3.9 06/04/2017    CHLORIDE 103 06/04/2017    CO2 30 06/04/2017    ANIONGAP 4 06/04/2017    GLC 92 06/04/2017    BUN 13 06/04/2017    CR 0.87 06/04/2017    GFRESTIMATED 83 06/04/2017    GFRESTBLACK >90   GFR Calc   06/04/2017    TYSON 9.7 06/04/2017    BILITOTAL 0.7 04/18/2017     ALBUMIN 3.2 (L) 04/18/2017    ALKPHOS 75 04/18/2017    ALT 15 04/18/2017    AST 20 04/18/2017        INR RESULTS:  Lab Results   Component Value Date    INR 2.56 (H) 06/04/2017       No components found for: CK  Lab Results   Component Value Date    MAG 1.8 02/25/2017     Lab Results   Component Value Date    NTBNPI 787 02/24/2017       Echocardiogram (2/25/2017):  Interpretation Summary  The left ventricle is normal in size. There is normal left ventricular wall  thickness. Left ventricular systolic function is normal. The visual ejection  fraction is estimated at 55-60%. Flattened septum is consistent with RV  pressure/volume overload. There is no thrombus seen in the left ventricle.  The right ventricle is severely dilated. The right ventricular systolic  function is mild to moderately reduced.  The left atrium is moderately dilated. The right atrium is severely dilated.  There is severe (4+) tricuspid regurgitation. The right ventricular systolic  pressure is approximated at 30.9 mmHg plus the right atrial pressure. This  most likely represents an underestimation of the true RVSP.  Mild aortic stenosis.  In direct comparison to the previous study dated 11/07/2012, there has been a  mild interval increase in right ventricular size and deterioration in right  ventricular systolic function.      CT Chest Pulmonary (2/24/2017):  COMPARISON: 4/13/2011     FINDINGS: Evaluation of the pulmonary arterial system shows no  evidence of embolus. The thoracic aorta is calcified and slightly  tortuous. No aortic aneurysm or dissection. There are coronary artery  atherosclerotic calcifications. The heart is enlarged. There is no  mediastinal, hilar or axillary lymph node enlargement. There are a few  tiny calcified granulomas in the right upper lobe. Mild dependent  atelectasis bilaterally. A few bands of scar or atelectasis  bilaterally. Small bilateral pleural effusions. Images through the  upper abdomen are remarkable  for small amount of ascites. The inferior  vena cava is very large which may be due to right heart dysfunction.  The liver has a slightly nodular contour suggesting cirrhosis.     IMPRESSION:  1. There is no pulmonary embolus, aortic aneurysm or dissection.  2. Coronary artery atherosclerotic calcification. Cardiomegaly.  3. Small bilateral pleural effusions.  4. Probable hepatic cirrhosis. Small amount of ascites.      XR Chest 2 Views (4/5/2017):  HISTORY: Other secondary pulmonary hypertension  COMPARISON: 2/24/2017.  IMPRESSION: No acute abnormality.       Right Heart Catheterization (4/11/2017):  82-year-old gentleman with unexplained pulmonary hypertension, severe  right heart failure and right ventricular dysfunction, and severe  tricuspid regurgitation is referred for further evaluation of his  pulmonary hypertension etiology 2 guide further evaluation and  treatment.    Catheterization results  Baseline with thermal dilution cardiac output  -RA pressure mean = 12. Patient is defibrillation  -RV pressure-49/11  -Pulmonary artery pressure 48/21 (31)  -Pulmonary capillary wedge pressure -/24 (15)  -Thermal dilution cardiac output/index 4.9/2.4  -Calculated pulmonary vascular arteriolar resistance 3.2 Woods units    With estimated Rachael cardiac output  -Pulmonary pressure 47/19 (31)  -Pulmonary capillary wedge pressure-/27 (17)  -Pulmonary artery oxygen saturation 67% aortic oxygen saturation 98  percent on room air  -Estimated Rachael cardiac output/index 5.1/2.5  -Pulmonary vascular arteriolar resistance 2.6 Woods units       VQ Scan (4/5/2017):  IMPRESSION:   1. Central airways deposition, suggestive of chronic obstructive  pulmonary disease.  2. Mild diffusely heterogeneous pulmonary perfusion, similar to that  of the ventilation images. These findings most likely relate to  underlying pulmonary disease. There are no focal pulmonary perfusion  defects.       NM MPI w Lexiscan (2/24/2017):  Indication/Clinical  History: Shortness of breath, chest pain     Impression  1. Myocardial perfusion imaging using single isotope technique  demonstrated no evidence of ischemia or infarction.   2. Gated images demonstrated normal size left ventricle with normal  wall motion. The left ventricular systolic function is normal at 76%.  3. Compared to the prior study from  .     Procedure  Pharmacologic stress testing was performed with Lexiscan at a rate of  0.08 mg/ml rapid bolus injection, for 15 seconds, 0.4 mg/5ml  intravenously. Low-level exercise was not performed along with the  vasodilator infusion. The heart rate was 6767 at baseline and bj to  82 beats per minute during the Lexiscan infusion. The rest blood  pressure was 118/64 mmHg and was 118/57 mm Hg during Lexiscan  infusion. The patient experienced no chest pain during the test.     Myocardial perfusion imaging was performed at rest, approximately 45  minutes after the injection intravenously of 11 mCi of Tc-99m Myoview.  At peak pharmacologic effect, 10-20 seconds after Lexiscan, the  patient was injected intravenously with 32 mCi of Tc-99m Myoview. The  post-stress tomographic imaging was performed approximately 60 minutes  after stress.     EKG Findings  The resting EKG demonstrated sinus rhythm with poor R wave progression  in the low voltage pattern. The stress EKG demonstrated no EKG  changes suggestive of ischemia.     Tomographic Findings  Overall, the study quality is fair. Mild visceral tracer artifact is  noted . On the stress images, mild inferior count reduction is noted.  On the rest images, mild inferior count reduction is seen and likely  due to diaphragmatic attenuation and visceral tracer artifact . Gated  images demonstrated normal size left ventricle with normal wall  motion. The left ventricular ejection fraction was calculated to be  76%. TID was absent.      Assessment and Plan:   1. Stable cardiopulmonary status  2. Improved sleep and daytime  sleep with augmented oxygen  3. Laboratories : CBC, CMP, BNP  4. No signs of right heart failure            .

## 2018-09-01 ENCOUNTER — APPOINTMENT (OUTPATIENT)
Dept: ULTRASOUND IMAGING | Facility: CLINIC | Age: 83
DRG: 871 | End: 2018-09-01
Attending: INTERNAL MEDICINE
Payer: MEDICARE

## 2018-09-01 ENCOUNTER — APPOINTMENT (OUTPATIENT)
Dept: GENERAL RADIOLOGY | Facility: CLINIC | Age: 83
DRG: 871 | End: 2018-09-01
Attending: EMERGENCY MEDICINE
Payer: MEDICARE

## 2018-09-01 ENCOUNTER — HOSPITAL ENCOUNTER (INPATIENT)
Facility: CLINIC | Age: 83
LOS: 5 days | Discharge: HOME | DRG: 871 | End: 2018-09-06
Attending: EMERGENCY MEDICINE | Admitting: INTERNAL MEDICINE
Payer: MEDICARE

## 2018-09-01 ENCOUNTER — APPOINTMENT (OUTPATIENT)
Dept: CT IMAGING | Facility: CLINIC | Age: 83
DRG: 871 | End: 2018-09-01
Attending: EMERGENCY MEDICINE
Payer: MEDICARE

## 2018-09-01 DIAGNOSIS — R79.89 ELEVATED LACTIC ACID LEVEL: ICD-10-CM

## 2018-09-01 DIAGNOSIS — B99.9 INFECTIOUS ENCEPHALOPATHY: ICD-10-CM

## 2018-09-01 DIAGNOSIS — R65.20 SEVERE SEPSIS (H): ICD-10-CM

## 2018-09-01 DIAGNOSIS — I48.91 ATRIAL FIBRILLATION, UNSPECIFIED TYPE (H): Primary | ICD-10-CM

## 2018-09-01 DIAGNOSIS — R73.9 HYPERGLYCEMIA: ICD-10-CM

## 2018-09-01 DIAGNOSIS — G93.49 INFECTIOUS ENCEPHALOPATHY: ICD-10-CM

## 2018-09-01 DIAGNOSIS — L03.116 LEFT LEG CELLULITIS: ICD-10-CM

## 2018-09-01 DIAGNOSIS — A41.9 SEVERE SEPSIS (H): ICD-10-CM

## 2018-09-01 LAB
ALBUMIN SERPL-MCNC: 2.9 G/DL (ref 3.4–5)
ALBUMIN UR-MCNC: NEGATIVE MG/DL
ALP SERPL-CCNC: 63 U/L (ref 40–150)
ALT SERPL W P-5'-P-CCNC: 11 U/L (ref 0–70)
ANION GAP SERPL CALCULATED.3IONS-SCNC: 7 MMOL/L (ref 3–14)
APPEARANCE UR: CLEAR
APTT PPP: 51 SEC (ref 22–37)
AST SERPL W P-5'-P-CCNC: 18 U/L (ref 0–45)
BACTERIA #/AREA URNS HPF: ABNORMAL /HPF
BASOPHILS # BLD AUTO: 0 10E9/L (ref 0–0.2)
BASOPHILS NFR BLD AUTO: 0.3 %
BILIRUB SERPL-MCNC: 1.2 MG/DL (ref 0.2–1.3)
BILIRUB UR QL STRIP: NEGATIVE
BUN SERPL-MCNC: 17 MG/DL (ref 7–30)
CALCIUM SERPL-MCNC: 8.5 MG/DL (ref 8.5–10.1)
CHLORIDE SERPL-SCNC: 104 MMOL/L (ref 94–109)
CO2 SERPL-SCNC: 28 MMOL/L (ref 20–32)
COLOR UR AUTO: YELLOW
CREAT SERPL-MCNC: 1.14 MG/DL (ref 0.66–1.25)
D DIMER PPP FEU-MCNC: 0.4 UG/ML FEU (ref 0–0.5)
DIFFERENTIAL METHOD BLD: ABNORMAL
EOSINOPHIL # BLD AUTO: 0.1 10E9/L (ref 0–0.7)
EOSINOPHIL NFR BLD AUTO: 0.7 %
ERYTHROCYTE [DISTWIDTH] IN BLOOD BY AUTOMATED COUNT: 14.6 % (ref 10–15)
GFR SERPL CREATININE-BSD FRML MDRD: 61 ML/MIN/1.7M2
GLUCOSE BLDC GLUCOMTR-MCNC: 107 MG/DL (ref 70–99)
GLUCOSE BLDC GLUCOMTR-MCNC: 119 MG/DL (ref 70–99)
GLUCOSE BLDC GLUCOMTR-MCNC: 97 MG/DL (ref 70–99)
GLUCOSE SERPL-MCNC: 131 MG/DL (ref 70–99)
GLUCOSE UR STRIP-MCNC: NEGATIVE MG/DL
HBA1C MFR BLD: 4.9 % (ref 0–5.6)
HCT VFR BLD AUTO: 32.6 % (ref 40–53)
HGB BLD-MCNC: 10.4 G/DL (ref 13.3–17.7)
HGB UR QL STRIP: NEGATIVE
IMM GRANULOCYTES # BLD: 0 10E9/L (ref 0–0.4)
IMM GRANULOCYTES NFR BLD: 0.6 %
INR PPP: 3.63 (ref 0.86–1.14)
INTERPRETATION ECG - MUSE: NORMAL
KETONES UR STRIP-MCNC: 5 MG/DL
LACTATE BLD-SCNC: 1.8 MMOL/L (ref 0.7–2)
LACTATE BLD-SCNC: 2.3 MMOL/L (ref 0.7–2)
LACTATE BLD-SCNC: 2.6 MMOL/L (ref 0.7–2)
LEUKOCYTE ESTERASE UR QL STRIP: ABNORMAL
LYMPHOCYTES # BLD AUTO: 0.7 10E9/L (ref 0.8–5.3)
LYMPHOCYTES NFR BLD AUTO: 9.2 %
MAGNESIUM SERPL-MCNC: 2 MG/DL (ref 1.6–2.3)
MCH RBC QN AUTO: 31.6 PG (ref 26.5–33)
MCHC RBC AUTO-ENTMCNC: 31.9 G/DL (ref 31.5–36.5)
MCV RBC AUTO: 99 FL (ref 78–100)
MONOCYTES # BLD AUTO: 0.6 10E9/L (ref 0–1.3)
MONOCYTES NFR BLD AUTO: 8.1 %
NEUTROPHILS # BLD AUTO: 5.8 10E9/L (ref 1.6–8.3)
NEUTROPHILS NFR BLD AUTO: 81.1 %
NITRATE UR QL: NEGATIVE
NRBC # BLD AUTO: 0 10*3/UL
NRBC BLD AUTO-RTO: 0 /100
PH UR STRIP: 7 PH (ref 5–7)
PLATELET # BLD AUTO: 115 10E9/L (ref 150–450)
POTASSIUM SERPL-SCNC: 4.4 MMOL/L (ref 3.4–5.3)
PROT SERPL-MCNC: 7.2 G/DL (ref 6.8–8.8)
RBC # BLD AUTO: 3.29 10E12/L (ref 4.4–5.9)
RBC #/AREA URNS AUTO: 3 /HPF (ref 0–2)
SODIUM SERPL-SCNC: 139 MMOL/L (ref 133–144)
SOURCE: ABNORMAL
SP GR UR STRIP: 1.02 (ref 1–1.03)
TROPONIN I SERPL-MCNC: <0.015 UG/L (ref 0–0.04)
UROBILINOGEN UR STRIP-MCNC: 4 MG/DL (ref 0–2)
WBC # BLD AUTO: 7.1 10E9/L (ref 4–11)
WBC #/AREA URNS AUTO: 4 /HPF (ref 0–5)

## 2018-09-01 PROCEDURE — 25000132 ZZH RX MED GY IP 250 OP 250 PS 637: Mod: GY | Performed by: INTERNAL MEDICINE

## 2018-09-01 PROCEDURE — 84484 ASSAY OF TROPONIN QUANT: CPT | Performed by: EMERGENCY MEDICINE

## 2018-09-01 PROCEDURE — A9270 NON-COVERED ITEM OR SERVICE: HCPCS | Mod: GY | Performed by: INTERNAL MEDICINE

## 2018-09-01 PROCEDURE — 12000007 ZZH R&B INTERMEDIATE

## 2018-09-01 PROCEDURE — 85379 FIBRIN DEGRADATION QUANT: CPT | Performed by: EMERGENCY MEDICINE

## 2018-09-01 PROCEDURE — 81001 URINALYSIS AUTO W/SCOPE: CPT | Performed by: EMERGENCY MEDICINE

## 2018-09-01 PROCEDURE — 96368 THER/DIAG CONCURRENT INF: CPT

## 2018-09-01 PROCEDURE — 36415 COLL VENOUS BLD VENIPUNCTURE: CPT | Performed by: INTERNAL MEDICINE

## 2018-09-01 PROCEDURE — 25000128 H RX IP 250 OP 636: Performed by: EMERGENCY MEDICINE

## 2018-09-01 PROCEDURE — 83605 ASSAY OF LACTIC ACID: CPT | Performed by: EMERGENCY MEDICINE

## 2018-09-01 PROCEDURE — 96366 THER/PROPH/DIAG IV INF ADDON: CPT

## 2018-09-01 PROCEDURE — 25000128 H RX IP 250 OP 636: Performed by: INTERNAL MEDICINE

## 2018-09-01 PROCEDURE — 87040 BLOOD CULTURE FOR BACTERIA: CPT | Performed by: EMERGENCY MEDICINE

## 2018-09-01 PROCEDURE — 99223 1ST HOSP IP/OBS HIGH 75: CPT | Mod: AI | Performed by: INTERNAL MEDICINE

## 2018-09-01 PROCEDURE — 85610 PROTHROMBIN TIME: CPT | Performed by: EMERGENCY MEDICINE

## 2018-09-01 PROCEDURE — 83735 ASSAY OF MAGNESIUM: CPT | Performed by: EMERGENCY MEDICINE

## 2018-09-01 PROCEDURE — 99285 EMERGENCY DEPT VISIT HI MDM: CPT | Mod: 25

## 2018-09-01 PROCEDURE — 96365 THER/PROPH/DIAG IV INF INIT: CPT

## 2018-09-01 PROCEDURE — 70450 CT HEAD/BRAIN W/O DYE: CPT

## 2018-09-01 PROCEDURE — 80053 COMPREHEN METABOLIC PANEL: CPT | Performed by: EMERGENCY MEDICINE

## 2018-09-01 PROCEDURE — 85730 THROMBOPLASTIN TIME PARTIAL: CPT | Performed by: EMERGENCY MEDICINE

## 2018-09-01 PROCEDURE — 87086 URINE CULTURE/COLONY COUNT: CPT | Performed by: EMERGENCY MEDICINE

## 2018-09-01 PROCEDURE — 85025 COMPLETE CBC W/AUTO DIFF WBC: CPT | Performed by: EMERGENCY MEDICINE

## 2018-09-01 PROCEDURE — 93926 LOWER EXTREMITY STUDY: CPT | Mod: LT

## 2018-09-01 PROCEDURE — 93971 EXTREMITY STUDY: CPT | Mod: LT

## 2018-09-01 PROCEDURE — 96376 TX/PRO/DX INJ SAME DRUG ADON: CPT

## 2018-09-01 PROCEDURE — 36415 COLL VENOUS BLD VENIPUNCTURE: CPT | Performed by: EMERGENCY MEDICINE

## 2018-09-01 PROCEDURE — A9270 NON-COVERED ITEM OR SERVICE: HCPCS | Mod: GY | Performed by: EMERGENCY MEDICINE

## 2018-09-01 PROCEDURE — 25000132 ZZH RX MED GY IP 250 OP 250 PS 637: Mod: GY | Performed by: EMERGENCY MEDICINE

## 2018-09-01 PROCEDURE — 00000146 ZZHCL STATISTIC GLUCOSE BY METER IP

## 2018-09-01 PROCEDURE — 83605 ASSAY OF LACTIC ACID: CPT | Performed by: INTERNAL MEDICINE

## 2018-09-01 PROCEDURE — 83036 HEMOGLOBIN GLYCOSYLATED A1C: CPT | Performed by: INTERNAL MEDICINE

## 2018-09-01 PROCEDURE — 71045 X-RAY EXAM CHEST 1 VIEW: CPT

## 2018-09-01 RX ORDER — DIVALPROEX SODIUM 250 MG/1
250 TABLET, DELAYED RELEASE ORAL AT BEDTIME
Status: DISCONTINUED | OUTPATIENT
Start: 2018-09-01 | End: 2018-09-06 | Stop reason: HOSPADM

## 2018-09-01 RX ORDER — FENTANYL CITRATE 50 UG/ML
50 INJECTION, SOLUTION INTRAMUSCULAR; INTRAVENOUS ONCE
Status: COMPLETED | OUTPATIENT
Start: 2018-09-01 | End: 2018-09-01

## 2018-09-01 RX ORDER — POTASSIUM CL/LIDO/0.9 % NACL 10MEQ/0.1L
10 INTRAVENOUS SOLUTION, PIGGYBACK (ML) INTRAVENOUS
Status: DISCONTINUED | OUTPATIENT
Start: 2018-09-01 | End: 2018-09-06 | Stop reason: HOSPADM

## 2018-09-01 RX ORDER — FUROSEMIDE 20 MG
40 TABLET ORAL EVERY OTHER DAY
Status: ON HOLD | COMMUNITY
End: 2019-01-01

## 2018-09-01 RX ORDER — TERAZOSIN 5 MG/1
5 CAPSULE ORAL AT BEDTIME
COMMUNITY

## 2018-09-01 RX ORDER — LEVOTHYROXINE SODIUM 75 UG/1
150 TABLET ORAL
Status: DISCONTINUED | OUTPATIENT
Start: 2018-09-01 | End: 2018-09-06 | Stop reason: HOSPADM

## 2018-09-01 RX ORDER — CEFAZOLIN SODIUM 1 G/50ML
1750 SOLUTION INTRAVENOUS ONCE
Status: COMPLETED | OUTPATIENT
Start: 2018-09-01 | End: 2018-09-01

## 2018-09-01 RX ORDER — ONDANSETRON 4 MG/1
4 TABLET, ORALLY DISINTEGRATING ORAL EVERY 6 HOURS PRN
Status: DISCONTINUED | OUTPATIENT
Start: 2018-09-01 | End: 2018-09-06 | Stop reason: HOSPADM

## 2018-09-01 RX ORDER — POTASSIUM CHLORIDE 7.45 MG/ML
10 INJECTION INTRAVENOUS
Status: DISCONTINUED | OUTPATIENT
Start: 2018-09-01 | End: 2018-09-06 | Stop reason: HOSPADM

## 2018-09-01 RX ORDER — POTASSIUM CHLORIDE 29.8 MG/ML
20 INJECTION INTRAVENOUS
Status: DISCONTINUED | OUTPATIENT
Start: 2018-09-01 | End: 2018-09-06 | Stop reason: HOSPADM

## 2018-09-01 RX ORDER — WARFARIN SODIUM 1 MG/1
1 TABLET ORAL WEEKLY
Status: ON HOLD | COMMUNITY
End: 2018-09-06

## 2018-09-01 RX ORDER — CEFAZOLIN SODIUM 1 G/50ML
1750 SOLUTION INTRAVENOUS EVERY 12 HOURS
Status: DISCONTINUED | OUTPATIENT
Start: 2018-09-01 | End: 2018-09-02

## 2018-09-01 RX ORDER — METOPROLOL SUCCINATE 50 MG/1
50 TABLET, EXTENDED RELEASE ORAL EVERY EVENING
Status: DISCONTINUED | OUTPATIENT
Start: 2018-09-01 | End: 2018-09-06

## 2018-09-01 RX ORDER — MAGNESIUM SULFATE HEPTAHYDRATE 40 MG/ML
4 INJECTION, SOLUTION INTRAVENOUS EVERY 4 HOURS PRN
Status: DISCONTINUED | OUTPATIENT
Start: 2018-09-01 | End: 2018-09-06 | Stop reason: HOSPADM

## 2018-09-01 RX ORDER — LANOLIN ALCOHOL/MO/W.PET/CERES
3000 CREAM (GRAM) TOPICAL EVERY MORNING
Status: DISCONTINUED | OUTPATIENT
Start: 2018-09-01 | End: 2018-09-06 | Stop reason: HOSPADM

## 2018-09-01 RX ORDER — SIMVASTATIN 40 MG
40 TABLET ORAL
Status: DISCONTINUED | OUTPATIENT
Start: 2018-09-01 | End: 2018-09-06 | Stop reason: HOSPADM

## 2018-09-01 RX ORDER — LEVOTHYROXINE SODIUM 75 UG/1
150 TABLET ORAL
Status: DISCONTINUED | OUTPATIENT
Start: 2018-09-02 | End: 2018-09-01

## 2018-09-01 RX ORDER — NALOXONE HYDROCHLORIDE 0.4 MG/ML
.1-.4 INJECTION, SOLUTION INTRAMUSCULAR; INTRAVENOUS; SUBCUTANEOUS
Status: DISCONTINUED | OUTPATIENT
Start: 2018-09-01 | End: 2018-09-06 | Stop reason: HOSPADM

## 2018-09-01 RX ORDER — DEXTROSE MONOHYDRATE 25 G/50ML
25-50 INJECTION, SOLUTION INTRAVENOUS
Status: DISCONTINUED | OUTPATIENT
Start: 2018-09-01 | End: 2018-09-06 | Stop reason: HOSPADM

## 2018-09-01 RX ORDER — TERAZOSIN 5 MG/1
5 CAPSULE ORAL AT BEDTIME
Status: DISCONTINUED | OUTPATIENT
Start: 2018-09-01 | End: 2018-09-06 | Stop reason: HOSPADM

## 2018-09-01 RX ORDER — WARFARIN SODIUM 1 MG/1
1.5 TABLET ORAL EVERY EVENING
Status: ON HOLD | COMMUNITY
End: 2018-09-06

## 2018-09-01 RX ORDER — POTASSIUM CHLORIDE 1.5 G/1.58G
20-40 POWDER, FOR SOLUTION ORAL
Status: DISCONTINUED | OUTPATIENT
Start: 2018-09-01 | End: 2018-09-06 | Stop reason: HOSPADM

## 2018-09-01 RX ORDER — NICOTINE POLACRILEX 4 MG
15-30 LOZENGE BUCCAL
Status: DISCONTINUED | OUTPATIENT
Start: 2018-09-01 | End: 2018-09-06 | Stop reason: HOSPADM

## 2018-09-01 RX ORDER — DIGOXIN 125 MCG
125 TABLET ORAL EVERY OTHER DAY
Status: DISCONTINUED | OUTPATIENT
Start: 2018-09-01 | End: 2018-09-05

## 2018-09-01 RX ORDER — ONDANSETRON 2 MG/ML
4 INJECTION INTRAMUSCULAR; INTRAVENOUS EVERY 6 HOURS PRN
Status: DISCONTINUED | OUTPATIENT
Start: 2018-09-01 | End: 2018-09-06 | Stop reason: HOSPADM

## 2018-09-01 RX ORDER — POTASSIUM CHLORIDE 1500 MG/1
20-40 TABLET, EXTENDED RELEASE ORAL
Status: DISCONTINUED | OUTPATIENT
Start: 2018-09-01 | End: 2018-09-06 | Stop reason: HOSPADM

## 2018-09-01 RX ORDER — TRAMADOL HYDROCHLORIDE 50 MG/1
50 TABLET ORAL EVERY 6 HOURS PRN
Status: DISCONTINUED | OUTPATIENT
Start: 2018-09-01 | End: 2018-09-06 | Stop reason: HOSPADM

## 2018-09-01 RX ADMIN — FENTANYL CITRATE 50 MCG: 50 INJECTION INTRAMUSCULAR; INTRAVENOUS at 08:47

## 2018-09-01 RX ADMIN — SODIUM CHLORIDE, POTASSIUM CHLORIDE, SODIUM LACTATE AND CALCIUM CHLORIDE 500 ML: 600; 310; 30; 20 INJECTION, SOLUTION INTRAVENOUS at 09:05

## 2018-09-01 RX ADMIN — TAZOBACTAM SODIUM AND PIPERACILLIN SODIUM 4.5 G: 500; 4 INJECTION, SOLUTION INTRAVENOUS at 08:19

## 2018-09-01 RX ADMIN — DIVALPROEX SODIUM 250 MG: 250 TABLET, DELAYED RELEASE ORAL at 21:02

## 2018-09-01 RX ADMIN — TAZOBACTAM SODIUM AND PIPERACILLIN SODIUM 3.38 G: 375; 3 INJECTION, SOLUTION INTRAVENOUS at 14:32

## 2018-09-01 RX ADMIN — OMEPRAZOLE 40 MG: 20 CAPSULE, DELAYED RELEASE ORAL at 12:02

## 2018-09-01 RX ADMIN — SODIUM CHLORIDE 1000 ML: 9 INJECTION, SOLUTION INTRAVENOUS at 12:07

## 2018-09-01 RX ADMIN — TAZOBACTAM SODIUM AND PIPERACILLIN SODIUM 3.38 G: 375; 3 INJECTION, SOLUTION INTRAVENOUS at 21:02

## 2018-09-01 RX ADMIN — LEVOTHYROXINE SODIUM 150 MCG: 75 TABLET ORAL at 12:02

## 2018-09-01 RX ADMIN — VANCOMYCIN HYDROCHLORIDE 1750 MG: 1 INJECTION, POWDER, LYOPHILIZED, FOR SOLUTION INTRAVENOUS at 21:38

## 2018-09-01 RX ADMIN — SODIUM CHLORIDE, POTASSIUM CHLORIDE, SODIUM LACTATE AND CALCIUM CHLORIDE 1000 ML: 600; 310; 30; 20 INJECTION, SOLUTION INTRAVENOUS at 09:55

## 2018-09-01 RX ADMIN — TRAMADOL HYDROCHLORIDE 50 MG: 50 TABLET, COATED ORAL at 15:58

## 2018-09-01 RX ADMIN — DIGOXIN 125 MCG: 0.12 TABLET ORAL at 12:02

## 2018-09-01 RX ADMIN — SIMVASTATIN 40 MG: 40 TABLET, FILM COATED ORAL at 18:38

## 2018-09-01 RX ADMIN — ACETAMINOPHEN 975 MG: 650 SUPPOSITORY RECTAL at 08:16

## 2018-09-01 RX ADMIN — VANCOMYCIN HYDROCHLORIDE 1750 MG: 1 INJECTION, POWDER, LYOPHILIZED, FOR SOLUTION INTRAVENOUS at 09:55

## 2018-09-01 RX ADMIN — SODIUM CHLORIDE, POTASSIUM CHLORIDE, SODIUM LACTATE AND CALCIUM CHLORIDE 500 ML: 600; 310; 30; 20 INJECTION, SOLUTION INTRAVENOUS at 08:15

## 2018-09-01 RX ADMIN — CYANOCOBALAMIN TAB 1000 MCG 3000 MCG: 1000 TAB at 12:02

## 2018-09-01 ASSESSMENT — ENCOUNTER SYMPTOMS
NAUSEA: 1
CONFUSION: 1
ARTHRALGIAS: 1
COUGH: 1
FEVER: 1
DIARRHEA: 1
VOMITING: 1
MYALGIAS: 1

## 2018-09-01 ASSESSMENT — ACTIVITIES OF DAILY LIVING (ADL)
ADLS_ACUITY_SCORE: 9
ADLS_ACUITY_SCORE: 12
ADLS_ACUITY_SCORE: 13

## 2018-09-01 ASSESSMENT — VISUAL ACUITY
OU: BASELINE;GLASSES

## 2018-09-01 NOTE — ED NOTES
[facility]  ED Nurse Handoff Report    Sean Brunner is a 83 year old male   ED Chief complaint: Altered Mental Status  . ED Diagnosis:   Final diagnoses:   Severe sepsis (H)   Left leg cellulitis   Elevated lactic acid level   Infectious encephalopathy     Allergies: No Known Allergies    Code Status: Full Code  Activity level - Baseline/Home:  Total Care. Activity Level - Current:   Total Care. Lift room needed: No. Bariatric: No   Needed: No   Isolation: No. Infection: Not Applicable.     Vital Signs:   Vitals:    09/01/18 0814 09/01/18 0830 09/01/18 0845 09/01/18 0900   BP: 107/57 103/53 98/46 94/54   Pulse:       Resp: 25 22 24 15   Temp:       TempSrc:       SpO2: 98% 97% 97% 96%   Weight:       Height:           Cardiac Rhythm:  ,      Pain level:    Patient confused: Yes. Patient Falls Risk: Yes.   Elimination Status: Has voided   Patient Report - Initial Complaint: AMS, fever. Focused Assessment: swollen red left lower leg, confusion, pain in leg, fever since last night   Tests Performed:   Labs Ordered and Resulted from Time of ED Arrival Up to the Time of Departure from the ED   CBC WITH PLATELETS DIFFERENTIAL - Abnormal; Notable for the following:        Result Value    RBC Count 3.29 (*)     Hemoglobin 10.4 (*)     Hematocrit 32.6 (*)     Platelet Count 115 (*)     Absolute Lymphocytes 0.7 (*)     All other components within normal limits   COMPREHENSIVE METABOLIC PANEL - Abnormal; Notable for the following:     Glucose 131 (*)     Albumin 2.9 (*)     All other components within normal limits   LACTIC ACID WHOLE BLOOD - Abnormal; Notable for the following:     Lactic Acid 2.6 (*)     All other components within normal limits   ROUTINE UA WITH MICROSCOPIC - Abnormal; Notable for the following:     Ketones Urine 5 (*)     Urobilinogen mg/dL 4.0 (*)     Leukocyte Esterase Urine Trace (*)     RBC Urine 3 (*)     Bacteria Urine Few (*)     All other components within normal  limits   GLUCOSE BY METER - Abnormal; Notable for the following:     Glucose 119 (*)     All other components within normal limits   INR - Abnormal; Notable for the following:     INR 3.63 (*)     All other components within normal limits   PARTIAL THROMBOPLASTIN TIME - Abnormal; Notable for the following:     PTT 51 (*)     All other components within normal limits   TROPONIN I   D DIMER QUANTITATIVE   PULSE OXIMETRY NURSING   CARDIAC CONTINUOUS MONITORING   MEASURE URINE OUTPUT   PATIENT CARE ORDER   ISTAT CG4 GASES LACTATE CARMELO NURSING POCT   URINE CULTURE AEROBIC BACTERIAL   BLOOD CULTURE   BLOOD CULTURE     Head CT w/o contrast   Final Result   IMPRESSION: No acute intracranial abnormality.      YOLIE HARRINGTON MD      XR Chest Port 1 View   Final Result   IMPRESSION: The lungs are clear. No focal pulmonary opacities. Heart   and mediastinum are unremarkable. No acute cardiopulmonary   abnormalities.      LEANDRO SPEARS MD        . Abnormal Results: see above.   Treatments provided: labs, IV antibiotic  Family Comments: none  OBS brochure/video discussed/provided to patient:  No  ED Medications:   Medications   vancomycin (VANCOCIN) 1,750 mg in sodium chloride 0.9 % 500 mL intermittent infusion (not administered)   lactated ringers BOLUS 500 mL (not administered)   lactated ringers BOLUS 1,000 mL (not administered)   piperacillin-tazobactam (ZOSYN) intermittent infusion 4.5 g (4.5 g Intravenous New Bag 9/1/18 0819)   lactated ringers BOLUS 500 mL (500 mLs Intravenous New Bag 9/1/18 0815)   acetaminophen (TYLENOL) Suppository 975 mg (975 mg Rectal Given 9/1/18 0816)   fentaNYL (PF) (SUBLIMAZE) injection 50 mcg (50 mcg Intravenous Given 9/1/18 0847)     Drips infusing:  No  For the majority of the shift, the patient's behavior Green. Interventions performed were none.     Severe Sepsis OR Septic Shock Diagnosis Present:   Yes    Per the ED Provider, Time Zero for severe sepsis or septic shock is:  0727    3 Hour  Severe Sepsis Bundle Completion:  1. Initial Lactic Acid Result:   Recent Labs   Lab Test  09/01/18   0747  04/11/17   1308  01/03/15   1856   LACT  2.6*  0.4*  1.0     2. Blood Cultures before Antibiotics: Yes  3. Broad Spectrum Antibiotics Administered:     Anti-infectives (Future)    Start     Dose/Rate Route Frequency Ordered Stop    09/01/18 0828  vancomycin (VANCOCIN) 1,750 mg in sodium chloride 0.9 % 500 mL intermittent infusion      1,750 mg  over 2 Hours Intravenous ONCE 09/01/18 0827          4. 1350 ml of IV fluids have been given so far      6 Hour Severe Sepsis Bundle Completion:    1. Repeat Lactic Acid Level: Not drawn  2. Patient currently on Vasopressors =  No      ED Nurse Name/Phone Number: Radha ERYMUNDO Nina,   9:43 AM    RECEIVING UNIT ED HANDOFF REVIEW    Above ED Nurse Handoff Report was reviewed: Yes  Reviewed by: Lynnette Elkins on September 1, 2018 at 10:24 AM

## 2018-09-01 NOTE — ED PROVIDER NOTES
"  History   Chief Complaint:  Leg Redness & Swelling    HPI   Sean Brunner is an anticoagulated 83 year old male with a history of cardiomyopathy, atrial fibrillation, and diabetes who presents via EMS with leg redness and swelling. EMS reports that the patient has felt sick the past couple days, but has refused to see a doctor. They note that the patient has an injury to his left leg that has persisted for the years since it occurred. EMS states that the patient developed a fever last night, and believe he is fighting an infection. The patient is regularly oriented at baseline according to EMS. The patient's wife reports that the patient injured his left leg \"years ago\" that has swollen intermittently  since then. She notes that in the past week, the patient developed increased redness, swelling, and drainage in the area of his left leg and ankle. The patient does have some baseline redness to the area. The patient's wife states that the patient seemed incoherent last night with untypical \"mumbling\" speech, and has also developed cough, nausea, vomiting, and diarrhea. The patient additionally has some baseline enuresis. The wife is unsure if the patient has had cellulitis in the past, but does note that when the swelling is worsened the patient takes antibiotics. Of note, the patient uses oxygen at night, but not throughout the day.     Allergies:  No known drug allergies    Medications:    Vitamin B-12  Lanoxin  Depakote  Synthroid  Metformin  Metoprolol   Omeprazole  Simvastatin  Demadex  Tramadol  Warfarin sodium     Past Medical History:    Atrial fibrillation  Diabetes mellitus  Low blood pressure  Thyroid disease  LV dysfunction  Vitamin B-12 deficiency disease  Morbid obesity  Cardiomyopathy  STEWART  Cor pulmonale   Small bowel obstruction  Cellulitis     Past Surgical History:    History reviewed. No pertinent surgical history.    Family History:    History reviewed. No pertinent family history.     Social " "History:  Smoking status: Never smoker  Alcohol use: No  Marital Status:   [2]    Review of Systems   Constitutional: Positive for fever.   Respiratory: Positive for cough.    Cardiovascular: Positive for leg swelling.   Gastrointestinal: Positive for diarrhea, nausea and vomiting.   Genitourinary: Positive for enuresis (baseline).   Musculoskeletal: Positive for arthralgias (left ankle) and myalgias (left lower leg).   Psychiatric/Behavioral: Positive for confusion.   All other systems reviewed and are negative.    Physical Exam   Patient Vitals for the past 24 hrs:   BP Temp Temp src Pulse Heart Rate Resp SpO2 Height Weight   09/01/18 1015 94/47 - - - 87 21 95 % - -   09/01/18 1000 95/51 - - - 83 23 95 % - -   09/01/18 0945 (!) 89/52 - - - 89 15 97 % - -   09/01/18 0930 - 99  F (37.2  C) Oral - 87 24 98 % - -   09/01/18 0900 94/54 - - - 93 15 96 % - -   09/01/18 0845 98/46 - - - 79 24 97 % - -   09/01/18 0830 103/53 - - - 74 22 97 % - -   09/01/18 0814 107/57 - - - 90 25 98 % - -   09/01/18 0731 - - - - - - - - 84.4 kg (186 lb)   09/01/18 0730 112/61 - - - 77 10 97 % - -   09/01/18 0724 118/69 101  F (38.3  C) Oral 70 - 18 98 % 1.753 m (5' 9\") 84.4 kg (186 lb)     Physical Exam    General:   Age appropriate, ill appearing  male.  HEENT:    Oropharynx is dry  Eyes:    Conjunctiva normal  Neck:    Supple, no meningismus.     CV:     Regular rate; irregular rhythm.      No murmurs, rubs or gallops.       2+ radial pulses bilateral.       Non-palpable DP pulses; able to doppler pulses bilateral     Left lower extremity edema.  PULM:    Clear to auscultation bilateral.       No respiratory distress.      Good air exchange.     No rales or wheezing.     No stridor.  ABD:    Soft, non-tender, non-distended.       No pulsatile masses.       No rebound, guarding or rigidity.     No CVA tenderness.  MSK:     No gross deformity to all four extremities.   LYMPH:   No cervical lymphadenopathy.  NEURO:   Mild " somnolence     GCS E4 M6 V4 = 14     Speech garbled     Strength globally depressed     No tremor.      Good muscle tone, no atrophy.  Skin:    Warm, dry     Left lower extremity:      Chronic hyperpigmentation to lower leg      Erythematous poorly circumscribed patch that is warm and tender to touch       No crepitus or fluctuance    Emergency Department Course   ECG (07:27:06):  Rate 72 bpm. KY interval * . QRS duration 96. QT/QTc 382/418. P-R-T axes * 19 113. Atrial fibrillation. Low voltage QRS. Nonspecific T wave abnormality. Abnormal ECG. No significant change compared to EKG dated 02/24/2017. Interpreted at 0728 by Idris Nielsen MD.    Imaging:  Radiographic findings were communicated with the patient and family who voiced understanding of the findings.    Head CT w/o Contrast:  No acute intracranial abnormality.  As read by Radiology.    XR Chest Port 1 View:  The lungs are clear. No focal pulmonary opacities. Heart  and mediastinum are unremarkable. No acute cardiopulmonary  Abnormalities.  As read by Radiology.    Laboratory:  CBC: HGB 10.4 (L),  (L) o/w WNL (WBC 7.1)  CMP: Glucose 131 (H), Albumin 2.9 (L) o/w WNL (Creatinine 1.14)  Lactic Acid: 2.6 (H)  UA with Microscopic: Ketones 5, Urobilinogen 4.0 (H), Leukocyte Esterase- trace, RBC 3 (H), Bacteria- few ow negative  INR: 3.63 (H)  Partial Thromboplastin Time: 51 (H)  Troponin: <0.015  Glucose by Meter: 119 (H)  D-dimer: 0.4    Blood Culture: In process x2  Urine Culture Aerobic Bacterial: In process    Interventions:  0815:  mL's IV Bolus  0816: Tylenol Suppository 975 mg Rectal  0819: Zosyn 4.5 g IV  0847: Fentanyl 50 mcg IV  0905: LR 1L IV Bolus  0955: Vancomycin 1750 mg IV  0955: LR 1L IV Bolus    Emergency Department Course:  The patient presents to the ED via EMS.    Past medical records, nursing notes, and vitals reviewed.  0717: I performed an exam of the patient and obtained history, as documented above.  IV inserted and  blood drawn.  The patient was sent for a head CT and portable chest x-ray while in the emergency department, findings above.    0727: EKG obtained, results above.    0935: I spoke to Dr. Patel of the hospitalist service who accepts the patient for admission.     Findings and plan explained to the patient and spouse who consents to admission.     Discussed the patient with Dr. Patel, who will admit the patient to a medical/surgical bed for further monitoring, evaluation, and treatment.     Impression & Plan    CMS Diagnoses:   The patient has signs of Severe Sepsis as evidenced by:    1. 2 SIRS criteria, AND  2. Suspected infection, AND   3. Organ dysfunction: Lactic Acid > 1.9    Time severe sepsis diagnosis confirmed = 0747 as this was the time when Lactate resulted, and the level was > 1.9      3 Hour Severe Sepsis Bundle Completion:  1. Initial Lactic Acid Result:   Recent Labs   Lab Test  18   0747  17   1308  01/03/15   1856   LACT  2.6*  0.4*  1.0     2. Blood Cultures before Antibiotics: Yes  3. Broad Spectrum Antibiotics Administered: Yes  Zosyn     Anti-infectives (Future)    Start     Dose/Rate Route Frequency Ordered Stop    18 0828  vancomycin (VANCOCIN) 1,750 mg in sodium chloride 0.9 % 500 mL intermittent infusion      1,750 mg  over 2 Hours Intravenous ONCE 18 0827          Severe Sepsis reassessment:  1. Repeat Lactic Acid Level: pending  2. MAP>65 after initial IVF bolus, will continue to monitor fluid status and vital signs  I attest to having performed a repeat sepsis exam and assessment of perfusion at 0915 and the results demonstrate improved perfusion.    Medical Decision Makin-year-old male presented to the ED with left leg swelling, erythema, fever and new onset confusion.  His findings are consistent with infectious encephalopathy with signs of left lower extremity cellulitis without signs of necrotizing fasciitis or deep space abscess.  No alternative  source for altered mental status noted on ED evaluation.  In regards to the patient's sepsis, he has signs of severe sepsis with elevated lactate.  Patient given broad-spectrum antibiotics with vancomycin and Zosyn.  IV fluids for acute resuscitation.  Patient did have resulting hypotension at 89/52.  With IV fluids, his blood pressure returned to normotensive level.  Repeat lactic pending.  No need for central line placement or initiation of vasopressors.  Patient safe for transfer the floor.      Diagnosis:    ICD-10-CM   1. Severe sepsis (H) A41.9    R65.20   2. Left leg cellulitis L03.116   3. Elevated lactic acid level R79.89   4. Infectious encephalopathy G93.49       Disposition:  Admitted to medicine/surgery in the care of Dr. Patel.      Reji Guy  9/1/2018   [facility] EMERGENCY DEPARTMENT  I, Reji Guy, am serving as a scribe at 7:17 AM on 9/1/2018 to document services personally performed by Idris Nielsen MD based on my observations and the provider's statements to me.        Idris Nielsen MD  09/01/18 1045       Idris Nielsen MD  09/01/18 1049

## 2018-09-01 NOTE — H&P
[facility]    Hospitalist History and Physical    Name: Sean Brunner    MRN: [id number]  YOB: 1934    Age: 83 year old  Date of Admission:  9/1/2018  Date of Service (when I saw the patient): 09/01/18    Assessment & Plan   Sean Brunner is a 83 year old male with past medical history significant for diabetes mellitus, atrial fibrillation, hypothyroidism presented to the emergency room with altered mental status.  Patient gives history of 1 week of pain swelling and redness involving left lower extremity.  In the emergency room patient had elevated lactic acid he was hypotensive tachycardic with erythematous left lower extremity is admitted for sepsis secondary to left lower extremity cellulitis    Sepsis secondary to left lower extremity cellulitis  --Patient tachycardic, hypotensive, altered mental status with elevated lactic acid levels  --Blood culture sent in the emergency  --Started on vancomycin and Zosyn will continue for now.  We will narrow antibiotic coverage based on cultures  --Significant swelling in the lower extremity patient is anticoagulated however will still get Dopplers to rule out DVT  --Unable to palpate dorsalis pedis pulse on the left lower extremity we will check arterial ultrasound as well    Lactic acidosis  --Likely secondary to infection  --IV fluids  --We will recheck lactic acid level    History of diabetes mellitus  --We will hold off metformin for now  --Sliding scale as patient is confused    Atrial fibrillation  --Monitor on telemetry  --Continue digoxin and metoprolol   --We will hold metoprolol if SBP is less than 100  --On  Warfarin.  Monitor INR.  INR supratherapeutic at this time  --Pharmacy to dose    Hyper lipidemia  --Continue statins    Hypothyroidism  --On levothyroxine    Chronic leg edema  --On torsemide will hold for now.  Patient was hypotensive.  --Monitor daily weight  --Monitor electrolyte      DVT Prophylaxis: Warfarin  Code  Status: Full Code    Disposition: Admitted for more than 2 midnight stay    Primary Care Physician   Oscar Parekh    Chief Complaint   Confusion today  Left lower extremity pain and swelling 1 week  Weakness and malaise 2 days    History is obtained from the patient/wife    History of Present Illness   Sean Brunner is a 83 year old male with past medical history significant for diabetes, hyperlipidemia, hypothyroidism, atrial fibrillation, chronic edema presented with altered mental status.  Patient has had increasing left lower extremity swelling and pain for last 1 week for last few days he was not feeling well.  Per wife yesterday after dinner he had a large loose BM and felt weak and went to bed.  This morning he woke up at around 4 in the morning was talking to himself is confused and so brought him to the emergency room.  Patient is complaining of increasing pain swelling and weeping of left lower extremity for last 1 week worse for the last few days.  Complains of pain being 7 out of 10 sharp pain constant.  Complains of increased malaise and weakness.  Denies any fever or chills.  No chest pain no shortness of breath.  Review of all the other systems negative.  During H&P patient was somnolent and was minimally answering question.  Most of H&P was obtained from wife    Past Medical History    Past Medical History:   Diagnosis Date     A-fib (H)      Diabetes mellitus (H)      Low BP      Thyroid disease          Past Surgical History   History reviewed. No pertinent surgical history.    Prior to Admission Medications   Prior to Admission Medications   Prescriptions Last Dose Informant Patient Reported? Taking?   ACCU-CHEK MULTICLIX LANCETS MISC   No No   Sig: by Lancet route 2 times daily. Use to test blood sugar twice daily or as directed.   METFORMIN HCL PO  Spouse/Significant Other Yes No   Sig: Take 1,000 mg by mouth daily (with dinner) Pt on 500 mg daily   OMEPRAZOLE PO  Self Yes No    Sig: Take 40 mg by mouth every evening   WARFARIN SODIUM PO  Self Yes No   Sig: Take by mouth every evening 1 mg Mon, Thu, and 1.5 mg all other days   cyanocolbalamin (VITAMIN  B-12) 1000 MCG tablet   Yes No   Sig: Take 3,000 mcg by mouth every morning   digoxin (LANOXIN) 125 MCG tablet   No No   Sig: Take 1 tablet (125 mcg) by mouth every other day   divalproex (DEPAKOTE) 250 MG EC tablet  Spouse/Significant Other Yes No   Sig: Take 250 mg by mouth At Bedtime   levothyroxine (SYNTHROID) 150 MCG tablet   Yes No   Sig: Take 150 mcg by mouth every morning (before breakfast)    metoprolol (TOPROL-XL) 25 MG 24 hr tablet   Yes No   Sig: Take 50 mg by mouth daily    multivitamin, therapeutic with minerals (THERA-VIT-M) TABS tablet  Self Yes No   Sig: Take 1 tablet by mouth 2 times daily   oxyCODONE-acetaminophen (PERCOCET) 5-325 MG per tablet   No No   Sig: Take 1-2 tablets by mouth every 4 hours as needed for pain   simvastatin (ZOCOR) 40 MG tablet   Yes No   Sig: Take 40 mg by mouth daily (with dinner)    torsemide (DEMADEX) 10 MG tablet   No No   Sig: Take 1 tablet (10 mg) by mouth daily   traMADol (ULTRAM) 50 MG tablet   Yes No   Sig: Take by mouth every 6 hours as needed for moderate pain      Facility-Administered Medications: None     Allergies   No Known Allergies    Social History   Social History   Substance Use Topics     Smoking status: Never Smoker     Smokeless tobacco: Never Used     Alcohol use No     Social History     Social History Narrative     Lives with wife.  Retired.  No history of smoking or alcohol use.    Family History   Brother had colon cancer brother had coronary artery disease.  Mother had hypertension    Review of Systems   A Comprehensive greater than 10 system review of systems was carried out.  Pertinent positives and negatives are noted above.  Otherwise negative for contributory information.    Physical Exam   Temp: 101  F (38.3  C) Temp src: Oral BP: 94/54 Pulse: 70 Heart Rate: 93  Resp: 15 SpO2: 96 %      Vital Signs with Ranges  Temp:  [101  F (38.3  C)] 101  F (38.3  C)  Pulse:  [70] 70  Heart Rate:  [74-93] 93  Resp:  [10-25] 15  BP: ()/(46-69) 94/54  SpO2:  [96 %-98 %] 96 %  186 lbs 0 oz    GEN:  Alert, oriented x 3, appears sleepy minimally verbal but appropriately answering questions, no overt distress  HEENT:  Normocephalic/atraumatic, no scleral icterus, no nasal discharge, mouth dry  CV: Irregularly irregular..  S1 + S2 noted, no S3 or S4.  LUNGS: Few bibasilar crackles.  Symmetric chest rise on inhalation noted.  ABD:  Active bowel sounds, soft, non-tender/non-distended.  No rebound/guarding/rigidity.  EXT: Right lower extremity with significant swelling, erythema, area of warmth and tenderness.  No fluctuance or crepitus  SKIN:  Dry to touch, no exanthems noted in the visualized areas.  NEURO:  Symmetric muscle strength, sensation to touch grossly intact.  Coordination symmetric on general exam.  No new focal deficits appreciated.    Data   Data reviewed today:  I personally reviewed the EKG tracing showing A. fib with nonspecific ST-T changes.      Recent Labs  Lab 09/01/18  0747   WBC 7.1   HGB 10.4*   HCT 32.6*   MCV 99   *       Recent Labs  Lab 09/01/18  0747      POTASSIUM 4.4   CHLORIDE 104   CO2 28   ANIONGAP 7   *   BUN 17   CR 1.14   GFRESTIMATED 61   GFRESTBLACK 74   TYSON 8.5       Recent Labs  Lab 09/01/18  0746   CULT PENDING     No results for input(s): NTBNPI, NTBNP in the last 168 hours.    Recent Labs  Lab 09/01/18  0747   AST 18   ALT 11   ALKPHOS 63   BILITOTAL 1.2       Recent Labs  Lab 09/01/18  0814   INR 3.63*       Recent Labs  Lab 09/01/18  1059 09/01/18  0747   LACT 2.3* 2.6*       Recent Labs  Lab 09/01/18  0747   TROPI <0.015       Recent Labs  Lab 09/01/18  0747   COLOR Yellow   APPEARANCE Clear   URINEGLC Negative   URINEBILI Negative   URINEKETONE 5*   SG 1.019   UBLD Negative   URINEPH 7.0   PROTEIN Negative   NITRITE  Negative   LEUKEST Trace*   RBCU 3*   WBCU 4       Recent Results (from the past 24 hour(s))   XR Chest Port 1 View    Narrative    XR CHEST PORT 1 VW 9/1/2018 8:53 AM    HISTORY: Fever.     COMPARISON: April 5, 2017.       Impression    IMPRESSION: The lungs are clear. No focal pulmonary opacities. Heart  and mediastinum are unremarkable. No acute cardiopulmonary  abnormalities.    LEANDRO SPEARS MD   Head CT w/o contrast    Narrative    CT SCAN OF THE HEAD WITHOUT CONTRAST   9/1/2018 9:23 AM     HISTORY: Altered mental status.     TECHNIQUE: Axial images of the head and coronal reformations without  IV contrast material. Radiation dose for this scan was reduced using  automated exposure control, adjustment of the mA and/or kV according  to patient size, or iterative reconstruction technique.    COMPARISON: 5/30/2013    FINDINGS: Mild to moderate volume loss is present. Patchy white matter  hypoattenuation is present which likely represents chronic small  vessel ischemic change. No evidence of acute ischemia, hemorrhage,  mass, mass effect, or hydrocephalus. The visualized calvarium, skull  base, paranasal sinuses, and extracranial soft tissues are  unremarkable. Bilateral lens replacements are present.      Impression    IMPRESSION: No acute intracranial abnormality.    YOLIE HARRINGTON MD

## 2018-09-01 NOTE — PHARMACY-ADMISSION MEDICATION HISTORY
Admission medication history interview status for this patient is complete. See [facility] admission navigator for allergy information, prior to admission medications and immunization status.     Medication history interview source(s):Patient  Medication history resources (including written lists, pill bottles, clinic record): Home med list, [facility] records, pharmacy hx   Primary pharmacy:[facility] [address]     Changes made to PTA medication list:  Added: furosemide, terazosin  Deleted: torsemide, percocet   Changed: omeprazole from PM to AM; metformin from 1000mg to 500mg; warfarin from 1mg Mon/Thurs 1.5mg AOD to 1mg Wed and 1.5mg AOD    Actions taken by pharmacist (provider contacted, etc): Called pharmacy to review fill hx      Additional medication history information:     -Home med list shows torsemide 10mg in AM however recent [facility] records show this was switched to furosemide 20mg qAM to decrease cost. Confirmed with pharmacy this was picked up on 8/20 and torsemide had already been deactivated at pharmacy.     -depakote or digoxin not on home med list however [facility] records show these are current and refill hx at pharmacy shows that pt picked up 90 day supply of digoxin in July and depakote picked up this week on 8/26.     -warfarin: recent INR check this week 3.3 - dose was decreased from 1.5mg every day to 1mg on Wed and 1.5mg ROW. Patient reports he missed dose yesterday and last dose was Thursday. Wife says he had broccoli past couple days which is more than usual.     -Patient was able to articulate that he had not had any meds today - last meds taken last night.     Medication reconciliation/reorder completed by provider prior to medication history? No    Do you take OTC medications (eg tylenol, ibuprofen, fish oil, eye/ear drops, etc)? Y (Y/N)    For patients on insulin therapy: N (Y/N)    Prior to Admission medications    Medication Sig Last Dose Taking? Auth Provider   cyanocolbalamin (VITAMIN  B-12) 1000  MCG tablet Take 3,000 mcg by mouth every morning 8/31/2018 at am Yes Unknown, Entered By History   divalproex (DEPAKOTE) 250 MG EC tablet Take 250 mg by mouth At Bedtime Past Week at Unknown time Yes Unknown, Entered By History   furosemide (LASIX) 20 MG tablet Take 20 mg by mouth daily 8/31/2018 at am Yes Unknown, Entered By History   levothyroxine (SYNTHROID) 150 MCG tablet Take 150 mcg by mouth every morning (before breakfast)  8/31/2018 at am Yes Reported, Patient   METFORMIN HCL PO Take 500 mg by mouth daily (with dinner)  8/31/2018 at pm Yes Unknown, Entered By History   metoprolol (TOPROL-XL) 25 MG 24 hr tablet Take 50 mg by mouth every evening  8/31/2018 at pm Yes Unknown, Entered By History   OMEPRAZOLE PO Take 40 mg by mouth every morning  8/31/2018 at am Yes Unknown, Entered By History   simvastatin (ZOCOR) 40 MG tablet Take 40 mg by mouth daily (with dinner)  8/31/2018 at pm Yes Reported, Patient   terazosin (HYTRIN) 5 MG capsule Take 5 mg by mouth At Bedtime 8/31/2018 at pm Yes Unknown, Entered By History   warfarin (COUMADIN) 1 MG tablet Take 1 mg by mouth once a week On Wednesday (evening) 8/29/2018 at pm Yes Unknown, Entered By History   warfarin (COUMADIN) 1 MG tablet Take 1.5 mg by mouth every evening Except Wednesday 8/30/2018 at pm Yes Unknown, Entered By History   ACCU-CHEK MULTICLIX LANCETS MISC by Lancet route 2 times daily. Use to test blood sugar twice daily or as directed.   Oscar Parekh MD   digoxin (LANOXIN) 125 MCG tablet Take 1 tablet (125 mcg) by mouth every other day Unknown at Unknown time  Julian Joyce MD   traMADol (ULTRAM) 50 MG tablet Take 50 mg by mouth every 6 hours as needed for moderate pain  Unknown at Unknown time  Reported, Patient

## 2018-09-01 NOTE — IP AVS SNAPSHOT
[place] [id number] [facility]    [address]    [facility] [address]    Phone:  [phone]    Fax:  [phone]                                       After Visit Summary   9/1/2018    Sean Brunner    MRN: [id number]           After Visit Summary Signature Page     I have received my discharge instructions, and my questions have been answered. I have discussed any challenges I see with this plan with the nurse or doctor.    ..........................................................................................................................................  Patient/Patient Representative Signature      ..........................................................................................................................................  Patient Representative Print Name and Relationship to Patient    ..................................................               ................................................  Date                                            Time    ..........................................................................................................................................  Reviewed by Signature/Title    ...................................................              ..............................................  Date                                                            Time          22EPIC Rev 08/18

## 2018-09-01 NOTE — ED NOTES
Bed: ED15  Expected date: 9/1/18  Expected time: 7:02 AM  Means of arrival: Ambulance  Comments:  jose guadalupe Franklin

## 2018-09-01 NOTE — PLAN OF CARE
Problem: Patient Care Overview  Goal: Plan of Care/Patient Progress Review  Outcome: No Change  New admit at 1100: Pt/spouse were oriented to room, use of call light, calling for assistance, safety, poc, etc.  Vss, no co pain/cp/sob.   [place], alarms on for safety, transfer with a lift, see charting for neuro assessment details.  INC BM smear this shift, urinal at bedside and has NOT voided since on the unit at 1100.  Tele AFIB CVR ST depression.  LLE weeping wound, elevated up on pillows, see charting for assessment details.  Lactic 2.3, 1L bolus, recheck 1600.  ANTONIO continue x2, K+ 4.4, mag 2.0, hgbA1c 4.9, BG 97.  Maria R and Arterial ultrasound ordered as L pedal pulse could not be palpated or found with doppler.  Continue poc and monitoring.

## 2018-09-01 NOTE — IP AVS SNAPSHOT
MRN:[id number]                      After Visit Summary   9/1/2018    Sean Brunner    MRN: [id number]           Thank you!     Thank you for choosing [facility] for your care. Our goal is always to provide you with excellent care. Hearing back from our patients is one way we can continue to improve our services. Please take a few minutes to complete the written survey that you may receive in the mail after you visit. If you would like to speak to someone directly about your visit please contact Patient Relations at [phone]. Thank you!          Patient Information     Date Of Birth          9/13/1934        Designated Caregiver       Most Recent Value    Caregiver    Will someone help with your care after discharge? yes    Name of designated caregiver Dona    Phone number of caregiver [phone]    Caregiver address [address] [place] [address]      About your hospital stay     You were admitted on:  September 1, 2018 You last received care in the:  [place] [address] [facility]    You were discharged on:  September 6, 2018       Who to Call     For medical emergencies, please call 911.  For non-urgent questions about your medical care, please call your primary care provider or clinic, [phone]          Attending Provider     Provider Specialty    Idris Nielsen MD Emergency Medicine    [facility], Gregoria Logan MD Internal Medicine       Primary Care Provider Office Phone # Fax #    Dereckjordana Cory Parekh [phone]      Follow-up Appointments     Follow-up and recommended labs and tests        F/u with your primary MD early next week for recheck of this hospitalization:       I have discontinued your digoxin and decreased your metoprolol to half dosing due to slow heart rate and some pauses       You will be discharged with a holter monitor to follow your EKG after above changes and determine if your HR is under good  control       I have stopped your metformin as you have normal blood sugars and a normal hgb A1c       You have a mildly low white count/hemoglobin/platelet count- these should all be rechecked at your follow up visit    You should have your INR rechecked tomorrow    Eat yogurt or take a probiotic for the next 7-10 days                  Further instructions from your care team       You have an INR recheck tomorrow, Friday, 9/7/18 at 9:45am at the [facility] ([phone]).   They are located at [address], [place] [address]. The results will be sent to Dr. Oscar Parekh.     You have a hospital f/u appointment on Tuesday, 9/11/18 at 1:45pm with Dr. Oscar Parekh at the [facility] ([phone]).   They are located at [address] [place] [address].     Warfarin Instruction     You have started taking a medicine called warfarin. This is a blood-thinning medicine (anticoagulant). It helps prevent and treat blood clots.      Before leaving the hospital, make sure you know how much to take and how long to take it.      You will need regular blood tests to make sure your blood is clotting safely. It is very important to see your doctor for regular blood tests.    Talk to your doctor before taking any new medicine (this includes over-the-counter drugs and herbal products). Many medicines can interact with warfarin. This may cause more bleeding or too much clotting.     Eating a lot of vitamin K--found in green, leafy vegetables--can change the way warfarin works in your body. Do NOT avoid these foods. Instead, try to eat the same amount each day.     Bleeding is the most common side-effect of warfarin. You may notice bleeding gums, a bloody nose, bruises and bleeding longer when you cut yourself. See a doctor at once if:   o You cough up blood  o You find blood in your stool (poop)  o You have a deep cut, or a cut that bleeds longer than 10  "minutes   o You have a bad cut, hard fall, accident or hit your head (go to urgent care or the emergency room).    For women who can get pregnant: This medicine can harm an unborn baby. Be very careful not to get pregnant while taking this medicine. If you think you might be pregnant, call your doctor right away.    For more information, read \"Guide to Warfarin Therapy,  the booklet you received in the hospital.        Pending Results     Date and Time Order Name Status Description    2018 0748 Blood culture Preliminary     2018 0727 Blood culture Preliminary             Statement of Approval     Ordered          18 1327  I have reviewed and agree with all the recommendations and orders detailed in this document.  EFFECTIVE NOW     Approved and electronically signed by:  Kimi Gil MD             Admission Information     Date & Time Provider Department Dept. Phone    2018 Gregoria Patel MD [place] [address] [facility] [phone]      Your Vitals Were     Blood Pressure Pulse Temperature Respirations Height Weight    104/50 (BP Location: Left arm) 75 97.6  F (36.4  C) (Oral) 18 1.753 m (5' 9\") 87.4 kg (192 lb 9.6 oz)    Pulse Oximetry BMI (Body Mass Index)                95% 28.44 kg/m2          [employer] Information     [employer] lets you send messages to your doctor, view your test results, renew your prescriptions, schedule appointments and more. To sign up, go to [email or web address] . Click on \"Log in\" on the left side of the screen, which will take you to the Welcome page. Then click on \"Sign up Now\" on the right side of the page.     You will be asked to enter the access code listed below, as well as some personal information. Please follow the directions to create your username and password.     Your access code is: [email or web address]  Expires: 2018 10:38 PM     Your access code will  in 90 days. If you need help or a new code, please call your [facility] or " [phone].        Care EveryWhere ID     This is your Care EveryWhere ID. This could be used by other organizations to access your [facility] medical records  [id number]        Equal Access to Services     VITALY AMAYA : Hadii aad ku hadnelsonjaymie Roshan, waaxda luqadaha, qaybta kaalmada gregory, ismael johnson laIsabelemmy weller. So [facility] [phone].    ATENCIÓN: Si habla español, tiene a ritchie disposición servicios gratuitos de asistencia lingüística. Llame al [phone].    We comply with applicable federal civil rights laws and [place] laws. We do not discriminate on the basis of race, color, national origin, age, disability, sex, sexual orientation, or gender identity.               Review of your medicines      START taking        Dose / Directions    amoxicillin-clavulanate 875-125 MG per tablet   Commonly known as:  AUGMENTIN   Indication:  Infection of the Skin and/or Related Soft Tissue   Used for:  Left leg cellulitis        Dose:  1 tablet   Take 1 tablet by mouth every 12 hours for 4 days   Quantity:  8 tablet   Refills:  0         CONTINUE these medicines which may have CHANGED, or have new prescriptions. If we are uncertain of the size of tablets/capsules you have at home, strength may be listed as something that might have changed.        Dose / Directions    metFORMIN 500 MG tablet   Commonly known as:  GLUCOPHAGE   This may have changed:  additional instructions   Used for:  Hyperglycemia        Dose:  500 mg   Take 1 tablet (500 mg) by mouth daily (with dinner) Hold this medication until seen by primary MD   Quantity:  60 tablet   Refills:  0       metoprolol succinate 25 MG 24 hr tablet   Commonly known as:  TOPROL-XL   This may have changed:  how much to take   Used for:  Atrial fibrillation, unspecified type (H)        Dose:  25 mg   Take 1 tablet (25 mg) by mouth every evening   Quantity:  30 tablet   Refills:  0       warfarin 1 MG tablet   Commonly known as:  COUMADIN   This may  have changed:    - when to take this  - Another medication with the same name was removed. Continue taking this medication, and follow the directions you see here.   Used for:  Atrial fibrillation, unspecified type (H)        Dose:  1 mg   Take 1 tablet (1 mg) by mouth daily On Wednesday (evening)   Quantity:  30 tablet   Refills:  0         CONTINUE these medicines which have NOT CHANGED        Dose / Directions    blood glucose monitoring lancets   Used for:  Diabetes mellitus, type 2 (H)        by Lancet route 2 times daily. Use to test blood sugar twice daily or as directed.   Quantity:  102 each   Refills:  prn       cyanocobalamin 1000 MCG tablet   Commonly known as:  vitamin  B-12        Dose:  3000 mcg   Take 3,000 mcg by mouth every morning   Refills:  0       divalproex sodium delayed-release 250 MG DR tablet   Commonly known as:  DEPAKOTE        Dose:  250 mg   Take 250 mg by mouth At Bedtime   Refills:  0       furosemide 20 MG tablet   Commonly known as:  LASIX        Dose:  20 mg   Take 20 mg by mouth daily   Refills:  0       OMEPRAZOLE PO        Dose:  40 mg   Take 40 mg by mouth every morning   Refills:  0       simvastatin 40 MG tablet   Commonly known as:  ZOCOR        Dose:  40 mg   Take 40 mg by mouth daily (with dinner)   Refills:  0       SYNTHROID 150 MCG tablet   Generic drug:  levothyroxine        Dose:  150 mcg   Take 150 mcg by mouth every morning (before breakfast)   Refills:  0       terazosin 5 MG capsule   Commonly known as:  HYTRIN        Dose:  5 mg   Take 5 mg by mouth At Bedtime   Refills:  0       traMADol 50 MG tablet   Commonly known as:  ULTRAM        Dose:  50 mg   Take 50 mg by mouth every 6 hours as needed for moderate pain   Refills:  0         STOP taking     digoxin 125 MCG tablet   Commonly known as:  LANOXIN                Where to get your medicines      These medications were sent to [place], [place] - [address] [facility]  [address]  [place] [id number]     Phone:  [phone]     amoxicillin-clavulanate 875-125 MG per tablet         Some of these will need a paper prescription and others can be bought over the counter. Ask your nurse if you have questions.     You don't need a prescription for these medications     metFORMIN 500 MG tablet    metoprolol succinate 25 MG 24 hr tablet    warfarin 1 MG tablet                Protect others around you: Learn how to safely use, store and throw away your medicines at www.disposemymeds.org.        ANTIBIOTIC INSTRUCTION     You've Been Prescribed an Antibiotic - Now What?  Your healthcare team thinks that you or your loved one might have an infection. Some infections can be treated with antibiotics, which are powerful, life-saving drugs. Like all medications, antibiotics have side effects and should only be used when necessary. There are some important things you should know about your antibiotic treatment.      Your healthcare team may run tests before you start taking an antibiotic.    Your team may take samples (e.g., from your blood, urine or other areas) to run tests to look for bacteria. These test can be important to determine if you need an antibiotic at all and, if you do, which antibiotic will work best.      Within a few days, your healthcare team might change or even stop your antibiotic.    Your team may start you on an antibiotic while they are working to find out what is making you sick.    Your team might change your antibiotic because test results show that a different antibiotic would be better to treat your infection.    In some cases, once your team has more information, they learn that you do not need an antibiotic at all. They may find out that you don't have an infection, or that the antibiotic you're taking won't work against your infection. For example, an infection caused by a virus can't be treated with antibiotics. Staying on an antibiotic when you don't need  it is more likely to be harmful than helpful.      You may experience side effects from your antibiotic.    Like all medications, antibiotics have side effects. Some of these can be serious.    Let you healthcare team know if you have any known allergies when you are admitted to the hospital.    One significant side effect of nearly all antibiotics is the risk of severe and sometimes deadly diarrhea caused by Clostridium difficile (C. Difficile). This occurs when a person takes antibiotics because some good germs are destroyed. Antibiotic use allows C. diificile to take over, putting patients at high risk for this serious infection.    As a patient or caregiver, it is important to understand your or your loved one's antibiotic treatment. It is especially important for caregivers to speak up when patients can't speak for themselves. Here are some important questions to ask your healthcare team.    What infection is this antibiotic treating and how do you know I have that infection?    What side effects might occur from this antibiotic?    How long will I need to take this antibiotic?    Is it safe to take this antibiotic with other medications or supplements (e.g., vitamins) that I am taking?     Are there any special directions I need to know about taking this antibiotic? For example, should I take it with food?    How will I be monitored to know whether my infection is responding to the antibiotic?    What tests may help to make sure the right antibiotic is prescribed for me?      Information provided by:  www.cdc.gov/getsmart  U.S. Department of Health and Human Services  Centers for disease Control and Prevention  National Center for Emerging and Zoonotic Infectious Diseases  Division of Healthcare Quality Promotion             Medication List: This is a list of all your medications and when to take them. Check marks below indicate your daily home schedule. Keep this list as a reference.      Medications            Morning Afternoon Evening Bedtime As Needed    amoxicillin-clavulanate 875-125 MG per tablet   Commonly known as:  AUGMENTIN   Take 1 tablet by mouth every 12 hours for 4 days   Last time this was given:  1 tablet on 9/6/2018  7:48 AM            0800           8pm               blood glucose monitoring lancets   by Lancet route 2 times daily. Use to test blood sugar twice daily or as directed.                                cyanocobalamin 1000 MCG tablet   Commonly known as:  vitamin  B-12   Take 3,000 mcg by mouth every morning   Last time this was given:  3,000 mcg on 9/6/2018  7:47 AM            8am                       divalproex sodium delayed-release 250 MG DR tablet   Commonly known as:  DEPAKOTE   Take 250 mg by mouth At Bedtime   Last time this was given:  250 mg on 9/5/2018  9:09 PM                        10pm           furosemide 20 MG tablet   Commonly known as:  LASIX   Take 20 mg by mouth daily   Last time this was given:  20 mg on 9/6/2018  7:48 AM            8am                       metFORMIN 500 MG tablet   Commonly known as:  GLUCOPHAGE   Take 1 tablet (500 mg) by mouth daily (with dinner) Hold this medication until seen by primary MD                    At dinner after okayed by primary dr               metoprolol succinate 25 MG 24 hr tablet   Commonly known as:  TOPROL-XL   Take 1 tablet (25 mg) by mouth every evening   Last time this was given:  50 mg on 9/5/2018  9:09 PM                    8pm               OMEPRAZOLE PO   Take 40 mg by mouth every morning   Last time this was given:  40 mg on 9/6/2018  7:48 AM            8am                       simvastatin 40 MG tablet   Commonly known as:  ZOCOR   Take 40 mg by mouth daily (with dinner)   Last time this was given:  40 mg on 9/5/2018  4:26 PM                    6pm               SYNTHROID 150 MCG tablet   Take 150 mcg by mouth every morning (before breakfast)   Last time this was given:  150 mcg on 9/6/2018  6:57 AM   Generic drug:   levothyroxine            7am                       terazosin 5 MG capsule   Commonly known as:  HYTRIN   Take 5 mg by mouth At Bedtime   Last time this was given:  5 mg on 9/5/2018  9:09 PM                        10pm           traMADol 50 MG tablet   Commonly known as:  ULTRAM   Take 50 mg by mouth every 6 hours as needed for moderate pain   Last time this was given:  50 mg on 9/6/2018 12:19 PM                                   warfarin 1 MG tablet   Commonly known as:  COUMADIN   Take 1 tablet (1 mg) by mouth daily On Wednesday (evening)                    6pm                         More Information        Discharge Instructions for Cellulitis  You have been diagnosed with cellulitis. This is an infection in the deepest layer of the skin. In some cases, the infection also affects the muscle. Cellulitis is caused by bacteria. The bacteria can enter the body through broken skin. This can happen with a cut, scratch, animal bite, or an insect bite that has been scratched. You may have been treated in the hospital with antibiotics and fluids. You will likely be given a prescription for antibiotics to take at home. This sheet will help you take care of yourself at home.  Home care  When you are home:    Take the prescribed antibiotic medicine you are given as directed until it is gone. Take it even if you feel better. It treats the infection and stops it from returning. Not taking all the medicine can make future infections hard to treat.    Keep the infected area clean.    When possible, raise the infected area above the level of your heart. This helps keep swelling down.    Talk with your healthcare provider if you are in pain. Ask what kind of over-the-counter medicine you can take for pain.    Apply clean bandages as advised.    Take your temperature once a day for a week.    Wash your hands often to prevent spreading the infection.  In the future, wash your hands before and after you touch cuts, scratches, or  bandages. This will help prevent infection.   When to call your healthcare provider  Call your healthcare provider immediately if you have any of the following:    Difficulty or pain when moving the joints above or below the infected area    Discharge or pus draining from the area    Fever of 100.4 F (38 C) or higher, or as directed by your healthcare provider    Pain that gets worse in or around the infected     Redness that gets worse in or around the infected area, particularly if the area of redness expands to a wider area    Shaking chills    Swelling of the infected area    Vomiting   Date Last Reviewed: 8/1/2016 2000-2017 [employer]. [address] [place] [address]. All rights reserved. This information is not intended as a substitute for professional medical care. Always follow your healthcare professional's instructions.                Cellulitis  Cellulitis is an infection of the deep layers of skin. A break in the skin, such as a cut or scratch, can let bacteria under the skin. If the bacteria get to deep layers of the skin, it can be serious. If not treated, cellulitis can get into the bloodstream and lymph nodes. The infection can then spread throughout the body. This causes serious illness.  Cellulitis causes the affected skin to become red, swollen, warm, and sore. The reddened areas have a visible border. An open sore may leak fluid (pus). You may have a fever, chills, and pain.  Cellulitis is treated with antibiotics taken for 7 to 10 days. An open sore may be cleaned and covered with cool wet gauze. Symptoms should get better 1 to 2 days after treatment is started. Make sure to take all the antibiotics for the full number of days until they are gone. Keep taking the medicine even if your symptoms go away.  Home care  Follow these tips:    Limit the use of the part of your body with cellulitis.     If the infection is on your leg, keep your leg raised while sitting. This  will help to reduce swelling.    Take all of the antibiotic medicine exactly as directed until it is gone. Do not miss any doses, especially during the first 7 days. Don t stop taking the medicine when your symptoms get better.    Keep the affected area clean and dry.    Wash your hands with soap and warm water before and after touching your skin. Anyone else who touches your skin should also wash his or her hands. Don't share towels.  Follow-up care  Follow up with your healthcare provider, or as advised. If your infection does not go away on the first antibiotic, your healthcare provider will prescribe a different one.  When to seek medical advice  Call your healthcare provider right away if any of these occur:    Red areas that spread    Swelling or pain that gets worse    Fluid leaking from the skin (pus)    Fever higher of 100.4  F (38.0  C) or higher after 2 days on antibiotics  Date Last Reviewed: 9/1/2016 2000-2017 [employer]. [address] [place] [address], [place] [address]. All rights reserved. This information is not intended as a substitute for professional medical care. Always follow your healthcare professional's instructions.

## 2018-09-01 NOTE — ED TRIAGE NOTES
"Wound on left lower leg for years, weeping and swelling over the past week. Last night pt seemed \"confused\" per wife. Pts speech is garbled. Left leg is swollen and skin is discolored. There are 2 open wounds that are oozing.   "

## 2018-09-01 NOTE — PROGRESS NOTES
[facility]    Sepsis Evaluation Progress Note    Date of Service: 09/01/2018    I was called to see Sean Brunner due to abnormal vital signs triggering the Sepsis SIRS screening alert. He is known to have an infection.     Physical Exam    Vital Signs:  Temp: 99.9  F (37.7  C) Temp src: Oral BP: 95/42 Pulse: 70 Heart Rate: 86 Resp: 18 SpO2: 97 % O2 Device: None (Room air)      Lab:  Lactic Acid   Date Value Ref Range Status   09/01/2018 2.3 (H) 0.7 - 2.0 mmol/L Final       The patient is at baseline mental status.  Has been confused intermittently    The rest of their physical exam is significant for cellulitis    Assessment and Plan    The SIRS and exam findings are likely due to   sepsis.     ID: The patient is currently on the following antibiotics:  Anti-infectives (Future)    Start     Dose/Rate Route Frequency Ordered Stop    09/01/18 1400  piperacillin-tazobactam (ZOSYN) infusion 3.375 g     Comments:  Pharmacy can adjust dose based on renal function.    3.375 g  100 mL/hr over 30 Minutes Intravenous EVERY 6 HOURS 09/01/18 1107      09/01/18 0828  vancomycin (VANCOCIN) 1,750 mg in sodium chloride 0.9 % 500 mL intermittent infusion      1,750 mg  over 2 Hours Intravenous ONCE 09/01/18 0827          Current antibiotic coverage is appropriate for source of infection.    Fluid: Fluid bolus ordered.    Lab: Repeat lactic acid ordered for 2 hours from now.    Disposition: The patient will remain on the current unit. We will continue to monitor this patient closely.    Gregoria Patel MD

## 2018-09-01 NOTE — PROVIDER NOTIFICATION
Paged dr vila: the ED was unable to find L pedal pulse with dopler. do you want an ultrasound?    Spoke with md on the phone, orders received.

## 2018-09-02 ENCOUNTER — APPOINTMENT (OUTPATIENT)
Dept: GENERAL RADIOLOGY | Facility: CLINIC | Age: 83
DRG: 871 | End: 2018-09-02
Attending: INTERNAL MEDICINE
Payer: MEDICARE

## 2018-09-02 LAB
ANION GAP SERPL CALCULATED.3IONS-SCNC: 4 MMOL/L (ref 3–14)
BACTERIA SPEC CULT: NO GROWTH
BUN SERPL-MCNC: 17 MG/DL (ref 7–30)
CALCIUM SERPL-MCNC: 8.5 MG/DL (ref 8.5–10.1)
CHLORIDE SERPL-SCNC: 109 MMOL/L (ref 94–109)
CO2 SERPL-SCNC: 27 MMOL/L (ref 20–32)
CREAT SERPL-MCNC: 1.07 MG/DL (ref 0.66–1.25)
ERYTHROCYTE [DISTWIDTH] IN BLOOD BY AUTOMATED COUNT: 14.8 % (ref 10–15)
GFR SERPL CREATININE-BSD FRML MDRD: 66 ML/MIN/1.7M2
GLUCOSE BLDC GLUCOMTR-MCNC: 108 MG/DL (ref 70–99)
GLUCOSE BLDC GLUCOMTR-MCNC: 131 MG/DL (ref 70–99)
GLUCOSE BLDC GLUCOMTR-MCNC: 135 MG/DL (ref 70–99)
GLUCOSE BLDC GLUCOMTR-MCNC: 149 MG/DL (ref 70–99)
GLUCOSE BLDC GLUCOMTR-MCNC: 85 MG/DL (ref 70–99)
GLUCOSE BLDC GLUCOMTR-MCNC: 96 MG/DL (ref 70–99)
GLUCOSE SERPL-MCNC: 92 MG/DL (ref 70–99)
HCT VFR BLD AUTO: 30 % (ref 40–53)
HGB BLD-MCNC: 9.8 G/DL (ref 13.3–17.7)
INR PPP: 4.37 (ref 0.86–1.14)
Lab: NORMAL
MCH RBC QN AUTO: 32.2 PG (ref 26.5–33)
MCHC RBC AUTO-ENTMCNC: 32.7 G/DL (ref 31.5–36.5)
MCV RBC AUTO: 99 FL (ref 78–100)
PLATELET # BLD AUTO: 106 10E9/L (ref 150–450)
POTASSIUM SERPL-SCNC: 4.3 MMOL/L (ref 3.4–5.3)
RBC # BLD AUTO: 3.04 10E12/L (ref 4.4–5.9)
SODIUM SERPL-SCNC: 140 MMOL/L (ref 133–144)
SPECIMEN SOURCE: NORMAL
VANCOMYCIN SERPL-MCNC: 21.3 MG/L
WBC # BLD AUTO: 5.8 10E9/L (ref 4–11)

## 2018-09-02 PROCEDURE — 25000132 ZZH RX MED GY IP 250 OP 250 PS 637: Mod: GY | Performed by: INTERNAL MEDICINE

## 2018-09-02 PROCEDURE — 85027 COMPLETE CBC AUTOMATED: CPT | Performed by: INTERNAL MEDICINE

## 2018-09-02 PROCEDURE — 71045 X-RAY EXAM CHEST 1 VIEW: CPT

## 2018-09-02 PROCEDURE — 99233 SBSQ HOSP IP/OBS HIGH 50: CPT | Performed by: INTERNAL MEDICINE

## 2018-09-02 PROCEDURE — 80202 ASSAY OF VANCOMYCIN: CPT | Performed by: INTERNAL MEDICINE

## 2018-09-02 PROCEDURE — A9270 NON-COVERED ITEM OR SERVICE: HCPCS | Mod: GY | Performed by: INTERNAL MEDICINE

## 2018-09-02 PROCEDURE — 12000007 ZZH R&B INTERMEDIATE

## 2018-09-02 PROCEDURE — 85610 PROTHROMBIN TIME: CPT | Performed by: INTERNAL MEDICINE

## 2018-09-02 PROCEDURE — 25000128 H RX IP 250 OP 636: Performed by: INTERNAL MEDICINE

## 2018-09-02 PROCEDURE — 25000125 ZZHC RX 250: Performed by: INTERNAL MEDICINE

## 2018-09-02 PROCEDURE — 80048 BASIC METABOLIC PNL TOTAL CA: CPT | Performed by: INTERNAL MEDICINE

## 2018-09-02 PROCEDURE — 00000146 ZZHCL STATISTIC GLUCOSE BY METER IP

## 2018-09-02 PROCEDURE — 36415 COLL VENOUS BLD VENIPUNCTURE: CPT | Performed by: INTERNAL MEDICINE

## 2018-09-02 RX ORDER — CEFAZOLIN SODIUM 1 G/50ML
1250 SOLUTION INTRAVENOUS EVERY 12 HOURS
Status: DISCONTINUED | OUTPATIENT
Start: 2018-09-03 | End: 2018-09-03 | Stop reason: CLARIF

## 2018-09-02 RX ORDER — GINSENG 100 MG
CAPSULE ORAL 2 TIMES DAILY
Status: DISCONTINUED | OUTPATIENT
Start: 2018-09-02 | End: 2018-09-06 | Stop reason: HOSPADM

## 2018-09-02 RX ORDER — FUROSEMIDE 10 MG/ML
20 INJECTION INTRAMUSCULAR; INTRAVENOUS ONCE
Status: COMPLETED | OUTPATIENT
Start: 2018-09-02 | End: 2018-09-02

## 2018-09-02 RX ADMIN — TAZOBACTAM SODIUM AND PIPERACILLIN SODIUM 3.38 G: 375; 3 INJECTION, SOLUTION INTRAVENOUS at 02:11

## 2018-09-02 RX ADMIN — TAZOBACTAM SODIUM AND PIPERACILLIN SODIUM 4.5 G: 500; 4 INJECTION, SOLUTION INTRAVENOUS at 13:24

## 2018-09-02 RX ADMIN — TAZOBACTAM SODIUM AND PIPERACILLIN SODIUM 3.38 G: 375; 3 INJECTION, SOLUTION INTRAVENOUS at 08:08

## 2018-09-02 RX ADMIN — FUROSEMIDE 20 MG: 10 INJECTION, SOLUTION INTRAVENOUS at 17:19

## 2018-09-02 RX ADMIN — LEVOTHYROXINE SODIUM 150 MCG: 75 TABLET ORAL at 06:45

## 2018-09-02 RX ADMIN — OMEPRAZOLE 40 MG: 20 CAPSULE, DELAYED RELEASE ORAL at 08:12

## 2018-09-02 RX ADMIN — TAZOBACTAM SODIUM AND PIPERACILLIN SODIUM 4.5 G: 500; 4 INJECTION, SOLUTION INTRAVENOUS at 20:09

## 2018-09-02 RX ADMIN — BACITRACIN: 500 OINTMENT TOPICAL at 21:07

## 2018-09-02 RX ADMIN — FUROSEMIDE 20 MG: 10 INJECTION, SOLUTION INTRAVENOUS at 18:24

## 2018-09-02 RX ADMIN — CYANOCOBALAMIN TAB 1000 MCG 3000 MCG: 1000 TAB at 08:12

## 2018-09-02 RX ADMIN — VANCOMYCIN HYDROCHLORIDE 1750 MG: 1 INJECTION, POWDER, LYOPHILIZED, FOR SOLUTION INTRAVENOUS at 21:14

## 2018-09-02 RX ADMIN — TERAZOSIN HYDROCHLORIDE 5 MG: 5 CAPSULE ORAL at 21:07

## 2018-09-02 RX ADMIN — TRAMADOL HYDROCHLORIDE 50 MG: 50 TABLET, COATED ORAL at 16:01

## 2018-09-02 RX ADMIN — VANCOMYCIN HYDROCHLORIDE 1750 MG: 1 INJECTION, POWDER, LYOPHILIZED, FOR SOLUTION INTRAVENOUS at 09:17

## 2018-09-02 RX ADMIN — SIMVASTATIN 40 MG: 40 TABLET, FILM COATED ORAL at 17:19

## 2018-09-02 RX ADMIN — METOPROLOL SUCCINATE 50 MG: 50 TABLET, EXTENDED RELEASE ORAL at 19:59

## 2018-09-02 RX ADMIN — DIVALPROEX SODIUM 250 MG: 250 TABLET, DELAYED RELEASE ORAL at 21:09

## 2018-09-02 ASSESSMENT — VISUAL ACUITY
OU: GLASSES;BASELINE
OU: BASELINE;GLASSES

## 2018-09-02 ASSESSMENT — ACTIVITIES OF DAILY LIVING (ADL)
ADLS_ACUITY_SCORE: 11
ADLS_ACUITY_SCORE: 12
ADLS_ACUITY_SCORE: 12
ADLS_ACUITY_SCORE: 11
ADLS_ACUITY_SCORE: 12
ADLS_ACUITY_SCORE: 11

## 2018-09-02 NOTE — PLAN OF CARE
Problem: Patient Care Overview  Goal: Plan of Care/Patient Progress Review  Outcome: No Change  Vss, no co pain/cp/sob.  [place], alarms on for safety, forgetful, transfers Ax2+GB to commode, no change in neuro assessment.  +BM this shift, urinal at bedside.  Tele AFIB CVR ST depression.  LLE weeping wound, elevated up on pillows, see charting for assessment details (still waiting for wound care orders, md paged again), still 3+ edema/red/garry (area marked yesterday on admit and still within boarders).  ANTONIO continue x2, K+ 4.3, INR 4.37 (no warfarin tonight), BG 85, 96.  Continue poc and monitoring.

## 2018-09-02 NOTE — PROVIDER NOTIFICATION
Spoke with dr vila regarding obtaining orders for wound care.  She states to continue with 2 pillow elevation, cleanse area and use bacitracin (she will enter orders).

## 2018-09-02 NOTE — PLAN OF CARE
Problem: Patient Care Overview  Goal: Plan of Care/Patient Progress Review  Outcome: No Change  Pt A/O, [place],  VS- BP soft, on RA, afebrile, c/o LLE pain with movement- given PRN tramadol this shift x1 with relief, neuros- intact, Tele: Afib CVR (HR 60s-70s), up AX2 pivot to BSC, uses urinal at bedside, PO metoprolol held this shift d/t soft BP,   & 149, LLE weeping, elevated on pillows, continue POC.

## 2018-09-02 NOTE — PHARMACY-VANCOMYCIN DOSING SERVICE
Pharmacy Vancomycin Initial Note  Date of Service 2018  Patient's  1934  83 year old, male    Indication: Sepsis   (originally started as Skin/Soft tissue infection)    Current estimated CrCl = Estimated Creatinine Clearance: 59.3 mL/min (based on Cr of 1.07).    Creatinine for last 3 days  2018:  7:47 AM Creatinine 1.14 mg/dL  2018:  6:41 AM Creatinine 1.07 mg/dL    Recent Vancomycin Level(s) for last 3 days  No results found for requested labs within last 72 hours.      Vancomycin IV Administrations (past 72 hours)                   vancomycin (VANCOCIN) 1,750 mg in sodium chloride 0.9 % 500 mL intermittent infusion (mg) 1,750 mg Given 18 0917     1,750 mg Given 18 2138    vancomycin (VANCOCIN) 1,750 mg in sodium chloride 0.9 % 500 mL intermittent infusion (mg) 1,750 mg Given 18 0955                Nephrotoxins and other renal medications (Future)    Start     Dose/Rate Route Frequency Ordered Stop    18 1400  piperacillin-tazobactam (ZOSYN) intermittent infusion 4.5 g      4.5 g  200 mL/hr over 30 Minutes Intravenous EVERY 6 HOURS 18 1214      18 2200  vancomycin (VANCOCIN) 1,750 mg in sodium chloride 0.9 % 500 mL intermittent infusion      1,750 mg  over 2 Hours Intravenous EVERY 12 HOURS 18 1149            Contrast Orders - past 72 hours     None                Plan:  1.  Start vancomycin  1750 mg IV q12h.   2.  Goal Trough Level: 15-20 mg/L  (original goal was not for sepsis so 10-15)  3.  Pharmacy will check trough before 4th dose tonight.   4. Serum creatinine levels will be ordered daily for the first week of therapy and at least twice weekly for subsequent weeks.      Leroy Pickens

## 2018-09-02 NOTE — PLAN OF CARE
Problem: Patient Care Overview  Goal: Plan of Care/Patient Progress Review  Outcome: No Change  Patient resting comfortably overnight. Vital signs stable. Alert/oriented x4. Denies pain/discomfort at this time. Blood glucose stable at 108. Tele is A. Fib CVR. Neuro's intact. Continue IV vanco and Zosyn.

## 2018-09-02 NOTE — PROVIDER NOTIFICATION
MD updated on pt c/o increased cough, increased weakness, malaise and low grade temp of 99.2, Pt also having abdominal discomfort. LS are dim, crackles to bases. IS given to pt.

## 2018-09-02 NOTE — PROGRESS NOTES
[facility]    Hospitalist Progress Note  Name: Sean Brunner    MRN: [id number]  Provider: Gregoria Patel MD  Date of Service: 09/02/2018    Assessment & Plan   Summary of Stay: Sean Brunner is a 83 year old male who was admitted on 9/1/2018. Pt with past medical history significant for diabetes mellitus, atrial fibrillation, hypothyroidism presented to the emergency room with altered mental status.  Patient gives history of 1 week of pain swelling and redness involving left lower extremity.  In the emergency room patient had elevated lactic acid he was hypotensive tachycardic with erythematous left lower extremity is admitted for sepsis secondary to left lower extremity cellulitis     Sepsis secondary to left lower extremity cellulitis  --Patient tachycardic, hypotensive, altered mental status with elevated lactic acid levels on admission  --Blood culture negative to date  --Started on vancomycin and Zosyn will continue for now.  We will narrow antibiotic for now we will stop vancomycin if cultures remain negative by tomorrow  --Ultrasound negative for DVT       Lactic acidosis  --Likely secondary to infection  --I improved with IV fluid       History of diabetes mellitus  --We will hold off metformin for now  --On sliding scale insulin     Atrial fibrillation  --Monitor on telemetry  --Continue digoxin and metoprolol   --We will hold metoprolol if SBP is less than 100  --On  Warfarin.  Monitor INR.  INR supratherapeutic at this time  --Pharmacy to dose     Hyper lipidemia  --Continue statins     Hypothyroidism  --On levothyroxine     Chronic leg edema  --On torsemide will hold for now.    Blood pressure is on the lower side  --Weight is up.  Will closely monitor.  --Discontinue IV fluids.         DVT Prophylaxis: Warfarin  Code Status: Full Code    Disposition: Expected discharge to be decided.    Interval History   Patient is more awake oriented less confused.  Continues to have pain in left  lower leg.  Swelling has unchanged.  Continues to have redness and warmth.  No chest pain no shortness of breath.  Review of all the other systems are    -Data reviewed today: I reviewed all new labs and imaging reports over the last 24 hours. I personally reviewed no images or EKG's today.    Physical Exam   Temp: 98.2  F (36.8  C) Temp src: Axillary BP: 98/52   Heart Rate: 71 Resp: 16 SpO2: 98 % O2 Device: None (Room air)    Vitals:    09/01/18 0724 09/01/18 0731 09/02/18 0558   Weight: 84.4 kg (186 lb) 84.4 kg (186 lb) 94.2 kg (207 lb 11.2 oz)     Vital Signs with Ranges  Temp:  [96.4  F (35.8  C)-99.9  F (37.7  C)] 98.2  F (36.8  C)  Heart Rate:  [67-93] 71  Resp:  [15-24] 16  BP: ()/(42-58) 98/52  SpO2:  [95 %-99 %] 98 %  I/O last 3 completed shifts:  In: 1170 [P.O.:120; I.V.:50; IV Piggyback:1000]  Out: -       GEN:  Alert, oriented x 3, appears sleepy minimally verbal but appropriately answering questions, no overt distress  HEENT:  Normocephalic/atraumatic, no scleral icterus, no nasal discharge, mouth dry  CV: Irregularly irregular..  S1 + S2 noted, no S3 or S4.  LUNGS: Few bibasilar crackles.  Symmetric chest rise on inhalation noted.  ABD:  Active bowel sounds, soft, non-tender/non-distended.  No rebound/guarding/rigidity.  EXT: Right lower extremity with significant swelling, erythema, area of warmth and tenderness.  No fluctuance or crepitus  SKIN:  Dry to touch, no exanthems noted in the visualized areas.  NEURO:  Symmetric muscle strength, sensation to touch grossly intact.  Coordination symmetric on general exam.  No new focal deficits appreciated.    Medications     - MEDICATION INSTRUCTIONS -       Warfarin Therapy Reminder         cyanocobalamin  3,000 mcg Oral QAM     digoxin  125 mcg Oral Every Other Day     divalproex sodium delayed-release  250 mg Oral At Bedtime     insulin aspart  1-7 Units Subcutaneous TID AC     insulin aspart  1-5 Units Subcutaneous At Bedtime     levothyroxine  150  mcg Oral QAM AC     metoprolol succinate  50 mg Oral QPM     omeprazole (priLOSEC) CR capsule 40 mg  40 mg Oral QAM     piperacillin-tazobactam  3.375 g Intravenous Q6H     simvastatin  40 mg Oral Daily with supper     terazosin  5 mg Oral At Bedtime     vancomycin (VANCOCIN) IV  1,750 mg Intravenous Q12H     warfarin-No DOSE today  1 each Does not apply no dose today (warfarin)     Data       Recent Labs  Lab 09/02/18  0641 09/01/18  0747   WBC 5.8 7.1   HGB 9.8* 10.4*   HCT 30.0* 32.6*   MCV 99 99   * 115*       Recent Labs  Lab 09/02/18  0641 09/01/18  0747    139   POTASSIUM 4.3 4.4   CHLORIDE 109 104   CO2 27 28   ANIONGAP 4 7   GLC 92 131*   BUN 17 17   CR 1.07 1.14   GFRESTIMATED 66 61   GFRESTBLACK 80 74   TYSON 8.5 8.5       Recent Labs  Lab 09/01/18  0808 09/01/18  0746   CULT No growth after 19 hours No growth after 19 hours  PENDING       Recent Labs  Lab 09/01/18  0747   AST 18   ALT 11   ALKPHOS 63   BILITOTAL 1.2       Recent Labs  Lab 09/01/18  0814   INR 3.63*       Recent Labs  Lab 09/01/18  1557 09/01/18  1059 09/01/18  0747   LACT 1.8 2.3* 2.6*       Recent Labs  Lab 09/01/18  0747   COLOR Yellow   APPEARANCE Clear   URINEGLC Negative   URINEBILI Negative   URINEKETONE 5*   SG 1.019   UBLD Negative   URINEPH 7.0   PROTEIN Negative   NITRITE Negative   LEUKEST Trace*   RBCU 3*   WBCU 4       Recent Results (from the past 24 hour(s))   XR Chest Port 1 View    Narrative    XR CHEST PORT 1 VW 9/1/2018 8:53 AM    HISTORY: Fever.     COMPARISON: April 5, 2017.       Impression    IMPRESSION: The lungs are clear. No focal pulmonary opacities. Heart  and mediastinum are unremarkable. No acute cardiopulmonary  abnormalities.    LEANDRO SPEARS MD   Head CT w/o contrast    Narrative    CT SCAN OF THE HEAD WITHOUT CONTRAST   9/1/2018 9:23 AM     HISTORY: Altered mental status.     TECHNIQUE: Axial images of the head and coronal reformations without  IV contrast material. Radiation dose for this  scan was reduced using  automated exposure control, adjustment of the mA and/or kV according  to patient size, or iterative reconstruction technique.    COMPARISON: 5/30/2013    FINDINGS: Mild to moderate volume loss is present. Patchy white matter  hypoattenuation is present which likely represents chronic small  vessel ischemic change. No evidence of acute ischemia, hemorrhage,  mass, mass effect, or hydrocephalus. The visualized calvarium, skull  base, paranasal sinuses, and extracranial soft tissues are  unremarkable. Bilateral lens replacements are present.      Impression    IMPRESSION: No acute intracranial abnormality.    YOLIE HARRINGTON MD   US Lower Extremity Venous Duplex Left    Narrative    US LOWER EXTREMITY VENOUS DUPLEX LEFT 9/1/2018 3:45 PM    HISTORY: Left leg pain.    TECHNIQUE: Imaged deep venous structures of the left lower extremity  include the left common femoral vein, femoral vein, popliteal vein,  and visualized posterior deep calf veins.  Color flow and spectral  Doppler with waveform analysis performed.    COMPARISON: None.    FINDINGS: Limited visualization of the peroneal veins and midportion  of the posterior tibial vein secondary to edema. Otherwise, no DVT is  demonstrated.    LEANDRO SPEARS MD   US Lower Extremity Arterial Duplex Left    Narrative    US LOWER EXTREMITY ARTERIAL DUPLEX LEFT  9/1/2018 3:50 PM     HISTORY: No pulses on exam in the left dorsalis pedis artery.    COMPARISON: None.    TECHNIQUE: Color Doppler and spectral waveform analysis performed.    FINDINGS: The left common femoral artery, profunda femoris artery,  superficial femoral artery, popliteal artery, and visualized tibial  arteries are patent with normal triphasic waveforms. Flow is  identified in the dorsalis pedis artery on ultrasound.      Impression    IMPRESSION: The visualized arteries of the left lower extremity are  patent without evidence for significant stenosis.

## 2018-09-03 LAB
ANION GAP SERPL CALCULATED.3IONS-SCNC: 5 MMOL/L (ref 3–14)
BASOPHILS # BLD AUTO: 0 10E9/L (ref 0–0.2)
BASOPHILS NFR BLD AUTO: 0.4 %
BUN SERPL-MCNC: 17 MG/DL (ref 7–30)
CALCIUM SERPL-MCNC: 8.5 MG/DL (ref 8.5–10.1)
CHLORIDE SERPL-SCNC: 107 MMOL/L (ref 94–109)
CO2 SERPL-SCNC: 29 MMOL/L (ref 20–32)
CREAT SERPL-MCNC: 1.05 MG/DL (ref 0.66–1.25)
DIFFERENTIAL METHOD BLD: ABNORMAL
DIGOXIN SERPL-MCNC: 0.7 UG/L (ref 0.5–2)
EOSINOPHIL # BLD AUTO: 0.2 10E9/L (ref 0–0.7)
EOSINOPHIL NFR BLD AUTO: 3.3 %
ERYTHROCYTE [DISTWIDTH] IN BLOOD BY AUTOMATED COUNT: 14.9 % (ref 10–15)
GFR SERPL CREATININE-BSD FRML MDRD: 67 ML/MIN/1.7M2
GLUCOSE BLDC GLUCOMTR-MCNC: 159 MG/DL (ref 70–99)
GLUCOSE BLDC GLUCOMTR-MCNC: 80 MG/DL (ref 70–99)
GLUCOSE BLDC GLUCOMTR-MCNC: 89 MG/DL (ref 70–99)
GLUCOSE BLDC GLUCOMTR-MCNC: 93 MG/DL (ref 70–99)
GLUCOSE BLDC GLUCOMTR-MCNC: 98 MG/DL (ref 70–99)
GLUCOSE SERPL-MCNC: 89 MG/DL (ref 70–99)
HCT VFR BLD AUTO: 29.8 % (ref 40–53)
HGB BLD-MCNC: 9.6 G/DL (ref 13.3–17.7)
IMM GRANULOCYTES # BLD: 0 10E9/L (ref 0–0.4)
IMM GRANULOCYTES NFR BLD: 0.4 %
INR PPP: 3.66 (ref 0.86–1.14)
LYMPHOCYTES # BLD AUTO: 0.7 10E9/L (ref 0.8–5.3)
LYMPHOCYTES NFR BLD AUTO: 15.6 %
MAGNESIUM SERPL-MCNC: 1.8 MG/DL (ref 1.6–2.3)
MCH RBC QN AUTO: 31.6 PG (ref 26.5–33)
MCHC RBC AUTO-ENTMCNC: 32.2 G/DL (ref 31.5–36.5)
MCV RBC AUTO: 98 FL (ref 78–100)
MONOCYTES # BLD AUTO: 0.5 10E9/L (ref 0–1.3)
MONOCYTES NFR BLD AUTO: 10.2 %
NEUTROPHILS # BLD AUTO: 3.2 10E9/L (ref 1.6–8.3)
NEUTROPHILS NFR BLD AUTO: 70.1 %
NRBC # BLD AUTO: 0 10*3/UL
NRBC BLD AUTO-RTO: 0 /100
PLATELET # BLD AUTO: 100 10E9/L (ref 150–450)
POTASSIUM SERPL-SCNC: 3.6 MMOL/L (ref 3.4–5.3)
POTASSIUM SERPL-SCNC: 3.9 MMOL/L (ref 3.4–5.3)
RBC # BLD AUTO: 3.04 10E12/L (ref 4.4–5.9)
SODIUM SERPL-SCNC: 141 MMOL/L (ref 133–144)
WBC # BLD AUTO: 4.5 10E9/L (ref 4–11)

## 2018-09-03 PROCEDURE — 25000132 ZZH RX MED GY IP 250 OP 250 PS 637: Mod: GY | Performed by: INTERNAL MEDICINE

## 2018-09-03 PROCEDURE — 25000125 ZZHC RX 250: Performed by: INTERNAL MEDICINE

## 2018-09-03 PROCEDURE — 85610 PROTHROMBIN TIME: CPT | Performed by: INTERNAL MEDICINE

## 2018-09-03 PROCEDURE — 85025 COMPLETE CBC W/AUTO DIFF WBC: CPT | Performed by: INTERNAL MEDICINE

## 2018-09-03 PROCEDURE — 83735 ASSAY OF MAGNESIUM: CPT | Performed by: INTERNAL MEDICINE

## 2018-09-03 PROCEDURE — 25000128 H RX IP 250 OP 636: Performed by: INTERNAL MEDICINE

## 2018-09-03 PROCEDURE — 80048 BASIC METABOLIC PNL TOTAL CA: CPT | Performed by: INTERNAL MEDICINE

## 2018-09-03 PROCEDURE — 84132 ASSAY OF SERUM POTASSIUM: CPT | Performed by: INTERNAL MEDICINE

## 2018-09-03 PROCEDURE — 00000146 ZZHCL STATISTIC GLUCOSE BY METER IP

## 2018-09-03 PROCEDURE — 36415 COLL VENOUS BLD VENIPUNCTURE: CPT | Performed by: INTERNAL MEDICINE

## 2018-09-03 PROCEDURE — 80162 ASSAY OF DIGOXIN TOTAL: CPT | Performed by: INTERNAL MEDICINE

## 2018-09-03 PROCEDURE — A9270 NON-COVERED ITEM OR SERVICE: HCPCS | Mod: GY | Performed by: INTERNAL MEDICINE

## 2018-09-03 PROCEDURE — 12000007 ZZH R&B INTERMEDIATE

## 2018-09-03 PROCEDURE — 99233 SBSQ HOSP IP/OBS HIGH 50: CPT | Performed by: INTERNAL MEDICINE

## 2018-09-03 RX ORDER — FUROSEMIDE 10 MG/ML
20 INJECTION INTRAMUSCULAR; INTRAVENOUS ONCE
Status: COMPLETED | OUTPATIENT
Start: 2018-09-03 | End: 2018-09-03

## 2018-09-03 RX ORDER — FUROSEMIDE 20 MG
20 TABLET ORAL DAILY
Status: DISCONTINUED | OUTPATIENT
Start: 2018-09-03 | End: 2018-09-06 | Stop reason: HOSPADM

## 2018-09-03 RX ADMIN — BACITRACIN 0.9 G: 500 OINTMENT TOPICAL at 09:24

## 2018-09-03 RX ADMIN — TAZOBACTAM SODIUM AND PIPERACILLIN SODIUM 4.5 G: 500; 4 INJECTION, SOLUTION INTRAVENOUS at 15:00

## 2018-09-03 RX ADMIN — DIGOXIN 125 MCG: 0.12 TABLET ORAL at 09:16

## 2018-09-03 RX ADMIN — VANCOMYCIN HYDROCHLORIDE 1250 MG: 10 INJECTION, POWDER, LYOPHILIZED, FOR SOLUTION INTRAVENOUS at 10:31

## 2018-09-03 RX ADMIN — SIMVASTATIN 40 MG: 40 TABLET, FILM COATED ORAL at 17:45

## 2018-09-03 RX ADMIN — TERAZOSIN HYDROCHLORIDE 5 MG: 5 CAPSULE ORAL at 20:42

## 2018-09-03 RX ADMIN — OMEPRAZOLE 40 MG: 20 CAPSULE, DELAYED RELEASE ORAL at 09:16

## 2018-09-03 RX ADMIN — BACITRACIN: 500 OINTMENT TOPICAL at 20:42

## 2018-09-03 RX ADMIN — LEVOTHYROXINE SODIUM 150 MCG: 75 TABLET ORAL at 09:16

## 2018-09-03 RX ADMIN — DIVALPROEX SODIUM 250 MG: 250 TABLET, DELAYED RELEASE ORAL at 20:42

## 2018-09-03 RX ADMIN — TAZOBACTAM SODIUM AND PIPERACILLIN SODIUM 4.5 G: 500; 4 INJECTION, SOLUTION INTRAVENOUS at 01:53

## 2018-09-03 RX ADMIN — TRAMADOL HYDROCHLORIDE 50 MG: 50 TABLET, COATED ORAL at 17:45

## 2018-09-03 RX ADMIN — FUROSEMIDE 20 MG: 10 INJECTION, SOLUTION INTRAVENOUS at 10:29

## 2018-09-03 RX ADMIN — CYANOCOBALAMIN TAB 1000 MCG 3000 MCG: 1000 TAB at 09:16

## 2018-09-03 RX ADMIN — TAZOBACTAM SODIUM AND PIPERACILLIN SODIUM 4.5 G: 500; 4 INJECTION, SOLUTION INTRAVENOUS at 09:12

## 2018-09-03 RX ADMIN — TRAMADOL HYDROCHLORIDE 50 MG: 50 TABLET, COATED ORAL at 11:36

## 2018-09-03 RX ADMIN — METOPROLOL SUCCINATE 50 MG: 50 TABLET, EXTENDED RELEASE ORAL at 20:42

## 2018-09-03 RX ADMIN — TAZOBACTAM SODIUM AND PIPERACILLIN SODIUM 4.5 G: 500; 4 INJECTION, SOLUTION INTRAVENOUS at 20:41

## 2018-09-03 RX ADMIN — FUROSEMIDE 20 MG: 20 TABLET ORAL at 10:28

## 2018-09-03 ASSESSMENT — ACTIVITIES OF DAILY LIVING (ADL)
ADLS_ACUITY_SCORE: 11

## 2018-09-03 ASSESSMENT — VISUAL ACUITY
OU: BASELINE;GLASSES

## 2018-09-03 NOTE — PROVIDER NOTIFICATION
Paged dr vila:  1. per note dc bo, still active on mar.  2. per note hold metoprolol sbp<100, not on order parameter.  please update the above. Thanks!    Orders received

## 2018-09-03 NOTE — PROGRESS NOTES
[facility]    Hospitalist Progress Note  Name: Sean Brunner    MRN: [id number]  Provider: Gregoria Patel MD  Date of Service: 09/03/2018    Assessment & Plan   Summary of Stay: Sean Brunner is a 83 year old male who was admitted on 9/1/2018. Pt with past medical history significant for diabetes mellitus, atrial fibrillation, hypothyroidism presented to the emergency room with altered mental status.  Patient gives history of 1 week of pain swelling and redness involving left lower extremity.  In the emergency room patient had elevated lactic acid he was hypotensive tachycardic with erythematous left lower extremity is admitted for sepsis secondary to left lower extremity cellulitis.    9/2/2018 patient was increasingly short of breath.  Weight was up.  Chest x-ray showed mild pleural effusion.  Treated with IV Lasixs     Sepsis secondary to left lower extremity cellulitis  --On admission patient tachycardic, hypotensive, altered mental status with elevated lactic acid levels on admission  --Cellulitis seems to be improving now.  Leg swelling is down  --Blood culture negative to date  --Started on vancomycin and Zosyn.  Cultures remain negative.  Will discontinue vancomycin.  Continue Zosyn  --Ultrasound negative for DVT    Shortness of breath and dyspnea  --Patient was volume up after hydration.  He received fluids for treatment of sepsis  --Improved with IV diuresis  --We will give additional dose of IV Lasix today  --Resume prior to admission daily dose of oral Lasix  --Monitor daily weights I's and O's and electrolytes     Lactic acidosis  --Likely secondary to infection  -- improved with IV fluid     History of diabetes mellitus  --We will hold off metformin for now  --On sliding scale insulin     Atrial fibrillation  --Monitor on telemetry  --Continue digoxin and metoprolol   --We will hold metoprolol if SBP is less than 100  --On  Warfarin.  Monitor INR.  INR supratherapeutic at this  time  --Pharmacy to dose     Hyper lipidemia  --Continue statins     Hypothyroidism  --On levothyroxine     Chronic leg edema  --On torsemide will hold for now.    Blood pressure is on the lower side  --Weight is up.  Will closely monitor.  --Discontinue IV fluids.         DVT Prophylaxis: Warfarin  Code Status: Full Code    Disposition: Expected discharge 2-3 days if continues to improve    Interval History   Review chart.  Last evening patient was short of breath.  Chest x-ray showed new small pleural effusion, overall there was significant weight gain.  Patient responded well to IV Lasix.  Is breathing better at this time.  No chest pain, shortness of breath with minimal exertion.  No cough no fever no chills.  Leg pain has improved it was able to walk on his right leg.  Review of all the other systems negative    -Data reviewed today: I reviewed all new labs and imaging reports over the last 24 hours. I personally reviewed no images or EKG's today.    Physical Exam   Temp: 96.4  F (35.8  C) Temp src: Axillary BP: 105/64 Pulse: 80 Heart Rate: 75 Resp: 16 SpO2: 98 % O2 Device: None (Room air)    Vitals:    09/01/18 0731 09/02/18 0558 09/03/18 0031   Weight: 84.4 kg (186 lb) 94.2 kg (207 lb 11.2 oz) 91.3 kg (201 lb 4.8 oz)     Vital Signs with Ranges  Temp:  [96.4  F (35.8  C)-99.7  F (37.6  C)] 96.4  F (35.8  C)  Pulse:  [80] 80  Heart Rate:  [73-81] 75  Resp:  [16-20] 16  BP: ()/(49-64) 105/64  SpO2:  [97 %-98 %] 98 %  I/O last 3 completed shifts:  In: 530 [P.O.:480; I.V.:50]  Out: 3450 [Urine:3450]      GEN:  Alert, oriented x 3, appears sleepy minimally verbal but appropriately answering questions, no overt distress  HEENT:  Normocephalic/atraumatic, no scleral icterus, no nasal discharge, mouth dry  CV: Irregularly irregular..  S1 + S2 noted, no S3 or S4.  LUNGS: Few bibasilar crackles.  Symmetric chest rise on inhalation noted.  ABD:  Active bowel sounds, soft, non-tender/non-distended.  No  rebound/guarding/rigidity.  EXT: Right lower extremity with significant swelling, erythema, area of warmth and tenderness.  No fluctuance or crepitus  SKIN:  Dry to touch, cellulitis as described above  NEURO:  Symmetric muscle strength, sensation to touch grossly intact.  Coordination symmetric on general exam.  No new focal deficits appreciated.    Medications     - MEDICATION INSTRUCTIONS -       Warfarin Therapy Reminder         bacitracin   Topical BID     cyanocobalamin  3,000 mcg Oral QAM     digoxin  125 mcg Oral Every Other Day     divalproex sodium delayed-release  250 mg Oral At Bedtime     insulin aspart  1-7 Units Subcutaneous TID AC     insulin aspart  1-5 Units Subcutaneous At Bedtime     levothyroxine  150 mcg Oral QAM AC     metoprolol succinate  50 mg Oral QPM     omeprazole (priLOSEC) CR capsule 40 mg  40 mg Oral QAM     piperacillin-tazobactam  4.5 g Intravenous Q6H     simvastatin  40 mg Oral Daily with supper     terazosin  5 mg Oral At Bedtime     vancomycin (VANCOCIN) IV  1,250 mg Intravenous Q12H     Data       Recent Labs  Lab 09/03/18  0714 09/02/18  0641 09/01/18  0747   WBC 4.5 5.8 7.1   HGB 9.6* 9.8* 10.4*   HCT 29.8* 30.0* 32.6*   MCV 98 99 99   * 106* 115*       Recent Labs  Lab 09/03/18  0714 09/02/18  0641 09/01/18  0747    140 139   POTASSIUM 3.6 4.3 4.4   CHLORIDE 107 109 104   CO2 29 27 28   ANIONGAP 5 4 7   GLC 89 92 131*   BUN 17 17 17   CR 1.05 1.07 1.14   GFRESTIMATED 67 66 61   GFRESTBLACK 81 80 74   TYSON 8.5 8.5 8.5       Recent Labs  Lab 09/01/18  0808 09/01/18  0746   CULT No growth after 2 days No growth after 2 days  No growth       Recent Labs  Lab 09/01/18  0747   AST 18   ALT 11   ALKPHOS 63   BILITOTAL 1.2       Recent Labs  Lab 09/03/18  0714 09/02/18  0641 09/01/18  0814   INR 3.66* 4.37* 3.63*       Recent Labs  Lab 09/01/18  1557 09/01/18  1059 09/01/18  0747   LACT 1.8 2.3* 2.6*       Recent Labs  Lab 09/01/18  0747   COLOR Yellow   APPEARANCE  Clear   URINEGLC Negative   URINEBILI Negative   URINEKETONE 5*   SG 1.019   UBLD Negative   URINEPH 7.0   PROTEIN Negative   NITRITE Negative   LEUKEST Trace*   RBCU 3*   WBCU 4       Recent Results (from the past 24 hour(s))   XR Chest Port 1 View    Narrative    XR CHEST PORT 1 VW  9/2/2018 4:43 PM     HISTORY:  rule out pna; cough, weakness, fever;     COMPARISON: None.    FINDINGS:  The cardiac silhouette is at the upper limits of normal in  size. There is slightly increased opacity in the left retrocardiac  region which is new since 4/5/2017 this is consistent with a small  amount of infiltrate or atelectasis. Also appears to be a small area  of infiltrate or atelectasis in the left lung base. Slight blunting of  the costophrenic angles suggesting small pleural effusions.      Impression    IMPRESSION: Mild basilar opacities which are new since 4/5/2017. This  would be consistent with a small amount of infiltrate or atelectasis.  Small pleural effusions are also probably present.    LY TRAN MD

## 2018-09-03 NOTE — PLAN OF CARE
Problem: Patient Care Overview  Goal: Plan of Care/Patient Progress Review  Outcome: Therapy, progress toward functional goals as expected  Orientation: Alert and oriented x4. Forgetful at times. [place]  VSS. 97% on RA. Afebrile.   Neuro: intact. No changes overnight.   LS: infrequent, nonproductive cough. Diminished LS throughout.   GI: Passing gas. no BM. Denies N/V.   : Adequate urine output. Clear yellow. Incontinent at times.   Skin: bruises noted throughout. Blanchable redness to left lower extremity. Blister to left lower extremity. TONG.   Activity: Ax1 with walker. Up to bathroom overnight. Pt slept comfortably throughout shift.   Pain: 0/10. Denies pain throughout shift. No PRN interventions utilized.   Plan: Continue with current cares.

## 2018-09-03 NOTE — PLAN OF CARE
Problem: Patient Care Overview  Goal: Plan of Care/Patient Progress Review  Outcome: Improving  Vss, no co cp/sob, HA controlled with ultram.  [place], alarms on for safety, forgetful, transfers Ax1+GB to BR, no change in neuro assessment.  Tele AFIB CVR ST depression.  LLE wound, elevated up on 2 pillows, see charting for assessment details, wound care done with wound cleanser/bacitracin, improved 2/3+ edema/red/garry.  Per md note dc vanco (not order at this time) but continue zosyn, IV/PO lasix given, strict I and O, pt given and instructed on use/importance of IS, return demonstration successful, encouraged continued use. K+ 3.9, mag 1.8, INR 3.66, BG 80, 98.  Plan to dc in 2-3 days.  Continue poc and monitoring.

## 2018-09-03 NOTE — PHARMACY-VANCOMYCIN DOSING SERVICE
Pharmacy Vancomycin Note  Date of Service 2018  Patient's  1934   83 year old, male    Indication: Sepsis (originally started as Skin/Soft tissue infection)  Goal Trough Level: 15-20 mg/L  Day of Therapy: 2  Current Vancomycin regimen: 1,750 mg IV q12h    Current estimated CrCl = Estimated Creatinine Clearance: 59.3 mL/min (based on Cr of 1.07).    Creatinine for last 3 days  2018:  7:47 AM Creatinine 1.14 mg/dL  2018:  6:41 AM Creatinine 1.07 mg/dL    Recent Vancomycin Levels (past 3 days)  2018:  8:47 PM Vancomycin Level 21.3 mg/L    Vancomycin IV Administrations (past 72 hours)                   vancomycin (VANCOCIN) 1,750 mg in sodium chloride 0.9 % 500 mL intermittent infusion (mg) 1,750 mg Given 184     1,750 mg Given  917     1,750 mg Given 18 2138    vancomycin (VANCOCIN) 1,750 mg in sodium chloride 0.9 % 500 mL intermittent infusion (mg) 1,750 mg Given 18 0955                Nephrotoxins and other renal medications (Future)    Start     Dose/Rate Route Frequency Ordered Stop    18 1400  piperacillin-tazobactam (ZOSYN) intermittent infusion 4.5 g      4.5 g  200 mL/hr over 30 Minutes Intravenous EVERY 6 HOURS 18 1214      18 2200  vancomycin (VANCOCIN) 1,750 mg in sodium chloride 0.9 % 500 mL intermittent infusion      1,750 mg  over 2 Hours Intravenous EVERY 12 HOURS 18 1149               Contrast Orders - past 72 hours     None          Interpretation of levels and current regimen:  Trough level is Supratherapeutic    Has serum creatinine changed > 50% in last 72 hours: No    Urine output: unable to determine    Renal Function: Stable    Plan:  1.  Decrease Dose to 1,250 mg IV q12h  2.  Pharmacy will check trough levels as appropriate in 1-3 Days.    3. Serum creatinine levels will be ordered daily for the first week of therapy and at least twice weekly for subsequent weeks.      Sabra Moise

## 2018-09-03 NOTE — PLAN OF CARE
Problem: Infection, Risk/Actual (Adult)  Goal: Identify Related Risk Factors and Signs and Symptoms  Related risk factors and signs and symptoms are identified upon initiation of Human [employer] Clinical Practice Guideline (CPG).   Outcome: Improving  VSS, max Temp=99.7  LS are dim, 97% on RA. Pt received a total of 40 mg IV Lasix for increased sob, temp and pleural effusions, LS had crackles, now are dim. Good urine output this shift. Pt up w/ assist x1 to BR. LLE kept up on 2 pillows, area cleansed, and bacitracin applied to affected area. BP's were 135 and 131. Tele is Afib.

## 2018-09-04 LAB
ANION GAP SERPL CALCULATED.3IONS-SCNC: 6 MMOL/L (ref 3–14)
BASOPHILS # BLD AUTO: 0 10E9/L (ref 0–0.2)
BASOPHILS NFR BLD AUTO: 0.8 %
BUN SERPL-MCNC: 15 MG/DL (ref 7–30)
CALCIUM SERPL-MCNC: 8.5 MG/DL (ref 8.5–10.1)
CHLORIDE SERPL-SCNC: 104 MMOL/L (ref 94–109)
CO2 SERPL-SCNC: 31 MMOL/L (ref 20–32)
CREAT SERPL-MCNC: 1.06 MG/DL (ref 0.66–1.25)
DIFFERENTIAL METHOD BLD: ABNORMAL
EOSINOPHIL # BLD AUTO: 0.2 10E9/L (ref 0–0.7)
EOSINOPHIL NFR BLD AUTO: 4.7 %
ERYTHROCYTE [DISTWIDTH] IN BLOOD BY AUTOMATED COUNT: 14.5 % (ref 10–15)
GFR SERPL CREATININE-BSD FRML MDRD: 67 ML/MIN/1.7M2
GLUCOSE BLDC GLUCOMTR-MCNC: 102 MG/DL (ref 70–99)
GLUCOSE BLDC GLUCOMTR-MCNC: 103 MG/DL (ref 70–99)
GLUCOSE BLDC GLUCOMTR-MCNC: 108 MG/DL (ref 70–99)
GLUCOSE BLDC GLUCOMTR-MCNC: 90 MG/DL (ref 70–99)
GLUCOSE SERPL-MCNC: 79 MG/DL (ref 70–99)
HCT VFR BLD AUTO: 30.2 % (ref 40–53)
HGB BLD-MCNC: 10 G/DL (ref 13.3–17.7)
IMM GRANULOCYTES # BLD: 0 10E9/L (ref 0–0.4)
IMM GRANULOCYTES NFR BLD: 0.3 %
INR PPP: 3.41 (ref 0.86–1.14)
LYMPHOCYTES # BLD AUTO: 0.9 10E9/L (ref 0.8–5.3)
LYMPHOCYTES NFR BLD AUTO: 22.6 %
MCH RBC QN AUTO: 31.8 PG (ref 26.5–33)
MCHC RBC AUTO-ENTMCNC: 33.1 G/DL (ref 31.5–36.5)
MCV RBC AUTO: 96 FL (ref 78–100)
MONOCYTES # BLD AUTO: 0.4 10E9/L (ref 0–1.3)
MONOCYTES NFR BLD AUTO: 11.3 %
NEUTROPHILS # BLD AUTO: 2.3 10E9/L (ref 1.6–8.3)
NEUTROPHILS NFR BLD AUTO: 60.3 %
NRBC # BLD AUTO: 0 10*3/UL
NRBC BLD AUTO-RTO: 0 /100
PLATELET # BLD AUTO: 115 10E9/L (ref 150–450)
POTASSIUM SERPL-SCNC: 3.5 MMOL/L (ref 3.4–5.3)
RBC # BLD AUTO: 3.14 10E12/L (ref 4.4–5.9)
SODIUM SERPL-SCNC: 141 MMOL/L (ref 133–144)
WBC # BLD AUTO: 3.8 10E9/L (ref 4–11)

## 2018-09-04 PROCEDURE — 25000132 ZZH RX MED GY IP 250 OP 250 PS 637: Mod: GY | Performed by: INTERNAL MEDICINE

## 2018-09-04 PROCEDURE — 00000146 ZZHCL STATISTIC GLUCOSE BY METER IP

## 2018-09-04 PROCEDURE — 80048 BASIC METABOLIC PNL TOTAL CA: CPT | Performed by: INTERNAL MEDICINE

## 2018-09-04 PROCEDURE — 12000007 ZZH R&B INTERMEDIATE

## 2018-09-04 PROCEDURE — A9270 NON-COVERED ITEM OR SERVICE: HCPCS | Mod: GY | Performed by: INTERNAL MEDICINE

## 2018-09-04 PROCEDURE — 99232 SBSQ HOSP IP/OBS MODERATE 35: CPT | Performed by: INTERNAL MEDICINE

## 2018-09-04 PROCEDURE — 25000125 ZZHC RX 250: Performed by: INTERNAL MEDICINE

## 2018-09-04 PROCEDURE — 85610 PROTHROMBIN TIME: CPT | Performed by: INTERNAL MEDICINE

## 2018-09-04 PROCEDURE — 25000128 H RX IP 250 OP 636: Performed by: INTERNAL MEDICINE

## 2018-09-04 PROCEDURE — 85025 COMPLETE CBC W/AUTO DIFF WBC: CPT | Performed by: INTERNAL MEDICINE

## 2018-09-04 PROCEDURE — 36415 COLL VENOUS BLD VENIPUNCTURE: CPT | Performed by: INTERNAL MEDICINE

## 2018-09-04 RX ADMIN — OMEPRAZOLE 40 MG: 20 CAPSULE, DELAYED RELEASE ORAL at 07:58

## 2018-09-04 RX ADMIN — LEVOTHYROXINE SODIUM 150 MCG: 75 TABLET ORAL at 07:58

## 2018-09-04 RX ADMIN — CYANOCOBALAMIN TAB 1000 MCG 3000 MCG: 1000 TAB at 07:58

## 2018-09-04 RX ADMIN — BACITRACIN: 500 OINTMENT TOPICAL at 19:34

## 2018-09-04 RX ADMIN — TAZOBACTAM SODIUM AND PIPERACILLIN SODIUM 4.5 G: 500; 4 INJECTION, SOLUTION INTRAVENOUS at 14:48

## 2018-09-04 RX ADMIN — METOPROLOL SUCCINATE 50 MG: 50 TABLET, EXTENDED RELEASE ORAL at 19:34

## 2018-09-04 RX ADMIN — DIVALPROEX SODIUM 250 MG: 250 TABLET, DELAYED RELEASE ORAL at 21:38

## 2018-09-04 RX ADMIN — TRAMADOL HYDROCHLORIDE 50 MG: 50 TABLET, COATED ORAL at 17:57

## 2018-09-04 RX ADMIN — TAZOBACTAM SODIUM AND PIPERACILLIN SODIUM 4.5 G: 500; 4 INJECTION, SOLUTION INTRAVENOUS at 09:09

## 2018-09-04 RX ADMIN — TAZOBACTAM SODIUM AND PIPERACILLIN SODIUM 4.5 G: 500; 4 INJECTION, SOLUTION INTRAVENOUS at 21:38

## 2018-09-04 RX ADMIN — TAZOBACTAM SODIUM AND PIPERACILLIN SODIUM 4.5 G: 500; 4 INJECTION, SOLUTION INTRAVENOUS at 03:16

## 2018-09-04 RX ADMIN — SIMVASTATIN 40 MG: 40 TABLET, FILM COATED ORAL at 17:57

## 2018-09-04 RX ADMIN — TERAZOSIN HYDROCHLORIDE 5 MG: 5 CAPSULE ORAL at 21:38

## 2018-09-04 RX ADMIN — BACITRACIN: 500 OINTMENT TOPICAL at 09:09

## 2018-09-04 RX ADMIN — FUROSEMIDE 20 MG: 20 TABLET ORAL at 07:58

## 2018-09-04 ASSESSMENT — VISUAL ACUITY
OU: GLASSES;BASELINE
OU: BASELINE;GLASSES

## 2018-09-04 ASSESSMENT — ACTIVITIES OF DAILY LIVING (ADL)
ADLS_ACUITY_SCORE: 11
ADLS_ACUITY_SCORE: 9
ADLS_ACUITY_SCORE: 11
ADLS_ACUITY_SCORE: 11
ADLS_ACUITY_SCORE: 9
ADLS_ACUITY_SCORE: 11

## 2018-09-04 NOTE — PLAN OF CARE
Problem: Patient Care Overview  Goal: Plan of Care/Patient Progress Review  A&O, forgetful. Alarms on in bed and chair. [place]. Neuros intact. Up with assist x1 and walker. Lung sounds: clear. Tele: AFib CVR- HR 70-80's. Diet: mod carb. B- pt notes this is normal for him, orange juice and breakfast provided, and 109. PIV: SL. Pain: denies.  Wound care completed on lower left leg, redness within boarders. Cont POC.

## 2018-09-04 NOTE — PROGRESS NOTES
[facility]    Hospitalist Progress Note  Name: Sean Brunner    MRN: [id number]  Provider: Gregoria Patel MD  Date of Service: 09/04/2018    Assessment & Plan   Summary of Stay: Sean Brunner is a 83 year old male who was admitted on 9/1/2018. Pt with past medical history significant for diabetes mellitus, atrial fibrillation, hypothyroidism presented to the emergency room with altered mental status.  Patient gives history of 1 week of pain swelling and redness involving left lower extremity.  In the emergency room patient had elevated lactic acid he was hypotensive tachycardic with erythematous left lower extremity is admitted for sepsis secondary to left lower extremity cellulitis.    9/2/2018 patient was increasingly short of breath.  Weight was up.  Chest x-ray showed mild pleural effusion.  Treated with IV Lasixs. Breathing has since improved.     Sepsis secondary to left lower extremity cellulitis  --On admission patient tachycardic, hypotensive, altered mental status with elevated lactic acid levels on admission  --Cellulitis seems to be improving now.  Leg swelling is down  --Blood culture negative to date  --Started on vancomycin and Zosyn.  Cultures remain negative.  Discontinued vancomycin.  Continue Zosyn  --Ultrasound negative for DVT    Shortness of breath and dyspnea  --Patient was volume up after hydration.  He received fluids for treatment of sepsis  --Improved with IV diuresis  --Resume prior to admission daily dose of oral Lasix  --Monitor daily weights I's and O's and electrolytes     Lactic acidosis  --Likely secondary to infection  -- improved with IV fluid     History of diabetes mellitus  --We will hold off metformin for now  --On sliding scale insulin     Atrial fibrillation  --Monitor on telemetry  --Continue digoxin and metoprolol   --We will hold metoprolol if SBP is less than 100  --On  Warfarin.  Monitor INR.  INR supratherapeutic at this time  --Pharmacy to  dose     Hyper lipidemia  --Continue statins     Hypothyroidism  --On levothyroxine     Chronic leg edema  ---- on po diuretics.         DVT Prophylaxis: Warfarin  Code Status: Full Code    Disposition: Expected discharge 2-3 days if continues to improve    Interval History    No chest pain, shortness of breath with minimal exertion.  No cough no fever no chills.  Leg pain has improved it was able to walk on his right leg.  Review of all the other systems negative    -Data reviewed today: I reviewed all new labs and imaging reports over the last 24 hours. I personally reviewed no images or EKG's today.    Physical Exam   Temp: 98.8  F (37.1  C) Temp src: Oral BP: 101/46 Pulse: 79 Heart Rate: 78 Resp: 16 SpO2: 96 % O2 Device: None (Room air)    Vitals:    09/02/18 0558 09/03/18 0031 09/04/18 0403   Weight: 94.2 kg (207 lb 11.2 oz) 91.3 kg (201 lb 4.8 oz) 88.4 kg (194 lb 14.4 oz)     Vital Signs with Ranges  Temp:  [96.4  F (35.8  C)-98.9  F (37.2  C)] 98.8  F (37.1  C)  Pulse:  [79] 79  Heart Rate:  [75-82] 78  Resp:  [16] 16  BP: ()/(46-64) 101/46  SpO2:  [96 %-99 %] 96 %  I/O last 3 completed shifts:  In: 988 [P.O.:540; I.V.:448]  Out: 3775 [Urine:3775]      GEN:  Alert, oriented x 3, appears sleepy minimally verbal but appropriately answering questions, no overt distress  HEENT:  Normocephalic/atraumatic, no scleral icterus, no nasal discharge, mouth dry  CV: Irregularly irregular..  S1 + S2 noted, no S3 or S4.  LUNGS: Few bibasilar crackles.  Symmetric chest rise on inhalation noted.  ABD:  Active bowel sounds, soft, non-tender/non-distended.  No rebound/guarding/rigidity.  EXT: Right lower extremity with significant swelling, erythema, area of warmth and tenderness.  No fluctuance or crepitus  SKIN:  Dry to touch, cellulitis as described above  NEURO:  Symmetric muscle strength, sensation to touch grossly intact.  Coordination symmetric on general exam.  No new focal deficits appreciated.    Medications      - MEDICATION INSTRUCTIONS -       Warfarin Therapy Reminder         bacitracin   Topical BID     cyanocobalamin  3,000 mcg Oral QAM     digoxin  125 mcg Oral Every Other Day     divalproex sodium delayed-release  250 mg Oral At Bedtime     furosemide  20 mg Oral Daily     insulin aspart  1-7 Units Subcutaneous TID AC     insulin aspart  1-5 Units Subcutaneous At Bedtime     levothyroxine  150 mcg Oral QAM AC     metoprolol succinate  50 mg Oral QPM     omeprazole (priLOSEC) CR capsule 40 mg  40 mg Oral QAM     piperacillin-tazobactam  4.5 g Intravenous Q6H     simvastatin  40 mg Oral Daily with supper     terazosin  5 mg Oral At Bedtime     Data       Recent Labs  Lab 09/04/18  0655 09/03/18  0714 09/02/18  0641   WBC 3.8* 4.5 5.8   HGB 10.0* 9.6* 9.8*   HCT 30.2* 29.8* 30.0*   MCV 96 98 99   * 100* 106*       Recent Labs  Lab 09/04/18  0655 09/03/18  1248 09/03/18  0714 09/02/18  0641     --  141 140   POTASSIUM 3.5 3.9 3.6 4.3   CHLORIDE 104  --  107 109   CO2 31  --  29 27   ANIONGAP 6  --  5 4   GLC 79  --  89 92   BUN 15  --  17 17   CR 1.06  --  1.05 1.07   GFRESTIMATED 67  --  67 66   GFRESTBLACK 81  --  81 80   TYSON 8.5  --  8.5 8.5       Recent Labs  Lab 09/01/18  0808 09/01/18  0746   CULT No growth after 3 days No growth after 3 days  No growth       Recent Labs  Lab 09/01/18  0747   AST 18   ALT 11   ALKPHOS 63   BILITOTAL 1.2       Recent Labs  Lab 09/04/18  0655 09/03/18  0714 09/02/18  0641   INR 3.41* 3.66* 4.37*       Recent Labs  Lab 09/01/18  1557 09/01/18  1059 09/01/18  0747   LACT 1.8 2.3* 2.6*       Recent Labs  Lab 09/01/18  0747   COLOR Yellow   APPEARANCE Clear   URINEGLC Negative   URINEBILI Negative   URINEKETONE 5*   SG 1.019   UBLD Negative   URINEPH 7.0   PROTEIN Negative   NITRITE Negative   LEUKEST Trace*   RBCU 3*   WBCU 4       No results found for this or any previous visit (from the past 24 hour(s)).

## 2018-09-04 NOTE — PLAN OF CARE
Problem: Infection, Risk/Actual (Adult)  Goal: Identify Related Risk Factors and Signs and Symptoms  Related risk factors and signs and symptoms are identified upon initiation of Human Response Clinical Practice Guideline (CPG).   Outcome: No Change  VSS on RA. Pt rested in bed this shift, able to turn/reposition self. Up with SBA/A1 and walker to bathroom. Attempted to elevate LLE on pillows, falls off while alseep. Denies numbness and tingling to BLE, strength intact. PIV SL between IV abx. Voiding with adequate UO, encouraged increased PO overnight. BG 90 this shift. Neuro intact. Bed alarms in use, pt not attempting to exit bed. Alert and oriented, able to make needs known. Continue to monitor.

## 2018-09-05 LAB
ANION GAP SERPL CALCULATED.3IONS-SCNC: 5 MMOL/L (ref 3–14)
BASOPHILS # BLD AUTO: 0 10E9/L (ref 0–0.2)
BASOPHILS NFR BLD AUTO: 0.6 %
BUN SERPL-MCNC: 14 MG/DL (ref 7–30)
CALCIUM SERPL-MCNC: 8.4 MG/DL (ref 8.5–10.1)
CHLORIDE SERPL-SCNC: 105 MMOL/L (ref 94–109)
CO2 SERPL-SCNC: 30 MMOL/L (ref 20–32)
CREAT SERPL-MCNC: 1.05 MG/DL (ref 0.66–1.25)
DIFFERENTIAL METHOD BLD: ABNORMAL
EOSINOPHIL # BLD AUTO: 0.2 10E9/L (ref 0–0.7)
EOSINOPHIL NFR BLD AUTO: 4.8 %
ERYTHROCYTE [DISTWIDTH] IN BLOOD BY AUTOMATED COUNT: 14.2 % (ref 10–15)
GFR SERPL CREATININE-BSD FRML MDRD: 67 ML/MIN/1.7M2
GLUCOSE BLDC GLUCOMTR-MCNC: 109 MG/DL (ref 70–99)
GLUCOSE BLDC GLUCOMTR-MCNC: 144 MG/DL (ref 70–99)
GLUCOSE BLDC GLUCOMTR-MCNC: 91 MG/DL (ref 70–99)
GLUCOSE BLDC GLUCOMTR-MCNC: 99 MG/DL (ref 70–99)
GLUCOSE SERPL-MCNC: 79 MG/DL (ref 70–99)
HCT VFR BLD AUTO: 30.6 % (ref 40–53)
HGB BLD-MCNC: 10 G/DL (ref 13.3–17.7)
IMM GRANULOCYTES # BLD: 0 10E9/L (ref 0–0.4)
IMM GRANULOCYTES NFR BLD: 0.6 %
INR PPP: 2.97 (ref 0.86–1.14)
LYMPHOCYTES # BLD AUTO: 0.9 10E9/L (ref 0.8–5.3)
LYMPHOCYTES NFR BLD AUTO: 27.1 %
MCH RBC QN AUTO: 31.9 PG (ref 26.5–33)
MCHC RBC AUTO-ENTMCNC: 32.7 G/DL (ref 31.5–36.5)
MCV RBC AUTO: 98 FL (ref 78–100)
MONOCYTES # BLD AUTO: 0.4 10E9/L (ref 0–1.3)
MONOCYTES NFR BLD AUTO: 11.9 %
NEUTROPHILS # BLD AUTO: 1.9 10E9/L (ref 1.6–8.3)
NEUTROPHILS NFR BLD AUTO: 55 %
NRBC # BLD AUTO: 0 10*3/UL
NRBC BLD AUTO-RTO: 0 /100
PLATELET # BLD AUTO: 121 10E9/L (ref 150–450)
POTASSIUM SERPL-SCNC: 3.7 MMOL/L (ref 3.4–5.3)
RBC # BLD AUTO: 3.13 10E12/L (ref 4.4–5.9)
SODIUM SERPL-SCNC: 140 MMOL/L (ref 133–144)
WBC # BLD AUTO: 3.4 10E9/L (ref 4–11)

## 2018-09-05 PROCEDURE — 25000132 ZZH RX MED GY IP 250 OP 250 PS 637: Mod: GY | Performed by: INTERNAL MEDICINE

## 2018-09-05 PROCEDURE — A9270 NON-COVERED ITEM OR SERVICE: HCPCS | Mod: GY | Performed by: INTERNAL MEDICINE

## 2018-09-05 PROCEDURE — 00000146 ZZHCL STATISTIC GLUCOSE BY METER IP

## 2018-09-05 PROCEDURE — 25000125 ZZHC RX 250: Performed by: INTERNAL MEDICINE

## 2018-09-05 PROCEDURE — 85025 COMPLETE CBC W/AUTO DIFF WBC: CPT | Performed by: INTERNAL MEDICINE

## 2018-09-05 PROCEDURE — 25000128 H RX IP 250 OP 636: Performed by: INTERNAL MEDICINE

## 2018-09-05 PROCEDURE — 80048 BASIC METABOLIC PNL TOTAL CA: CPT | Performed by: INTERNAL MEDICINE

## 2018-09-05 PROCEDURE — 12000007 ZZH R&B INTERMEDIATE

## 2018-09-05 PROCEDURE — 85610 PROTHROMBIN TIME: CPT | Performed by: INTERNAL MEDICINE

## 2018-09-05 PROCEDURE — 36415 COLL VENOUS BLD VENIPUNCTURE: CPT | Performed by: INTERNAL MEDICINE

## 2018-09-05 PROCEDURE — 99233 SBSQ HOSP IP/OBS HIGH 50: CPT | Performed by: INTERNAL MEDICINE

## 2018-09-05 RX ADMIN — CYANOCOBALAMIN TAB 1000 MCG 3000 MCG: 1000 TAB at 07:43

## 2018-09-05 RX ADMIN — OMEPRAZOLE 40 MG: 20 CAPSULE, DELAYED RELEASE ORAL at 07:42

## 2018-09-05 RX ADMIN — TAZOBACTAM SODIUM AND PIPERACILLIN SODIUM 4.5 G: 500; 4 INJECTION, SOLUTION INTRAVENOUS at 08:58

## 2018-09-05 RX ADMIN — DIVALPROEX SODIUM 250 MG: 250 TABLET, DELAYED RELEASE ORAL at 21:09

## 2018-09-05 RX ADMIN — FUROSEMIDE 20 MG: 20 TABLET ORAL at 07:43

## 2018-09-05 RX ADMIN — TERAZOSIN HYDROCHLORIDE 5 MG: 5 CAPSULE ORAL at 21:09

## 2018-09-05 RX ADMIN — BACITRACIN: 500 OINTMENT TOPICAL at 07:44

## 2018-09-05 RX ADMIN — TAZOBACTAM SODIUM AND PIPERACILLIN SODIUM 4.5 G: 500; 4 INJECTION, SOLUTION INTRAVENOUS at 03:51

## 2018-09-05 RX ADMIN — BACITRACIN: 500 OINTMENT TOPICAL at 21:13

## 2018-09-05 RX ADMIN — AMOXICILLIN AND CLAVULANATE POTASSIUM 1 TABLET: 875; 125 TABLET, FILM COATED ORAL at 21:09

## 2018-09-05 RX ADMIN — LEVOTHYROXINE SODIUM 150 MCG: 75 TABLET ORAL at 06:12

## 2018-09-05 RX ADMIN — METOPROLOL SUCCINATE 50 MG: 50 TABLET, EXTENDED RELEASE ORAL at 21:09

## 2018-09-05 RX ADMIN — AMOXICILLIN AND CLAVULANATE POTASSIUM 1 TABLET: 875; 125 TABLET, FILM COATED ORAL at 11:55

## 2018-09-05 RX ADMIN — SIMVASTATIN 40 MG: 40 TABLET, FILM COATED ORAL at 16:26

## 2018-09-05 RX ADMIN — TRAMADOL HYDROCHLORIDE 50 MG: 50 TABLET, COATED ORAL at 10:33

## 2018-09-05 ASSESSMENT — VISUAL ACUITY
OU: GLASSES;BASELINE
OU: GLASSES

## 2018-09-05 ASSESSMENT — ACTIVITIES OF DAILY LIVING (ADL)
ADLS_ACUITY_SCORE: 11

## 2018-09-05 ASSESSMENT — PAIN DESCRIPTION - DESCRIPTORS: DESCRIPTORS: HEADACHE

## 2018-09-05 NOTE — PLAN OF CARE
Problem: Infection, Risk/Actual (Adult)  Goal: Identify Related Risk Factors and Signs and Symptoms  Related risk factors and signs and symptoms are identified upon initiation of Human Response Clinical Practice Guideline (CPG).   Outcome: No Change  VS stable. Diminshed LS. +1 edema to RLE. +2 edema to LLE. Left lower extremity is warm and red. Forgetful. [place]. Neuro's intact. No complaints of pain. Tele is a fib CVR. Slept well throughout the night.

## 2018-09-05 NOTE — PLAN OF CARE
Problem: Patient Care Overview  Goal: Plan of Care/Patient Progress Review  A&O, forgetful and [place]- hearing aides in place. Up with SBA and walker- ambulated in halls. Lung sounds: dim. Tele: AFib CVR- HR in 60-70's. Diet: mod carb. B and 91. Large formed BM. PIV: SL. Pain: 6/10 headache, requested tramadol. 3.2 sec pause this morning. Digoxin held. Zosyn changed to augmentin today. Possible discharge home tomorrow.

## 2018-09-05 NOTE — PROGRESS NOTES
[facility]  Hospitalist Progress Note  Kimi Gil MD 09/05/2018    Reason for Stay (Diagnosis): sepsis 2/2 LLE cellulitis         Assessment and Plan:      Summary of Stay: Sean Brunner is a 83 year old male with a history of T2dm, AF on warfarin, hypothyroidism admitted on 9/1/2018 with 1 week hx of increasing LLE erythema and pain, with evaluation notable for fever, hypotension, tachycardia, elevated lactic acid consistent with sepsis in setting of LLE cellulitis.     Given sepsis-he was empirically placed on pip-tazo and vanco and had substantial improvement, vanco dc'd on 9/3/18.    Sepsis has resolved and cellulitis is improving  Problem List:   1. Sepsis:  Fevere/leukocytosis/tachycardaia/elevated lactic acid/ hypertension-resolved, did not require pressor support  2. LLE cellulitis:  On day 5 of pip-tazo, rec'd 3 days of vanco (dc'd 9/3).  Will stop pip-tazo today and challenge with oral amox -clav in preparation for discharge soon.  3. Leukopenia:  ? Component of fluid.  Does not appear septic anymore- will recheck in am with diff  4. T2dm:  pta meds:  Metformin 500 mg qpm  5. AF:  Rate controlled and also having pauses-discontinued digoxin, cont BB, cont tele.  Pharmacy to manage warfarin  6. Htn:  Home regimen is furosemide 20 mg every day, metop xl 50 mg q pm, terazosin 5 mg at bedtime-resumed and BPs quite variable 100-140's/40-50 's  7. Hlp:  Cont statin as at home  8. Chronic headaches:  Cont divalproex as at home  9. GERD:  Cont PPI  DVT Prophylaxis: Warfarin  Code Status: Full Code    Disposition Plan   Expected discharge in 1-2 days to prior living arrangement once tolerates oral abx.     Entered: Kimi Gil 09/05/2018, 4:18 PM               Interval History (Subjective):      Feels good, thinks cellulitis continues to improve denies cp or sob no n/v/d no abdominal pain.  Able to bear weight on leg without difficulty                   Physical Exam:      Last Vital Signs:  BP  "101/51 (BP Location: Left arm)  Pulse 71  Temp 98  F (36.7  C) (Oral)  Resp 16  Ht 1.753 m (5' 9\")  Wt 88.4 kg (194 lb 14.4 oz)  SpO2 98%  BMI 28.78 kg/m2    Pleasant nad looks stated age head nc/at sclera clear lungs ctab nl effort rrr no mrg no le edema skin w/d no c/c abd s/nt/nd alert and oriented affect appropriate olivares            Medications:      All current medications were reviewed with changes reflected in problem list.         Data:      All new lab and imaging data was reviewed.   Labs:    Recent Labs  Lab 09/05/18  0644      POTASSIUM 3.7   CHLORIDE 105   CO2 30   ANIONGAP 5   GLC 79   BUN 14   CR 1.05   GFRESTIMATED 67   GFRESTBLACK 81   TYSON 8.4*       Recent Labs  Lab 09/05/18  0644   WBC 3.4*   HGB 10.0*   HCT 30.6*   MCV 98   *      Imaging:       "

## 2018-09-05 NOTE — PLAN OF CARE
Problem: Patient Care Overview  Goal: Plan of Care/Patient Progress Review  Outcome: Improving  VSS, BPs soft.  Afib on tele.  2.6 second pause X1 at shift change.  Pt asymptomatic.  Hx of pauses on previous shifts.  RA SPO2.  C/O headache; treated with Tramadol.  Neuro checks unchanged from previous assessments.  States good relief.  Up in chair for meal.  BGT stable.  Ate 100% supper.  Up with SBA and FWW.  LLEE.  Elevated on pillows.  Redness outlined; no increase.  Elevated on pillows while in bed.  Bacitracin to LLE per orders.  Fall precautions in place; bed/chair alarms on.  A/O X4, but forgetful and [place].  Discharge in couple days.  Continue to monitor and implement plan of care.

## 2018-09-06 VITALS
WEIGHT: 192.6 LBS | OXYGEN SATURATION: 95 % | HEART RATE: 75 BPM | SYSTOLIC BLOOD PRESSURE: 104 MMHG | TEMPERATURE: 97.6 F | DIASTOLIC BLOOD PRESSURE: 50 MMHG | HEIGHT: 69 IN | RESPIRATION RATE: 18 BRPM | BODY MASS INDEX: 28.53 KG/M2

## 2018-09-06 LAB
ANION GAP SERPL CALCULATED.3IONS-SCNC: 5 MMOL/L (ref 3–14)
BASOPHILS # BLD AUTO: 0 10E9/L (ref 0–0.2)
BASOPHILS NFR BLD AUTO: 0.6 %
BUN SERPL-MCNC: 13 MG/DL (ref 7–30)
CALCIUM SERPL-MCNC: 8.4 MG/DL (ref 8.5–10.1)
CHLORIDE SERPL-SCNC: 106 MMOL/L (ref 94–109)
CO2 SERPL-SCNC: 29 MMOL/L (ref 20–32)
CREAT SERPL-MCNC: 0.96 MG/DL (ref 0.66–1.25)
DIFFERENTIAL METHOD BLD: ABNORMAL
EOSINOPHIL # BLD AUTO: 0.2 10E9/L (ref 0–0.7)
EOSINOPHIL NFR BLD AUTO: 5.6 %
ERYTHROCYTE [DISTWIDTH] IN BLOOD BY AUTOMATED COUNT: 14.2 % (ref 10–15)
GFR SERPL CREATININE-BSD FRML MDRD: 75 ML/MIN/1.7M2
GLUCOSE BLDC GLUCOMTR-MCNC: 110 MG/DL (ref 70–99)
GLUCOSE BLDC GLUCOMTR-MCNC: 81 MG/DL (ref 70–99)
GLUCOSE BLDC GLUCOMTR-MCNC: 81 MG/DL (ref 70–99)
GLUCOSE BLDC GLUCOMTR-MCNC: 86 MG/DL (ref 70–99)
GLUCOSE SERPL-MCNC: 82 MG/DL (ref 70–99)
HCT VFR BLD AUTO: 30.5 % (ref 40–53)
HGB BLD-MCNC: 9.8 G/DL (ref 13.3–17.7)
IMM GRANULOCYTES # BLD: 0 10E9/L (ref 0–0.4)
IMM GRANULOCYTES NFR BLD: 0.6 %
INR PPP: 2.52 (ref 0.86–1.14)
LYMPHOCYTES # BLD AUTO: 0.9 10E9/L (ref 0.8–5.3)
LYMPHOCYTES NFR BLD AUTO: 27.5 %
MAGNESIUM SERPL-MCNC: 2 MG/DL (ref 1.6–2.3)
MCH RBC QN AUTO: 31.3 PG (ref 26.5–33)
MCHC RBC AUTO-ENTMCNC: 32.1 G/DL (ref 31.5–36.5)
MCV RBC AUTO: 97 FL (ref 78–100)
MONOCYTES # BLD AUTO: 0.4 10E9/L (ref 0–1.3)
MONOCYTES NFR BLD AUTO: 12.1 %
NEUTROPHILS # BLD AUTO: 1.8 10E9/L (ref 1.6–8.3)
NEUTROPHILS NFR BLD AUTO: 53.6 %
NRBC # BLD AUTO: 0 10*3/UL
NRBC BLD AUTO-RTO: 0 /100
PLATELET # BLD AUTO: 117 10E9/L (ref 150–450)
POTASSIUM SERPL-SCNC: 3.9 MMOL/L (ref 3.4–5.3)
RBC # BLD AUTO: 3.13 10E12/L (ref 4.4–5.9)
SODIUM SERPL-SCNC: 140 MMOL/L (ref 133–144)
TSH SERPL DL<=0.005 MIU/L-ACNC: 3.47 MU/L (ref 0.4–4)
WBC # BLD AUTO: 3.4 10E9/L (ref 4–11)

## 2018-09-06 PROCEDURE — 00000146 ZZHCL STATISTIC GLUCOSE BY METER IP

## 2018-09-06 PROCEDURE — 84443 ASSAY THYROID STIM HORMONE: CPT | Performed by: INTERNAL MEDICINE

## 2018-09-06 PROCEDURE — 25000125 ZZHC RX 250: Performed by: INTERNAL MEDICINE

## 2018-09-06 PROCEDURE — 80048 BASIC METABOLIC PNL TOTAL CA: CPT | Performed by: INTERNAL MEDICINE

## 2018-09-06 PROCEDURE — 25000132 ZZH RX MED GY IP 250 OP 250 PS 637: Mod: GY | Performed by: INTERNAL MEDICINE

## 2018-09-06 PROCEDURE — 83735 ASSAY OF MAGNESIUM: CPT | Performed by: INTERNAL MEDICINE

## 2018-09-06 PROCEDURE — A9270 NON-COVERED ITEM OR SERVICE: HCPCS | Mod: GY | Performed by: INTERNAL MEDICINE

## 2018-09-06 PROCEDURE — 93227 XTRNL ECG REC<48 HR R&I: CPT | Performed by: INTERNAL MEDICINE

## 2018-09-06 PROCEDURE — 36415 COLL VENOUS BLD VENIPUNCTURE: CPT | Performed by: INTERNAL MEDICINE

## 2018-09-06 PROCEDURE — 85025 COMPLETE CBC W/AUTO DIFF WBC: CPT | Performed by: INTERNAL MEDICINE

## 2018-09-06 PROCEDURE — 85610 PROTHROMBIN TIME: CPT | Performed by: INTERNAL MEDICINE

## 2018-09-06 PROCEDURE — 93226 XTRNL ECG REC<48 HR SCAN A/R: CPT | Performed by: INTERNAL MEDICINE

## 2018-09-06 PROCEDURE — 99239 HOSP IP/OBS DSCHRG MGMT >30: CPT | Performed by: INTERNAL MEDICINE

## 2018-09-06 RX ORDER — WARFARIN SODIUM 1 MG/1
1 TABLET ORAL DAILY
Qty: 30 TABLET | Status: ON HOLD
Start: 2018-09-06 | End: 2018-11-08

## 2018-09-06 RX ORDER — WARFARIN SODIUM 1 MG/1
1 TABLET ORAL
Status: DISCONTINUED | OUTPATIENT
Start: 2018-09-06 | End: 2018-09-06 | Stop reason: HOSPADM

## 2018-09-06 RX ORDER — METOPROLOL SUCCINATE 25 MG/1
25 TABLET, EXTENDED RELEASE ORAL EVERY EVENING
Qty: 30 TABLET | Status: ON HOLD
Start: 2018-09-06 | End: 2019-01-01

## 2018-09-06 RX ADMIN — TRAMADOL HYDROCHLORIDE 50 MG: 50 TABLET, COATED ORAL at 12:19

## 2018-09-06 RX ADMIN — LEVOTHYROXINE SODIUM 150 MCG: 75 TABLET ORAL at 06:57

## 2018-09-06 RX ADMIN — FUROSEMIDE 20 MG: 20 TABLET ORAL at 07:48

## 2018-09-06 RX ADMIN — CYANOCOBALAMIN TAB 1000 MCG 3000 MCG: 1000 TAB at 07:47

## 2018-09-06 RX ADMIN — BACITRACIN: 500 OINTMENT TOPICAL at 07:47

## 2018-09-06 RX ADMIN — AMOXICILLIN AND CLAVULANATE POTASSIUM 1 TABLET: 875; 125 TABLET, FILM COATED ORAL at 07:48

## 2018-09-06 RX ADMIN — TRAMADOL HYDROCHLORIDE 50 MG: 50 TABLET, COATED ORAL at 01:24

## 2018-09-06 RX ADMIN — OMEPRAZOLE 40 MG: 20 CAPSULE, DELAYED RELEASE ORAL at 07:48

## 2018-09-06 ASSESSMENT — ACTIVITIES OF DAILY LIVING (ADL)
ADLS_ACUITY_SCORE: 11
ADLS_ACUITY_SCORE: 10

## 2018-09-06 NOTE — PLAN OF CARE
Ambulated down two halls and tolerated well with SBA and walker.  Feels confident, even putting on own shoes.Appetite good, ordering own food.  Awaiting dischg later today.   Tele afib with occas pauses.. [place] planned at dischg

## 2018-09-06 NOTE — PLAN OF CARE
Problem: Infection, Risk/Actual (Adult)  Goal: Identify Related Risk Factors and Signs and Symptoms  Related risk factors and signs and symptoms are identified upon initiation of Human Response Clinical Practice Guideline (CPG).   Outcome: Improving  Orientation: Alert and oriented x4. Awake, alert, cooperative, no apparent distress.     VSS. 96% on RA. Afebrile. BS 83 OJ given, recheck 86, OJ given, Recheck 110  IVF: SL  Tele: Afib CVR HR 50's, Pauses noted on Tele strips, See providers note  LS: clear and equal bilaterally.  No wheezing  GI: Passing gas. No BM. Denies N/V.  Normal bowel sounds, soft, non-distended, non-tender  : Adequate urine output. Clear yellow.   Skin: bruising noted, Skin otherwise intact. No rashes, no cyanosis, dry to touch  Activity: SBA to assist of 1. Pt slept comfortably throughout shift.   Pain: No c/o pain. No PRN interventions utilized. Pt sleeping.   DC Plan: Continue with current cares.

## 2018-09-06 NOTE — PROVIDER NOTIFICATION
FYI - Pt has had a 3.4 sec pause, bradycardic, soft BP's, a/o, 2.9 sec pause earlier in the night, no c/o of CP

## 2018-09-06 NOTE — DISCHARGE SUMMARY
Discharge Summary  Hospitalist Service    Sean Brunner MRN# [id number]   YOB: 1934 Age: 83 year old     Date of Admission:  9/1/2018  Date of Discharge:  9/6/2018  Admitting Physician:  Gregoria Patel MD  Discharge Physician: Kimi Gil MD  Discharging Service: Hospitalist Service     Primary Provider: Oscar Parekh  Primary Care Physician Phone Number: [phone]         Discharge Diagnoses/Problem Oriented Hospital Course (Providers):    Sean Brunner was admitted on 9/1/2018 by Gregoria Patel MD and I would refer you to their history and physical.  The following problems were addressed during his hospitalization:      Sepsis  LLE Cellulitis  Pancytopenia  AF  htn  Chronic headaches  ?T2dm  GERD         Code Status:      Full Code        Brief Hospital Stay Summary Sent Home With Patient in AVS:       Summary of Stay: Sean Brunner is a 83 year old male with a history of T2dm, AF on warfarin, hypothyroidism admitted on 9/1/2018 with 1 week hx of increasing LLE erythema and pain, with evaluation notable for fever, hypotension, tachycardia, elevated lactic acid consistent with sepsis in setting of LLE cellulitis.      Given sepsis-he was empirically placed on pip-tazo and vanco and had substantial improvement, vanco dc'd on 9/3/18, and has successfully switched to amox-clav.  He will get a total of 10 days worth of antibiotics and have close f/u in OP setting.     His hospital course has been complicated by pauses noted on tele (> 3 seconds) and some transient bradycardia-noted mostly at night time.  This has led to discontinuation of digoxin and decrease of his BB.  He'll be discharged with a holter to monitor for appropriate heart rate after above interventions.     He is feeling much improved and wanting to be discharged home.  He walked up and down the [place] without difficulty with RN.  He appears safe for discharge, will ask for close f/u       Problem  List:   1. Sepsis:  Fevere/leukocytosis/tachycardaia/elevated lactic acid/ hypotension-resolved, did not require pressor support  2. LLE cellulitis: rec'd 5 days of pip-tazo, rec'd 3 days of vanco (dc'd 9/3). transitioned to amox/clav on day prior to discharge and will ultimately complete a 10 day course of abx.  I've recommended yogurt or probiotic for the next 7-10 days to mitigate against abx side effects  3. Bradycardia/pauses:  Initially pauses were < 3 seconds and had no bradycardia, but then worsened into the 3.4 range and HR would transiently drop into the 30's.  This prompted discontinuation of his typical every other day digoxin (of note dig level 0.7 during this hospitalization), and halving of his BB dose   4. Pancytopenia:  Stable, suspect a component of dilutional effect from IVF.  Although certainly not a typical finding.  All absolute counts wnl.  Will require recheck in op setting.   5. T2dm:  pta meds:  Metformin 500 mg qpm  6. AF:  Rate controlled and also having pauses-discontinued digoxin, cont BB, cont tele then with bradycardia prompting initially discontinuation of BB but I've resumed at half dosing at discharge. .  Pharmacy to manage warfarin-has been mostly supra-therapeutic during this hospitalization-2.52 at discharge.  Therefore will discharge with slightly lower dosing (almost certainly influenced by abx), and close recheck  7. Htn:  Home regimen is furosemide 20 mg every day, metop xl 50 mg q pm, terazosin 5 mg at bedtime-resumed and BPs quite variable 100-140's/40-50 's-will decrease BB as described above and close f/u with primary MD  8. Hlp:  Cont statin as at home  9. Chronic headaches:  Cont divalproex as at home  10. GERD:  Cont PPI  DVT Prophylaxis: Warfarin  Code Status: Full Code             Important Results:      As noted below         Pending Results:        Unresulted Labs Ordered in the Past 30 Days of this Admission     Date and Time Order Name Status Description     9/1/2018 0748 Blood culture Preliminary     9/1/2018 0727 Blood culture Preliminary       NGTD      Discharge Instructions and Follow-Up:      Follow-up Appointments     Follow-up and recommended labs and tests        F/u with your primary MD early next week for recheck of this   hospitalization:       I have discontinued your digoxin and decreased your metoprolol to   half dosing due to slow heart rate and some pauses       You will be discharged with a holter monitor to follow your EKG after   above changes and determine if your HR is under good control       I have stopped your metformin as you have normal blood sugars and a   normal hgb A1c    You should have your INR rechecked tomorrow    Eat yogurt or take a probiotic for the next 7-10 days                      Discharge Disposition:      Discharged to home         Discharge Medications:        Current Discharge Medication List      START taking these medications    Details   amoxicillin-clavulanate (AUGMENTIN) 875-125 MG per tablet Take 1 tablet by mouth every 12 hours for 4 days  Qty: 8 tablet, Refills: 0    Associated Diagnoses: Left leg cellulitis         CONTINUE these medications which have CHANGED    Details   metFORMIN (GLUCOPHAGE) 500 MG tablet Take 1 tablet (500 mg) by mouth daily (with dinner) Hold this medication until seen by primary MD  Qty: 60 tablet    Associated Diagnoses: Hyperglycemia      metoprolol succinate (TOPROL-XL) 25 MG 24 hr tablet Take 1 tablet (25 mg) by mouth every evening  Qty: 30 tablet    Associated Diagnoses: Atrial fibrillation, unspecified type (H)      warfarin (COUMADIN) 1 MG tablet Take 1 tablet (1 mg) by mouth daily On Wednesday (evening)  Qty: 30 tablet    Associated Diagnoses: Atrial fibrillation, unspecified type (H)         CONTINUE these medications which have NOT CHANGED    Details   cyanocolbalamin (VITAMIN  B-12) 1000 MCG tablet Take 3,000 mcg by mouth every morning      divalproex (DEPAKOTE) 250 MG EC tablet  "Take 250 mg by mouth At Bedtime      furosemide (LASIX) 20 MG tablet Take 20 mg by mouth daily      levothyroxine (SYNTHROID) 150 MCG tablet Take 150 mcg by mouth every morning (before breakfast)       OMEPRAZOLE PO Take 40 mg by mouth every morning       simvastatin (ZOCOR) 40 MG tablet Take 40 mg by mouth daily (with dinner)       terazosin (HYTRIN) 5 MG capsule Take 5 mg by mouth At Bedtime      ACCU-CHEK MULTICLIX LANCETS MISC by Lancet route 2 times daily. Use to test blood sugar twice daily or as directed.  Qty: 102 each, Refills: prn    Associated Diagnoses: Diabetes mellitus, type 2 (H)      traMADol (ULTRAM) 50 MG tablet Take 50 mg by mouth every 6 hours as needed for moderate pain          STOP taking these medications       digoxin (LANOXIN) 125 MCG tablet Comments:   Reason for Stopping:                 Allergies:       No Known Allergies        Consultations This Hospital Stay:      No consultations were requested during this admission         Condition and Physical on Discharge:      Discharge condition: Stable   Vitals: Blood pressure 104/50, pulse 75, temperature 97.6  F (36.4  C), temperature source Oral, resp. rate 18, height 1.753 m (5' 9\"), weight 87.4 kg (192 lb 9.6 oz), SpO2 95 %.     Constitutional: Pleasant nad looks stated age head nc/at sclera clear   Lungs: ctab nl effort   Cardiovascular: IIR no mrg no CV le edema   Abdomen: S/nt/nd po is good no n/v   Skin: W/d no c/c    Other: Alert and oriented affect appropriate olivares ambulating without difficulty.  His LLE remains moderately swollen with circumferential erythema -some of which is bruising, 2 + partially pitting edema, no warmth minimal tenderness-much improved from admission per patient          Discharge Time:      Greater than 30 minutes.        Image Results From This Hospital Stay (For Non-EPIC Providers):        Results for orders placed or performed during the hospital encounter of 09/01/18   XR Chest Port 1 View    Narrative    " XR CHEST PORT 1 VW 9/1/2018 8:53 AM    HISTORY: Fever.     COMPARISON: April 5, 2017.       Impression    IMPRESSION: The lungs are clear. No focal pulmonary opacities. Heart  and mediastinum are unremarkable. No acute cardiopulmonary  abnormalities.    LEANDRO SPEARS MD   Head CT w/o contrast    Narrative    CT SCAN OF THE HEAD WITHOUT CONTRAST   9/1/2018 9:23 AM     HISTORY: Altered mental status.     TECHNIQUE: Axial images of the head and coronal reformations without  IV contrast material. Radiation dose for this scan was reduced using  automated exposure control, adjustment of the mA and/or kV according  to patient size, or iterative reconstruction technique.    COMPARISON: 5/30/2013    FINDINGS: Mild to moderate volume loss is present. Patchy white matter  hypoattenuation is present which likely represents chronic small  vessel ischemic change. No evidence of acute ischemia, hemorrhage,  mass, mass effect, or hydrocephalus. The visualized calvarium, skull  base, paranasal sinuses, and extracranial soft tissues are  unremarkable. Bilateral lens replacements are present.      Impression    IMPRESSION: No acute intracranial abnormality.    YOLIE HARRINGTON MD   US Lower Extremity Venous Duplex Left    Narrative    US LOWER EXTREMITY VENOUS DUPLEX LEFT 9/1/2018 3:45 PM    HISTORY: Left leg pain.    TECHNIQUE: Imaged deep venous structures of the left lower extremity  include the left common femoral vein, femoral vein, popliteal vein,  and visualized posterior deep calf veins.  Color flow and spectral  Doppler with waveform analysis performed.    COMPARISON: None.    FINDINGS: Limited visualization of the peroneal veins and midportion  of the posterior tibial vein secondary to edema. Otherwise, no DVT is  demonstrated.    LEANDRO SPEARS MD   US Lower Extremity Arterial Duplex Left    Narrative    US LOWER EXTREMITY ARTERIAL DUPLEX LEFT  9/1/2018 3:50 PM     HISTORY: No pulses on exam in the left dorsalis pedis  artery.    COMPARISON: None.    TECHNIQUE: Color Doppler and spectral waveform analysis performed.    FINDINGS: The left common femoral artery, profunda femoris artery,  superficial femoral artery, popliteal artery, and visualized tibial  arteries are patent with normal triphasic waveforms. Flow is  identified in the dorsalis pedis artery on ultrasound.      Impression    IMPRESSION: The visualized arteries of the left lower extremity are  patent without evidence for significant stenosis.    FERNANDO PRIETO MD   XR Chest Port 1 View    Narrative    XR CHEST PORT 1 VW  9/2/2018 4:43 PM     HISTORY:  rule out pna; cough, weakness, fever;     COMPARISON: None.    FINDINGS:  The cardiac silhouette is at the upper limits of normal in  size. There is slightly increased opacity in the left retrocardiac  region which is new since 4/5/2017 this is consistent with a small  amount of infiltrate or atelectasis. Also appears to be a small area  of infiltrate or atelectasis in the left lung base. Slight blunting of  the costophrenic angles suggesting small pleural effusions.      Impression    IMPRESSION: Mild basilar opacities which are new since 4/5/2017. This  would be consistent with a small amount of infiltrate or atelectasis.  Small pleural effusions are also probably present.    LY TRAN MD           Most Recent Lab Results In EPIC (For Non-EPIC Providers):    Most Recent 3 CBC's:  Recent Labs   Lab Test  09/06/18 0648  09/05/18   0644  09/04/18   0655   WBC  3.4*  3.4*  3.8*   HGB  9.8*  10.0*  10.0*   MCV  97  98  96   PLT  117*  121*  115*      Most Recent 3 BMP's:  Recent Labs   Lab Test  09/06/18 0648  09/05/18   0644  09/04/18   0655   NA  140  140  141   POTASSIUM  3.9  3.7  3.5   CHLORIDE  106  105  104   CO2  29  30  31   BUN  13  14  15   CR  0.96  1.05  1.06   ANIONGAP  5  5  6   TYSON  8.4*  8.4*  8.5   GLC  82  79  79     Most Recent 3 INR's:  Recent Labs   Lab Test  09/06/18 0648  09/05/18   0644   09/04/18   0655   INR  2.52*  2.97*  3.41*     Most Recent 2 LFT's:  Recent Labs   Lab Test  09/01/18   0747  05/15/18   1208   AST  18  24   ALT  11  16   ALKPHOS  63  66   BILITOTAL  1.2  0.6     Most Recent 6 Bacteria Isolates From Any Culture (See EPIC Reports for Culture Details):  Recent Labs   Lab Test  09/01/18   0808  09/01/18   0746  01/06/15   0847  01/06/15   0844  01/03/15   1813  01/03/15   1653   CULT  No growth after 5 days  No growth after 5 days  No growth  No growth  No growth  No growth after 5 days  No growth after 5 days     Most Recent TSH, T4 and HgbA1c:   Recent Labs   Lab Test  09/06/18   0648  09/01/18   1120   01/03/15   1653   TSH  3.47   --    < >  0.36*   T4   --    --    --   1.29   A1C   --   4.9   < >   --     < > = values in this interval not displayed.

## 2018-09-06 NOTE — PHARMACY - DISCHARGE MEDICATION RECONCILIATION
Clinical Pharmacy- Warfarin Discharge Note  This patient is currently on warfarin for the treatment of Atrial fibrillation.  INR Goal= 1.5-2.5  Expected length of therapy lifetime.    Warfarin PTA Regimen: 1mg Wed and 1.5mg ROW      Anticoagulation Dose History     Recent Dosing and Labs Latest Ref Rng & Units 5/15/2018 9/1/2018 9/2/2018 9/3/2018 9/4/2018 9/5/2018 9/6/2018    INR 0.86 - 1.14 2.42(H) 3.63(H) 4.37(H) 3.66(H) 3.41(H) 2.97(H) 2.52(H)          Warfarin discharge reconciliation complete   Agree with plan to discharge on Warfarin 1mg and recheck INR tomorrow 09/07/18 with anticoagulation clinic.        Allen Matthews, PharmD./PGY-1 Resident

## 2018-09-06 NOTE — PLAN OF CARE
Problem: Patient Care Overview  Goal: Plan of Care/Patient Progress Review  Outcome: No Change  A&O, VSS, BP still soft 101/55, RA, q4h neuros intact. Ax1 with a walker, denies pain, Wife at bedside and would like pt to consult PT before going home, sticky note left for MD. Mod Cho diet. Wound on leg, dressing clean, dry and intact.  and 144. Oral Augmentin, PIV SL, Tele A-fib with  CVR. Continue plan of care.

## 2018-09-06 NOTE — PROGRESS NOTES
X-cover    Called about asymptomatic bradycardia and sinus pauses.  Had pauses of 2.9 sec and 3.4 sec this evening.  No sx.  On metoprolol 50 mg at bedtime    digoxin level 0.7 this admit    Plan:  Check BMP  Check mag level  Check TSH  Stop metoprolol for now  Continue tele monitoring

## 2018-09-06 NOTE — DISCHARGE INSTRUCTIONS
You have an INR recheck tomorrow, Friday, 9/7/18 at 9:45am at the [facility] ([phone]).   They are located at [address], [place] [address]. The results will be sent to Dr. Oscar Parekh.     You have a hospital f/u appointment on Tuesday, 9/11/18 at 1:45pm with Dr. Oscar Parekh at the [facility] ([phone]).   They are located at [address] [place] [address].

## 2018-09-07 LAB
BACTERIA SPEC CULT: NO GROWTH
BACTERIA SPEC CULT: NO GROWTH
Lab: NORMAL
SPECIMEN SOURCE: NORMAL
SPECIMEN SOURCE: NORMAL

## 2018-09-12 LAB — INTERPRETATION MONITOR -MUSE: NORMAL

## 2018-10-03 ENCOUNTER — TELEPHONE (OUTPATIENT)
Dept: CARDIOLOGY | Facility: CLINIC | Age: 83
End: 2018-10-03

## 2018-10-03 DIAGNOSIS — I27.20 PULMONARY HYPERTENSION (H): Primary | ICD-10-CM

## 2018-10-03 NOTE — TELEPHONE ENCOUNTER
Health Call Center    Phone Message    May a detailed message be left on voicemail: yes    Reason for Call: Other: Marie from [facility] stating that they have tried to reach out to pt to warn him that Medicare will not continue to cover his O2 unless he makes an appt to see Dr. Joyce by 11/2 and was hoping the clinic would be able to find a way to reach this pt.     Action Taken: Message routed to:  [facility] ([facility]

## 2018-10-04 NOTE — TELEPHONE ENCOUNTER
I called pt and we are going to schedule the patient to see Danelle with a 6 muinute walk test. Porsha Dawn RN on 10/4/2018 at 4:53 PM

## 2018-10-18 NOTE — TELEPHONE ENCOUNTER
Patient stated that he was feeling under the wether.  I stressed the importance of the appointment and that if he does not come in insurance may deny payment for his oxygen.  Porsha Dawn RN on 10/18/2018 at 10:53 AM

## 2018-10-22 NOTE — TELEPHONE ENCOUNTER
[facility]    Phone Message    May a detailed message be left on voicemail: yes    Reason for Call: Other: Pt wife calling asking for a call back from anyone on  Dr. Joyce team about scheduling PFT for O2 renewal. Per pt wife the pt O2 will be discontinued on 11/2 if they do not get an apt on the books. Please return call as soon as possible.     Action Taken: Message routed to:  [facility]

## 2018-10-24 NOTE — TELEPHONE ENCOUNTER
I called pt and he is scheduled to see Danelle BUNDY CNP on 10/30/18 6 muinute walk test at 1030 and follow up with Danelle BUNDY CNP at 1100.  I have canceled the 6 muinute walk test at [facility]. Porsha Dawn RN on 10/24/2018 at 2:02 PM

## 2018-10-29 ASSESSMENT — ENCOUNTER SYMPTOMS
SKIN CHANGES: 0
INCREASED ENERGY: 1
DIZZINESS: 1
TINGLING: 1
HEADACHES: 1
SLEEP DISTURBANCES DUE TO BREATHING: 0
SINUS PAIN: 0
POLYDIPSIA: 0
ORTHOPNEA: 1
CHILLS: 1
STIFFNESS: 1
MEMORY LOSS: 1
HEARTBURN: 1
FEVER: 0
WEAKNESS: 1
BRUISES/BLEEDS EASILY: 1
LIGHT-HEADEDNESS: 1
DIARRHEA: 1
WHEEZING: 1
DYSPNEA ON EXERTION: 1
FATIGUE: 1
BOWEL INCONTINENCE: 0
COUGH: 1
HEMOPTYSIS: 0
SPUTUM PRODUCTION: 1
DECREASED APPETITE: 0
LEG PAIN: 0
NECK MASS: 0
BLOOD IN STOOL: 0
POOR WOUND HEALING: 1
NAUSEA: 1
ARTHRALGIAS: 1
MYALGIAS: 1
BACK PAIN: 1
BLOATING: 0
TASTE DISTURBANCE: 0
NAIL CHANGES: 0
SEIZURES: 0
POSTURAL DYSPNEA: 1
JOINT SWELLING: 1
LOSS OF CONSCIOUSNESS: 0
TREMORS: 0
EXERCISE INTOLERANCE: 1
COUGH DISTURBING SLEEP: 1
MUSCLE CRAMPS: 1
DISTURBANCES IN COORDINATION: 1
CONSTIPATION: 1
WEIGHT LOSS: 0
MUSCLE WEAKNESS: 1
HOARSE VOICE: 1
NIGHT SWEATS: 0
SYNCOPE: 0
VOMITING: 0
SMELL DISTURBANCE: 0
POLYPHAGIA: 0
HYPOTENSION: 0
SPEECH CHANGE: 0
ALTERED TEMPERATURE REGULATION: 1
SORE THROAT: 1
HYPERTENSION: 0
WEIGHT GAIN: 0
NUMBNESS: 0
HALLUCINATIONS: 0
ABDOMINAL PAIN: 0
TROUBLE SWALLOWING: 0
PALPITATIONS: 1
SNORES LOUDLY: 1
SWOLLEN GLANDS: 0
PARALYSIS: 0
NECK PAIN: 1
RECTAL PAIN: 0
SHORTNESS OF BREATH: 1
SINUS CONGESTION: 1
JAUNDICE: 0

## 2018-10-30 ENCOUNTER — OFFICE VISIT (OUTPATIENT)
Dept: CARDIOLOGY | Facility: CLINIC | Age: 83
End: 2018-10-30
Attending: NURSE PRACTITIONER
Payer: MEDICARE

## 2018-10-30 VITALS
RESPIRATION RATE: 18 BRPM | SYSTOLIC BLOOD PRESSURE: 114 MMHG | DIASTOLIC BLOOD PRESSURE: 58 MMHG | OXYGEN SATURATION: 100 % | HEIGHT: 69 IN | WEIGHT: 205 LBS | HEART RATE: 78 BPM | BODY MASS INDEX: 30.36 KG/M2

## 2018-10-30 DIAGNOSIS — I42.9 CARDIOMYOPATHY (H): Primary | ICD-10-CM

## 2018-10-30 DIAGNOSIS — R07.9 CHEST PAIN, UNSPECIFIED TYPE: ICD-10-CM

## 2018-10-30 DIAGNOSIS — R07.9 CHEST PAIN, UNSPECIFIED TYPE: Primary | ICD-10-CM

## 2018-10-30 DIAGNOSIS — R06.09 DYSPNEA ON EXERTION: ICD-10-CM

## 2018-10-30 LAB
6 MIN WALK (FT): 740 FT
6 MIN WALK (M): 226 M
ANION GAP SERPL CALCULATED.3IONS-SCNC: 6 MMOL/L (ref 3–14)
BUN SERPL-MCNC: 15 MG/DL (ref 7–30)
CALCIUM SERPL-MCNC: 8.8 MG/DL (ref 8.5–10.1)
CHLORIDE SERPL-SCNC: 105 MMOL/L (ref 94–109)
CO2 SERPL-SCNC: 29 MMOL/L (ref 20–32)
CREAT SERPL-MCNC: 0.98 MG/DL (ref 0.66–1.25)
FIO2-PRE: 21 %
GFR SERPL CREATININE-BSD FRML MDRD: 73 ML/MIN/1.7M2
GLUCOSE SERPL-MCNC: 82 MG/DL (ref 70–99)
POTASSIUM SERPL-SCNC: 4.2 MMOL/L (ref 3.4–5.3)
SODIUM SERPL-SCNC: 139 MMOL/L (ref 133–144)

## 2018-10-30 PROCEDURE — 36415 COLL VENOUS BLD VENIPUNCTURE: CPT | Performed by: NURSE PRACTITIONER

## 2018-10-30 PROCEDURE — 93010 ELECTROCARDIOGRAM REPORT: CPT | Mod: ZP | Performed by: INTERNAL MEDICINE

## 2018-10-30 PROCEDURE — 99214 OFFICE O/P EST MOD 30 MIN: CPT | Mod: ZP | Performed by: NURSE PRACTITIONER

## 2018-10-30 PROCEDURE — G0463 HOSPITAL OUTPT CLINIC VISIT: HCPCS | Mod: ZF

## 2018-10-30 PROCEDURE — 93005 ELECTROCARDIOGRAM TRACING: CPT

## 2018-10-30 PROCEDURE — 80048 BASIC METABOLIC PNL TOTAL CA: CPT | Performed by: NURSE PRACTITIONER

## 2018-10-30 ASSESSMENT — PAIN SCALES - GENERAL: PAINLEVEL: MILD PAIN (2)

## 2018-10-30 NOTE — LETTER
10/30/2018      RE: Sean Brunner  [address] [facility] [address]       Dear Colleague,    Thank you for the opportunity to participate in the care of your patient, Sean Brunner, at the [facility] HEART [facility]. Please see a copy of my visit note below.    October 30, 2018      Mr. Sean Brunner is a pleasant 84 year old male with a past medical history of:    Problem List:     1. H/o cor pulmonale with know 3-4+ TR and RV dysfunction in past- slight increase in RV size and decline in RV systolic function compared to 2011 echo  2. Chronic afib- VR well controlled, on coumadin  3. Moderate obstruction on PFT 2011  4. STEWART- no compliant with CPAP  5. H/o PE- no PE on CT chest this admission  6. Non obstructive CAD CT cor angio 2011, negative stress test this admission  7. Obesity- lost 100 lbs over last several years    Today, he is accompanied by: spouse    Current Symptoms  1. Any ER visits or hospital admissions since last appointment: Yes: 9/1/2018-9/6/2018 [facility] for left lower leg swelling and infection.    2. Shortness of breath: Yes: shortness of breath with exertion, IE after 6 minute walk.  Does not wear oxygen during the day, but wears at night supplemental oxygen. States when he lies flat he does not breathe well.     3. Dizziness or lightheadedness: Yes: dizzy often, certainly with deep bends to the floor, otherwise occurs sporadically.    4. Any syncopal episodes or falls: No  5. Chest pain or chest tightness: Yes: with exertion/tightness, relieves with rest  6. Nausea, vomiting, diarrhea, constipation: Yes: diarrhea once in a while  7. Swelling in the legs: Yes: always swelling in the left leg. Takes diuretics. Also has a longtime injury to left ankle from 1965 and has trouble since.    8. Bloating in the abdomen: No  9. PND; Waking up at night coughing, choking or SOB: Yes: If he is not wearing oxygen.  10. Any medication  "intolerances: No  11. Reported headaches: Yes: all the time, takes tramadol for the headaches with relief  12. Jaw pain or bone/joint pain: Yes: \"most of my body aches\"   13. On IV Prostacyclin: No; current dose: N/A    14. Current level of activity: activity based on tolerance      PAST MEDICAL HISTORY:  Past Medical History:   Diagnosis Date     A-fib (H)      Diabetes mellitus (H)      Low BP      Thyroid disease          FAMILY HISTORY:  No family history on file.      SOCIAL HISTORY:  Social History   Substance Use Topics     Smoking status: Never Smoker     Smokeless tobacco: Never Used     Alcohol use No         CURRENT MEDICATIONS:  Current Outpatient Prescriptions   Medication Sig Dispense Refill     ACCU-CHEK MULTICLIX LANCETS MISC by Lancet route 2 times daily. Use to test blood sugar twice daily or as directed. 102 each prn     cyanocolbalamin (VITAMIN  B-12) 1000 MCG tablet Take 3,000 mcg by mouth every morning       divalproex (DEPAKOTE) 250 MG EC tablet Take 250 mg by mouth At Bedtime       furosemide (LASIX) 20 MG tablet Take 20 mg by mouth daily       levothyroxine (SYNTHROID) 150 MCG tablet Take 150 mcg by mouth every morning (before breakfast)        metFORMIN (GLUCOPHAGE) 500 MG tablet Take 1 tablet (500 mg) by mouth daily (with dinner) Hold this medication until seen by primary MD 60 tablet      metoprolol succinate (TOPROL-XL) 25 MG 24 hr tablet Take 1 tablet (25 mg) by mouth every evening 30 tablet      OMEPRAZOLE PO Take 40 mg by mouth every morning        simvastatin (ZOCOR) 40 MG tablet Take 40 mg by mouth daily (with dinner)        terazosin (HYTRIN) 5 MG capsule Take 5 mg by mouth At Bedtime       traMADol (ULTRAM) 50 MG tablet Take 50 mg by mouth every 6 hours as needed for moderate pain        warfarin (COUMADIN) 1 MG tablet Take 1 tablet (1 mg) by mouth daily On Wednesday (evening) 30 tablet          ROS:   10 point ROS of systems including Constitutional, Eyes, Respiratory, " "Cardiovascular, Gastroenterology, Genitourinary, Integumentary, Muscularskeletal, Psychiatric were all negative except for pertinent positives noted in my HPI.    EXAM:  /58  Pulse 78  Resp 18  Ht 1.753 m (5' 9\")  Wt 93 kg (205 lb)  SpO2 100%  BMI 30.27 kg/m2  General: appears comfortable, alert and articulate  Head: normocephalic, atraumatic  Eyes: anicteric sclera, EOMI  Neck: no adenopathy  Orophyarynx: moist mucosa, no lesions, dentition intact  Heart: irregular, S1/S2, no murmur, gallop, rub, estimated JVP at the level of the clavicle  Lungs: clear, no rales or wheezing; decreased effort bilaterally  Abdomen: soft, non-tender, bowel sounds present, no hepatosplenomegaly  Extremities: no clubbing, cyanosis; bilateral lower leg edema, L>R, +2-+3 pitting left leg, +1-+2 right leg  Neurological: normal speech and affect, no gross motor deficits      Weight  Wt Readings from Last 10 Encounters:   10/30/18 93 kg (205 lb)   09/06/18 87.4 kg (192 lb 9.6 oz)   05/15/18 87 kg (191 lb 12.8 oz)   10/17/17 88.7 kg (195 lb 8 oz)   06/04/17 88.5 kg (195 lb)   04/18/17 94.3 kg (208 lb)   04/11/17 91.6 kg (201 lb 15.1 oz)   04/04/17 93 kg (205 lb 1.6 oz)   02/27/17 100.9 kg (222 lb 6 oz)   01/08/15 96.6 kg (213 lb)       Labs:    CBC RESULTS:  Lab Results   Component Value Date    WBC 3.4 (L) 09/06/2018    RBC 3.13 (L) 09/06/2018    HGB 9.8 (L) 09/06/2018    HCT 30.5 (L) 09/06/2018    MCV 97 09/06/2018    MCH 31.3 09/06/2018    MCHC 32.1 09/06/2018    RDW 14.2 09/06/2018     (L) 09/06/2018       CMP RESULTS:  Lab Results   Component Value Date     09/06/2018    POTASSIUM 3.9 09/06/2018    CHLORIDE 106 09/06/2018    CO2 29 09/06/2018    ANIONGAP 5 09/06/2018    GLC 82 09/06/2018    BUN 13 09/06/2018    CR 0.96 09/06/2018    GFRESTIMATED 75 09/06/2018    GFRESTBLACK >90 09/06/2018    TYSON 8.4 (L) 09/06/2018    BILITOTAL 1.2 09/01/2018    ALBUMIN 2.9 (L) 09/01/2018    ALKPHOS 63 09/01/2018    ALT 11 " 09/01/2018    AST 18 09/01/2018        INR RESULTS:  Lab Results   Component Value Date    INR 2.52 (H) 09/06/2018       BNP RESULTS:  Lab Results   Component Value Date    NTBNPI 787 02/24/2017     No results found for: BNP      Recent Tests:      Echocardiogram (2/24/2017):  Interpretation Summary     The left ventricle is normal in size. There is normal left ventricular wall  thickness. Left ventricular systolic function is normal. The visual ejection  fraction is estimated at 55-60%. Flattened septum is consistent with RV  pressure/volume overload. There is no thrombus seen in the left ventricle.  The right ventricle is severely dilated. The right ventricular systolic  function is mild to moderately reduced.  The left atrium is moderately dilated. The right atrium is severely dilated.  There is severe (4+) tricuspid regurgitation. The right ventricular systolic  pressure is approximated at 30.9 mmHg plus the right atrial pressure. This  most likely represents an underestimation of the true RVSP.  Mild aortic stenosis.  In direct comparison to the previous study dated 11/07/2012, there has been a  mild interval increase in right ventricular size and deterioration in right  ventricular systolic function      RHC (4/11/2017):  Catheterization results  Baseline with thermal dilution cardiac output  -RA pressure mean = 12. Patient is defibrillation  -RV pressure-49/11  -Pulmonary artery pressure 48/21 (31)  -Pulmonary capillary wedge pressure -/24 (15)  -Thermal dilution cardiac output/index 4.9/2.4  -Calculated pulmonary vascular arteriolar resistance 3.2 Woods units     With estimated Rachael cardiac output  -Pulmonary pressure 47/19 (31)  -Pulmonary capillary wedge pressure-/27 (17)  -Pulmonary artery oxygen saturation 67% aortic oxygen saturation 98  percent on room air  -Estimated Rachael cardiac output/index 5.1/2.5  -Pulmonary vascular arteriolar resistance 2.6 Woods units      6MWT (10/30/2018):  226 meters, RA, no  desaturations (lowest 93%), no recovery time needed      Assessment and Plan:   Mr. Sean Brunner is a 84 year old male with pulmonary hypertension and known 3-4+ TR and RV dysfunction, chronic atrial fibrillation (on coumadin), STEWART (non compliant with CPAP but wears supplemental overnight oxygen), non-obstructive CAD.  WHO Group II, NYHA Functional Class III.    #PH Plan:  1. Due to increased weight and lower leg swelling, will first double Lasix dose for three days, followed by repeat labs.  2. Left lower leg weeping wound cleansed, dressed and wrapped with compression ACE wrap. Advised patient to wrap his legs during the day while upright, and wound care consult suggested and/or return to PCP office this week for close monitoring. Patient was recently admitted for left lower leg wound.  3. Patient is to have repeat labs this week following diuretic increase. If renal function is stable, will continue higher dose of Lasix.  Patient needs a repeat echocardiogram.  4. Six minute walk test today showed no desaturation, since patient has untreated STEWART and wears overnight O2, he will need a STAT overnight oximetry test for insurance purposes prior to Nov 2 deadline, to continue oxygen usage at night.  5. Return to clinic in 1-2 weeks for close follow up or follow up with CORE clinic in [facility].    It was a pleasure seeing Mr. Sean Brunner at the [facility]pert[facility].       Sincerely,  Danelle Hoffman, APRN, CNP.

## 2018-10-30 NOTE — MR AVS SNAPSHOT
After Visit Summary   10/30/2018    Sean Brunner    MRN: [id number]           Patient Information     Date Of Birth          9/13/1934        Visit Information        Provider Department      10/30/2018 11:00 AM Danelle Hoffman APRN CNP [facility]        Today's Diagnoses     Chest pain, unspecified type    -  1      Care Instructions    Medication Changes:   Increase lasix for 3 days to 40 mg Daily   Repeat your Labs at the end of the week    Patient Instructions:  1. Continue staying active and eat a heart healthy diet.    2. Please keep current list of medications with you at all times.    3. Remember to weigh yourself daily after voiding and before you consume any food or beverages and log the numbers.  If you have gained/lost 2 pounds overnight or 5 pounds in a week contact us immediately for medication adjustments or further instructions.    4. **Please call us immediately if you have any syncope, chest pain, edema, or decline in your functional status.    Overnight Oximetry on Room Air    Follow up Appointment Information:  CORE Referral closer to home.    Keep current follow up with Dr. Joyce    Follow up with your Primary Care this week for the wounds on your legs    We are located on the third floor of the [facility] ([facility]) on the [facility].  Our address is     [address] [place] [address] [place] [address] [place] [address]      Thank you for allowing us to be a part of your care here at the [facility]    If you have questions or concerns please contact us at:    Porsha Dawn, RN, BSN    Chilo Mon (Schedule,Prior Auth)  Nurse Coordinator     Clinic   Pulmonary Hypertension   Pulmonary Hypertension  [facility]  [phone])[phone]    (P) [phone]        (F)[phone]                ** Please note that you  will NOT receive a reminder call regarding your scheduled testing, reminder calls are for provider appointments only.  If you are scheduled for testing within the [facility] system you may receive a call regarding pre-registration for billing purposes only.**     Support Group:  Pulmonary Hypertension Association  Https://www.phassociation.org/  **Look at the Events Tab** They even have Support Groups that you can call into    [facility] Support Group  First Saturday of the Month from 1-3 PM   Location: [address] [place] [address]  Leader: Andrae Ramos  Phone: [phone]  Email: [email or web address]          Follow-ups after your visit        Your next 10 appointments already scheduled     Hung 15, 2019 11:30 AM CST   Pulmonary Hypertension Return with Julian Joyce MD   [facility] ([facility])    [address] [place] [id number] [phone] OPT 2              Who to contact     If you have questions or need follow up information about today's clinic visit or your schedule please contact [facility] directly at [phone].  Normal or non-critical lab and imaging results will be communicated to you by anfixhart, letter or phone within 4 business days after the clinic has received the results. If you do not hear from us within 7 days, please contact the clinic through anfixhart or phone. If you have a critical or abnormal lab result, we will notify you by phone as soon as possible.  Submit refill requests through [employer] or call your pharmacy and they will forward the refill request to us. Please allow 3 business days for your refill to be completed.          Additional Information About Your Visit        anfixhart Information     [employer] gives you secure access to your electronic health record. If you see a primary care provider, you can also send messages to your care team and make appointments. If you have questions,  "please call your primary care clinic.  If you do not have a primary care provider, please call [phone] and they will assist you.        Care EveryWhere ID     This is your Care EveryWhere ID. This could be used by other organizations to access your [facility] medical records  [id number]        Your Vitals Were     Pulse Respirations Height Pulse Oximetry BMI (Body Mass Index)       78 18 1.753 m (5' 9\") 100% 30.27 kg/m2        Blood Pressure from Last 3 Encounters:   10/30/18 114/58   09/06/18 104/50   05/15/18 103/61    Weight from Last 3 Encounters:   10/30/18 93 kg (205 lb)   09/06/18 87.4 kg (192 lb 9.6 oz)   05/15/18 87 kg (191 lb 12.8 oz)              We Performed the Following     EKG 12-lead, tracing only        Primary Care Provider Office Phone # Fax #    Oscar Cory Parekh [phone]       [place] [address]        Equal Access to Services     [facility]: Hadii aad ku hadasho Soomaali, waaxda luqadaha, qaybta kaalmada adeegyada, ismael bolivar . So [facility] [phone].    ATENCIÓN: Si habla español, tiene a ritchie disposición servicios gratuitos de asistencia lingüística. [facility] [phone].    We comply with applicable federal civil rights laws and [place] laws. We do not discriminate on the basis of race, color, national origin, age, disability, sex, sexual orientation, or gender identity.            Thank you!     Thank you for choosing [facility]  for your care. Our goal is always to provide you with excellent care. Hearing back from our patients is one way we can continue to improve our services. Please take a few minutes to complete the written survey that you may receive in the mail after your visit with us. Thank you!             Your Updated Medication List - Protect others around you: Learn how to safely use, store and throw away your medicines at www.disposemymeds.org.        "   This list is accurate as of 10/30/18 11:47 AM.  Always use your most recent med list.                   Brand Name Dispense Instructions for use Diagnosis    blood glucose monitoring lancets     102 each    by Lancet route 2 times daily. Use to test blood sugar twice daily or as directed.    Diabetes mellitus, type 2 (H)       cyanocobalamin 1000 MCG tablet    vitamin  B-12     Take 3,000 mcg by mouth every morning        divalproex sodium delayed-release 250 MG DR tablet    DEPAKOTE     Take 250 mg by mouth At Bedtime        furosemide 20 MG tablet    LASIX     Take 20 mg by mouth daily        metFORMIN 500 MG tablet    GLUCOPHAGE    60 tablet    Take 1 tablet (500 mg) by mouth daily (with dinner) Hold this medication until seen by primary MD    Hyperglycemia       metoprolol succinate 25 MG 24 hr tablet    TOPROL-XL    30 tablet    Take 1 tablet (25 mg) by mouth every evening    Atrial fibrillation, unspecified type (H)       OMEPRAZOLE PO      Take 40 mg by mouth every morning        simvastatin 40 MG tablet    ZOCOR     Take 40 mg by mouth daily (with dinner)        SYNTHROID 150 MCG tablet   Generic drug:  levothyroxine      Take 150 mcg by mouth every morning (before breakfast)        terazosin 5 MG capsule    HYTRIN     Take 5 mg by mouth At Bedtime        traMADol 50 MG tablet    ULTRAM     Take 50 mg by mouth every 6 hours as needed for moderate pain        warfarin 1 MG tablet    COUMADIN    30 tablet    Take 1 tablet (1 mg) by mouth daily On Wednesday (evening)    Atrial fibrillation, unspecified type (H)

## 2018-10-30 NOTE — PATIENT INSTRUCTIONS
Medication Changes:   Increase lasix for 3 days to 40 mg Daily   Repeat your Labs at the end of the week    Patient Instructions:  1. Continue staying active and eat a heart healthy diet.    2. Please keep current list of medications with you at all times.    3. Remember to weigh yourself daily after voiding and before you consume any food or beverages and log the numbers.  If you have gained/lost 2 pounds overnight or 5 pounds in a week contact us immediately for medication adjustments or further instructions.    4. **Please call us immediately if you have any syncope, chest pain, edema, or decline in your functional status.    Overnight Oximetry on Room Air (No oxygen)    Follow up Appointment Information:  CORE Referral closer to home.    Keep current follow up with Dr. Joyce    Follow up with your Primary Care this week for the wounds on your legs    We are located on the third floor of the [facility] ([facility]) on the [facility].  Our address is     [address] [place] [address] [place] [address] [place] [address]      Thank you for allowing us to be a part of your care here at the [facility]    If you have questions or concerns please contact us at:    Porsha Dawn RN, BSN    Chilo Mon (Schedule,Prior Auth)  Nurse Coordinator     Clinic   Pulmonary Hypertension   Pulmonary Hypertension  [facility]  [phone])[phone]    (P) [phone]        (F)[phone]                ** Please note that you will NOT receive a reminder call regarding your scheduled testing, reminder calls are for provider appointments only.  If you are scheduled for testing within the [facility] system you may receive a call regarding pre-registration for billing purposes only.**     Support Group:  Pulmonary Hypertension Association  Https://www.phassociation.org/  **Look at the Events  Tab** They even have Support Groups that you can call into    [facility] Support Group  First Saturday of the Month from 1-3 PM   [facility] [address]  Leader: Andrae Ramos  Phone: [phone]  Email: [email or web address]

## 2018-10-30 NOTE — NURSING NOTE
Patient's left lower leg had 3+ edema with open weeping wounds.  Pant leg, sock & tennis shoe were all wet with serious drainage.  Pictures were taken of 3 open weeping wounds to document staging prior to wound care.    The following wound care was completed to Left lower leg;      Cleansed open wounds with Betadine swabs.    Applied non-adherant dressing over open wounds    Applied 2 ABD pads    Wrapped leg from toe to knee with 2 large Ace Wraps    Covered foot with [facility] non-skid sock    Tennis shoe re-applied    Patient's sock was thrown away per wife's request    Patient was advised to remove dressing if he started to experience new numbness and tingling.  Patient verbalized understanding, agreed with plan and denied any further questions. Linda Saha RN on 10/30/2018 at 12:14 PM  ----------------------------------------------------------------------------------------

## 2018-10-30 NOTE — NURSING NOTE
Procedures and/or Testing: Patient given instructions regarding  Overnight Oximetry. Discussed purpose, preparation, procedure and when to expect results reported back to the patient. Patient demonstrated understanding of this information and agreed to call with further questions or concerns.  Med Reconcile: Reviewed and verified all current medications with the patient. The updated medication list was printed and given to the patient.  Diet: Patient instructed regarding a heart healthy diet, including discussion of reduced fat and sodium intake. Patient demonstrated understanding of this information and agreed to call with further questions or concerns.  Return Appointment: Patient given instructions regarding scheduling next clinic visit. Patient demonstrated understanding of this information and agreed to call with further questions or concerns.  Patient stated he understood all health information given and agreed to call with further questions or concerns.     Medication Changes:   Increase lasix for 3 days to 40 mg Daily   Repeat your Labs at the end of the week    Patient Instructions:  1. Continue staying active and eat a heart healthy diet.    2. Please keep current list of medications with you at all times.    3. Remember to weigh yourself daily after voiding and before you consume any food or beverages and log the numbers.  If you have gained/lost 2 pounds overnight or 5 pounds in a week contact us immediately for medication adjustments or further instructions.    4. **Please call us immediately if you have any syncope, chest pain, edema, or decline in your functional status.    Overnight Oximetry on Room Air (No oxygen)    Follow up Appointment Information:  CORE Referral closer to home.    Keep current follow up with Dr. Joyce    Follow up with your Primary Care this week for the wounds on your legs

## 2018-10-30 NOTE — NURSING NOTE
Chief Complaint   Patient presents with     RECHECK     Return for PH F/U with 6 muinute walk test prior      Jose Luis England CMA at 11:04 AM on 10/30/2018     Medications and vitals reviewed with patient and confirmed.

## 2018-10-30 NOTE — PROGRESS NOTES
"October 30, 2018      Mr. Sean Brunner is a pleasant 84 year old male with a past medical history of:    Problem List:     1. H/o cor pulmonale with know 3-4+ TR and RV dysfunction in past- slight increase in RV size and decline in RV systolic function compared to 2011 echo  2. Chronic afib- VR well controlled, on coumadin  3. Moderate obstruction on PFT 2011  4. STEWART- no compliant with CPAP  5. H/o PE- no PE on CT chest this admission  6. Non obstructive CAD CT cor angio 2011, negative stress test this admission  7. Obesity- lost 100 lbs over last several years    Today, he is accompanied by: spouse    Current Symptoms  1. Any ER visits or hospital admissions since last appointment: Yes: 9/1/2018-9/6/2018 [facility] for left lower leg swelling and infection.    2. Shortness of breath: Yes: shortness of breath with exertion, IE after 6 minute walk.  Does not wear oxygen during the day, but wears at night supplemental oxygen. States when he lies flat he does not breathe well.     3. Dizziness or lightheadedness: Yes: dizzy often, certainly with deep bends to the floor, otherwise occurs sporadically.    4. Any syncopal episodes or falls: No  5. Chest pain or chest tightness: Yes: with exertion/tightness, relieves with rest  6. Nausea, vomiting, diarrhea, constipation: Yes: diarrhea once in a while  7. Swelling in the legs: Yes: always swelling in the left leg. Takes diuretics. Also has a longtime injury to left ankle from 1965 and has trouble since.    8. Bloating in the abdomen: No  9. PND; Waking up at night coughing, choking or SOB: Yes: If he is not wearing oxygen.  10. Any medication intolerances: No  11. Reported headaches: Yes: all the time, takes tramadol for the headaches with relief  12. Jaw pain or bone/joint pain: Yes: \"most of my body aches\"   13. On IV Prostacyclin: No; current dose: N/A    14. Current level of activity: activity based on tolerance      PAST MEDICAL HISTORY:  Past Medical " "History:   Diagnosis Date     A-fib (H)      Diabetes mellitus (H)      Low BP      Thyroid disease          FAMILY HISTORY:  No family history on file.      SOCIAL HISTORY:  Social History   Substance Use Topics     Smoking status: Never Smoker     Smokeless tobacco: Never Used     Alcohol use No         CURRENT MEDICATIONS:  Current Outpatient Prescriptions   Medication Sig Dispense Refill     ACCU-CHEK MULTICLIX LANCETS MISC by Lancet route 2 times daily. Use to test blood sugar twice daily or as directed. 102 each prn     cyanocolbalamin (VITAMIN  B-12) 1000 MCG tablet Take 3,000 mcg by mouth every morning       divalproex (DEPAKOTE) 250 MG EC tablet Take 250 mg by mouth At Bedtime       furosemide (LASIX) 20 MG tablet Take 20 mg by mouth daily       levothyroxine (SYNTHROID) 150 MCG tablet Take 150 mcg by mouth every morning (before breakfast)        metFORMIN (GLUCOPHAGE) 500 MG tablet Take 1 tablet (500 mg) by mouth daily (with dinner) Hold this medication until seen by primary MD 60 tablet      metoprolol succinate (TOPROL-XL) 25 MG 24 hr tablet Take 1 tablet (25 mg) by mouth every evening 30 tablet      OMEPRAZOLE PO Take 40 mg by mouth every morning        simvastatin (ZOCOR) 40 MG tablet Take 40 mg by mouth daily (with dinner)        terazosin (HYTRIN) 5 MG capsule Take 5 mg by mouth At Bedtime       traMADol (ULTRAM) 50 MG tablet Take 50 mg by mouth every 6 hours as needed for moderate pain        warfarin (COUMADIN) 1 MG tablet Take 1 tablet (1 mg) by mouth daily On Wednesday (evening) 30 tablet          ROS:   10 point ROS of systems including Constitutional, Eyes, Respiratory, Cardiovascular, Gastroenterology, Genitourinary, Integumentary, Muscularskeletal, Psychiatric were all negative except for pertinent positives noted in my HPI.    EXAM:  /58  Pulse 78  Resp 18  Ht 1.753 m (5' 9\")  Wt 93 kg (205 lb)  SpO2 100%  BMI 30.27 kg/m2  General: appears comfortable, alert and articulate  Head: " normocephalic, atraumatic  Eyes: anicteric sclera, EOMI  Neck: no adenopathy  Orophyarynx: moist mucosa, no lesions, dentition intact  Heart: irregular, S1/S2, no murmur, gallop, rub, estimated JVP at the level of the clavicle  Lungs: clear, no rales or wheezing; decreased effort bilaterally  Abdomen: soft, non-tender, bowel sounds present, no hepatosplenomegaly  Extremities: no clubbing, cyanosis; bilateral lower leg edema, L>R, +2-+3 pitting left leg, +1-+2 right leg  Neurological: normal speech and affect, no gross motor deficits      Weight  Wt Readings from Last 10 Encounters:   10/30/18 93 kg (205 lb)   09/06/18 87.4 kg (192 lb 9.6 oz)   05/15/18 87 kg (191 lb 12.8 oz)   10/17/17 88.7 kg (195 lb 8 oz)   06/04/17 88.5 kg (195 lb)   04/18/17 94.3 kg (208 lb)   04/11/17 91.6 kg (201 lb 15.1 oz)   04/04/17 93 kg (205 lb 1.6 oz)   02/27/17 100.9 kg (222 lb 6 oz)   01/08/15 96.6 kg (213 lb)       Labs:    CBC RESULTS:  Lab Results   Component Value Date    WBC 3.4 (L) 09/06/2018    RBC 3.13 (L) 09/06/2018    HGB 9.8 (L) 09/06/2018    HCT 30.5 (L) 09/06/2018    MCV 97 09/06/2018    MCH 31.3 09/06/2018    MCHC 32.1 09/06/2018    RDW 14.2 09/06/2018     (L) 09/06/2018       CMP RESULTS:  Lab Results   Component Value Date     09/06/2018    POTASSIUM 3.9 09/06/2018    CHLORIDE 106 09/06/2018    CO2 29 09/06/2018    ANIONGAP 5 09/06/2018    GLC 82 09/06/2018    BUN 13 09/06/2018    CR 0.96 09/06/2018    GFRESTIMATED 75 09/06/2018    GFRESTBLACK >90 09/06/2018    TYSON 8.4 (L) 09/06/2018    BILITOTAL 1.2 09/01/2018    ALBUMIN 2.9 (L) 09/01/2018    ALKPHOS 63 09/01/2018    ALT 11 09/01/2018    AST 18 09/01/2018        INR RESULTS:  Lab Results   Component Value Date    INR 2.52 (H) 09/06/2018       BNP RESULTS:  Lab Results   Component Value Date    NTBNPI 787 02/24/2017     No results found for: BNP      Recent Tests:      Echocardiogram (2/24/2017):  Interpretation Summary     The left ventricle is normal in  size. There is normal left ventricular wall  thickness. Left ventricular systolic function is normal. The visual ejection  fraction is estimated at 55-60%. Flattened septum is consistent with RV  pressure/volume overload. There is no thrombus seen in the left ventricle.  The right ventricle is severely dilated. The right ventricular systolic  function is mild to moderately reduced.  The left atrium is moderately dilated. The right atrium is severely dilated.  There is severe (4+) tricuspid regurgitation. The right ventricular systolic  pressure is approximated at 30.9 mmHg plus the right atrial pressure. This  most likely represents an underestimation of the true RVSP.  Mild aortic stenosis.  In direct comparison to the previous study dated 11/07/2012, there has been a  mild interval increase in right ventricular size and deterioration in right  ventricular systolic function      RHC (4/11/2017):  Catheterization results  Baseline with thermal dilution cardiac output  -RA pressure mean = 12. Patient is defibrillation  -RV pressure-49/11  -Pulmonary artery pressure 48/21 (31)  -Pulmonary capillary wedge pressure -/24 (15)  -Thermal dilution cardiac output/index 4.9/2.4  -Calculated pulmonary vascular arteriolar resistance 3.2 Woods units     With estimated Rachael cardiac output  -Pulmonary pressure 47/19 (31)  -Pulmonary capillary wedge pressure-/27 (17)  -Pulmonary artery oxygen saturation 67% aortic oxygen saturation 98  percent on room air  -Estimated Rachael cardiac output/index 5.1/2.5  -Pulmonary vascular arteriolar resistance 2.6 Woods units      6MWT (10/30/2018):  226 meters, RA, no desaturations (lowest 93%), no recovery time needed      Assessment and Plan:   Mr. Sean Brunner is a 84 year old male with pulmonary hypertension and known 3-4+ TR and RV dysfunction, chronic atrial fibrillation (on coumadin), STEWART (non compliant with CPAP but wears supplemental overnight oxygen), non-obstructive CAD.  WHO Group  II, NYHA Functional Class III.    #PH Plan:  1. Due to increased weight and lower leg swelling, will first double Lasix dose for three days, followed by repeat labs.  2. Left lower leg weeping wound cleansed, dressed and wrapped with compression ACE wrap. Advised patient to wrap his legs during the day while upright, and wound care consult suggested and/or return to PCP office this week for close monitoring. Patient was recently admitted for left lower leg wound.  3. Patient is to have repeat labs this week following diuretic increase. If renal function is stable, will continue higher dose of Lasix.  Patient needs a repeat echocardiogram.  4. Six minute walk test today showed no desaturation, since patient has untreated STEWART and wears overnight O2, he will need a STAT overnight oximetry test for insurance purposes prior to Nov 2 deadline, to continue oxygen usage at night.  5. Return to clinic in 1-2 weeks for close follow up or follow up with [facility] in [facility].    It was a pleasure seeing Mr. Sean Brunner at the [facility].       Sincerely,  Danelle Hoffman, APRN, CNP.

## 2018-10-31 LAB — INTERPRETATION ECG - MUSE: NORMAL

## 2018-11-01 DIAGNOSIS — R06.09 DYSPNEA ON EXERTION: ICD-10-CM

## 2018-11-01 DIAGNOSIS — R07.9 CHEST PAIN, UNSPECIFIED TYPE: ICD-10-CM

## 2018-11-01 PROCEDURE — 80048 BASIC METABOLIC PNL TOTAL CA: CPT | Performed by: NURSE PRACTITIONER

## 2018-11-01 PROCEDURE — 36415 COLL VENOUS BLD VENIPUNCTURE: CPT | Performed by: NURSE PRACTITIONER

## 2018-11-02 ENCOUNTER — TELEPHONE (OUTPATIENT)
Dept: CARDIOLOGY | Facility: CLINIC | Age: 83
End: 2018-11-02

## 2018-11-02 LAB
ANION GAP SERPL CALCULATED.3IONS-SCNC: 8 MMOL/L (ref 3–14)
BUN SERPL-MCNC: 15 MG/DL (ref 7–30)
CALCIUM SERPL-MCNC: 9.1 MG/DL (ref 8.5–10.1)
CHLORIDE SERPL-SCNC: 103 MMOL/L (ref 94–109)
CO2 SERPL-SCNC: 28 MMOL/L (ref 20–32)
CREAT SERPL-MCNC: 0.97 MG/DL (ref 0.66–1.25)
GFR SERPL CREATININE-BSD FRML MDRD: 73 ML/MIN/1.7M2
GLUCOSE SERPL-MCNC: 83 MG/DL (ref 70–99)
POTASSIUM SERPL-SCNC: 4.5 MMOL/L (ref 3.4–5.3)
SODIUM SERPL-SCNC: 139 MMOL/L (ref 133–144)

## 2018-11-02 NOTE — TELEPHONE ENCOUNTER
I called pt and reviewed his Labs.  They are all [facility]. Porsha Dawn RN on 11/2/2018 at 3:18 PM    ----- Message from Porsha Dawn RN sent at 10/30/2018 12:14 PM CDT -----  Follow up on Labs,  We increased Lasix at last appointment.      Porsha Dawn RN

## 2018-11-04 ENCOUNTER — APPOINTMENT (OUTPATIENT)
Dept: GENERAL RADIOLOGY | Facility: CLINIC | Age: 83
DRG: 871 | End: 2018-11-04
Attending: EMERGENCY MEDICINE
Payer: MEDICARE

## 2018-11-04 ENCOUNTER — HOSPITAL ENCOUNTER (INPATIENT)
Facility: CLINIC | Age: 83
LOS: 4 days | Discharge: HOME | DRG: 871 | End: 2018-11-08
Attending: EMERGENCY MEDICINE | Admitting: INTERNAL MEDICINE
Payer: MEDICARE

## 2018-11-04 DIAGNOSIS — A41.9 SEVERE SEPSIS (H): ICD-10-CM

## 2018-11-04 DIAGNOSIS — L03.116 LEFT LEG CELLULITIS: ICD-10-CM

## 2018-11-04 DIAGNOSIS — I48.91 ATRIAL FIBRILLATION, UNSPECIFIED TYPE (H): ICD-10-CM

## 2018-11-04 DIAGNOSIS — R65.20 SEVERE SEPSIS (H): ICD-10-CM

## 2018-11-04 DIAGNOSIS — I48.91 ATRIAL FIBRILLATION WITH RAPID VENTRICULAR RESPONSE (H): ICD-10-CM

## 2018-11-04 DIAGNOSIS — R41.82 ALTERED MENTAL STATUS, UNSPECIFIED ALTERED MENTAL STATUS TYPE: ICD-10-CM

## 2018-11-04 PROBLEM — R65.21 SEPTIC SHOCK (H): Status: ACTIVE | Noted: 2018-11-04

## 2018-11-04 LAB
ALBUMIN SERPL-MCNC: 3.2 G/DL (ref 3.4–5)
ALP SERPL-CCNC: 84 U/L (ref 40–150)
ALT SERPL W P-5'-P-CCNC: 14 U/L (ref 0–70)
ANION GAP SERPL CALCULATED.3IONS-SCNC: 7 MMOL/L (ref 3–14)
AST SERPL W P-5'-P-CCNC: 21 U/L (ref 0–45)
BASOPHILS # BLD AUTO: 0 10E9/L (ref 0–0.2)
BASOPHILS NFR BLD AUTO: 0.2 %
BILIRUB DIRECT SERPL-MCNC: 0.5 MG/DL (ref 0–0.2)
BILIRUB SERPL-MCNC: 1.3 MG/DL (ref 0.2–1.3)
BUN SERPL-MCNC: 18 MG/DL (ref 7–30)
CALCIUM SERPL-MCNC: 9.2 MG/DL (ref 8.5–10.1)
CHLORIDE SERPL-SCNC: 108 MMOL/L (ref 94–109)
CO2 BLDCOV-SCNC: 24 MMOL/L (ref 21–28)
CO2 SERPL-SCNC: 26 MMOL/L (ref 20–32)
CREAT SERPL-MCNC: 1.04 MG/DL (ref 0.66–1.25)
DIFFERENTIAL METHOD BLD: ABNORMAL
EOSINOPHIL # BLD AUTO: 0 10E9/L (ref 0–0.7)
EOSINOPHIL NFR BLD AUTO: 0.4 %
ERYTHROCYTE [DISTWIDTH] IN BLOOD BY AUTOMATED COUNT: 14.6 % (ref 10–15)
GFR SERPL CREATININE-BSD FRML MDRD: 68 ML/MIN/1.7M2
GLUCOSE BLDC GLUCOMTR-MCNC: 156 MG/DL (ref 70–99)
GLUCOSE SERPL-MCNC: 155 MG/DL (ref 70–99)
HCT VFR BLD AUTO: 35.2 % (ref 40–53)
HGB BLD-MCNC: 11.4 G/DL (ref 13.3–17.7)
IMM GRANULOCYTES # BLD: 0 10E9/L (ref 0–0.4)
IMM GRANULOCYTES NFR BLD: 0.2 %
INR PPP: 2.96 (ref 0.86–1.14)
LACTATE BLD-SCNC: 1.4 MMOL/L (ref 0.7–2)
LACTATE BLD-SCNC: 2.2 MMOL/L (ref 0.7–2.1)
LYMPHOCYTES # BLD AUTO: 0.3 10E9/L (ref 0.8–5.3)
LYMPHOCYTES NFR BLD AUTO: 5.5 %
MAGNESIUM SERPL-MCNC: 1.9 MG/DL (ref 1.6–2.3)
MCH RBC QN AUTO: 30.9 PG (ref 26.5–33)
MCHC RBC AUTO-ENTMCNC: 32.4 G/DL (ref 31.5–36.5)
MCV RBC AUTO: 95 FL (ref 78–100)
MONOCYTES # BLD AUTO: 0.3 10E9/L (ref 0–1.3)
MONOCYTES NFR BLD AUTO: 7.1 %
MRSA DNA SPEC QL NAA+PROBE: NEGATIVE
NEUTROPHILS # BLD AUTO: 3.9 10E9/L (ref 1.6–8.3)
NEUTROPHILS NFR BLD AUTO: 86.6 %
NRBC # BLD AUTO: 0 10*3/UL
NRBC BLD AUTO-RTO: 0 /100
PCO2 BLDV: 33 MM HG (ref 40–50)
PH BLDV: 7.48 PH (ref 7.32–7.43)
PLATELET # BLD AUTO: 133 10E9/L (ref 150–450)
PO2 BLDV: 19 MM HG (ref 25–47)
POTASSIUM SERPL-SCNC: 3.7 MMOL/L (ref 3.4–5.3)
PROCALCITONIN SERPL-MCNC: 0.55 NG/ML
PROT SERPL-MCNC: 8.6 G/DL (ref 6.8–8.8)
RBC # BLD AUTO: 3.69 10E12/L (ref 4.4–5.9)
SAO2 % BLDV FROM PO2: 34 %
SODIUM SERPL-SCNC: 141 MMOL/L (ref 133–144)
SPECIMEN SOURCE: NORMAL
TROPONIN I SERPL-MCNC: 0.02 UG/L (ref 0–0.04)
WBC # BLD AUTO: 4.5 10E9/L (ref 4–11)

## 2018-11-04 PROCEDURE — 87640 STAPH A DNA AMP PROBE: CPT | Performed by: INTERNAL MEDICINE

## 2018-11-04 PROCEDURE — 96365 THER/PROPH/DIAG IV INF INIT: CPT

## 2018-11-04 PROCEDURE — 85610 PROTHROMBIN TIME: CPT | Performed by: EMERGENCY MEDICINE

## 2018-11-04 PROCEDURE — 93005 ELECTROCARDIOGRAM TRACING: CPT | Mod: 76

## 2018-11-04 PROCEDURE — 84145 PROCALCITONIN (PCT): CPT | Performed by: INTERNAL MEDICINE

## 2018-11-04 PROCEDURE — 3E043XZ INTRODUCTION OF VASOPRESSOR INTO CENTRAL VEIN, PERCUTANEOUS APPROACH: ICD-10-PCS | Performed by: INTERNAL MEDICINE

## 2018-11-04 PROCEDURE — 99285 EMERGENCY DEPT VISIT HI MDM: CPT | Mod: 25

## 2018-11-04 PROCEDURE — 36415 COLL VENOUS BLD VENIPUNCTURE: CPT | Performed by: EMERGENCY MEDICINE

## 2018-11-04 PROCEDURE — 96366 THER/PROPH/DIAG IV INF ADDON: CPT

## 2018-11-04 PROCEDURE — 36415 COLL VENOUS BLD VENIPUNCTURE: CPT | Performed by: INTERNAL MEDICINE

## 2018-11-04 PROCEDURE — 83605 ASSAY OF LACTIC ACID: CPT

## 2018-11-04 PROCEDURE — A9270 NON-COVERED ITEM OR SERVICE: HCPCS | Mod: GY | Performed by: EMERGENCY MEDICINE

## 2018-11-04 PROCEDURE — 80048 BASIC METABOLIC PNL TOTAL CA: CPT | Performed by: EMERGENCY MEDICINE

## 2018-11-04 PROCEDURE — 25000132 ZZH RX MED GY IP 250 OP 250 PS 637: Mod: GY | Performed by: EMERGENCY MEDICINE

## 2018-11-04 PROCEDURE — 96361 HYDRATE IV INFUSION ADD-ON: CPT

## 2018-11-04 PROCEDURE — 71046 X-RAY EXAM CHEST 2 VIEWS: CPT

## 2018-11-04 PROCEDURE — 85025 COMPLETE CBC W/AUTO DIFF WBC: CPT | Performed by: EMERGENCY MEDICINE

## 2018-11-04 PROCEDURE — 83605 ASSAY OF LACTIC ACID: CPT | Performed by: INTERNAL MEDICINE

## 2018-11-04 PROCEDURE — 25000128 H RX IP 250 OP 636: Performed by: ANESTHESIOLOGY

## 2018-11-04 PROCEDURE — 99207 ZZC MOONLIGHTING INDICATOR: CPT | Performed by: INTERNAL MEDICINE

## 2018-11-04 PROCEDURE — 20000003 ZZH R&B ICU

## 2018-11-04 PROCEDURE — 00000146 ZZHCL STATISTIC GLUCOSE BY METER IP

## 2018-11-04 PROCEDURE — 25000128 H RX IP 250 OP 636: Performed by: EMERGENCY MEDICINE

## 2018-11-04 PROCEDURE — 80076 HEPATIC FUNCTION PANEL: CPT | Performed by: EMERGENCY MEDICINE

## 2018-11-04 PROCEDURE — 93005 ELECTROCARDIOGRAM TRACING: CPT

## 2018-11-04 PROCEDURE — 99223 1ST HOSP IP/OBS HIGH 75: CPT | Mod: AI | Performed by: INTERNAL MEDICINE

## 2018-11-04 PROCEDURE — 87040 BLOOD CULTURE FOR BACTERIA: CPT | Performed by: EMERGENCY MEDICINE

## 2018-11-04 PROCEDURE — 96367 TX/PROPH/DG ADDL SEQ IV INF: CPT

## 2018-11-04 PROCEDURE — 83735 ASSAY OF MAGNESIUM: CPT | Performed by: EMERGENCY MEDICINE

## 2018-11-04 PROCEDURE — 87641 MR-STAPH DNA AMP PROBE: CPT | Performed by: INTERNAL MEDICINE

## 2018-11-04 PROCEDURE — 84484 ASSAY OF TROPONIN QUANT: CPT | Performed by: EMERGENCY MEDICINE

## 2018-11-04 PROCEDURE — 82803 BLOOD GASES ANY COMBINATION: CPT

## 2018-11-04 RX ORDER — CLINDAMYCIN PHOSPHATE 10 UG/ML
LOTION TOPICAL DAILY PRN
COMMUNITY
End: 2019-01-15

## 2018-11-04 RX ORDER — DOCUSATE SODIUM 100 MG/1
100 CAPSULE, LIQUID FILLED ORAL 2 TIMES DAILY PRN
COMMUNITY
End: 2019-01-15

## 2018-11-04 RX ORDER — DIVALPROEX SODIUM 250 MG/1
250 TABLET, DELAYED RELEASE ORAL AT BEDTIME
COMMUNITY
End: 2020-01-01

## 2018-11-04 RX ORDER — WARFARIN SODIUM 1 MG/1
1 TABLET ORAL DAILY
Status: DISCONTINUED | OUTPATIENT
Start: 2018-11-04 | End: 2018-11-05

## 2018-11-04 RX ORDER — ONDANSETRON 4 MG/1
4 TABLET, ORALLY DISINTEGRATING ORAL EVERY 6 HOURS PRN
Status: DISCONTINUED | OUTPATIENT
Start: 2018-11-04 | End: 2018-11-08 | Stop reason: HOSPADM

## 2018-11-04 RX ORDER — SODIUM CHLORIDE 9 MG/ML
1000 INJECTION, SOLUTION INTRAVENOUS CONTINUOUS
Status: DISCONTINUED | OUTPATIENT
Start: 2018-11-04 | End: 2018-11-04 | Stop reason: DRUGHIGH

## 2018-11-04 RX ORDER — CEFAZOLIN SODIUM 1 G/50ML
2000 SOLUTION INTRAVENOUS EVERY 24 HOURS
Status: DISCONTINUED | OUTPATIENT
Start: 2018-11-05 | End: 2018-11-06

## 2018-11-04 RX ORDER — METOPROLOL SUCCINATE 25 MG/1
25 TABLET, EXTENDED RELEASE ORAL EVERY EVENING
Status: DISCONTINUED | OUTPATIENT
Start: 2018-11-05 | End: 2018-11-08 | Stop reason: HOSPADM

## 2018-11-04 RX ORDER — AMOXICILLIN 250 MG
2 CAPSULE ORAL 2 TIMES DAILY PRN
Status: DISCONTINUED | OUTPATIENT
Start: 2018-11-04 | End: 2018-11-08 | Stop reason: HOSPADM

## 2018-11-04 RX ORDER — SIMVASTATIN 40 MG
40 TABLET ORAL
Status: DISCONTINUED | OUTPATIENT
Start: 2018-11-05 | End: 2018-11-08 | Stop reason: HOSPADM

## 2018-11-04 RX ORDER — LANOLIN ALCOHOL/MO/W.PET/CERES
3000 CREAM (GRAM) TOPICAL EVERY MORNING
Status: DISCONTINUED | OUTPATIENT
Start: 2018-11-05 | End: 2018-11-08 | Stop reason: HOSPADM

## 2018-11-04 RX ORDER — MULTIPLE VITAMINS W/ MINERALS TAB 9MG-400MCG
1 TAB ORAL 2 TIMES DAILY WITH MEALS
COMMUNITY

## 2018-11-04 RX ORDER — NOREPINEPHRINE BITARTRATE/D5W 16MG/250ML
.03-.4 PLASTIC BAG, INJECTION (ML) INTRAVENOUS CONTINUOUS
Status: DISCONTINUED | OUTPATIENT
Start: 2018-11-04 | End: 2018-11-05

## 2018-11-04 RX ORDER — AMOXICILLIN 250 MG
1 CAPSULE ORAL 2 TIMES DAILY PRN
Status: DISCONTINUED | OUTPATIENT
Start: 2018-11-04 | End: 2018-11-08 | Stop reason: HOSPADM

## 2018-11-04 RX ORDER — MAGNESIUM SULFATE 1 G/100ML
1 INJECTION INTRAVENOUS ONCE
Status: COMPLETED | OUTPATIENT
Start: 2018-11-04 | End: 2018-11-04

## 2018-11-04 RX ORDER — ACETAMINOPHEN 500 MG
1000 TABLET ORAL ONCE
Status: COMPLETED | OUTPATIENT
Start: 2018-11-04 | End: 2018-11-04

## 2018-11-04 RX ORDER — SODIUM CHLORIDE 9 MG/ML
INJECTION, SOLUTION INTRAVENOUS CONTINUOUS
Status: DISCONTINUED | OUTPATIENT
Start: 2018-11-04 | End: 2018-11-05

## 2018-11-04 RX ORDER — NALOXONE HYDROCHLORIDE 0.4 MG/ML
.1-.4 INJECTION, SOLUTION INTRAMUSCULAR; INTRAVENOUS; SUBCUTANEOUS
Status: DISCONTINUED | OUTPATIENT
Start: 2018-11-04 | End: 2018-11-08 | Stop reason: HOSPADM

## 2018-11-04 RX ORDER — LEVOTHYROXINE SODIUM 75 UG/1
150 TABLET ORAL
Status: DISCONTINUED | OUTPATIENT
Start: 2018-11-05 | End: 2018-11-08 | Stop reason: HOSPADM

## 2018-11-04 RX ORDER — ONDANSETRON 2 MG/ML
4 INJECTION INTRAMUSCULAR; INTRAVENOUS EVERY 6 HOURS PRN
Status: DISCONTINUED | OUTPATIENT
Start: 2018-11-04 | End: 2018-11-08 | Stop reason: HOSPADM

## 2018-11-04 RX ORDER — METOPROLOL TARTRATE 1 MG/ML
5 INJECTION, SOLUTION INTRAVENOUS ONCE
Status: DISCONTINUED | OUTPATIENT
Start: 2018-11-04 | End: 2018-11-05

## 2018-11-04 RX ADMIN — MAGNESIUM SULFATE IN DEXTROSE 1 G: 10 INJECTION, SOLUTION INTRAVENOUS at 16:19

## 2018-11-04 RX ADMIN — SODIUM CHLORIDE: 9 INJECTION, SOLUTION INTRAVENOUS at 22:18

## 2018-11-04 RX ADMIN — VANCOMYCIN HYDROCHLORIDE 2250 MG: 5 INJECTION, POWDER, LYOPHILIZED, FOR SOLUTION INTRAVENOUS at 17:22

## 2018-11-04 RX ADMIN — ACETAMINOPHEN 1000 MG: 500 TABLET, FILM COATED ORAL at 16:46

## 2018-11-04 RX ADMIN — SODIUM CHLORIDE 1000 ML: 9 INJECTION, SOLUTION INTRAVENOUS at 15:43

## 2018-11-04 RX ADMIN — TAZOBACTAM SODIUM AND PIPERACILLIN SODIUM 4.5 G: 500; 4 INJECTION, SOLUTION INTRAVENOUS at 16:49

## 2018-11-04 ASSESSMENT — ENCOUNTER SYMPTOMS
ABDOMINAL PAIN: 1
FEVER: 0
CHILLS: 0
VOMITING: 1
NAUSEA: 1
CONSTIPATION: 1

## 2018-11-04 NOTE — H&P
[facility]    History and Physical  Hospitalist       Date of Admission:  11/4/2018    Assessment & Plan   Sean Brunner is a 83 year old male with past medical history significant for diabetes mellitus, atrial fibrillation, hypothyroidism presented to the emergency room with altered mental status.  Patient gives history of 1 week of pain swelling and redness involving left lower extremity.  In the emergency room patient had elevated lactic acid he was hypotensive tachycardic with erythematous left lower extremity is admitted for sepsis secondary to left lower extremity cellulitis.     Sepsis secondary to left lower extremity cellulitis  --Patient tachycardic, hypotensive, altered mental status with elevated lactic acid levels  --Blood culture sent in the emergency  --Started on vancomycin and Zosyn will continue for now.  We will narrow antibiotic coverage based on cultures  --No need for Dopplers to rule out DVT   --Though difficult to palpate dorsalis pedis pulse on the left lower extremity, will not recheck arterial ultrasound given this was checked 2 months ago and was normal.     Lactic acidosis  --Likely secondary to infection  --IV fluids  --We will recheck lactic acid level     History of diabetes mellitus  --We will hold off metformin for now  --Sliding scale as patient is confused     Atrial fibrillation  --Monitor on telemetry  --Continue digoxin and metoprolol   --We will hold metoprolol if SBP is less than 100  --On  Warfarin. Monitor INR.   --Pharmacy to dose     Hyper lipidemia  --Continue statins     Hypothyroidism  --On levothyroxine     Chronic leg edema  --On lasix, will hold for now.  Patient was hypotensive.  --Monitor daily weight  --Monitor electrolyte    # Pain Assessment:  Current Pain Score 9/6/2018   Patient currently in pain? yes   Pain score (0-10) 5   Pain location Hand   Pain descriptors Headache   Sean more pain level was assessed and he currently denies pain.       DVT Prophylaxis: Warfarin  Code Status: Full Code    Disposition: Expected discharge in 2-3 days once recovery from sepsis.    Lisandro Osei MD    Primary Care Physician   Oscar Parekh    Chief Complaint   Sepsis, cellulitis    History is obtained from the patient    History of Present Illness   Sean Brunner is a 84 year old male with history of atrial fibrillation who presents with vomiting and diarrhea. Per patients son, the patient had contact with a family member who was having similar symptoms. Family states that the patient had many emetic episodes this morning and has been increasingly getting weaker. Shortly after patient had several episodes of diarrhea he began to complain of abdominal pain and chest pain. Per son, the patients left leg is swollen which has lead to him taking double dosage of Coumadin. Over the past few weeks the leg has become warmer and more swollen. The patient son denies patient having fever, chills, urinary problems, or cough.    In the ED, was febrile to 101.2. Initial blood pressure was 103/66.  Heart rate was 140.  Highest heart rate in the ED was 180.  Left leg was found to be warm to touch.  Was in atrial fibrillation.  Initial labs showed a white count of 4.5 and hemoglobin of 11.4.  Creatinine was normal at 1.  Patient was somewhat confused in the ED.  Initial troponin is negative.  Blood cultures were obtained.  Lactic acid was 2.2.  Was given 1 L of normal saline.  Was also given magnesium sulfate and Tylenol.  Was started on broad-spectrum antibiotics with vancomycin and Zosyn.  Since the patient met severe sepsis criteria, was admitted to the ICU.     Of note, he was recently seen at this ED for similar symptoms and leg cellulitis and fever. At this visit he was also in atrial fibrillation. He was admitted during that time as well (in 9/18) for fever, hypotension, tachycardia, elevated lactic acid consistent with sepsis in setting of LLE cellulitis  (similar admission to today). Given sepsis-he was empirically placed on pip-tazo and vanco and had substantial improvement. His hospital course had been complicated by pauses noted on tele (> 3 seconds) and some transient bradycardia-noted mostly at night time.  This has led to discontinuation of digoxin and decrease of his BB. Holter showed pauses of less than 3 seconds.     Past Medical History    I have reviewed this patient's medical history and updated it with pertinent information if needed.   Past Medical History:   Diagnosis Date     A-fib (H)      COPD (chronic obstructive pulmonary disease) (H)      Diabetes mellitus (H)      Leg wound, left      Low BP      Thyroid disease        Past Surgical History   I have reviewed this patient's surgical history and updated it with pertinent information if needed.  Past Surgical History:   Procedure Laterality Date     JOINT REPLACEMENT         Prior to Admission Medications   Prior to Admission Medications   Prescriptions Last Dose Informant Patient Reported? Taking?   ACCU-CHEK MULTICLIX LANCETS MISC   No No   Sig: by Lancet route 2 times daily. Use to test blood sugar twice daily or as directed.   OMEPRAZOLE PO  Self Yes No   Sig: Take 40 mg by mouth every morning    cyanocolbalamin (VITAMIN  B-12) 1000 MCG tablet   Yes No   Sig: Take 3,000 mcg by mouth every morning   divalproex (DEPAKOTE) 250 MG EC tablet  Spouse/Significant Other Yes No   Sig: Take 250 mg by mouth At Bedtime   furosemide (LASIX) 20 MG tablet   Yes No   Sig: Take 20 mg by mouth daily   levothyroxine (SYNTHROID) 150 MCG tablet   Yes No   Sig: Take 150 mcg by mouth every morning (before breakfast)    metFORMIN (GLUCOPHAGE) 500 MG tablet   No No   Sig: Take 1 tablet (500 mg) by mouth daily (with dinner) Hold this medication until seen by primary MD   metoprolol succinate (TOPROL-XL) 25 MG 24 hr tablet   No No   Sig: Take 1 tablet (25 mg) by mouth every evening   simvastatin (ZOCOR) 40 MG tablet    Yes No   Sig: Take 40 mg by mouth daily (with dinner)    terazosin (HYTRIN) 5 MG capsule   Yes No   Sig: Take 5 mg by mouth At Bedtime   traMADol (ULTRAM) 50 MG tablet   Yes No   Sig: Take 50 mg by mouth every 6 hours as needed for moderate pain    warfarin (COUMADIN) 1 MG tablet   No No   Sig: Take 1 tablet (1 mg) by mouth daily On Wednesday (evening)      Facility-Administered Medications: None     Allergies   No Known Allergies    Social History   I have reviewed this patient's social history and updated it with pertinent information if needed. Sean Brunner  reports that he has never smoked. He has never used smokeless tobacco. He reports that he does not drink alcohol or use illicit drugs.    Family History   I have reviewed this patient's family history and updated it with pertinent information if needed.   No family history on file.    Review of Systems   The 10 point Review of Systems is negative other than noted in the HPI or here.     Physical Exam   Temp: 101.3  F (38.5  C) Temp src: Temporal BP: 103/66   Heart Rate: 138 Resp: 22 SpO2: 99 % O2 Device: None (Room air)    Vital Signs with Ranges  Temp:  [99.5  F (37.5  C)-101.3  F (38.5  C)] 101.3  F (38.5  C)  Heart Rate:  [138-179] 138  Resp:  [22] 22  BP: (103-139)/(66-78) 103/66  SpO2:  [96 %-99 %] 99 %  200 lbs 0 oz    GEN:  Alert, oriented x 3, appears sleepy minimally verbal but appropriately answering questions, no overt distress  HEENT:  Normocephalic/atraumatic, no scleral icterus, no nasal discharge, mouth dry  CV: Irregularly irregular..  S1 + S2 noted, no S3 or S4.  LUNGS: Few bibasilar crackles.  Symmetric chest rise on inhalation noted.  ABD:  Active bowel sounds, soft, non-tender/non-distended.  No rebound/guarding/rigidity.  EXT: Multiple open wounds on left leg with skin discoloration, pitting edema on left leg. Minimal swelling on right leg with no open wounds. Left leg is warm to the touch.   SKIN:  Dry to touch, no exanthems noted in  the visualized areas.  NEURO:  Symmetric muscle strength, sensation to touch grossly intact.  Coordination symmetric on general exam.  No new focal deficits appreciated.    Data   Data reviewed today:  I personally reviewed the EKG tracing showing afib with rvr.    Recent Labs  Lab 11/04/18  1542 11/01/18  1502 10/30/18  1237   WBC 4.5  --   --    HGB 11.4*  --   --    MCV 95  --   --    *  --   --    INR 2.96*  --   --     139 139   POTASSIUM 3.7 4.5 4.2   CHLORIDE 108 103 105   CO2 26 28 29   BUN 18 15 15   CR 1.04 0.97 0.98   ANIONGAP 7 8 6   TYSON 9.2 9.1 8.8   * 83 82   ALBUMIN 3.2*  --   --    PROTTOTAL 8.6  --   --    BILITOTAL 1.3  --   --    ALKPHOS 84  --   --    ALT 14  --   --    AST 21  --   --    TROPI 0.017  --   --        Imaging:  No results found for this or any previous visit (from the past 24 hour(s)).

## 2018-11-04 NOTE — ED TRIAGE NOTES
Pt presents to ER with his son who reports pt started with N/V/D since wedenesday. Pt reports chills denies pain. Per son, another family member at home has similar symptoms. Left leg has wrapped wound, per son wrap is for edema and an old wound for which pt was last hospitalized in august. Pt also has productive cough and runny nose, Craig, Pt A&Ox4.

## 2018-11-04 NOTE — IP AVS SNAPSHOT
MRN:[id number]                      After Visit Summary   11/4/2018    Sean Brunner    MRN: [id number]           Thank you!     Thank you for choosing [facility] for your care. Our goal is always to provide you with excellent care. Hearing back from our patients is one way we can continue to improve our services. Please take a few minutes to complete the written survey that you may receive in the mail after you visit. If you would like to speak to someone directly about your visit please contact Patient Relations at [phone]. Thank you!          Patient Information     Date Of Birth          9/13/1934        Designated Caregiver       Most Recent Value    Caregiver    Will someone help with your care after discharge? yes    Name of designated caregiver Bryan    Phone number of caregiver [phone]    Caregiver address patient home address      About your hospital stay     You were admitted on:  November 4, 2018 You last received care in the:  [place] [address] [facility]    You were discharged on:  November 8, 2018        Reason for your hospital stay       Septic shock secondary to cellulitis                  Who to Call     For medical emergencies, please call 911.  For non-urgent questions about your medical care, please call your primary care provider or clinic, [phone]          Attending Provider     Provider Specialty    Ashkan Altamirano MD Emergency Medicine    Lisandro Osei MD Internal Medicine       Primary Care Provider Office Phone # Fax #    Dereckjordana Cory Parekh [phone]      After Care Instructions     Activity       Your activity upon discharge: activity as tolerated            Diet       Follow this diet upon discharge: Orders Placed This Encounter      Room Service      Combination Diet [id number] Calories: Moderate Consistent CHO (4-6 CHO units/meal); 2 gm NA Diet            Wound care and dressings        Instructions to care for your wound at home: as directed.                  Follow-up Appointments     Follow-up and recommended labs and tests        Follow up with primary care provider, Oscar Parekh, within 7 days for hospital follow- up.    INR check on 11/12 to adjust Coumadin dosing  Follow-up wound care as instructed                  Your next 10 appointments already scheduled     Hung 15, 2019 11:30 AM CST   Pulmonary Hypertension Return with Julian Joyce MD   [facility])    [address] [place], [place] [address]  [facility] [id number] [phone] OPT 2              Additional Services     INR Clinic Referral                 Warfarin Instruction     You have started taking a medicine called warfarin. This is a blood-thinning medicine (anticoagulant). It helps prevent and treat blood clots.      Before leaving the hospital, make sure you know how much to take and how long to take it.      You will need regular blood tests to make sure your blood is clotting safely. It is very important to see your doctor for regular blood tests.    Talk to your doctor before taking any new medicine (this includes over-the-counter drugs and herbal products). Many medicines can interact with warfarin. This may cause more bleeding or too much clotting.     Eating a lot of vitamin K--found in green, leafy vegetables--can change the way warfarin works in your body. Do NOT avoid these foods. Instead, try to eat the same amount each day.     Bleeding is the most common side-effect of warfarin. You may notice bleeding gums, a bloody nose, bruises and bleeding longer when you cut yourself. See a doctor at once if:   o You cough up blood  o You find blood in your stool (poop)  o You have a deep cut, or a cut that bleeds longer than 10 minutes   o You have a bad cut, hard fall, accident or hit your head (go to urgent care or the emergency room).    For women  "who can get pregnant: This medicine can harm an unborn baby. Be very careful not to get pregnant while taking this medicine. If you think you might be pregnant, call your doctor right away.    For more information, read \"Guide to Warfarin Therapy,  the booklet you received in the hospital.        Pending Results     Date and Time Order Name Status Description    11/5/2018 0609 Plasma prepare order unit In process     11/4/2018 1645 Blood culture Preliminary     11/4/2018 1638 Blood culture Preliminary             Statement of Approval     Ordered          11/08/18 1333  I have reviewed and agree with all the recommendations and orders detailed in this document.  EFFECTIVE NOW     Approved and electronically signed by:  Panfilo Segal MD             Admission Information     Date & Time Provider Department Dept. Phone    11/4/2018 Lisandro Osei MD [place] [address] [facility] [phone]      Your Vitals Were     Blood Pressure Temperature Respirations Height Weight Pulse Oximetry    126/72 (BP Location: Left arm) 96  F (35.6  C) (Oral) 18 1.753 m (5' 9\") 85.6 kg (188 lb 11.2 oz) 99%    BMI (Body Mass Index)                   27.87 kg/m2           [facility] Information     [email or web address] gives you secure access to your electronic health record. If you see a primary care provider, you can also send messages to your care team and make appointments. If you have questions, please call your primary care clinic.  If you do not have a primary care provider, please call [phone] and they will assist you.        Care EveryWhere ID     This is your Care EveryWhere ID. This could be used by other organizations to access your [facility] medical records  [id number]        Equal Access to Services     [facility]: Tee Islas, wahector lumartin, qaybta ismael desai. So [facility] [phone].    ATENCIÓN: Si habla español, tiene a ritchie disposición servicios " francisco de asistencia lingüística. Erik kaiser [phone].    We comply with applicable federal civil rights laws and [place] laws. We do not discriminate on the basis of race, color, national origin, age, disability, sex, sexual orientation, or gender identity.               Review of your medicines      START taking        Dose / Directions    amoxicillin-clavulanate 875-125 MG per tablet   Commonly known as:  AUGMENTIN   Used for:  Left leg cellulitis, Severe sepsis (H)        Dose:  1 tablet   Take 1 tablet by mouth 2 times daily for 7 days   Quantity:  14 tablet   Refills:  0         CONTINUE these medicines which may have CHANGED, or have new prescriptions. If we are uncertain of the size of tablets/capsules you have at home, strength may be listed as something that might have changed.        Dose / Directions    warfarin 1 MG tablet   Commonly known as:  COUMADIN   This may have changed:  how much to take   Used for:  Atrial fibrillation, unspecified type (H)        Dose:  0.5 mg   Take 0.5 tablets (0.5 mg) by mouth daily On Wednesday (evening)   Quantity:  30 tablet   Refills:  0         CONTINUE these medicines which have NOT CHANGED        Dose / Directions    blood glucose monitoring lancets   Used for:  Diabetes mellitus, type 2 (H)        by Lancet route 2 times daily. Use to test blood sugar twice daily or as directed.   Quantity:  102 each   Refills:  prn       clindamycin 1 % lotion   Commonly known as:  CLEOCIN T        Apply topically daily as needed (open sore on leg)   Refills:  0       cyanocobalamin 1000 MCG tablet   Commonly known as:  vitamin  B-12        Dose:  3000 mcg   Take 3,000 mcg by mouth every morning   Refills:  0       divalproex sodium delayed-release 250 MG DR tablet   Commonly known as:  DEPAKOTE        Dose:  250 mg   Take 250 mg by mouth At Bedtime   Refills:  0       docusate sodium 100 MG capsule   Commonly known as:  COLACE        Dose:  100 mg   Take 100 mg by mouth 2  times daily as needed for constipation   Refills:  0       furosemide 20 MG tablet   Commonly known as:  LASIX        Dose:  20 mg   Take 20 mg by mouth daily   Refills:  0       metoprolol succinate 25 MG 24 hr tablet   Commonly known as:  TOPROL-XL   Used for:  Atrial fibrillation, unspecified type (H)        Dose:  25 mg   Take 1 tablet (25 mg) by mouth every evening   Quantity:  30 tablet   Refills:  0       multivitamin, therapeutic with minerals Tabs tablet        Dose:  1 tablet   Take 1 tablet by mouth 2 times daily (with meals)   Refills:  0       OMEPRAZOLE PO        Dose:  40 mg   Take 40 mg by mouth every morning   Refills:  0       simvastatin 40 MG tablet   Commonly known as:  ZOCOR        Dose:  40 mg   Take 40 mg by mouth daily (with dinner)   Refills:  0       SYNTHROID 150 MCG tablet   Generic drug:  levothyroxine        Dose:  150 mcg   Take 150 mcg by mouth every morning (before breakfast)   Refills:  0       terazosin 5 MG capsule   Commonly known as:  HYTRIN        Dose:  5 mg   Take 5 mg by mouth At Bedtime   Refills:  0       traMADol 50 MG tablet   Commonly known as:  ULTRAM        Dose:  50 mg   Take 50 mg by mouth every 6 hours as needed for moderate pain   Refills:  0            Where to get your medicines      These medications were sent to [facility] [address] [address] [address]     Phone:  [phone]     amoxicillin-clavulanate 875-125 MG per tablet         Some of these will need a paper prescription and others can be bought over the counter. Ask your nurse if you have questions.     You don't need a prescription for these medications     warfarin 1 MG tablet                Protect others around you: Learn how to safely use, store and throw away your medicines at www.disposemymeds.org.        ANTIBIOTIC INSTRUCTION     You've Been Prescribed an Antibiotic - Now What?  Your healthcare team thinks that you or  your loved one might have an infection. Some infections can be treated with antibiotics, which are powerful, life-saving drugs. Like all medications, antibiotics have side effects and should only be used when necessary. There are some important things you should know about your antibiotic treatment.      Your healthcare team may run tests before you start taking an antibiotic.    Your team may take samples (e.g., from your blood, urine or other areas) to run tests to look for bacteria. These test can be important to determine if you need an antibiotic at all and, if you do, which antibiotic will work best.      Within a few days, your healthcare team might change or even stop your antibiotic.    Your team may start you on an antibiotic while they are working to find out what is making you sick.    Your team might change your antibiotic because test results show that a different antibiotic would be better to treat your infection.    In some cases, once your team has more information, they learn that you do not need an antibiotic at all. They may find out that you don't have an infection, or that the antibiotic you're taking won't work against your infection. For example, an infection caused by a virus can't be treated with antibiotics. Staying on an antibiotic when you don't need it is more likely to be harmful than helpful.      You may experience side effects from your antibiotic.    Like all medications, antibiotics have side effects. Some of these can be serious.    Let you healthcare team know if you have any known allergies when you are admitted to the hospital.    One significant side effect of nearly all antibiotics is the risk of severe and sometimes deadly diarrhea caused by Clostridium difficile (C. Difficile). This occurs when a person takes antibiotics because some good germs are destroyed. Antibiotic use allows C. diificile to take over, putting patients at high risk for this serious infection.    As a  patient or caregiver, it is important to understand your or your loved one's antibiotic treatment. It is especially important for caregivers to speak up when patients can't speak for themselves. Here are some important questions to ask your healthcare team.    What infection is this antibiotic treating and how do you know I have that infection?    What side effects might occur from this antibiotic?    How long will I need to take this antibiotic?    Is it safe to take this antibiotic with other medications or supplements (e.g., vitamins) that I am taking?     Are there any special directions I need to know about taking this antibiotic? For example, should I take it with food?    How will I be monitored to know whether my infection is responding to the antibiotic?    What tests may help to make sure the right antibiotic is prescribed for me?      Information provided by:  www.cdc.gov/getsmart  U.S. Department of Health and Human Services  Centers for disease Control and Prevention  National Center for Emerging and Zoonotic Infectious Diseases  Division of Healthcare Quality Promotion             Medication List: This is a list of all your medications and when to take them. Check marks below indicate your daily home schedule. Keep this list as a reference.      Medications           Morning Afternoon Evening Bedtime As Needed    amoxicillin-clavulanate 875-125 MG per tablet   Commonly known as:  AUGMENTIN   Take 1 tablet by mouth 2 times daily for 7 days                                blood glucose monitoring lancets   by Lancet route 2 times daily. Use to test blood sugar twice daily or as directed.                                clindamycin 1 % lotion   Commonly known as:  CLEOCIN T   Apply topically daily as needed (open sore on leg)                                cyanocobalamin 1000 MCG tablet   Commonly known as:  vitamin  B-12   Take 3,000 mcg by mouth every morning   Last time this was given:  3,000 mcg on  11/8/2018  8:32 AM                                divalproex sodium delayed-release 250 MG DR tablet   Commonly known as:  DEPAKOTE   Take 250 mg by mouth At Bedtime   Last time this was given:  250 mg on 11/7/2018  8:55 PM                                docusate sodium 100 MG capsule   Commonly known as:  COLACE   Take 100 mg by mouth 2 times daily as needed for constipation                                furosemide 20 MG tablet   Commonly known as:  LASIX   Take 20 mg by mouth daily                                metoprolol succinate 25 MG 24 hr tablet   Commonly known as:  TOPROL-XL   Take 1 tablet (25 mg) by mouth every evening   Last time this was given:  25 mg on 11/7/2018  8:55 PM                                multivitamin, therapeutic with minerals Tabs tablet   Take 1 tablet by mouth 2 times daily (with meals)                                OMEPRAZOLE PO   Take 40 mg by mouth every morning   Last time this was given:  40 mg on 11/8/2018  8:32 AM                                simvastatin 40 MG tablet   Commonly known as:  ZOCOR   Take 40 mg by mouth daily (with dinner)   Last time this was given:  40 mg on 11/7/2018  5:56 PM                                SYNTHROID 150 MCG tablet   Take 150 mcg by mouth every morning (before breakfast)   Last time this was given:  150 mcg on 11/7/2018  6:12 AM   Generic drug:  levothyroxine                                terazosin 5 MG capsule   Commonly known as:  HYTRIN   Take 5 mg by mouth At Bedtime                                traMADol 50 MG tablet   Commonly known as:  ULTRAM   Take 50 mg by mouth every 6 hours as needed for moderate pain   Last time this was given:  50 mg on 11/6/2018 11:09 PM                                warfarin 1 MG tablet   Commonly known as:  COUMADIN   Take 0.5 tablets (0.5 mg) by mouth daily On Wednesday (evening)   Last time this was given:  1 mg on 11/7/2018  5:56 PM

## 2018-11-04 NOTE — ED PROVIDER NOTES
History     Chief Complaint:  Nausea, Vomiting, & Diarrhea      HPI   Sean Brunner is a 84 year old male with history of atrial fibrillation who presents with vomiting and diarrhea. Per patients son, the patient had contact with a family member who was having similar symptoms. Family states that the patient had many emetic episodes this morning and has been increasingly getting weaker. Shortly after patient had several episodes of diarrhea he began to complain of abdominal pain and chest pain. Per son, the patients left leg is swollen which has lead to him taking double dosage of Coumadin. Over the past few weeks the leg has become warmer and more swollen. The patient son denies patient having fever, chills, urinary problems, or cough.    Of note, he was recently seen at this ED for similar symptoms and leg cellulitis and fever. At this visit he was also in atrial fibrillation.       Allergies:  NKDA     Medications:    Depakote  Lasix  Synthroid  Metformin  Metroprolol  Omeprazole  Zocor  Coumadin      Past Medical History:    A fib  COPD  DM  Hypotension  Thyroid    Past Surgical History:    joint replacement    Family History:    History reviewed. No pertinent family history.    Social History:  Marital Status:   [2]  Negative for tobacco use.  Negative for alcohol use.      Review of Systems   Constitutional: Negative for chills and fever.   Cardiovascular: Positive for chest pain and leg swelling.   Gastrointestinal: Positive for abdominal pain, constipation, nausea and vomiting.   All other systems reviewed and are negative.    Physical Exam     Patient Vitals for the past 24 hrs:   BP Temp Temp src Heart Rate Resp SpO2 Height Weight   11/04/18 1730 106/68 - - 106 - 96 % - -   11/04/18 1715 116/64 - - 113 - 95 % - -   11/04/18 1620 - 101.3  F (38.5  C) Temporal - - - - -   11/04/18 1615 103/66 - - - - 99 % - -   11/04/18 1600 123/78 - - 138 - 96 % - -   11/04/18 1521 139/75 99.5  F (37.5  C) Oral  "179 22 97 % 1.753 m (5' 9\") 90.7 kg (200 lb)         Physical Exam  General: Patient is sleepy and ill appearance when I enter the room  Head:  The scalp, face, and head appear normal  Eyes:  The pupils are equal, round, and reactive to light    Conjunctivae and sclerae are normal  ENT:    External acoustic canals are normal    The oropharynx is normal without erythema.     Uvula is in the midline  Neck:  Normal range of motion  CV:  Irregularly irregular tachycardia. S1/S2. No murmurs.   Resp:  Lungs are clear without wheezes or rales. No distress  GI:  Abdomen is soft, no rigidity, guarding, or rebound. Generalized abdominal tenderness.     No distension. No tenderness to palpation in any quadrant.     MS:  Normal tone. Joints grossly normal without effusions.     No calf or thigh tenderness.      Normal motor assessment of all extremities.     Multiple open wounds on left leg with skin discoloration, pitting edema on left     leg. Minimal swelling on right leg with no open wounds. Left leg is warm to the    touch.   Skin:  No rash or noted. Normal capillary refill noted  Neuro: Confused, altered.  Conversant but difficult to understand.   Psych:  No hallucinations. Appropriate interactions.  Lymph: No anterior cervical lymphadenopathy noted    Emergency Department Course   ECG:  Time: 1522  Vent. Rate 133 bpm. TX interval *. QRS duration 84. QT/QTc 320/476. P-R-T axis * -10 114.  Atrial fibrillation with rapid ventricular response with premature ventricular or aberrantly conducted complexes. Low voltage QRS. Septal infarct, age undetermined. Abnormal ECG. Read time: 1522    Time: 1820  Vent. Rate 96 bpm. TX interval *. QRS duration 96. QT/QTc 356/449. P-R-T axis * 14 30.   Atrial fibrillation with premature ventricular or aberrantly conducted complexes. Low voltage QRS. Cannot rule out anterior infarct, age undetermined. Abnormal ECG. Read time: 1821      Imaging:  XR Chest 2 views:   IMPRESSION: Increased right " perihilar and lower lobe airspace opacity  concerning for aspiration and pneumonia although asymmetric pulmonary  edema could also have this appearance. There is a small right pleural  effusion. Left lung is relatively clear. Cardiac silhouette remains  borderline enlarged. No pneumothorax visualized.  As per radiology.       Laboratory:  CBC: WBC: 4.5, HGB: 11.4(L), PLT: 133(L)  BMP: Glucose 155(H), o/w WNL (Creatinine: 1.04)  Hepatic Panel: Bilirubin 0.5(H), Albumin 3.2(L), o/w WNL  INR: 2.96(H)  Magnesium: 1.9     VBG: pH 7.48(H) / PCO2 33(L) / PO2 19(L) / Bicarb 24/ Lactic Acid 2.2(H)    1542 Troponin: 0.017    Blood Cultures (x2): Pending     Interventions:  1543 Normal saline 1,000mL IV   1619 Magnesium sulfate 1g IV   Magnesium sulfate 1g IV  1649 Tylenol 1000mg Oral  1722 Vancomycin 2,250mg IV   1722   zosyn        Please see MAR for full list of medications administered in the ED.    Emergency Department Course:  Nursing notes and vitals reviewed. (1530) I performed an exam of the patient as documented above.     IV inserted. Medicine administered as documented above. Blood drawn. This was sent to the lab for further testing, results above.    EKG was done, interpretation as above.    The patient was sent for a Chest XR while in the emergency department, findings above.     (1617) I rechecked the patient and discussed the results of his workup thus far. The patient now has a 101.3F fever. I will give medications for these symptoms as noted above. I will not be giving many fluids due to his CHF history.    (1659)  I consulted with Dr. Butterfield of the hospitalist services. They are in agreement to accept the patient for admission.    (1835) I reevaluated the patient and provided an update in regards to his ED course.      Findings and plan explained to the Patient and son who consents to admission. Discussed the patient with Dr. Butterfield, who will admit the patient to a ICU bed for further monitoring, evaluation, and  treatment.      Impression & Plan    CMS Diagnoses:   The patient has signs of Severe Sepsis as evidenced by:    1. 2 SIRS criteria, AND  2. Suspected infection, AND   3. Organ dysfunction: Lactic Acid > 1.9      3 Hour Severe Sepsis Bundle Completion:  1. Initial Lactic Acid Result:   Recent Labs   Lab Test  11/04/18   1543  09/01/18   1557  09/01/18   1059   LACT  2.2*  1.8  2.3*     2. Blood Cultures before Antibiotics: Yes  3. Broad Spectrum Antibiotics Administered: Yes     Anti-infectives (Future)    Start     Dose/Rate Route Frequency Ordered Stop    11/04/18 1626  vancomycin (VANCOCIN) 2,250 mg in sodium chloride 0.9 % 500 mL intermittent infusion      2,250 mg  over 2 Hours Intravenous ONCE 11/04/18 1625          4. Full 30mL/kg bolus not administered due to CHF and acute Pulmonary Edema  Ideal body weight: 70.7 kg (155 lb 13.8 oz)  Adjusted ideal body weight: 78.7 kg (173 lb 8.3 oz)           Medical Decision Making:  Sean Brunner is a 84 year old male who presents with vomiting, and diarrhea. He has atrial fibrillation throughout the ventricular response and fever. He has severe sepsis by criteria and got broad spectrum antibiotics, blood cultures, and normal severe sepsis workup except fluids given is congestive heart failure history. The patient is stable for admission but is pressures are slightly soft and given ill appearance I think I am going to watch him in ICU. I would not cardiovert him at this point it appears he is likely in a fib more chronically than paroxysmally. In addition I did not want to sedate him at this point give how ill he appears. He is confused but I doubt he has meningitis. He did receive magnesium, fluids, and tylenol for his atrial fibrillation with RVR. His rate is better now.  He received broad spectrum abx.  I was involved in critcal care of another patient and did not order a repeat lactic before he went to floor.     Diagnosis:    ICD-10-CM    1. Severe sepsis (H)  A41.9 Blood culture    R65.20 Blood culture   2. Left leg cellulitis L03.116    3. Atrial fibrillation with rapid ventricular response (H) I48.91    4. Altered mental status, unspecified altered mental status type R41.82        Disposition:  Admitted to Dr. Scout Flores Disclosure:  ALAINA, Ana Tapia, am serving as a scribe on 11/4/2018 at 3:30 PM to personally document services performed by Ashkan Altamirano MD based on my observations and the provider's statements to me.     Ana Tapia  11/4/2018   [facility] EMERGENCY DEPARTMENT       Ashkan Altamirano MD  11/04/18 3641

## 2018-11-04 NOTE — IP AVS SNAPSHOT
[place] [facility] [facility]    [address]    [facility] [address]    Phone:  [phone]    Fax:  [phone]                                       After Visit Summary   11/4/2018    Sean Brunner    MRN: [id number]           After Visit Summary Signature Page     I have received my discharge instructions, and my questions have been answered. I have discussed any challenges I see with this plan with the nurse or doctor.    ..........................................................................................................................................  Patient/Patient Representative Signature      ..........................................................................................................................................  Patient Representative Print Name and Relationship to Patient    ..................................................               ................................................  Date                                   Time    ..........................................................................................................................................  Reviewed by Signature/Title    ...................................................              ..............................................  Date                                               Time          22EPIC Rev 08/18

## 2018-11-05 PROBLEM — A41.9 SEPTIC SHOCK (H): Status: RESOLVED | Noted: 2018-11-04 | Resolved: 2018-11-05

## 2018-11-05 PROBLEM — A41.9 SEPSIS (H): Status: RESOLVED | Noted: 2018-09-01 | Resolved: 2018-11-05

## 2018-11-05 PROBLEM — L03.116 CELLULITIS OF LEFT LOWER EXTREMITY: Status: ACTIVE | Noted: 2018-11-05

## 2018-11-05 PROBLEM — R65.21 SEPTIC SHOCK (H): Status: RESOLVED | Noted: 2018-11-04 | Resolved: 2018-11-05

## 2018-11-05 LAB
ALBUMIN SERPL-MCNC: 2.5 G/DL (ref 3.4–5)
ALP SERPL-CCNC: 61 U/L (ref 40–150)
ALT SERPL W P-5'-P-CCNC: 11 U/L (ref 0–70)
ANION GAP SERPL CALCULATED.3IONS-SCNC: 3 MMOL/L (ref 3–14)
AST SERPL W P-5'-P-CCNC: 17 U/L (ref 0–45)
BILIRUB SERPL-MCNC: 0.8 MG/DL (ref 0.2–1.3)
BUN SERPL-MCNC: 16 MG/DL (ref 7–30)
CALCIUM SERPL-MCNC: 8.5 MG/DL (ref 8.5–10.1)
CHLORIDE SERPL-SCNC: 113 MMOL/L (ref 94–109)
CO2 SERPL-SCNC: 28 MMOL/L (ref 20–32)
CREAT SERPL-MCNC: 0.92 MG/DL (ref 0.66–1.25)
ERYTHROCYTE [DISTWIDTH] IN BLOOD BY AUTOMATED COUNT: 14.8 % (ref 10–15)
GFR SERPL CREATININE-BSD FRML MDRD: 78 ML/MIN/1.7M2
GLUCOSE BLDC GLUCOMTR-MCNC: 103 MG/DL (ref 70–99)
GLUCOSE BLDC GLUCOMTR-MCNC: 108 MG/DL (ref 70–99)
GLUCOSE BLDC GLUCOMTR-MCNC: 128 MG/DL (ref 70–99)
GLUCOSE BLDC GLUCOMTR-MCNC: 67 MG/DL (ref 70–99)
GLUCOSE BLDC GLUCOMTR-MCNC: 76 MG/DL (ref 70–99)
GLUCOSE BLDC GLUCOMTR-MCNC: 78 MG/DL (ref 70–99)
GLUCOSE BLDC GLUCOMTR-MCNC: 99 MG/DL (ref 70–99)
GLUCOSE SERPL-MCNC: 104 MG/DL (ref 70–99)
HCT VFR BLD AUTO: 31.1 % (ref 40–53)
HGB BLD-MCNC: 10 G/DL (ref 13.3–17.7)
INR PPP: 5.86 (ref 0.86–1.14)
INTERPRETATION ECG - MUSE: NORMAL
INTERPRETATION ECG - MUSE: NORMAL
LACTATE BLD-SCNC: 1.1 MMOL/L (ref 0.7–2)
MCH RBC QN AUTO: 30.2 PG (ref 26.5–33)
MCHC RBC AUTO-ENTMCNC: 32.2 G/DL (ref 31.5–36.5)
MCV RBC AUTO: 94 FL (ref 78–100)
PLATELET # BLD AUTO: 90 10E9/L (ref 150–450)
POTASSIUM SERPL-SCNC: 4.1 MMOL/L (ref 3.4–5.3)
PROT SERPL-MCNC: 7 G/DL (ref 6.8–8.8)
RBC # BLD AUTO: 3.31 10E12/L (ref 4.4–5.9)
SODIUM SERPL-SCNC: 144 MMOL/L (ref 133–144)
WBC # BLD AUTO: 3.3 10E9/L (ref 4–11)

## 2018-11-05 PROCEDURE — 25000128 H RX IP 250 OP 636: Performed by: INTERNAL MEDICINE

## 2018-11-05 PROCEDURE — 83605 ASSAY OF LACTIC ACID: CPT | Performed by: INTERNAL MEDICINE

## 2018-11-05 PROCEDURE — 85610 PROTHROMBIN TIME: CPT | Performed by: INTERNAL MEDICINE

## 2018-11-05 PROCEDURE — A9270 NON-COVERED ITEM OR SERVICE: HCPCS | Mod: GY | Performed by: INTERNAL MEDICINE

## 2018-11-05 PROCEDURE — 00000146 ZZHCL STATISTIC GLUCOSE BY METER IP

## 2018-11-05 PROCEDURE — 12000007 ZZH R&B INTERMEDIATE

## 2018-11-05 PROCEDURE — 80053 COMPREHEN METABOLIC PANEL: CPT | Performed by: INTERNAL MEDICINE

## 2018-11-05 PROCEDURE — 85027 COMPLETE CBC AUTOMATED: CPT | Performed by: INTERNAL MEDICINE

## 2018-11-05 PROCEDURE — 25000132 ZZH RX MED GY IP 250 OP 250 PS 637: Mod: GY | Performed by: INTERNAL MEDICINE

## 2018-11-05 PROCEDURE — 99233 SBSQ HOSP IP/OBS HIGH 50: CPT | Performed by: INTERNAL MEDICINE

## 2018-11-05 PROCEDURE — 36415 COLL VENOUS BLD VENIPUNCTURE: CPT | Performed by: INTERNAL MEDICINE

## 2018-11-05 PROCEDURE — 25000128 H RX IP 250 OP 636: Performed by: ANESTHESIOLOGY

## 2018-11-05 RX ORDER — ACETAMINOPHEN 325 MG/1
650 TABLET ORAL EVERY 6 HOURS PRN
Status: DISCONTINUED | OUTPATIENT
Start: 2018-11-05 | End: 2018-11-08 | Stop reason: HOSPADM

## 2018-11-05 RX ORDER — DIVALPROEX SODIUM 250 MG/1
250 TABLET, DELAYED RELEASE ORAL AT BEDTIME
Status: DISCONTINUED | OUTPATIENT
Start: 2018-11-05 | End: 2018-11-08 | Stop reason: HOSPADM

## 2018-11-05 RX ORDER — PHYTONADIONE 1 MG/.5ML
1 INJECTION, EMULSION INTRAMUSCULAR; INTRAVENOUS; SUBCUTANEOUS ONCE
Status: DISCONTINUED | OUTPATIENT
Start: 2018-11-05 | End: 2018-11-05

## 2018-11-05 RX ADMIN — OMEPRAZOLE 40 MG: 20 CAPSULE, DELAYED RELEASE ORAL at 08:32

## 2018-11-05 RX ADMIN — PHYTONADIONE 1 MG: 2 INJECTION, EMULSION INTRAMUSCULAR; INTRAVENOUS; SUBCUTANEOUS at 06:51

## 2018-11-05 RX ADMIN — VANCOMYCIN HYDROCHLORIDE 2000 MG: 1 INJECTION, POWDER, LYOPHILIZED, FOR SOLUTION INTRAVENOUS at 18:00

## 2018-11-05 RX ADMIN — TAZOBACTAM SODIUM AND PIPERACILLIN SODIUM 3.38 G: 375; 3 INJECTION, SOLUTION INTRAVENOUS at 00:01

## 2018-11-05 RX ADMIN — DIVALPROEX SODIUM 250 MG: 250 TABLET, DELAYED RELEASE ORAL at 21:43

## 2018-11-05 RX ADMIN — LEVOTHYROXINE SODIUM 150 MCG: 75 TABLET ORAL at 08:32

## 2018-11-05 RX ADMIN — TAZOBACTAM SODIUM AND PIPERACILLIN SODIUM 3.38 G: 375; 3 INJECTION, SOLUTION INTRAVENOUS at 17:22

## 2018-11-05 RX ADMIN — SODIUM CHLORIDE: 9 INJECTION, SOLUTION INTRAVENOUS at 08:28

## 2018-11-05 RX ADMIN — SIMVASTATIN 40 MG: 40 TABLET, FILM COATED ORAL at 17:59

## 2018-11-05 RX ADMIN — TAZOBACTAM SODIUM AND PIPERACILLIN SODIUM 3.38 G: 375; 3 INJECTION, SOLUTION INTRAVENOUS at 05:42

## 2018-11-05 RX ADMIN — TAZOBACTAM SODIUM AND PIPERACILLIN SODIUM 3.38 G: 375; 3 INJECTION, SOLUTION INTRAVENOUS at 11:06

## 2018-11-05 RX ADMIN — CYANOCOBALAMIN TAB 1000 MCG 3000 MCG: 1000 TAB at 08:33

## 2018-11-05 ASSESSMENT — ACTIVITIES OF DAILY LIVING (ADL)
ADLS_ACUITY_SCORE: 13

## 2018-11-05 NOTE — PLAN OF CARE
Problem: Cardiac Disease Comorbidity  Goal: Cardiac Disease  Patient comorbidity will be monitored for signs and symptoms of Cardiac Disease.  Problems will be absent, minimized or managed by discharge/transition of care.   Outcome: No Change  A. Fib CVR

## 2018-11-05 NOTE — PROGRESS NOTES
CM: Aware of SW consult. Pt sleeping at time of attempted visit. RN requested to check back tomorrow.  Pt lives with spouse has not been out of bed today   I/P: will follow

## 2018-11-05 NOTE — PROGRESS NOTES
Called by [facility] that Mr. Brunner had blood pressures in the 80s and was not receiving any fluids.  In order to avoid initiation of levo fed infusion I will start the patient on maintenance fluid.  We will continue to monitor via the telemetry help.    Bryan Joseph MD  Critical care services

## 2018-11-05 NOTE — PROGRESS NOTES
[facility]    Hospitalist Progress Note      Assessment & Plan   84yoM with history of HTN, HLD, BPH, STEWART, GERD, dementia, hypothyroidism, chronic HFpEF, atrial fibrillation on warfarin and chronic LLE ulcers who initially presented to the ED on 11/4 for evaluation of altered mental status.  He was found to have sepsis secondary to LLE cellulitis and was admitted to the ICU for levophed infusion.      1) LLE celluliits: Sepsis resolved, off pressors  - Noted to have chronic venous stasis L>R and shallow pretibial ulcers  - BCx (11/4): NGTD x2; MRSA swab negative  - Continue vancomycin and zosyn for now  - OK to transfer out of ICU    2) Supratherapeutic INR:  - Given 1mg of IV vitamin K  - Cancel FFP previously ordered and monitor INR off of warfarin    3) Atrial fibrillation: Rate controlled metoprolol, holding warfarin as above    4) Chronic HFpEF: Compensated, holding lasix, continue BB; discontinue IV fluids    5) Dementia: Restart nightly depakote      DVT Prophylaxis: Warfarin  Code Status: Prior  Expected discharge: 2 - 3 days, recommended to transitional care unit once antibiotic plan established.    Anuj Rios MD    Text Page (7am to 6pm, M-F)    Interval History   Patient doing well, has been off levophed since last night, non-toxic appearing.      -Data reviewed today: I reviewed all new labs and imaging results over the last 24 hours. I personally reviewed no images or EKG's today.    Physical Exam   Temp: 97.8  F (36.6  C) Temp src: Oral BP: 107/66   Heart Rate: 74 Resp: 22 SpO2: 99 % O2 Device: None (Room air) Oxygen Delivery: 1 LPM  Vitals:    11/04/18 1521 11/04/18 2040 11/05/18 0546   Weight: 90.7 kg (200 lb) 82.9 kg (182 lb 12.2 oz) 83.6 kg (184 lb 4.9 oz)     Vital Signs with Ranges  Temp:  [97.6  F (36.4  C)-101.3  F (38.5  C)] 97.8  F (36.6  C)  Heart Rate:  [] 74  Resp:  [0-39] 22  BP: ()/(28-78) 107/66  SpO2:  [95 %-100 %] 99 %  I/O last 3 completed shifts:  In:  641.67 [I.V.:641.67]  Out: 350 [Urine:350]    Constitutional: NAD, AAox2  Resp: CTABL, non-labored, no wheezing  CV: Irregular, S1S2 appreciated, no MRG  GI: Soft, NT/ND  Extremities: Chronic venous stasis and edema L>R, shallow pretibial ulcers in various stages of healing  Neuro: CN grossly intact      Medications     norepinephrine Stopped (11/04/18 2057)     - MEDICATION INSTRUCTIONS -       sodium chloride 100 mL/hr at 11/05/18 1140     Warfarin Therapy Reminder         cyanocobalamin  3,000 mcg Oral QAM     levothyroxine  150 mcg Oral QAM AC     metoprolol  5 mg Intravenous Once     metoprolol succinate  25 mg Oral QPM     omeprazole (priLOSEC) CR capsule 40 mg  40 mg Oral QAM     piperacillin-tazobactam  3.375 g Intravenous Q6H     simvastatin  40 mg Oral Daily with supper     vancomycin (VANCOCIN) IV  2,000 mg Intravenous Q24H     warfarin-No DOSE today  1 each Does not apply no dose today (warfarin)       Data     Recent Labs  Lab 11/05/18  0528 11/04/18  1542 11/01/18  1502   WBC 3.3* 4.5  --    HGB 10.0* 11.4*  --    MCV 94 95  --    PLT 90* 133*  --    INR 5.86* 2.96*  --     141 139   POTASSIUM 4.1 3.7 4.5   CHLORIDE 113* 108 103   CO2 28 26 28   BUN 16 18 15   CR 0.92 1.04 0.97   ANIONGAP 3 7 8   TYSON 8.5 9.2 9.1   * 155* 83   ALBUMIN 2.5* 3.2*  --    PROTTOTAL 7.0 8.6  --    BILITOTAL 0.8 1.3  --    ALKPHOS 61 84  --    ALT 11 14  --    AST 17 21  --    TROPI  --  0.017  --        Recent Results (from the past 24 hour(s))   XR Chest 2 Views    Narrative    CHEST TWO VIEW   11/4/2018 5:10 PM     HISTORY: Cough, evaluate for pneumonia, fluid overload.     COMPARISON: 9/2/2018.      Impression    IMPRESSION: Increased right perihilar and lower lobe airspace opacity  concerning for aspiration and pneumonia although asymmetric pulmonary  edema could also have this appearance. There is a small right pleural  effusion. Left lung is relatively clear. Cardiac silhouette remains  borderline  enlarged. No pneumothorax visualized.    GORDY PABLO MD

## 2018-11-05 NOTE — PROGRESS NOTES
Called by bedside nurse patient had abnormal lab value of an INR of 5.86.  Upon chart review it was noted that the patient had double dosed his Coumadin within the last 24 hours.  Given the elevated INR we will start vitamin K and give 2 units of FFP and repeat the INR.  Also recommend holding Coumadin dosing currently.    Bryan Joseph MD  Critical Care Services

## 2018-11-05 NOTE — PLAN OF CARE
Problem: Patient Care Overview  Goal: Plan of Care/Patient Progress Review  Outcome: Improving  ICU End of Shift Summary.  For vital signs and complete assessments, please see documentation flowsheets.     Pertinent assessments: Patient is alert/oriented x4. Lung sounds clear/diminished throughout. Tele is A. Fib CVR. Abdomen is soft and nontender, incontinent BM this shift. Voiding without difficulty. Skin intact with exception of LLE wound.   Major Shift Events: Admitted from ED with sepsis at 2030. Blood pressures borderline hypotensive, but have improved with IVF. Critical INR of 5.86, tele ICU ordered Vitamin K and 2 units of FFP.   Plan (Upcoming Events): Zosyn and Vancomycin ordered for infection.   Discharge/Transfer Needs: TBD    Bedside Shift Report Completed :   Bedside Safety Check Completed:

## 2018-11-05 NOTE — PHARMACY-ADMISSION MEDICATION HISTORY
Admission medication history interview status for this patient is complete. See [facility] admission navigator for allergy information, prior to admission medications and immunization status.     Medication history interview source(s):Patient and son  Medication history resources (including written lists, pill bottles, clinic record):patient's home med list  Primary pharmacy:[facility]    Changes made to PTA medication list:  Added: multivit, docusate, clinda lotion  Deleted: metformin (sugars had been good so they dc'd)  Changed: Depakote to DR tab, Warfarin dose varies.  Additional medication history information: INR 11/1 was 5.  Dose held 11/1 & 11/2 and reduced to 1 mg for 11/3 & 11/4 with planned INR check on 11/5    Actions taken by pharmacist (provider contacted, etc):None     Medication reconciliation/reorder completed by provider prior to medication history? Yes    Do you take OTC medications (eg tylenol, ibuprofen, fish oil, eye/ear drops, etc)? N    For patients on insulin therapy:N    Prior to Admission medications    Medication Sig Last Dose Taking? Auth Provider   cyanocolbalamin (VITAMIN  B-12) 1000 MCG tablet Take 3,000 mcg by mouth every morning 11/4/2018 at am Yes Unknown, Entered By History   divalproex sodium delayed-release (DEPAKOTE) 250 MG DR tablet Take 250 mg by mouth At Bedtime 11/3/2018 at hs Yes Reported, Patient   furosemide (LASIX) 20 MG tablet Take 20 mg by mouth daily 11/4/2018 at am Yes Unknown, Entered By History   levothyroxine (SYNTHROID) 150 MCG tablet Take 150 mcg by mouth every morning (before breakfast)  11/4/2018 at am Yes Reported, Patient   metoprolol succinate (TOPROL-XL) 25 MG 24 hr tablet Take 1 tablet (25 mg) by mouth every evening 11/4/2018 at am Yes Kimi Gil MD   multivitamin, therapeutic with minerals (THERA-VIT-M) TABS tablet Take 1 tablet by mouth 2 times daily (with meals) 11/4/2018 at am Yes Reported, Patient   OMEPRAZOLE PO Take 40 mg by mouth every  morning  11/3/2018 at dinner Yes Unknown, Entered By History   simvastatin (ZOCOR) 40 MG tablet Take 40 mg by mouth daily (with dinner)  11/3/2018 at dinner Yes Reported, Patient   terazosin (HYTRIN) 5 MG capsule Take 5 mg by mouth At Bedtime 11/3/2018 at HS Yes Unknown, Entered By History   warfarin (COUMADIN) 1 MG tablet Take 1 tablet (1 mg) by mouth daily On Wednesday (evening) 11/3 1mg at dinner Yes Kimi Gil MD   ACCU-CHEK MULTICLIX LANCETS MISC by Lancet route 2 times daily. Use to test blood sugar twice daily or as directed.   Oscar Parekh MD   clindamycin (CLEOCIN T) 1 % lotion Apply topically daily as needed (open sore on leg) Unknown at Unknown time  Unknown, Entered By History   docusate sodium (COLACE) 100 MG capsule Take 100 mg by mouth 2 times daily as needed for constipation Unknown at Unknown time  Unknown, Entered By History   traMADol (ULTRAM) 50 MG tablet Take 50 mg by mouth every 6 hours as needed for moderate pain  Unknown at Unknown time  Reported, Patient

## 2018-11-05 NOTE — PHARMACY-VANCOMYCIN DOSING SERVICE
Pharmacy Vancomycin Note  Date of Service 2018  Patient's  1934   84 year old, male    Indication: Sepsis and Skin and Soft Tissue Infection  Goal Trough Level: 15-20 mg/L  Day of Therapy: 1  Current Vancomycin regimen:  2250 mg IV in ER    Current estimated CrCl = Estimated Creatinine Clearance: 58.9 mL/min (based on Cr of 1.04).    Creatinine for last 3 days  2018:  3:42 PM Creatinine 1.04 mg/dL    Recent Vancomycin Levels (past 3 days)  No results found for requested labs within last 72 hours.    Vancomycin IV Administrations (past 72 hours)                   vancomycin (VANCOCIN) 2,250 mg in sodium chloride 0.9 % 500 mL intermittent infusion (mg) 2,250 mg New Bag 18 1722                Nephrotoxins and other renal medications (Future)    Start     Dose/Rate Route Frequency Ordered Stop    18 1700  vancomycin (VANCOCIN) 2,000 mg in sodium chloride 0.9 % 500 mL intermittent infusion      2,000 mg  over 2 Hours Intravenous EVERY 24 HOURS 18 2344      18 2345  piperacillin-tazobactam (ZOSYN) infusion 3.375 g      3.375 g  100 mL/hr over 30 Minutes Intravenous EVERY 6 HOURS 18 2337      18 2030  norepinephrine (LEVOPHED) 16 mg in D5W 250 mL infusion      0.03-0.4 mcg/kg/min × 90.7 kg  2.6-34 mL/hr  Intravenous CONTINUOUS 18 2028               Contrast Orders - past 72 hours     None          Plan:  1.  Vancomycin 2g iv q24h  2.  Pharmacy will check trough levels as appropriate in 1-3 Days.    3. Serum creatinine levels will be ordered daily for the first week of therapy and at least twice weekly for subsequent weeks.      Mateo Bonilla        .

## 2018-11-06 ENCOUNTER — APPOINTMENT (OUTPATIENT)
Dept: PHYSICAL THERAPY | Facility: CLINIC | Age: 83
DRG: 871 | End: 2018-11-06
Attending: INTERNAL MEDICINE
Payer: MEDICARE

## 2018-11-06 LAB
ALBUMIN SERPL-MCNC: 2.5 G/DL (ref 3.4–5)
ALP SERPL-CCNC: 52 U/L (ref 40–150)
ALT SERPL W P-5'-P-CCNC: 9 U/L (ref 0–70)
ANION GAP SERPL CALCULATED.3IONS-SCNC: 4 MMOL/L (ref 3–14)
AST SERPL W P-5'-P-CCNC: 19 U/L (ref 0–45)
BASOPHILS # BLD AUTO: 0 10E9/L (ref 0–0.2)
BASOPHILS NFR BLD AUTO: 0.3 %
BILIRUB SERPL-MCNC: 0.5 MG/DL (ref 0.2–1.3)
BUN SERPL-MCNC: 15 MG/DL (ref 7–30)
CALCIUM SERPL-MCNC: 8.1 MG/DL (ref 8.5–10.1)
CHLORIDE SERPL-SCNC: 111 MMOL/L (ref 94–109)
CO2 SERPL-SCNC: 26 MMOL/L (ref 20–32)
CREAT SERPL-MCNC: 0.89 MG/DL (ref 0.66–1.25)
DIFFERENTIAL METHOD BLD: ABNORMAL
EOSINOPHIL # BLD AUTO: 0.1 10E9/L (ref 0–0.7)
EOSINOPHIL NFR BLD AUTO: 1.5 %
ERYTHROCYTE [DISTWIDTH] IN BLOOD BY AUTOMATED COUNT: 14.9 % (ref 10–15)
GFR SERPL CREATININE-BSD FRML MDRD: 81 ML/MIN/1.7M2
GLUCOSE BLDC GLUCOMTR-MCNC: 101 MG/DL (ref 70–99)
GLUCOSE BLDC GLUCOMTR-MCNC: 110 MG/DL (ref 70–99)
GLUCOSE BLDC GLUCOMTR-MCNC: 75 MG/DL (ref 70–99)
GLUCOSE BLDC GLUCOMTR-MCNC: 85 MG/DL (ref 70–99)
GLUCOSE BLDC GLUCOMTR-MCNC: 86 MG/DL (ref 70–99)
GLUCOSE BLDC GLUCOMTR-MCNC: 89 MG/DL (ref 70–99)
GLUCOSE BLDC GLUCOMTR-MCNC: 89 MG/DL (ref 70–99)
GLUCOSE BLDC GLUCOMTR-MCNC: 92 MG/DL (ref 70–99)
GLUCOSE BLDC GLUCOMTR-MCNC: 93 MG/DL (ref 70–99)
GLUCOSE BLDC GLUCOMTR-MCNC: 94 MG/DL (ref 70–99)
GLUCOSE SERPL-MCNC: 100 MG/DL (ref 70–99)
HCT VFR BLD AUTO: 32.3 % (ref 40–53)
HGB BLD-MCNC: 10.1 G/DL (ref 13.3–17.7)
IMM GRANULOCYTES # BLD: 0 10E9/L (ref 0–0.4)
IMM GRANULOCYTES NFR BLD: 0.3 %
INR PPP: 2.27 (ref 0.86–1.14)
LACTATE BLD-SCNC: 1.3 MMOL/L (ref 0.7–2)
LACTATE BLD-SCNC: 1.4 MMOL/L (ref 0.7–2)
LYMPHOCYTES # BLD AUTO: 0.8 10E9/L (ref 0.8–5.3)
LYMPHOCYTES NFR BLD AUTO: 23 %
MAGNESIUM SERPL-MCNC: 2 MG/DL (ref 1.6–2.3)
MCH RBC QN AUTO: 30.6 PG (ref 26.5–33)
MCHC RBC AUTO-ENTMCNC: 31.3 G/DL (ref 31.5–36.5)
MCV RBC AUTO: 98 FL (ref 78–100)
MONOCYTES # BLD AUTO: 0.5 10E9/L (ref 0–1.3)
MONOCYTES NFR BLD AUTO: 14.2 %
NEUTROPHILS # BLD AUTO: 2 10E9/L (ref 1.6–8.3)
NEUTROPHILS NFR BLD AUTO: 60.7 %
NRBC # BLD AUTO: 0 10*3/UL
NRBC BLD AUTO-RTO: 0 /100
PLATELET # BLD AUTO: 102 10E9/L (ref 150–450)
POTASSIUM SERPL-SCNC: 3.9 MMOL/L (ref 3.4–5.3)
PROT SERPL-MCNC: 6.9 G/DL (ref 6.8–8.8)
RBC # BLD AUTO: 3.3 10E12/L (ref 4.4–5.9)
SODIUM SERPL-SCNC: 141 MMOL/L (ref 133–144)
WBC # BLD AUTO: 3.3 10E9/L (ref 4–11)

## 2018-11-06 PROCEDURE — 00000146 ZZHCL STATISTIC GLUCOSE BY METER IP

## 2018-11-06 PROCEDURE — 97161 PT EVAL LOW COMPLEX 20 MIN: CPT | Mod: GP

## 2018-11-06 PROCEDURE — G0463 HOSPITAL OUTPT CLINIC VISIT: HCPCS

## 2018-11-06 PROCEDURE — 12000007 ZZH R&B INTERMEDIATE

## 2018-11-06 PROCEDURE — 93005 ELECTROCARDIOGRAM TRACING: CPT

## 2018-11-06 PROCEDURE — 25000132 ZZH RX MED GY IP 250 OP 250 PS 637: Mod: GY | Performed by: INTERNAL MEDICINE

## 2018-11-06 PROCEDURE — 85610 PROTHROMBIN TIME: CPT | Performed by: INTERNAL MEDICINE

## 2018-11-06 PROCEDURE — 36415 COLL VENOUS BLD VENIPUNCTURE: CPT | Performed by: INTERNAL MEDICINE

## 2018-11-06 PROCEDURE — 40000275 ZZH STATISTIC RCP TIME EA 10 MIN

## 2018-11-06 PROCEDURE — 93010 ELECTROCARDIOGRAM REPORT: CPT | Performed by: INTERNAL MEDICINE

## 2018-11-06 PROCEDURE — 40000193 ZZH STATISTIC PT WARD VISIT

## 2018-11-06 PROCEDURE — 97116 GAIT TRAINING THERAPY: CPT | Mod: GP

## 2018-11-06 PROCEDURE — A9270 NON-COVERED ITEM OR SERVICE: HCPCS | Mod: GY | Performed by: INTERNAL MEDICINE

## 2018-11-06 PROCEDURE — 25000128 H RX IP 250 OP 636: Performed by: ANESTHESIOLOGY

## 2018-11-06 PROCEDURE — 83605 ASSAY OF LACTIC ACID: CPT | Performed by: INTERNAL MEDICINE

## 2018-11-06 PROCEDURE — 85025 COMPLETE CBC W/AUTO DIFF WBC: CPT | Performed by: INTERNAL MEDICINE

## 2018-11-06 PROCEDURE — 80053 COMPREHEN METABOLIC PANEL: CPT | Performed by: INTERNAL MEDICINE

## 2018-11-06 PROCEDURE — 99233 SBSQ HOSP IP/OBS HIGH 50: CPT | Performed by: INTERNAL MEDICINE

## 2018-11-06 PROCEDURE — 83735 ASSAY OF MAGNESIUM: CPT | Performed by: INTERNAL MEDICINE

## 2018-11-06 RX ORDER — TRAMADOL HYDROCHLORIDE 50 MG/1
50 TABLET ORAL ONCE
Status: COMPLETED | OUTPATIENT
Start: 2018-11-06 | End: 2018-11-06

## 2018-11-06 RX ADMIN — METOPROLOL SUCCINATE 25 MG: 25 TABLET, EXTENDED RELEASE ORAL at 20:51

## 2018-11-06 RX ADMIN — TAZOBACTAM SODIUM AND PIPERACILLIN SODIUM 3.38 G: 375; 3 INJECTION, SOLUTION INTRAVENOUS at 23:09

## 2018-11-06 RX ADMIN — TAZOBACTAM SODIUM AND PIPERACILLIN SODIUM 3.38 G: 375; 3 INJECTION, SOLUTION INTRAVENOUS at 17:58

## 2018-11-06 RX ADMIN — TRAMADOL HYDROCHLORIDE 50 MG: 50 TABLET, COATED ORAL at 04:24

## 2018-11-06 RX ADMIN — CYANOCOBALAMIN TAB 1000 MCG 3000 MCG: 1000 TAB at 08:27

## 2018-11-06 RX ADMIN — TAZOBACTAM SODIUM AND PIPERACILLIN SODIUM 3.38 G: 375; 3 INJECTION, SOLUTION INTRAVENOUS at 11:45

## 2018-11-06 RX ADMIN — ACETAMINOPHEN 650 MG: 325 TABLET, FILM COATED ORAL at 03:10

## 2018-11-06 RX ADMIN — TAZOBACTAM SODIUM AND PIPERACILLIN SODIUM 3.38 G: 375; 3 INJECTION, SOLUTION INTRAVENOUS at 00:25

## 2018-11-06 RX ADMIN — DIVALPROEX SODIUM 250 MG: 250 TABLET, DELAYED RELEASE ORAL at 20:51

## 2018-11-06 RX ADMIN — SIMVASTATIN 40 MG: 40 TABLET, FILM COATED ORAL at 17:58

## 2018-11-06 RX ADMIN — OMEPRAZOLE 40 MG: 20 CAPSULE, DELAYED RELEASE ORAL at 08:27

## 2018-11-06 RX ADMIN — LEVOTHYROXINE SODIUM 150 MCG: 75 TABLET ORAL at 06:47

## 2018-11-06 RX ADMIN — TRAMADOL HYDROCHLORIDE 50 MG: 50 TABLET, COATED ORAL at 23:09

## 2018-11-06 RX ADMIN — Medication 1.5 MG: at 18:02

## 2018-11-06 RX ADMIN — TAZOBACTAM SODIUM AND PIPERACILLIN SODIUM 3.38 G: 375; 3 INJECTION, SOLUTION INTRAVENOUS at 05:11

## 2018-11-06 RX ADMIN — ACETAMINOPHEN 650 MG: 325 TABLET, FILM COATED ORAL at 11:45

## 2018-11-06 ASSESSMENT — ACTIVITIES OF DAILY LIVING (ADL)
ADLS_ACUITY_SCORE: 13
ADLS_ACUITY_SCORE: 15

## 2018-11-06 NOTE — PLAN OF CARE
Problem: Patient Care Overview  Goal: Plan of Care/Patient Progress Review  PT: Patient seen by physical therapy for evaluation and treatment.  Patient with PMH including diabetes mellitus, atrial fibrillation, hypothyroidism now admitted for sepsis secondary to left lower extremity cellulitis. Patient reports that he lives with his wife and 2 sons in a 2 level town house; no stairs to enter and all living for patient is on main level.  Patient reports that he uses an SEC at baseline for mobility.  Discharge Planner PT   Patient plan for discharge: Patient would prefer to discharge to home  Current status: Patient supine upon arrival.  Transferred to sitting at EOB independently.  Transferred to standing with 2WW with CGA.  Patient amb 300 feet with 2WW and graded assist from CGA to SBA.  Patient with good gait speed with walker, no LOB noted.  Verbal cueing provided for upright posture.  Patient up to bedside chair at end of session.  Barriers to return to prior living situation: decreased strength, decreased activity tolerance  Recommendations for discharge: home with walker, home RN/PT/OT/HHA for bathing  Rationale for recommendations: Patient presents below baseline level of independence with mobility, currently requires walker vs cane with ambulation for improved balance.  Patient would benefit from ongoing physical therapy in order to increase strength, activity tolerance and independence with mobility.       Entered by: Sujata Feng 11/06/2018 4:08 PM

## 2018-11-06 NOTE — PROGRESS NOTES
11/06/18 1529   Quick Adds   Type of Visit Initial PT Evaluation   Living Environment   Lives With spouse   Living Arrangements house  (townhouse)   Home Accessibility bed and bath on same level   Number of Stairs to Enter Home 0   Number of Stairs Within Home 0   Living Environment Comment Patient reports that he lives with his wife and sons in a 2 level townhouse.  Patient and wife live on main floor, do not need to go downstairs.  Patient reports that his sons live in lower level; one of his sons has special needs and attends a day program.   Self-Care   Usual Activity Tolerance moderate   Current Activity Tolerance fair   Equipment Currently Used at Home cane, straight   Functional Level Prior   Ambulation 1-->assistive equipment   Transferring 1-->assistive equipment   Toileting 0-->independent   Bathing 0-->independent   Dressing 0-->independent   Eating 0-->independent   Communication 0-->understands/communicates without difficulty   Swallowing 0-->swallows foods/liquids without difficulty   Cognition 1 - attention or memory deficits   Fall history within last six months no   Which of the above functional risks had a recent onset or change? none   Prior Functional Level Comment Patient reports being independent with mobility at baseline; uses a SEC for longer distances.   General Information   Onset of Illness/Injury or Date of Surgery - Date 11/04/18   Referring Physician Easton Grant MD   Patient/Family Goals Statement return to home   Pertinent History of Current Problem (include personal factors and/or comorbidities that impact the POC) Patient with PMH including diabetes mellitus, atrial fibrillation, hypothyroidism now admitted for sepsis secondary to left lower extremity cellulitis.   Precautions/Limitations fall precautions   Cognitive Status Examination   Orientation orientation to person, place and time   Level of Consciousness alert   Follows Commands and Answers Questions 100% of the time  "  Personal Safety and Judgment intact   Pain Assessment   Patient Currently in Pain No   Integumentary/Edema   Integumentary/Edema Comments Dressing applied to LLE - clean, dry, intact   Posture    Posture Forward head position;Protracted shoulders   Range of Motion (ROM)   ROM Comment WFL   Strength   Strength Comments WFL   Bed Mobility   Bed Mobility Comments Independent supine <> sit   Transfer Skills   Transfer Comments CGA sit <> stand with FWW   Gait   Gait Comments Ambulates 300' with FWW and graded assist from CGA to SBA   Balance   Balance Comments Requires UE support for dynamic balance   Sensory Examination   Sensory Perception no deficits were identified   Coordination   Coordination no deficits were identified   Muscle Tone   Muscle Tone no deficits were identified   General Therapy Interventions   Planned Therapy Interventions balance training;gait training;strengthening;transfer training;home program guidelines;progressive activity/exercise   Clinical Impression   Criteria for Skilled Therapeutic Intervention yes, treatment indicated   PT Diagnosis decreased independence with functional mobility   Influenced by the following impairments weakness, decreased activity tolerance, falls risk   Functional limitations due to impairments gait, transfers    Clinical Presentation Stable/Uncomplicated   Clinical Presentation Rationale Stable medical presentation; close to baseline. Unknown level of social support. Would benefit from ongoing services.    Clinical Decision Making (Complexity) Low complexity   Therapy Frequency` daily   Predicted Duration of Therapy Intervention (days/wks) 3 days    Anticipated Equipment Needs at Discharge walker   Anticipated Discharge Disposition Home with Home Therapy   Risk & Benefits of therapy have been explained Yes   Patient, Family & other staff in agreement with plan of care Yes   [facility]-PAC TM \"6 Clicks\"   2016, Trustees of [facility], under license " "to [employer].  All rights reserved.   6 Clicks Short Forms Basic Mobility Inpatient Short Form   [facility] AM-PAC  \"6 Clicks\" V.2 Basic Mobility Inpatient Short Form   1. Turning from your back to your side while in a flat bed without using bedrails? 4 - None   2. Moving from lying on your back to sitting on the side of a flat bed without using bedrails? 4 - None   3. Moving to and from a bed to a chair (including a wheelchair)? 4 - None   4. Standing up from a chair using your arms (e.g., wheelchair, or bedside chair)? 3 - A Little   5. To walk in hospital room? 3 - A Little   6. Climbing 3-5 steps with a railing? 2 - A Lot   Basic Mobility Raw Score (Score out of 24.Lower scores equate to lower levels of function) 20   Total Evaluation Time   Total Evaluation Time (Minutes) 5     "

## 2018-11-06 NOTE — PROGRESS NOTES
Hospitalist Progress Note  [facility]      Easton Grant MD 11/06/2018      Reason for hospitalization:severe  Sepsis secondary to left lower extremity cellulitis requiring ICU stay.         Assessment and Plan:        Brief patient summary of Stay:  Sean Brunner is a 83 year old male with past medical history significant for diabetes mellitus, atrial fibrillation, hypothyroidism presented to the emergency room with altered mental status.  Patient gives history of 1 week of pain swelling and redness involving left lower extremity.  In the emergency room patient had elevated lactic acid he was hypotensive tachycardic with erythematous left lower extremity is admitted for sepsis secondary to left lower extremity cellulitis.     Hospital course,Consults and procedures:    Patient was admitted to intensive care unit due to hypotension and need for intravenous pressors.  He was treated with intravenous Zosyn and vancomycin and was closely monitored in ICU.  Patient was transferred out of ICU and remained stable.  Vancomycin was discontinued due   Lack of MRSA infection on blood cultures.  Physical therapy, Occupational Therapy and social service consulted to assist with further discharge planning.  Hospital Active Problem List,Assessment and Plan:      #1.  Sepsis , severe sepsis and septic shock secondary to left lower extremity cellulitis: Patient improved, off pressors and moved out of ICU.  Currently on IV Zosyn and vancomycin.  Continue IV Zosyn that can be changed to oral Augmentin on discharge.  Discontinue vancomycin.      #2.  Lactic acidosis: Due to cellulitis, resolved    #3.Left lower extremity cellulitis: He has left lower extremity wound with erythema, no significant drainage.  Plan is to continue IV Zosyn, wound care, oral antibiotic on discharge and follow-up with his primary care physician.      #3.  Atrial fibrillation: Rate controlled, continue metoprolol and digoxin.  INR was  5.86 yesterday which was treated with vitamin K and this morning his INR is 2.27.  Plan is to resume Coumadin, pharmacy to assist with dosing      #4.  Generalized weakness due to critical illness: PT, OT and social service consulted to assist with discharge planning    #5.  Pancytopenia: Suspect due to sepsis, continue to monitor    #DVT Prophylaxis: Warfarin            Code Status and Goal of care: Full Code      Discharge Plan: Patient is improving, may discharge to TCU/home with home care in the next 1 or 2 days if he remains stable.        Interval History (Subjective):             Patient was seen and examined by me today and medical record reviewed.Overnight events noted and care discussed with nursing staff and treatment team.  Patient is doing well with no complaint of chest pain or shortness of breath.  No fever or chills.  He is ambulatory but remains weak.                   Physical Exam:      Last Vital Signs:  Temp:  [97.5  F (36.4  C)-98.3  F (36.8  C)] 97.9  F (36.6  C)  Heart Rate:  [72-85] 72  Resp:  [16-23] 16  BP: ()/(52-65) 101/52  SpO2:  [95 %-100 %] 95 %  I/O last 3 completed shifts:  In: 440 [P.O.:390; I.V.:50]  Out: 350 [Urine:350]    Wt Readings from Last 5 Encounters:   11/05/18 83.6 kg (184 lb 4.9 oz)   10/30/18 93 kg (205 lb)   09/06/18 87.4 kg (192 lb 9.6 oz)   05/15/18 87 kg (191 lb 12.8 oz)   10/17/17 88.7 kg (195 lb 8 oz)       GEN:  Alert, oriented x 3, appears comfortable, NAD.  NECK:Supple ,no mass or thyromegaly   HEENT:   Normocephalic/atraumatic, no scleral icterus, no nasal discharge, mouth moist.  CV:   Regular rate and rhythm, no murmur or JVD.  S1 + S2 noted, no S3 or S4.  LUNGS:  Clear to auscultation bilaterally without rales/rhonchi/wheezing/retractions.  Symmetric chest rise on inhalation noted.  ABD:  Active bowel sounds, soft, non-tender/non-distended.  No rebound/guarding/rigidity.  EXT: Left leg ulcerations and erythema.  Slightly swollen  SKIN:  Dry to touch,  no exanthems noted in the visualized areas.  Neurologic:Grossly intact,non focal                    Medications:      All current medications were reviewed with changes reflected in problem list.    Medications     - MEDICATION INSTRUCTIONS -       Warfarin Therapy Reminder         cyanocobalamin  3,000 mcg Oral QAM     divalproex sodium delayed-release  250 mg Oral At Bedtime     levothyroxine  150 mcg Oral QAM AC     metoprolol succinate  25 mg Oral QPM     omeprazole (priLOSEC) CR capsule 40 mg  40 mg Oral QAM     piperacillin-tazobactam  3.375 g Intravenous Q6H     simvastatin  40 mg Oral Daily with supper                Data:          Data reviewed today: I reviewed all new labs and imaging results over the last 24 hours. I personally reviewed no images or EKG's today        Recent Labs  Lab 11/06/18  0645 11/05/18  0528 11/04/18  1542   WBC 3.3* 3.3* 4.5   HGB 10.1* 10.0* 11.4*   HCT 32.3* 31.1* 35.2*   MCV 98 94 95   * 90* 133*       Recent Labs  Lab 11/04/18  1659 11/04/18  1644   CULT No growth after 2 days No growth after 2 days       Recent Labs  Lab 11/06/18  0747 11/05/18  0528 11/04/18  1542    144 141   POTASSIUM 3.9 4.1 3.7   CHLORIDE 111* 113* 108   CO2 26 28 26   ANIONGAP 4 3 7   * 104* 155*   BUN 15 16 18   CR 0.89 0.92 1.04   GFRESTIMATED 81 78 68   GFRESTBLACK >90 >90 82   TYSON 8.1* 8.5 9.2   MAG 2.0  --  1.9   PROTTOTAL 6.9 7.0 8.6   ALBUMIN 2.5* 2.5* 3.2*   BILITOTAL 0.5 0.8 1.3   ALKPHOS 52 61 84   AST 19 17 21   ALT 9 11 14         Recent Labs  Lab 11/06/18  0747 11/06/18  0646 11/06/18  0630 11/06/18  0552 11/06/18  0347 11/06/18  0243  11/05/18  0528  11/04/18  1542 11/01/18  1502   *  --   --   --   --   --   --  104*  --  155* 83   BGM  --  101* 86 92 110* 94  < >  --   < >  --   --    < > = values in this interval not displayed.          Recent Labs  Lab 11/06/18  0645 11/05/18  0528 11/04/18  1542   INR 2.27* 5.86* 2.96*           Recent Labs  Lab  11/04/18  1542   TROPI 0.017       Recent Results (from the past 48 hour(s))   XR Chest 2 Views    Narrative    CHEST TWO VIEW   11/4/2018 5:10 PM     HISTORY: Cough, evaluate for pneumonia, fluid overload.     COMPARISON: 9/2/2018.      Impression    IMPRESSION: Increased right perihilar and lower lobe airspace opacity  concerning for aspiration and pneumonia although asymmetric pulmonary  edema could also have this appearance. There is a small right pleural  effusion. Left lung is relatively clear. Cardiac silhouette remains  borderline enlarged. No pneumothorax visualized.    GORDY PABLO MD               Disclaimer: This note consists of symbols derived from keyboarding, dictation and/or voice recognition software. As a result, there may be errors in the script that have gone undetected. Please consider this when interpreting information found in this chart

## 2018-11-06 NOTE — PLAN OF CARE
Problem: Patient Care Overview  Goal: Plan of Care/Patient Progress Review  Outcome: No Change  BG 85 gave OJ increased to 89 provided more OJ .  Patient had HA since prior shift, md was paged for tramadol x2 this shift and once prior shift.  Patient said he takes tramadol at home and that is what he wanted.  He did take tylenol that slightly decreased pain, touch therapy was provided.  Patient still in pain gave tramadol and now able to sleep.  discharge 2-3 days possible TCU recommended.  Lactate for sepsis protocol started, Lactate results 1.4.

## 2018-11-06 NOTE — PROGRESS NOTES
[facility] Nurse Inpatient Wound Assessment     Assessment of wound(s) on pt's:   LLE        Data:   Patient History:      per MD note(s): 84yoM with history of HTN, HLD, BPH, STEWART, GERD, dementia, hypothyroidism, chronic HFpEF, atrial fibrillation on warfarin and chronic LLE ulcers who initially presented to the ED on  for evaluation of altered mental status.  He was found to have sepsis secondary to LLE cellulitis and was admitted to the ICU for levophed infusion.         Current Diet / Nutrition:           Active Diet Order      Combination Diet [id number] Calories: Moderate Consistent CHO (4-6 CHO units/meal); 2 gm NA Diet    Hesham Assessment and sub scores:   Hesham Score  Av.3  Min: 17  Max: 18     Mattress:  Standard , Atmos Air mattress    Labs:    Recent Labs   Lab Test  18   0747  18   0645   18   1120   14   1435   ALBUMIN  2.5*   --    < >   --    < >  3.2*   HGB   --   10.1*   < >   --    < >  12.9*   RBC   --   3.30*   < >   --    < >  4.18*   WBC   --   3.3*   < >   --    < >  5.0   PLT   --   102*   < >   --    < >  126*   INR   --   2.27*   < >   --    < >  4.67*   A1C   --    --    --   4.9   < >   --    CRP   --    --    --    --    --   30.3*    < > = values in this interval not displayed.          Wound Assessment (location #1):   Left anterior lower leg  Wound History:  Pt reports chronic issues with his leg    Specific Dimensions (length x width x depth, in cm):   Main wound is approx 3 x 1.5 x 0.2cm; distal satellite wounds approx 0.5 x 0.5 x 0.2cm    Tunneling:  N/A      Undermining: N/A    Wound Base: yellow-red thin slough which mostly came off with cleansing, revealing moist pink friable firm base     Palpation of the wound bed:  Normal; periwound and shin in general have some slightly boggy areas    Slough appearance:  yellow, loose    Eschar appearance:  none    Periwound Skin: erythema, scabs, edema; LLE garry and reddened in  general    Color: red    Temperature  warm    Drainage:  Moderate serosang    Odor: none    Pain:  Minimal     WOC photo of LLE 11-6-18:                 Intervention:     Patient's chart evaluated.      Wound(s) was assessed    Wound Care: was done:  Cleansed and dressed    Orders  Written    Supplies  - ordered Aquacel ag    Discussed plan of care with Patient and Nurse          Assessment:       LLE with shallow small wound, unclear etilogy.  Cellulitic appearance but minimal tenderness to leg.  Monitor slightly boggy-feeling areas.          Plan:     Nursing to notify the Provider(s) and re-consult the WOC Nurse if wound(s) deteriorate(s) or if the wound care plan needs reevaluation.      Wound care to LLE: daily:  1.  Cleanse with MicroKlenz.  2.  Apply Aquacel Ag (SPD# [id number]) to any open areas, cutting pieces to fit.    3.  Cover with gauze and Rogelio.  Label with time/date/initials.  4.  Elevate leg when possible.     WOC Nurse will return: weekly and prn

## 2018-11-06 NOTE — PLAN OF CARE
Problem: Skin and Soft Tissue Infection (Adult)  Goal: Signs and Symptoms of Listed Potential Problems Will be Absent, Minimized or Managed (Skin and Soft Tissue Infection)  Signs and symptoms of listed potential problems will be absent, minimized or managed by discharge/transition of care (reference Skin and Soft Tissue Infection (Adult) CPG).   Outcome: Therapy, progress toward functional goals as expected  Continue IV zosyn, vanco is discontinued. Redness marked today.

## 2018-11-06 NOTE — PROVIDER NOTIFICATION
334 WB, patient is complaining of headache but only wants Tramadol says he takes it at home.  Please advise on order?  Thank you     Second page 334 WB patient has a headache and at home he takes tramadol and is wanting tramadol?

## 2018-11-06 NOTE — PLAN OF CARE
Problem: Patient Care Overview  Goal: Plan of Care/Patient Progress Review  Outcome: No Change  Pt admitted from ICU at 1615. A&Ox4. VSS. Urinal used at bedside. Pt complained of headache - only wanted Tramadol which is not in MAR - md paged - no orders placed - cool washcloth provided with some relief. BG 67 - 8 oz of orange juice given - recheck 78 and 103. Diet - Mod CHO, 2g Na. Up to the bathroom with Ax2 to assess for mobility - pt did well - back to bed with Ax1. Abx IV Zosyn and Vanco. Wound on LLE - covered with mediplex. Soft BP - metoprolol held. Tele - afib CVR. Possible discharge in 2-3 days - TCU recommended.

## 2018-11-06 NOTE — PROGRESS NOTES
Patient was seen and examined by me today and medical record reviewed.Plan of care was discussed with patient/family and staff.  Complete progress note will follow.Please refer to my note for detail progress at a later time     Interval History/objective :    Sean Brunner is  doing well; no cp, sob, n/v/d, or abd pain.    Pain and leg wound better     Cultures negative to date           Current problem/Assessment:      Improving       Plan for today       Discontinue vancomycin     PT/OT/SW and discharge planning     May discharge in one day

## 2018-11-07 ENCOUNTER — APPOINTMENT (OUTPATIENT)
Dept: PHYSICAL THERAPY | Facility: CLINIC | Age: 83
DRG: 871 | End: 2018-11-07
Attending: INTERNAL MEDICINE
Payer: MEDICARE

## 2018-11-07 ENCOUNTER — APPOINTMENT (OUTPATIENT)
Dept: OCCUPATIONAL THERAPY | Facility: CLINIC | Age: 83
DRG: 871 | End: 2018-11-07
Attending: INTERNAL MEDICINE
Payer: MEDICARE

## 2018-11-07 LAB
ALBUMIN SERPL-MCNC: 2.6 G/DL (ref 3.4–5)
ALP SERPL-CCNC: 54 U/L (ref 40–150)
ALT SERPL W P-5'-P-CCNC: 13 U/L (ref 0–70)
ANION GAP SERPL CALCULATED.3IONS-SCNC: 6 MMOL/L (ref 3–14)
AST SERPL W P-5'-P-CCNC: 22 U/L (ref 0–45)
BILIRUB SERPL-MCNC: 0.6 MG/DL (ref 0.2–1.3)
BUN SERPL-MCNC: 13 MG/DL (ref 7–30)
CALCIUM SERPL-MCNC: 8.7 MG/DL (ref 8.5–10.1)
CHLORIDE SERPL-SCNC: 106 MMOL/L (ref 94–109)
CO2 SERPL-SCNC: 26 MMOL/L (ref 20–32)
CREAT SERPL-MCNC: 0.92 MG/DL (ref 0.66–1.25)
ERYTHROCYTE [DISTWIDTH] IN BLOOD BY AUTOMATED COUNT: 14.6 % (ref 10–15)
GFR SERPL CREATININE-BSD FRML MDRD: 79 ML/MIN/1.7M2
GLUCOSE BLDC GLUCOMTR-MCNC: 106 MG/DL (ref 70–99)
GLUCOSE BLDC GLUCOMTR-MCNC: 73 MG/DL (ref 70–99)
GLUCOSE SERPL-MCNC: 71 MG/DL (ref 70–99)
HCT VFR BLD AUTO: 32.2 % (ref 40–53)
HGB BLD-MCNC: 10.1 G/DL (ref 13.3–17.7)
INR PPP: 2.57 (ref 0.86–1.14)
INTERPRETATION ECG - MUSE: NORMAL
LACTATE BLD-SCNC: 0.7 MMOL/L (ref 0.7–2)
MCH RBC QN AUTO: 30.1 PG (ref 26.5–33)
MCHC RBC AUTO-ENTMCNC: 31.4 G/DL (ref 31.5–36.5)
MCV RBC AUTO: 96 FL (ref 78–100)
PLATELET # BLD AUTO: 100 10E9/L (ref 150–450)
POTASSIUM SERPL-SCNC: 3.9 MMOL/L (ref 3.4–5.3)
PROT SERPL-MCNC: 7.3 G/DL (ref 6.8–8.8)
RBC # BLD AUTO: 3.35 10E12/L (ref 4.4–5.9)
SODIUM SERPL-SCNC: 138 MMOL/L (ref 133–144)
WBC # BLD AUTO: 2.9 10E9/L (ref 4–11)

## 2018-11-07 PROCEDURE — 97116 GAIT TRAINING THERAPY: CPT | Mod: GP | Performed by: PHYSICAL THERAPIST

## 2018-11-07 PROCEDURE — 97165 OT EVAL LOW COMPLEX 30 MIN: CPT | Mod: GO | Performed by: STUDENT IN AN ORGANIZED HEALTH CARE EDUCATION/TRAINING PROGRAM

## 2018-11-07 PROCEDURE — 85610 PROTHROMBIN TIME: CPT | Performed by: INTERNAL MEDICINE

## 2018-11-07 PROCEDURE — 80053 COMPREHEN METABOLIC PANEL: CPT | Performed by: INTERNAL MEDICINE

## 2018-11-07 PROCEDURE — 99232 SBSQ HOSP IP/OBS MODERATE 35: CPT | Performed by: INTERNAL MEDICINE

## 2018-11-07 PROCEDURE — 36415 COLL VENOUS BLD VENIPUNCTURE: CPT | Performed by: INTERNAL MEDICINE

## 2018-11-07 PROCEDURE — 97110 THERAPEUTIC EXERCISES: CPT | Mod: GP | Performed by: PHYSICAL THERAPIST

## 2018-11-07 PROCEDURE — 40000133 ZZH STATISTIC OT WARD VISIT: Performed by: STUDENT IN AN ORGANIZED HEALTH CARE EDUCATION/TRAINING PROGRAM

## 2018-11-07 PROCEDURE — 12000007 ZZH R&B INTERMEDIATE

## 2018-11-07 PROCEDURE — 25000132 ZZH RX MED GY IP 250 OP 250 PS 637: Mod: GY | Performed by: INTERNAL MEDICINE

## 2018-11-07 PROCEDURE — 97535 SELF CARE MNGMENT TRAINING: CPT | Mod: GO | Performed by: STUDENT IN AN ORGANIZED HEALTH CARE EDUCATION/TRAINING PROGRAM

## 2018-11-07 PROCEDURE — 25000128 H RX IP 250 OP 636: Performed by: ANESTHESIOLOGY

## 2018-11-07 PROCEDURE — 85027 COMPLETE CBC AUTOMATED: CPT | Performed by: INTERNAL MEDICINE

## 2018-11-07 PROCEDURE — 40000193 ZZH STATISTIC PT WARD VISIT: Performed by: PHYSICAL THERAPIST

## 2018-11-07 PROCEDURE — 83605 ASSAY OF LACTIC ACID: CPT | Performed by: INTERNAL MEDICINE

## 2018-11-07 PROCEDURE — 00000146 ZZHCL STATISTIC GLUCOSE BY METER IP

## 2018-11-07 PROCEDURE — 99207 ZZC CDG-MDM COMPONENT: MEETS LOW - DOWN CODED: CPT | Performed by: INTERNAL MEDICINE

## 2018-11-07 PROCEDURE — A9270 NON-COVERED ITEM OR SERVICE: HCPCS | Mod: GY | Performed by: INTERNAL MEDICINE

## 2018-11-07 RX ORDER — WARFARIN SODIUM 1 MG/1
1 TABLET ORAL
Status: COMPLETED | OUTPATIENT
Start: 2018-11-07 | End: 2018-11-07

## 2018-11-07 RX ADMIN — CYANOCOBALAMIN TAB 1000 MCG 3000 MCG: 1000 TAB at 07:45

## 2018-11-07 RX ADMIN — METOPROLOL SUCCINATE 25 MG: 25 TABLET, EXTENDED RELEASE ORAL at 20:55

## 2018-11-07 RX ADMIN — WARFARIN SODIUM 1 MG: 1 TABLET ORAL at 17:56

## 2018-11-07 RX ADMIN — LEVOTHYROXINE SODIUM 150 MCG: 75 TABLET ORAL at 06:12

## 2018-11-07 RX ADMIN — DIVALPROEX SODIUM 250 MG: 250 TABLET, DELAYED RELEASE ORAL at 20:55

## 2018-11-07 RX ADMIN — TAZOBACTAM SODIUM AND PIPERACILLIN SODIUM 3.38 G: 375; 3 INJECTION, SOLUTION INTRAVENOUS at 17:58

## 2018-11-07 RX ADMIN — SIMVASTATIN 40 MG: 40 TABLET, FILM COATED ORAL at 17:56

## 2018-11-07 RX ADMIN — TAZOBACTAM SODIUM AND PIPERACILLIN SODIUM 3.38 G: 375; 3 INJECTION, SOLUTION INTRAVENOUS at 23:21

## 2018-11-07 RX ADMIN — ACETAMINOPHEN 650 MG: 325 TABLET, FILM COATED ORAL at 11:44

## 2018-11-07 RX ADMIN — OMEPRAZOLE 40 MG: 20 CAPSULE, DELAYED RELEASE ORAL at 07:46

## 2018-11-07 RX ADMIN — TAZOBACTAM SODIUM AND PIPERACILLIN SODIUM 3.38 G: 375; 3 INJECTION, SOLUTION INTRAVENOUS at 06:12

## 2018-11-07 RX ADMIN — TAZOBACTAM SODIUM AND PIPERACILLIN SODIUM 3.38 G: 375; 3 INJECTION, SOLUTION INTRAVENOUS at 11:41

## 2018-11-07 ASSESSMENT — ACTIVITIES OF DAILY LIVING (ADL)
ADLS_ACUITY_SCORE: 15
PREVIOUS_RESPONSIBILITIES: MEDICATION MANAGEMENT;DRIVING
ADLS_ACUITY_SCORE: 13
ADLS_ACUITY_SCORE: 14
ADLS_ACUITY_SCORE: 15
ADLS_ACUITY_SCORE: 15
ADLS_ACUITY_SCORE: 14

## 2018-11-07 NOTE — PLAN OF CARE
Problem: Patient Care Overview  Goal: Plan of Care/Patient Progress Review    Discharge Planner PT   Patient plan for discharge: home   Current status: Pt continues to demonstrate improving strength and mobility status.  Good functional strength noted with testing.  Amb hallways with SEC and SBA, up/down flight of stairs and tolerating all functional mobility skills with good tolerance. Minimal L leg/foot pain reported.  Reviewed progressive walking program and issued generalized LE strengthening HEP to continue on own.  Barriers to return to prior living situation: none anticipated  Recommendations for discharge: home  Rationale for recommendations: Pt has progressed well, all goals nearly met. Will continue to improve with HEP and walking program. No further need for skilled IP PT intervention. Safe for return home when medically stable.       Entered by: Arben New 11/07/2018 2:37 PM       Physical Therapy Discharge Summary    Reason for therapy discharge:    All goals and outcomes met, no further needs identified.    Progress towards therapy goal(s). See goals on Care Plan in [facility] electronic health record for goal details.  Goals partially met.  Barriers to achieving goals:   goals nearly all met, will continue to improve with time and HEP..    Therapy recommendation(s):    Continue home exercise program.

## 2018-11-07 NOTE — PLAN OF CARE
Problem: Patient Care Overview  Goal: Plan of Care/Patient Progress Review  Outcome: Improving  Left lower cellulitis. Getting IV zosyn.  Vanco discontinued. Dementia, alarm, CHF, WOC, OT, PT. Possible discharge tomorrow.

## 2018-11-07 NOTE — CONSULTS
Care Transition Initial Assessment - SW  Reason For Consult: discharge planning  Met with: Patient and Family  - Wife Dona was present.  Active Problems:    Cellulitis of left lower extremity       DATA  Lives With: child(arabella), adult, spouse - Pt's sons Bryan and Rubén live with the adult and his wife Dona.  Living Arrangements: house - Townhouse.  It is 2 levels, but everything they need is on 1 level.  There is one step to get from the [place] to the garage.  Description of Support System: Supportive, Involved  Who is your support system?: Wife, Children Pt's son Bryan moved in with them once he relocated and pt states he has not moved out.  Their son Rubén lives with them - he has Down Syndrome and has Dementia.  Support Assessment: Adequate family and caregiver support.  The pt said he has plenty of support and his family is the most supportive.  Identified issues/concerns regarding health management: Pt was admitted for cellulitis of the left lower extremity.     Resources List: Home Care, Skilled Nursing Facility - discussed with pt, but declined.     Quality Of Family Relationships: supportive, helpful, involved  Transportation Available: family or friend will provide Dona said she will likely transport, but if she is unable, her son will transport the pt home at discharge.      ASSESSMENT  Cognitive Status:  awake, alert and oriented  Concerns to be addressed:  Pt stated that he does not want to go to a TCU if he does not have to and would prefer not to do HC either.  Pt states that they have tickets for shows in [place], [place] on Sunday and they would still like to go if possible.  Pt states that he has been walking up and down the halls of the hospital and has not had any issues.  Pt is not homebound and would likely not meet the criteria for HC.  Pt says he has been to a rehab facility before, but does not believe that is needed this time.  Sw informed Pt and Dona that if anything changes,  they can revisit the idea of rehab.  Pt was independent prior to hospital admission.     PLAN  Sw will continue to follow pt until discharge and be available as needed.

## 2018-11-07 NOTE — PLAN OF CARE
Problem: Cardiac: Heart Failure (Adult)  Goal: Signs and Symptoms of Listed Potential Problems Will be Absent, Minimized or Managed (Cardiac: Heart Failure)  Signs and symptoms of listed potential problems will be absent, minimized or managed by discharge/transition of care (reference Cardiac: Heart Failure (Adult) CPG).   Outcome: Improving  Heart Failure Care Pathway  GOALS TO BE MET BEFORE DISCHARGE:    1. Decrease congestion and/or edema with diuretic therapy to achieve near      optimal volume status.            Dyspnea improved:  Yes            Edema improved:     Yes        Net I/O and Weights since admission:          10/07 2300 - 11/06 2259  In: 1471.67 [P.O.:780; I.V.:691.67]  Out: 700 [Urine:700]  Net: 771.67            Vitals:    11/04/18 1521 11/04/18 2040 11/05/18 0546   Weight: 90.7 kg (200 lb) 82.9 kg (182 lb 12.2 oz) 83.6 kg (184 lb 4.9 oz)       2.  O2 sats > 92% on RA or at prior home O2 therapy level.          Current oxygenation status:       SpO2: 97 %         O2 Device: None (Room air),            Able to wean O2 this shift to keep sats > 92%:  NA       Does patient use Home O2? No    3.  Tolerates ambulation and mobility near baseline: Yes        How many times did the patient ambulate with nursing staff this shift? 2    Please review the Heart Failure Care Pathway for additional HF goal outcomes.    Omaira Cat RN  11/6/2018

## 2018-11-07 NOTE — PLAN OF CARE
Problem: Patient Care Overview  Goal: Plan of Care/Patient Progress Review  OT: Evaluation and treatment completed. Pt admitted with sepsis related to L LE cellulitis, noted pt had some confusion and cognitive concerns, met with pt this morning who was alert, oriented and appropriate during session    Discharge Planner OT   Patient plan for discharge: home  Current status: pt completed transfers and mobility in room, no assistve device, SBA as pt reported he does not use cane in his home only out in community and plans to continue to do so; at this time note pt tends to reach out for supportive furniture, may benefit from continued PT services to assist with use of cane/FWW; pt able to complete bathroom transfers and tasks, SBA; pt participated in [facility] cognitive assessment, scored 23/30 suggestive of cognitive impairment; pt aware of poor memory, had other deficits including categorization and executive functioning skills (planning ahead and problem solving); pt educated on results and recommendation for further OT in home setting and use of compensatory techniques to assist pt's memory; pt participated in conversation and able to identify some ways in which he has adapted to deficits including using 2 weekly pillboxes to ensure he takes meds and does not run out before able to get more meds  Barriers to return to prior living situation: weak, impaired cognition  Recommendations for discharge: agree with PT recommendation for return home with home care services: RN, OT, PT, A  Rationale for recommendations: no further IP OT needs, anticipate pt discharge home with family assist for some IADLs and continued therapy in home care setting       Entered by: Perla Bean 11/07/2018 1:55 PM     Occupational Therapy Discharge Summary    Reason for therapy discharge:    Discharged to home with home therapy.  All goals and outcomes met, no further needs identified.    Progress towards therapy goal(s). See goals on  Care Plan in Epic electronic health record for goal details.  Goals met    Therapy recommendation(s):    Continued therapy is recommended.  Rationale/Recommendations:  further therapy in home setting.

## 2018-11-07 NOTE — PROGRESS NOTES
11/07/18 1032   Quick Adds   Type of Visit Initial Occupational Therapy Evaluation   Living Environment   Lives With spouse   Living Arrangements house   Number of Stairs to Enter Home 0   Number of Stairs Within Home 0   Transportation Available family or friend will provide   Living Environment Comment pt reports living in [facility] with wife adn 2 adult sons, one with down syndrome; sons live in lower level and pt remains on main level with wife   Self-Care   Dominant Hand right   Usual Activity Tolerance moderate   Current Activity Tolerance fair   Activity/Exercise/Self-Care Comment pt reports using a cane in the community but no AD in the home; pt states he and his family share IADL responsibilities   Functional Level Prior   Ambulation 0-->independent   Transferring 0-->independent   Toileting 1-->assistive equipment   Bathing 1-->assistive equipment   Dressing 0-->independent   Cognition 1 - attention or memory deficits   Fall history within last six months no   Prior Functional Level Comment pt reports independent with ADLs at baseline   General Information   Onset of Illness/Injury or Date of Surgery - Date 11/06/18   Referring Physician Juanita GUO   Patient/Family Goals Statement return home   Additional Occupational Profile Info/Pertinent History of Current Problem Patient with PMH including diabetes mellitus, atrial fibrillation, hypothyroidism now admitted for sepsis secondary to left lower extremity cellulitis   Cognitive Status Examination   Orientation orientation to person, place and time   Level of Consciousness alert   Able to Follow Commands WNL/WFL   Cognitive Comment pt self reports memory problems at baseline   Pain Assessment   Patient Currently in Pain No   Mobility   Bed Mobility Comments SBA   Transfer Skills   Transfer Comments close SBA, no AD as pt reported he does not use AD in his home and does not plan on changing that   Transfer Skill: Sit to Stand   Level of [place]:  "Sit/Stand stand-by assist   Physical Assist/Nonphysical Assist: Sit/Stand supervision   Lower Body Dressing   Level of [place]: Dress Lower Body stand-by assist   Physical Assist/Nonphysical Assist: Dress Lower Body verbal cues   Toileting   Level of [place]: Toilet stand-by assist   Physical Assist/Nonphysical Assist: Toilet verbal cues   Grooming   Level of [place]: Grooming stand-by assist   Physical Assist/Nonphysical Assist: Grooming supervision   Instrumental Activities of Daily Living (IADL)   Previous Responsibilities medication management;driving   IADL Comments pt reports the family shares IADLs however he does manage his medications by using 2 weekly pillboxes   Activities of Daily Living Analysis   Impairments Contributing to Impaired Activities of Daily Living balance impaired;cognition impaired   General Therapy Interventions   Planned Therapy Interventions cognition   Clinical Impression   Criteria for Skilled Therapeutic Interventions Met yes, treatment indicated   OT Diagnosis impaired ability to safely manage ADL/IADLs   Influenced by the following impairments impaired cognition   Assessment of Occupational Performance 1-3 Performance Deficits   Identified Performance Deficits impaired ability to manage meds, appointments   Clinical Decision Making (Complexity) Low complexity   Therapy Frequency daily   Predicted Duration of Therapy Intervention (days/wks) one time eval/treat   Anticipated Discharge Disposition Home with Assist;Home with Home Therapy   Risks and Benefits of Treatment have been explained. Yes   Patient, Family & other staff in agreement with plan of care Yes   [facility] [employer]-[facility] TM \"6 Clicks\"   2016, Trustees of [facility], under license to [employer].  All rights reserved.   6 Clicks Short Forms Daily Activity Inpatient Short Form   [facility]AC  \"6 Clicks\" Daily Activity Inpatient Short Form   1. Putting on and taking off regular lower " body clothing? 4 - None   2. Bathing (including washing, rinsing, drying)? 3 - A Little   3. Toileting, which includes using toilet, bedpan or urinal? 4 - None   4. Putting on and taking off regular upper body clothing? 4 - None   5. Taking care of personal grooming such as brushing teeth? 4 - None   6. Eating meals? 4 - None   Daily Activity Raw Score (Score out of 24.Lower scores equate to lower levels of function) 23   Total Evaluation Time   Total Evaluation Time (Minutes) 8

## 2018-11-07 NOTE — PROGRESS NOTES
Hospitalist Progress Note  [facility]    Panfilo Segal MD 11/07/2018    Reason for hospitalization: Septic shock due to left lower extremity cellulitis.         Assessment and Plan:        Sean Brunner is a 83 year old male with past medical history significant for diabetes mellitus, atrial fibrillation, hypothyroidism presented to the emergency room with altered mental status. He has one week  history of pain and swelling and redness involving left lower extremity.  In the emergency room, patient had elevated lactic acid, was hypotensive, tachycardic,  with swollen, erythematous, warm and tender left lower extremity and admitted to ICU with with septic shock, requiring pressors, due to cellulitis of the left leg.     He was also treated with intravenous Zosyn and vancomycin and closely monitored in ICU.  Patient was transferred out of ICU to floor and remained stable.  Vancomycin was discontinued, no MRSA infection or colonization.  Physical therapy, Occupational Therapy and social service consulted to assist with further discharge planning.    1.  Septic shock secondary to left lower extremity cellulitis: Lactic acidosis, hypotension/shock, tachycardia, source of infection - Patient required pressors and  IVF boluses in ICU.  - Overall improved.  - Continue IV Zosyn for now, will change to oral antibiotics, Augmentin on discharge.        2.Left lower extremity cellulitis: He has left lower extremity wound, swelling, erythema, no significant drainage.    -Continue IV antibiotic Zosyn.  -Elevate the leg  -Drace area of the wound    3.  Atrial fibrillation: Rate controlled.  - Continue metoprolol and digoxin.   -Continue Coumadin per pharmacy dosing INR is therapeutic.       4.  Generalized weakness due to infection and critical illness.  - PT/ OT and social service consulted to assist with discharge planning.    5.  Mild pancytopenia: Suspect due to sepsis, continue to monitor.  WBC is  2.9, hemoglobin 10.1 and platelets 100    DVT Prophylaxis: Warfarin    Code Status: Full Code    Discharge Plan: Patient was to be discharged home, if continues to improve likely tomorrow with services, if agrees he will will probably benefit from TCU.  I discussed with patient, his wife and nursing staff,      Interval History (Subjective):        Patient was seen and examined, no new overnight issues, feels better, no nausea or vomiting.  Patient is doing well with no complaint of chest pain or shortness of breath.  No fever or chills.  He is ambulatory but remains weak.               Physical Exam:      Last Vital Signs:  Temp:  [97.1  F (36.2  C)-97.6  F (36.4  C)] 97.1  F (36.2  C)  Heart Rate:  [68-78] 68  Resp:  [16] 16  BP: (105-121)/(60-67) 121/66  SpO2:  [97 %-100 %] 97 %  I/O last 3 completed shifts:  In: 390 [P.O.:390]  Out: -     Wt Readings from Last 5 Encounters:   11/05/18 83.6 kg (184 lb 4.9 oz)   10/30/18 93 kg (205 lb)   09/06/18 87.4 kg (192 lb 9.6 oz)   05/15/18 87 kg (191 lb 12.8 oz)   10/17/17 88.7 kg (195 lb 8 oz)       GEN:  Alert, oriented x 3, appears comfortable, NAD.  NECK:Supple ,no mass or thyromegaly   HEENT:   Normocephalic/atraumatic, no scleral icterus, no nasal discharge, mouth moist.  CVS:   Regular rate and rhythm, no murmur or JVD.  S1 + S2 noted, no S3 or S4.  LUNGS:  Clear to auscultation bilaterally without rales/rhonchi/wheezing/retractions.  Symmetric chest rise on inhalation noted.  ABD:  Active bowel sounds, soft, non-tender/non-distended.  No rebound/guarding/rigidity.  EXT: Left leg multiple spots of ulcerations and erythema and swelling.    SKIN:  Dry to touch, no exanthems noted in the visualized areas.  Neurologic:Grossly intact,non focal          Medications:      All current medications were reviewed with changes reflected in problem list.    Medications     - MEDICATION INSTRUCTIONS -       Warfarin Therapy Reminder         cyanocobalamin  3,000 mcg Oral QAM      divalproex sodium delayed-release  250 mg Oral At Bedtime     levothyroxine  150 mcg Oral QAM AC     metoprolol succinate  25 mg Oral QPM     omeprazole (priLOSEC) CR capsule 40 mg  40 mg Oral QAM     piperacillin-tazobactam  3.375 g Intravenous Q6H     simvastatin  40 mg Oral Daily with supper          Data:        Data reviewed today: I reviewed all new labs and imaging results over the last 24 hours. I personally reviewed no images or EKG's today      Recent Labs  Lab 11/07/18  0647 11/06/18  0645 11/05/18  0528   WBC 2.9* 3.3* 3.3*   HGB 10.1* 10.1* 10.0*   HCT 32.2* 32.3* 31.1*   MCV 96 98 94   * 102* 90*       Recent Labs  Lab 11/04/18  1659 11/04/18  1644   CULT No growth after 3 days No growth after 3 days       Recent Labs  Lab 11/07/18  0647 11/06/18  0747 11/05/18  0528 11/04/18  1542    141 144 141   POTASSIUM 3.9 3.9 4.1 3.7   CHLORIDE 106 111* 113* 108   CO2 26 26 28 26   ANIONGAP 6 4 3 7   GLC 71 100* 104* 155*   BUN 13 15 16 18   CR 0.92 0.89 0.92 1.04   GFRESTIMATED 79 81 78 68   GFRESTBLACK >90 >90 >90 82   TYSON 8.7 8.1* 8.5 9.2   MAG  --  2.0  --  1.9   PROTTOTAL 7.3 6.9 7.0 8.6   ALBUMIN 2.6* 2.5* 2.5* 3.2*   BILITOTAL 0.6 0.5 0.8 1.3   ALKPHOS 54 52 61 84   AST 22 19 17 21   ALT 13 9 11 14         Recent Labs  Lab 11/07/18  0647 11/07/18  0155 11/06/18  2041 11/06/18  1651 11/06/18  1624 11/06/18  0747 11/06/18  0646  11/05/18  0528  11/04/18  1542 11/01/18  1502   GLC 71  --   --   --   --  100*  --   --  104*  --  155* 83   BGM  --  73 89 93 75  --  101*  < >  --   < >  --   --    < > = values in this interval not displayed.          Recent Labs  Lab 11/07/18  0647 11/06/18  0645 11/05/18  0528   INR 2.57* 2.27* 5.86*           Recent Labs  Lab 11/04/18  1542   TROPI 0.017       Recent Results (from the past 48 hour(s))   XR Chest 2 Views    Narrative    CHEST TWO VIEW   11/4/2018 5:10 PM     HISTORY: Cough, evaluate for pneumonia, fluid overload.     COMPARISON: 9/2/2018.       Impression    IMPRESSION: Increased right perihilar and lower lobe airspace opacity  concerning for aspiration and pneumonia although asymmetric pulmonary  edema could also have this appearance. There is a small right pleural  effusion. Left lung is relatively clear. Cardiac silhouette remains  borderline enlarged. No pneumothorax visualized.    GORDY PABLO MD

## 2018-11-07 NOTE — PLAN OF CARE
Problem: Patient Care Overview  Goal: Plan of Care/Patient Progress Review    VSS Alert and oriented but confused at times. OT/PT consulted.  Up SBA to bathroom. IV zosyn maintained. Left leg wound covered, CDI. Dressing changed. Plan for discharge to home in 1-2 days.

## 2018-11-07 NOTE — PLAN OF CARE
VSS. Tramadol given once for complaint of headache. Alert and oriented but confused at times. Up SBA to bathroom. IV zosyn maintained. Left leg wound covered, CDI. Plan for discharge in 1-2 days.

## 2018-11-08 VITALS
OXYGEN SATURATION: 99 % | WEIGHT: 188.7 LBS | TEMPERATURE: 96 F | HEIGHT: 69 IN | RESPIRATION RATE: 18 BRPM | BODY MASS INDEX: 27.95 KG/M2 | SYSTOLIC BLOOD PRESSURE: 126 MMHG | DIASTOLIC BLOOD PRESSURE: 72 MMHG

## 2018-11-08 LAB
ALBUMIN SERPL-MCNC: 2.7 G/DL (ref 3.4–5)
ALP SERPL-CCNC: 52 U/L (ref 40–150)
ALT SERPL W P-5'-P-CCNC: 9 U/L (ref 0–70)
ANION GAP SERPL CALCULATED.3IONS-SCNC: 3 MMOL/L (ref 3–14)
AST SERPL W P-5'-P-CCNC: 22 U/L (ref 0–45)
BILIRUB SERPL-MCNC: 0.4 MG/DL (ref 0.2–1.3)
BLD PROD TYP BPU: NORMAL
BLD PROD TYP BPU: NORMAL
BLD UNIT ID BPU: 0
BLD UNIT ID BPU: 0
BLOOD PRODUCT CODE: NORMAL
BLOOD PRODUCT CODE: NORMAL
BPU ID: NORMAL
BPU ID: NORMAL
BUN SERPL-MCNC: 11 MG/DL (ref 7–30)
CALCIUM SERPL-MCNC: 8.9 MG/DL (ref 8.5–10.1)
CHLORIDE SERPL-SCNC: 108 MMOL/L (ref 94–109)
CO2 SERPL-SCNC: 30 MMOL/L (ref 20–32)
CREAT SERPL-MCNC: 0.9 MG/DL (ref 0.66–1.25)
ERYTHROCYTE [DISTWIDTH] IN BLOOD BY AUTOMATED COUNT: 14.6 % (ref 10–15)
GFR SERPL CREATININE-BSD FRML MDRD: 80 ML/MIN/1.7M2
GLUCOSE BLDC GLUCOMTR-MCNC: 73 MG/DL (ref 70–99)
GLUCOSE BLDC GLUCOMTR-MCNC: 97 MG/DL (ref 70–99)
GLUCOSE SERPL-MCNC: 82 MG/DL (ref 70–99)
HCT VFR BLD AUTO: 32.7 % (ref 40–53)
HGB BLD-MCNC: 10.3 G/DL (ref 13.3–17.7)
INR PPP: 2.84 (ref 0.86–1.14)
LACTATE BLD-SCNC: 0.7 MMOL/L (ref 0.7–2)
MCH RBC QN AUTO: 30.2 PG (ref 26.5–33)
MCHC RBC AUTO-ENTMCNC: 31.5 G/DL (ref 31.5–36.5)
MCV RBC AUTO: 96 FL (ref 78–100)
PLATELET # BLD AUTO: 121 10E9/L (ref 150–450)
POTASSIUM SERPL-SCNC: 4.4 MMOL/L (ref 3.4–5.3)
PROT SERPL-MCNC: 7.1 G/DL (ref 6.8–8.8)
RBC # BLD AUTO: 3.41 10E12/L (ref 4.4–5.9)
SODIUM SERPL-SCNC: 141 MMOL/L (ref 133–144)
TRANSFUSION STATUS PATIENT QL: NORMAL
WBC # BLD AUTO: 3.2 10E9/L (ref 4–11)

## 2018-11-08 PROCEDURE — 25000132 ZZH RX MED GY IP 250 OP 250 PS 637: Mod: GY | Performed by: INTERNAL MEDICINE

## 2018-11-08 PROCEDURE — A9270 NON-COVERED ITEM OR SERVICE: HCPCS | Mod: GY | Performed by: INTERNAL MEDICINE

## 2018-11-08 PROCEDURE — 00000146 ZZHCL STATISTIC GLUCOSE BY METER IP

## 2018-11-08 PROCEDURE — 36415 COLL VENOUS BLD VENIPUNCTURE: CPT | Performed by: INTERNAL MEDICINE

## 2018-11-08 PROCEDURE — 85610 PROTHROMBIN TIME: CPT | Performed by: INTERNAL MEDICINE

## 2018-11-08 PROCEDURE — 80053 COMPREHEN METABOLIC PANEL: CPT | Performed by: INTERNAL MEDICINE

## 2018-11-08 PROCEDURE — 99239 HOSP IP/OBS DSCHRG MGMT >30: CPT | Performed by: INTERNAL MEDICINE

## 2018-11-08 PROCEDURE — 25000128 H RX IP 250 OP 636: Performed by: ANESTHESIOLOGY

## 2018-11-08 PROCEDURE — 83605 ASSAY OF LACTIC ACID: CPT | Performed by: INTERNAL MEDICINE

## 2018-11-08 PROCEDURE — 85027 COMPLETE CBC AUTOMATED: CPT | Performed by: INTERNAL MEDICINE

## 2018-11-08 RX ORDER — WARFARIN SODIUM 1 MG/1
0.5 TABLET ORAL DAILY
Qty: 30 TABLET
Start: 2018-11-08 | End: 2019-01-01

## 2018-11-08 RX ADMIN — TAZOBACTAM SODIUM AND PIPERACILLIN SODIUM 3.38 G: 375; 3 INJECTION, SOLUTION INTRAVENOUS at 06:07

## 2018-11-08 RX ADMIN — TAZOBACTAM SODIUM AND PIPERACILLIN SODIUM 3.38 G: 375; 3 INJECTION, SOLUTION INTRAVENOUS at 12:55

## 2018-11-08 RX ADMIN — CYANOCOBALAMIN TAB 1000 MCG 3000 MCG: 1000 TAB at 08:32

## 2018-11-08 RX ADMIN — OMEPRAZOLE 40 MG: 20 CAPSULE, DELAYED RELEASE ORAL at 08:32

## 2018-11-08 ASSESSMENT — ACTIVITIES OF DAILY LIVING (ADL)
ADLS_ACUITY_SCORE: 14
ADLS_ACUITY_SCORE: 13
ADLS_ACUITY_SCORE: 13

## 2018-11-08 NOTE — PHARMACY - DISCHARGE MEDICATION RECONCILIATION
Clinical Pharmacy- Warfarin Discharge Note    Patient is discharging on 1mg daily with INR on 11/12.    Anticoagulation Dose History     Recent Dosing and Labs Latest Ref Rng & Units 9/5/2018 9/6/2018 11/4/2018 11/5/2018 11/6/2018 11/7/2018 11/8/2018    Warfarin 1 mg - - - - - - 1 mg -    Warfarin 1.5 mg - - - - - 1.5 mg - -    INR 0.86 - 1.14 2.97(H) 2.52(H) 2.96(H) 5.86(HH) 2.27(H) 2.57(H) 2.84(H)

## 2018-11-08 NOTE — DISCHARGE SUMMARY
Admit Date:     11/04/2018   Discharge Date:           DISCHARGE DIAGNOSES:   1.  Septic shock secondary to left lower extremity cellulitis.   2.  Left lower extremity cellulitis.   3.  Atrial fibrillation.   4.  Generalized weakness.      DISPOSITION:  Home.      DISCHARGE MEDICATIONS:  Reviewed and reconciled.      Review of your medicines      START taking       Dose / Directions    amoxicillin-clavulanate 875-125 MG per tablet   Commonly known as:  AUGMENTIN   Used for:  Left leg cellulitis, Severe sepsis (H)        Dose:  1 tablet   Take 1 tablet by mouth 2 times daily for 7 days   Quantity:  14 tablet   Refills:  0         CONTINUE these medicines which may have CHANGED, or have new prescriptions. If we are uncertain of the size of tablets/capsules you have at home, strength may be listed as something that might have changed.       Dose / Directions    warfarin 1 MG tablet   Commonly known as:  COUMADIN   This may have changed:  how much to take   Used for:  Atrial fibrillation, unspecified type (H)        Dose:  0.5 mg   Take 0.5 tablets (0.5 mg) by mouth daily On Wednesday (evening)   Quantity:  30 tablet   Refills:  0         CONTINUE these medicines which have NOT CHANGED       Dose / Directions    blood glucose monitoring lancets   Used for:  Diabetes mellitus, type 2 (H)        by Lancet route 2 times daily. Use to test blood sugar twice daily or as directed.   Quantity:  102 each   Refills:  prn       clindamycin 1 % lotion   Commonly known as:  CLEOCIN T        Apply topically daily as needed (open sore on leg)   Refills:  0       cyanocobalamin 1000 MCG tablet   Commonly known as:  vitamin  B-12        Dose:  3000 mcg   Take 3,000 mcg by mouth every morning   Refills:  0       divalproex sodium delayed-release 250 MG DR tablet   Commonly known as:  DEPAKOTE        Dose:  250 mg   Take 250 mg by mouth At Bedtime   Refills:  0       docusate sodium 100 MG capsule   Commonly known as:  COLACE         Dose:  100 mg   Take 100 mg by mouth 2 times daily as needed for constipation   Refills:  0       furosemide 20 MG tablet   Commonly known as:  LASIX        Dose:  20 mg   Take 20 mg by mouth daily   Refills:  0       metoprolol succinate 25 MG 24 hr tablet   Commonly known as:  TOPROL-XL   Used for:  Atrial fibrillation, unspecified type (H)        Dose:  25 mg   Take 1 tablet (25 mg) by mouth every evening   Quantity:  30 tablet   Refills:  0       multivitamin, therapeutic with minerals Tabs tablet        Dose:  1 tablet   Take 1 tablet by mouth 2 times daily (with meals)   Refills:  0       OMEPRAZOLE PO        Dose:  40 mg   Take 40 mg by mouth every morning   Refills:  0       simvastatin 40 MG tablet   Commonly known as:  ZOCOR        Dose:  40 mg   Take 40 mg by mouth daily (with dinner)   Refills:  0       SYNTHROID 150 MCG tablet   Generic drug:  levothyroxine        Dose:  150 mcg   Take 150 mcg by mouth every morning (before breakfast)   Refills:  0       terazosin 5 MG capsule   Commonly known as:  HYTRIN        Dose:  5 mg   Take 5 mg by mouth At Bedtime   Refills:  0       traMADol 50 MG tablet   Commonly known as:  ULTRAM        Dose:  50 mg   Take 50 mg by mouth every 6 hours as needed for moderate pain   Refills:  0            Where to get your medicines      These medications were sent to [facility] [id number] [address] [address]     Phone:  [phone]      amoxicillin-clavulanate 875-125 MG per tablet         Some of these will need a paper prescription and others can be bought over the counter. Ask your nurse if you have questions.     You don't need a prescription for these medications      warfarin 1 MG tablet           DISCHARGE CONDITION:  Stable.      DISCHARGE VITAL SIGNS:  Blood pressure 126/70, pulse rate 70, temperature 96, oxygen saturation 99% on room air.      DISCHARGE PHYSICAL EXAMINATION:   GENERAL:   Awake, alert, oriented, comfortable, not in any form of distress.   HEENT:  Pink and anicteric.  Extraocular muscle movement intact.   CHEST:  Good air entry bilaterally.  No wheezing, crackles or rales.   CARDIOVASCULAR:  S1 and S2 irregular.  No gallop or murmur.   ABDOMEN:  Soft, nontender, nondistended.   EXTREMITIES:  There is some degree of redness, mild swelling of the left leg below the knee.  The spots of ulceration noted which are dressed significantly improved from the day of admission.   PSYCHIATRIC:  Normal mood and affect, pleasant.  Keeps eye contact.  Responds to questions appropriately.      CONSULTATION:  None.      DISCHARGE DIET:  Carb controlled, 2-gram sodium diet.      DISCHARGE ACTIVITY:  As tolerated.      DISCHARGE FOLLOWUP:  Primary care provider in 1 week.  The patient will have INRs checked on 11/19/2018 to adjust his Coumadin dosing.      HOSPITAL COURSE:  Sean Brunner is an 84-year-old gentleman with past medical history significant for diabetes mellitus type 2, atrial fibrillation on anticoagulation, hypothyroidism who presented to the Emergency Room with weakness and change in mental status.  The patient noted a history of pain and swelling and redness in the left lower extremity.  In the Emergency Room, he was found to have elevated lactic acid level and was hypotensive and tachycardic with a swollen, erythematous left lower extremity.  The patient was admitted to the Intensive Care Unit with septic shock and started on pressors.  After IV fluid and pressors, the patient was hemodynamically stabilized and moved out of ICU.  Initially, he was on Zosyn and vancomycin, but as there was no MRSA infection or colonization, vancomycin was stopped.  The patient continued on Zosyn.  PT, OT evaluated the patient.  The ulceration on his leg was dressed daily.  Overall, his energy level improved, worked better with physical therapy, almost he got to his baseline functional status.  Advised  him to have a home visit for wound check and dressing and evaluation of the patient's status, but patient declined that and needs discharged.  The patient has no questions at this point, his concerns addressed and being discharged.      Total time spent coordinating his discharge face-to-face was over 30 minutes.         MALLORY FLORES MD             D: 2018   T: 2018   MT: PEDRO      Name:     MAYITO ANSARI   MRN:      [id number]        Account:        [id number]   :      1934           Admit Date:     2018                                  Discharge Date:       Document: [id number]       cc: Oscar Parekh MD

## 2018-11-08 NOTE — PLAN OF CARE
Problem: Patient Care Overview  Goal: Plan of Care/Patient Progress Review  Outcome: Adequate for Discharge Date Met: 11/08/18  Discharge to home. Agrees with plan of care and follow up.  All belongings are with patient and patient educated on wound care and antibiotic. Given the opportunity to ask questions.

## 2018-11-08 NOTE — PLAN OF CARE
Problem: Cardiac: Heart Failure (Adult)  Goal: Signs and Symptoms of Listed Potential Problems Will be Absent, Minimized or Managed (Cardiac: Heart Failure)  Signs and symptoms of listed potential problems will be absent, minimized or managed by discharge/transition of care (reference Cardiac: Heart Failure (Adult) CPG).   Outcome: Adequate for Discharge Date Met: 11/08/18  Heart Failure Care Pathway  GOALS TO BE MET BEFORE DISCHARGE:    1. Decrease congestion and/or edema with diuretic therapy to achieve near      optimal volume status.            Dyspnea improved:  Yes            Edema improved:     Yes        Net I/O and Weights since admission:          10/09 2300 - 11/08 2259  In: 1911.67 [P.O.:1220; I.V.:691.67]  Out: 700 [Urine:700]  Net: 1211.67            Vitals:    11/04/18 1521 11/04/18 2040 11/05/18 0546 11/08/18 0547   Weight: 90.7 kg (200 lb) 82.9 kg (182 lb 12.2 oz) 83.6 kg (184 lb 4.9 oz) 85.6 kg (188 lb 11.2 oz)       2.  O2 sats > 92% on RA or at prior home O2 therapy level.          Current oxygenation status:       SpO2: 99 %         O2 Device: None (Room air),            Able to wean O2 this shift to keep sats > 92%:  NA       Does patient use Home O2? No    3.  Tolerates ambulation and mobility near baseline: Yes        How many times did the patient ambulate with nursing staff this shift? 3    Please review the Heart Failure Care Pathway for additional HF goal outcomes.    Omaira Cat RN  11/8/2018

## 2018-11-08 NOTE — PLAN OF CARE
VSS. Alert and oriented but confused at times. Up SBA to bathroom. IV zosyn maintained. Left leg wound covered, CDI. Plan for discharge in 1-2 days home vs TCU.

## 2018-11-08 NOTE — PLAN OF CARE
Problem: Patient Care Overview  Goal: Plan of Care/Patient Progress Review  Outcome: Improving  A&Ox4, VSS, 98% sats on RA.  Denies pain.  LE with dressing, c/d/i.  CMS intact.  IV Zosyn as ordered.  Up in chair with SBA.  Voiding without difficulty.  Plan for discharge 1-2 days.  Continue with plan of care.

## 2018-11-08 NOTE — PROGRESS NOTES
Discharge Planner   Discharge Plans in progress: Pt will discharge home today.  Pt previously declined TCU and HC.  Barriers to discharge plan: None  Follow up plan: Sw will continue to follow and be available as needed until discharge.       Entered by: Eugenie Anglin 11/08/2018 3:29 PM

## 2018-11-10 LAB
BACTERIA SPEC CULT: NO GROWTH
BACTERIA SPEC CULT: NO GROWTH
Lab: NORMAL
Lab: NORMAL
SPECIMEN SOURCE: NORMAL
SPECIMEN SOURCE: NORMAL

## 2018-11-12 DIAGNOSIS — I27.20 PULMONARY HYPERTENSION (H): Primary | ICD-10-CM

## 2018-11-12 DIAGNOSIS — R06.02 SOB (SHORTNESS OF BREATH): ICD-10-CM

## 2018-11-16 ENCOUNTER — TELEPHONE (OUTPATIENT)
Dept: CARDIOLOGY | Facility: CLINIC | Age: 83
End: 2018-11-16

## 2018-11-16 NOTE — TELEPHONE ENCOUNTER
Reviewed chart, pt has been followed by Dr. Joyce at [facility]. Messaged scheduling to arrange New CORE MD Cardiology visit for pt at [place] location, CORE follow up with VICENTE can be determined at that visit. KENRICK Dumont 12:58 PM 11/16/18

## 2018-11-16 NOTE — TELEPHONE ENCOUNTER
----- Message from Porsha Dawn RN sent at 11/15/2018  1:09 PM CST -----  Ayala Story,  This pt would like to be referred to the CORE clinic down at [place] closer to his home.  He understands that he would need to scheduled an appointment with a Cardiologist and then be able to make the appointment with CORE.  Can you please assist with this?    Porsha Dawn RN

## 2018-12-19 ENCOUNTER — OFFICE VISIT (OUTPATIENT)
Dept: CARDIOLOGY | Facility: CLINIC | Age: 83
End: 2018-12-19
Payer: COMMERCIAL

## 2018-12-19 VITALS
HEART RATE: 76 BPM | BODY MASS INDEX: 27.83 KG/M2 | DIASTOLIC BLOOD PRESSURE: 64 MMHG | SYSTOLIC BLOOD PRESSURE: 112 MMHG | HEIGHT: 69 IN | WEIGHT: 187.9 LBS

## 2018-12-19 DIAGNOSIS — I27.20 PULMONARY HYPERTENSION (H): Primary | ICD-10-CM

## 2018-12-19 PROCEDURE — 99214 OFFICE O/P EST MOD 30 MIN: CPT | Performed by: INTERNAL MEDICINE

## 2018-12-19 ASSESSMENT — MIFFLIN-ST. JEOR: SCORE: 1532.69

## 2018-12-19 NOTE — LETTER
12/19/2018      Oscar Parekh MD  [address] [address]      RE: Sean Brunner       Dear Colleague,    I had the pleasure of seeing Sean Brunner in the [facility].    Service Date: 12/19/2018      REASON FOR VISIT:  Cardiology clinic visit because of cor pulmonale.      HISTORY OF PRESENT ILLNESS:  Mr. Brunner is a pleasant 84-year-old gentleman with a history of cor pulmonale with 3-4 tricuspid regurgitation with severely dilated RV, mild to moderate RV systolic function with normal LV function, with evidence of both RV volume and pressure overload on echocardiogram, with history of moderate obstruction on PFT in 2011, chronic atrial fibrillation on beta blocker, ventricular rate well controlled on Coumadin, negative stress test last year, obstructive sleep apnea on CPAP, history of obesity with weight more than 300 pounds in the past, but the patient has lost about 130-120 pounds over the last few years, with right heart catheterization done last year showing pulmonary hypertension with mean PA pressure of 31 mmHg, wedge pressure of 15 with pulmonary vascular resistance of 3.2 Wood units.  The patient sees Dr. Joyce.  Today the patient is coming to clinic accompanied by his wife.  He has no specific cardiac complaints.  The patient was recently seen by Danelle Hoffman for some weight gain and leg edema, and Lasix dose was increased.  He is presently on 20 mg of Lasix every day.  It looks like his weight has been stable now.  In fact, he has lost weight compared to his last clinic visit.  He is now weighing 187 pounds.  He weighed 191 pounds in May of this year and 205 pounds in October this year.  He denies any shortness of breath, any chest discomfort.  His main complaint is fatigue and longstanding history of headaches which the patient tells me has been going on for the last 5-6 years.        CURRENT  MEDICATIONS:    1.  In addition to low-dose Lasix, he is on:   2.  Coumadin.   3.  Metoprolol succinate 25 mg daily   4.  Simvastatin 40 mg daily.      PHYSICAL EXAMINATION:   VITAL SIGNS:  Blood pressure 112/64, heart rate 76 irregular, weight 187 pounds, BMI 27.81.   GENERAL:  The patient appears pleasant, comfortable.     NECK:  JVP appears around 10 cm with positive V waves.   CARDIOVASCULAR SYSTEM:  Irregular, normal rate.  There is grade 2/6 systolic murmur heard best over the left lower sternal border.  No S3, S4, rub or gallop.     RESPIRATORY SYSTEM:  Clear to auscultation bilaterally.   GASTROINTESTINAL SYSTEM:  Abdomen soft, nontender.   EXTREMITIES:  No pitting pedal edema.  There is chronic venous stasis changes seen.   PSYCH:  Normal affect.   SKIN:  No obvious rash.   HEENT:  No pallor or icterus.      IMPRESSION AND PLAN:  A pleasant 84-year-old gentleman with cor pulmonale with underlying moderate obstructive disease on PFT in , no evidence of PE on CT chest, pulmonary hypertension as noted above with mean PA pressure of 31 mmHg with near normal wedge, with negative stress test last year, chronic atrial fibrillation, CHADS2-VASc score of 3 on Coumadin for stroke prophylaxis, history of obstructive sleep apnea on CPAP, history of morbid obesity, but the patient has gradually lost weight more than 100 pounds of weight loss.  Overall, cardiac status-wise he appears stable.  He appears euvolemic to me at this time.  Fortunately, low-dose diuretic is keeping him euvolemic and his kidney functions have been stable normal.  At this time, I recommend no new cardiac recommendations.  He can continue to follow up with Dr. Joyce.         DANNI DE LEON MD             D: 2018   T: 2018   MT: ELIUD      Name:     MAYITO ANSARI   MRN:      [id number]        Account:      [id number]   :      1934           Service Date: 2018      Document: [id number]         Outpatient Encounter  Medications as of 12/19/2018   Medication Sig Dispense Refill     ACCU-CHEK MULTICLIX LANCETS MISC by Lancet route 2 times daily. Use to test blood sugar twice daily or as directed. 102 each prn     cyanocolbalamin (VITAMIN  B-12) 1000 MCG tablet Take 3,000 mcg by mouth every morning       divalproex sodium delayed-release (DEPAKOTE) 250 MG DR tablet Take 250 mg by mouth At Bedtime       furosemide (LASIX) 20 MG tablet Take 20 mg by mouth daily       levothyroxine (SYNTHROID) 150 MCG tablet Take 150 mcg by mouth every morning (before breakfast)        metoprolol succinate (TOPROL-XL) 25 MG 24 hr tablet Take 1 tablet (25 mg) by mouth every evening 30 tablet      multivitamin, therapeutic with minerals (THERA-VIT-M) TABS tablet Take 1 tablet by mouth 2 times daily (with meals)       OMEPRAZOLE PO Take 40 mg by mouth every morning        simvastatin (ZOCOR) 40 MG tablet Take 40 mg by mouth daily (with dinner)        terazosin (HYTRIN) 5 MG capsule Take 5 mg by mouth At Bedtime       traMADol (ULTRAM) 50 MG tablet Take 50 mg by mouth every 6 hours as needed for moderate pain        warfarin (COUMADIN) 1 MG tablet Take 0.5 tablets (0.5 mg) by mouth daily On Wednesday (evening) 30 tablet      clindamycin (CLEOCIN T) 1 % lotion Apply topically daily as needed (open sore on leg)       docusate sodium (COLACE) 100 MG capsule Take 100 mg by mouth 2 times daily as needed for constipation       No facility-administered encounter medications on file as of 12/19/2018.        Again, thank you for allowing me to participate in the care of your patient.      Sincerely,    Luke Nielsen MD     [facility]

## 2018-12-19 NOTE — PROGRESS NOTES
HPI and Plan:   See dictation(#[id number])    No orders of the defined types were placed in this encounter.      No orders of the defined types were placed in this encounter.      There are no discontinued medications.      No diagnosis found.    CURRENT MEDICATIONS:  Current Outpatient Medications   Medication Sig Dispense Refill     ACCU-CHEK MULTICLIX LANCETS MISC by Lancet route 2 times daily. Use to test blood sugar twice daily or as directed. 102 each prn     cyanocolbalamin (VITAMIN  B-12) 1000 MCG tablet Take 3,000 mcg by mouth every morning       divalproex sodium delayed-release (DEPAKOTE) 250 MG DR tablet Take 250 mg by mouth At Bedtime       furosemide (LASIX) 20 MG tablet Take 20 mg by mouth daily       levothyroxine (SYNTHROID) 150 MCG tablet Take 150 mcg by mouth every morning (before breakfast)        metoprolol succinate (TOPROL-XL) 25 MG 24 hr tablet Take 1 tablet (25 mg) by mouth every evening 30 tablet      multivitamin, therapeutic with minerals (THERA-VIT-M) TABS tablet Take 1 tablet by mouth 2 times daily (with meals)       OMEPRAZOLE PO Take 40 mg by mouth every morning        simvastatin (ZOCOR) 40 MG tablet Take 40 mg by mouth daily (with dinner)        terazosin (HYTRIN) 5 MG capsule Take 5 mg by mouth At Bedtime       traMADol (ULTRAM) 50 MG tablet Take 50 mg by mouth every 6 hours as needed for moderate pain        warfarin (COUMADIN) 1 MG tablet Take 0.5 tablets (0.5 mg) by mouth daily On Wednesday (evening) 30 tablet      clindamycin (CLEOCIN T) 1 % lotion Apply topically daily as needed (open sore on leg)       docusate sodium (COLACE) 100 MG capsule Take 100 mg by mouth 2 times daily as needed for constipation         ALLERGIES   No Known Allergies    PAST MEDICAL HISTORY:  Past Medical History:   Diagnosis Date     Atrial fibrillation (H) 12/27/2011    Sees Dr Carcamo, avoid amio due to cor pulmonale, asymptomatic rates up to160 with exersion.        Bilateral leg edema     compression  wraps     CAD (coronary artery disease)     Nonobstructive     Cardiomyopathy (H) 5/1/2012     Carotid stenosis      Cellulitis of left lower extremity 11/5/2018     COPD (chronic obstructive pulmonary disease) (H)      Cor pulmonale (H) 5/1/2012     Diastolic dysfunction      DM2 (diabetes mellitus, type 2) (H)      IYER (dyspnea on exertion)      GERD (gastroesophageal reflux disease)      HTN (hypertension)      Hypothyroidism      Leg wound, left      LV dysfunction 05/01/2012     Due to cor pulmonale and a fib related tachycardia      Morbid obesity (H) 5/1/2012     On supplemental oxygen by nasal cannula     at [facility]     STEWART (obstructive sleep apnea) 05/01/2012    Non compliant with CPAP     Pulmonary embolism (H)      Pulmonary HTN (H)      Varicose veins of bilateral lower extremities with other complications      Vitamin B12 deficiency disease 5/1/2012    On oral replacement        PAST SURGICAL HISTORY:  Past Surgical History:   Procedure Laterality Date     JOINT REPLACEMENT         FAMILY HISTORY:  History reviewed. No pertinent family history.    SOCIAL HISTORY:  Social History     Socioeconomic History     Marital status:      Spouse name: None     Number of children: None     Years of education: None     Highest education level: None   Social Needs     Financial resource strain: None     Food insecurity - worry: None     Food insecurity - inability: None     Transportation needs - medical: None     Transportation needs - non-medical: None   Occupational History     None   Tobacco Use     Smoking status: Never Smoker     Smokeless tobacco: Never Used   Substance and Sexual Activity     Alcohol use: No     Drug use: No     Sexual activity: None   Other Topics Concern     Parent/sibling w/ CABG, MI or angioplasty before 65F 55M? Not Asked   Social History Narrative     None       Review of Systems:  Skin:  Negative for       Eyes:  Positive for glasses    ENT:  Positive for hearing loss   "  Respiratory:  Positive for sleep apnea     Cardiovascular:    fatigue;Positive for;dizziness;edema    Gastroenterology: Negative      Genitourinary:         Musculoskeletal:  Negative      Neurologic:  Positive for headaches    Psychiatric:  Negative      Heme/Lymph/Imm:  Negative      Endocrine:  Positive for diabetes;thyroid disorder      Physical Exam:  Vitals: /64 (BP Location: Right arm, Patient Position: Sitting, Cuff Size: Adult Regular)   Pulse 76   Ht 1.753 m (5' 9\")   Wt 85.2 kg (187 lb 14.4 oz)   BMI 27.75 kg/m      Constitutional:           Skin:             Head:           Eyes:           Lymph:      ENT:           Neck:           Respiratory:            Cardiac:                                                           GI:           Extremities and Muscular Skeletal:                 Neurological:           Psych:           CC  No referring provider defined for this encounter.              "

## 2018-12-19 NOTE — LETTER
12/19/2018    Oscar Parekh MD  [address] [address]    RE: Sean Brunner       Dear Colleague,    I had the pleasure of seeing Sean Brunner in the [facility].    HPI and Plan:   See dictation(#[id number])    No orders of the defined types were placed in this encounter.      No orders of the defined types were placed in this encounter.      There are no discontinued medications.      No diagnosis found.    CURRENT MEDICATIONS:  Current Outpatient Medications   Medication Sig Dispense Refill     ACCU-CHEK MULTICLIX LANCETS MISC by Lancet route 2 times daily. Use to test blood sugar twice daily or as directed. 102 each prn     cyanocolbalamin (VITAMIN  B-12) 1000 MCG tablet Take 3,000 mcg by mouth every morning       divalproex sodium delayed-release (DEPAKOTE) 250 MG DR tablet Take 250 mg by mouth At Bedtime       furosemide (LASIX) 20 MG tablet Take 20 mg by mouth daily       levothyroxine (SYNTHROID) 150 MCG tablet Take 150 mcg by mouth every morning (before breakfast)        metoprolol succinate (TOPROL-XL) 25 MG 24 hr tablet Take 1 tablet (25 mg) by mouth every evening 30 tablet      multivitamin, therapeutic with minerals (THERA-VIT-M) TABS tablet Take 1 tablet by mouth 2 times daily (with meals)       OMEPRAZOLE PO Take 40 mg by mouth every morning        simvastatin (ZOCOR) 40 MG tablet Take 40 mg by mouth daily (with dinner)        terazosin (HYTRIN) 5 MG capsule Take 5 mg by mouth At Bedtime       traMADol (ULTRAM) 50 MG tablet Take 50 mg by mouth every 6 hours as needed for moderate pain        warfarin (COUMADIN) 1 MG tablet Take 0.5 tablets (0.5 mg) by mouth daily On Wednesday (evening) 30 tablet      clindamycin (CLEOCIN T) 1 % lotion Apply topically daily as needed (open sore on leg)       docusate sodium (COLACE) 100 MG capsule Take 100 mg by mouth 2 times daily as needed for constipation         ALLERGIES   No  Known Allergies    PAST MEDICAL HISTORY:  Past Medical History:   Diagnosis Date     Atrial fibrillation (H) 12/27/2011    Sees Dr Carcamo, avoid amio due to cor pulmonale, asymptomatic rates up to160 with exersion.        Bilateral leg edema     compression wraps     CAD (coronary artery disease)     Nonobstructive     Cardiomyopathy (H) 5/1/2012     Carotid stenosis      Cellulitis of left lower extremity 11/5/2018     COPD (chronic obstructive pulmonary disease) (H)      Cor pulmonale (H) 5/1/2012     Diastolic dysfunction      DM2 (diabetes mellitus, type 2) (H)      IYER (dyspnea on exertion)      GERD (gastroesophageal reflux disease)      HTN (hypertension)      Hypothyroidism      Leg wound, left      LV dysfunction 05/01/2012     Due to cor pulmonale and a fib related tachycardia      Morbid obesity (H) 5/1/2012     On supplemental oxygen by nasal cannula     at [facility]     STEWART (obstructive sleep apnea) 05/01/2012    Non compliant with CPAP     Pulmonary embolism (H)      Pulmonary HTN (H)      Varicose veins of bilateral lower extremities with other complications      Vitamin B12 deficiency disease 5/1/2012    On oral replacement        PAST SURGICAL HISTORY:  Past Surgical History:   Procedure Laterality Date     JOINT REPLACEMENT         FAMILY HISTORY:  History reviewed. No pertinent family history.    SOCIAL HISTORY:  Social History     Socioeconomic History     Marital status:      Spouse name: None     Number of children: None     Years of education: None     Highest education level: None   Social Needs     Financial resource strain: None     Food insecurity - worry: None     Food insecurity - inability: None     Transportation needs - medical: None     Transportation needs - non-medical: None   Occupational History     None   Tobacco Use     Smoking status: Never Smoker     Smokeless tobacco: Never Used   Substance and Sexual Activity     Alcohol use: No     Drug use: No     Sexual activity: None  "  Other Topics Concern     Parent/sibling w/ CABG, MI or angioplasty before 65F 55M? Not Asked   Social History Narrative     None       Review of Systems:  Skin:  Negative for       Eyes:  Positive for glasses    ENT:  Positive for hearing loss    Respiratory:  Positive for sleep apnea     Cardiovascular:    fatigue;Positive for;dizziness;edema    Gastroenterology: Negative      Genitourinary:         Musculoskeletal:  Negative      Neurologic:  Positive for headaches    Psychiatric:  Negative      Heme/Lymph/Imm:  Negative      Endocrine:  Positive for diabetes;thyroid disorder      Physical Exam:  Vitals: /64 (BP Location: Right arm, Patient Position: Sitting, Cuff Size: Adult Regular)   Pulse 76   Ht 1.753 m (5' 9\")   Wt 85.2 kg (187 lb 14.4 oz)   BMI 27.75 kg/m       Constitutional:           Skin:             Head:           Eyes:           Lymph:      ENT:           Neck:           Respiratory:            Cardiac:                                                           GI:           Extremities and Muscular Skeletal:                 Neurological:           Psych:           CC  No referring provider defined for this encounter.                Thank you for allowing me to participate in the care of your patient.      Sincerely,     Luke Nielsen MD     [facility]    cc:   No referring provider defined for this encounter.        "

## 2018-12-20 NOTE — PROGRESS NOTES
Service Date: 12/19/2018      REASON FOR VISIT:  Cardiology clinic visit because of cor pulmonale.      HISTORY OF PRESENT ILLNESS:  Mr. Brunner is a pleasant 84-year-old gentleman with a history of cor pulmonale with 3-4 tricuspid regurgitation with severely dilated RV, mild to moderate RV systolic function with normal LV function, with evidence of both RV volume and pressure overload on echocardiogram, with history of moderate obstruction on PFT in 2011, chronic atrial fibrillation on beta blocker, ventricular rate well controlled on Coumadin, negative stress test last year, obstructive sleep apnea on CPAP, history of obesity with weight more than 300 pounds in the past, but the patient has lost about 130-120 pounds over the last few years, with right heart catheterization done last year showing pulmonary hypertension with mean PA pressure of 31 mmHg, wedge pressure of 15 with pulmonary vascular resistance of 3.2 Wood units.  The patient sees Dr. Joyce.  Today the patient is coming to clinic accompanied by his wife.  He has no specific cardiac complaints.  The patient was recently seen by Danelle Hoffman for some weight gain and leg edema, and Lasix dose was increased.  He is presently on 20 mg of Lasix every day.  It looks like his weight has been stable now.  In fact, he has lost weight compared to his last clinic visit.  He is now weighing 187 pounds.  He weighed 191 pounds in May of this year and 205 pounds in October this year.  He denies any shortness of breath, any chest discomfort.  His main complaint is fatigue and longstanding history of headaches which the patient tells me has been going on for the last 5-6 years.        CURRENT MEDICATIONS:    1.  In addition to low-dose Lasix, he is on:   2.  Coumadin.   3.  Metoprolol succinate 25 mg daily   4.  Simvastatin 40 mg daily.      PHYSICAL EXAMINATION:   VITAL SIGNS:  Blood pressure 112/64, heart rate 76 irregular, weight 187 pounds, BMI 27.81.    GENERAL:  The patient appears pleasant, comfortable.     NECK:  JVP appears around 10 cm with positive V waves.   CARDIOVASCULAR SYSTEM:  Irregular, normal rate.  There is grade 2/6 systolic murmur heard best over the left lower sternal border.  No S3, S4, rub or gallop.     RESPIRATORY SYSTEM:  Clear to auscultation bilaterally.   GASTROINTESTINAL SYSTEM:  Abdomen soft, nontender.   EXTREMITIES:  No pitting pedal edema.  There is chronic venous stasis changes seen.   PSYCH:  Normal affect.   SKIN:  No obvious rash.   HEENT:  No pallor or icterus.      IMPRESSION AND PLAN:  A pleasant 84-year-old gentleman with cor pulmonale with underlying moderate obstructive disease on PFT in , no evidence of PE on CT chest, pulmonary hypertension as noted above with mean PA pressure of 31 mmHg with near normal wedge, with negative stress test last year, chronic atrial fibrillation, CHADS2-VASc score of 3 on Coumadin for stroke prophylaxis, history of obstructive sleep apnea on CPAP, history of morbid obesity, but the patient has gradually lost weight more than 100 pounds of weight loss.  Overall, cardiac status-wise he appears stable.  He appears euvolemic to me at this time.  Fortunately, low-dose diuretic is keeping him euvolemic and his kidney functions have been stable normal.  At this time, I recommend no new cardiac recommendations.  He can continue regular follow up with his primary cardiologist Dr. Joyce.         DANNI DE LEON MD             D: 2018   T: 2018   MT: ELIUD      Name:     MAYITO ANSARI   MRN:      [id number]        Account:      [id number]   :      1934           Service Date: 2018      Document: [id number]

## 2019-01-01 ENCOUNTER — HOSPITAL ENCOUNTER (INPATIENT)
Facility: CLINIC | Age: 84
LOS: 6 days | Discharge: HOME HEALTH | DRG: 871 | End: 2019-06-06
Attending: EMERGENCY MEDICINE | Admitting: INTERNAL MEDICINE
Payer: COMMERCIAL

## 2019-01-01 ENCOUNTER — APPOINTMENT (OUTPATIENT)
Dept: MRI IMAGING | Facility: CLINIC | Age: 84
DRG: 445 | End: 2019-01-01
Attending: PHYSICIAN ASSISTANT
Payer: COMMERCIAL

## 2019-01-01 ENCOUNTER — APPOINTMENT (OUTPATIENT)
Dept: OCCUPATIONAL THERAPY | Facility: CLINIC | Age: 84
DRG: 445 | End: 2019-01-01
Payer: COMMERCIAL

## 2019-01-01 ENCOUNTER — APPOINTMENT (OUTPATIENT)
Dept: GENERAL RADIOLOGY | Facility: CLINIC | Age: 84
DRG: 871 | End: 2019-01-01
Attending: EMERGENCY MEDICINE
Payer: COMMERCIAL

## 2019-01-01 ENCOUNTER — APPOINTMENT (OUTPATIENT)
Dept: ULTRASOUND IMAGING | Facility: CLINIC | Age: 84
DRG: 445 | End: 2019-01-01
Attending: EMERGENCY MEDICINE
Payer: COMMERCIAL

## 2019-01-01 ENCOUNTER — APPOINTMENT (OUTPATIENT)
Dept: GENERAL RADIOLOGY | Facility: CLINIC | Age: 84
DRG: 445 | End: 2019-01-01
Attending: EMERGENCY MEDICINE
Payer: COMMERCIAL

## 2019-01-01 ENCOUNTER — TELEPHONE (OUTPATIENT)
Dept: CARE COORDINATION | Facility: CLINIC | Age: 84
End: 2019-01-01

## 2019-01-01 ENCOUNTER — APPOINTMENT (OUTPATIENT)
Dept: PHYSICAL THERAPY | Facility: CLINIC | Age: 84
DRG: 871 | End: 2019-01-01
Payer: COMMERCIAL

## 2019-01-01 ENCOUNTER — APPOINTMENT (OUTPATIENT)
Dept: OCCUPATIONAL THERAPY | Facility: CLINIC | Age: 84
DRG: 445 | End: 2019-01-01
Attending: INTERNAL MEDICINE
Payer: COMMERCIAL

## 2019-01-01 ENCOUNTER — APPOINTMENT (OUTPATIENT)
Dept: CT IMAGING | Facility: CLINIC | Age: 84
DRG: 445 | End: 2019-01-01
Attending: EMERGENCY MEDICINE
Payer: COMMERCIAL

## 2019-01-01 ENCOUNTER — APPOINTMENT (OUTPATIENT)
Dept: PHYSICAL THERAPY | Facility: CLINIC | Age: 84
DRG: 445 | End: 2019-01-01
Payer: COMMERCIAL

## 2019-01-01 ENCOUNTER — APPOINTMENT (OUTPATIENT)
Dept: GENERAL RADIOLOGY | Facility: CLINIC | Age: 84
DRG: 871 | End: 2019-01-01
Attending: INTERNAL MEDICINE
Payer: COMMERCIAL

## 2019-01-01 ENCOUNTER — APPOINTMENT (OUTPATIENT)
Dept: PHYSICAL THERAPY | Facility: CLINIC | Age: 84
DRG: 445 | End: 2019-01-01
Attending: INTERNAL MEDICINE
Payer: COMMERCIAL

## 2019-01-01 ENCOUNTER — OFFICE VISIT (OUTPATIENT)
Dept: CARDIOLOGY | Facility: CLINIC | Age: 84
End: 2019-01-01
Attending: INTERNAL MEDICINE
Payer: COMMERCIAL

## 2019-01-01 ENCOUNTER — HOSPITAL ENCOUNTER (INPATIENT)
Facility: CLINIC | Age: 84
LOS: 5 days | Discharge: HOME | DRG: 445 | End: 2019-12-11
Attending: EMERGENCY MEDICINE | Admitting: INTERNAL MEDICINE
Payer: COMMERCIAL

## 2019-01-01 ENCOUNTER — HEALTH MAINTENANCE LETTER (OUTPATIENT)
Age: 84
End: 2019-01-01

## 2019-01-01 VITALS
WEIGHT: 194 LBS | HEART RATE: 76 BPM | SYSTOLIC BLOOD PRESSURE: 110 MMHG | DIASTOLIC BLOOD PRESSURE: 65 MMHG | HEIGHT: 69 IN | BODY MASS INDEX: 28.73 KG/M2

## 2019-01-01 VITALS
HEART RATE: 83 BPM | OXYGEN SATURATION: 100 % | RESPIRATION RATE: 16 BRPM | HEIGHT: 69 IN | BODY MASS INDEX: 31.03 KG/M2 | WEIGHT: 209.5 LBS | DIASTOLIC BLOOD PRESSURE: 68 MMHG | SYSTOLIC BLOOD PRESSURE: 114 MMHG | TEMPERATURE: 96.4 F

## 2019-01-01 VITALS
RESPIRATION RATE: 20 BRPM | HEIGHT: 69 IN | HEART RATE: 91 BPM | TEMPERATURE: 95.9 F | OXYGEN SATURATION: 94 % | DIASTOLIC BLOOD PRESSURE: 60 MMHG | SYSTOLIC BLOOD PRESSURE: 125 MMHG | BODY MASS INDEX: 29.62 KG/M2 | WEIGHT: 200 LBS

## 2019-01-01 DIAGNOSIS — G47.33 OSA (OBSTRUCTIVE SLEEP APNEA): ICD-10-CM

## 2019-01-01 DIAGNOSIS — S30.1XXA ABDOMINAL WALL HEMATOMA, INITIAL ENCOUNTER: ICD-10-CM

## 2019-01-01 DIAGNOSIS — I27.20 PULMONARY HYPERTENSION (H): ICD-10-CM

## 2019-01-01 DIAGNOSIS — I48.91 ATRIAL FIBRILLATION, UNSPECIFIED TYPE (H): ICD-10-CM

## 2019-01-01 DIAGNOSIS — I27.81 COR PULMONALE (H): ICD-10-CM

## 2019-01-01 DIAGNOSIS — K29.71 GASTRITIS WITH HEMORRHAGE, UNSPECIFIED CHRONICITY, UNSPECIFIED GASTRITIS TYPE: ICD-10-CM

## 2019-01-01 DIAGNOSIS — R10.13 DYSPEPSIA: ICD-10-CM

## 2019-01-01 DIAGNOSIS — A41.9 SEPSIS, DUE TO UNSPECIFIED ORGANISM: Primary | ICD-10-CM

## 2019-01-01 DIAGNOSIS — R79.1 SUPRATHERAPEUTIC INR: ICD-10-CM

## 2019-01-01 DIAGNOSIS — I51.9 LV DYSFUNCTION: ICD-10-CM

## 2019-01-01 DIAGNOSIS — Z79.01 ANTICOAGULATED ON COUMADIN: ICD-10-CM

## 2019-01-01 DIAGNOSIS — R06.09 DYSPNEA ON EXERTION: ICD-10-CM

## 2019-01-01 DIAGNOSIS — R42 DIZZINESS: ICD-10-CM

## 2019-01-01 DIAGNOSIS — R60.9 EDEMA, UNSPECIFIED TYPE: ICD-10-CM

## 2019-01-01 DIAGNOSIS — R60.0 PERIPHERAL EDEMA: ICD-10-CM

## 2019-01-01 DIAGNOSIS — K80.20 CALCULUS OF GALLBLADDER WITHOUT CHOLECYSTITIS WITHOUT OBSTRUCTION: ICD-10-CM

## 2019-01-01 DIAGNOSIS — I50.23 ACUTE ON CHRONIC SYSTOLIC CONGESTIVE HEART FAILURE (H): ICD-10-CM

## 2019-01-01 DIAGNOSIS — M25.552 HIP PAIN, LEFT: Primary | ICD-10-CM

## 2019-01-01 DIAGNOSIS — R51.9 NONINTRACTABLE HEADACHE, UNSPECIFIED CHRONICITY PATTERN, UNSPECIFIED HEADACHE TYPE: ICD-10-CM

## 2019-01-01 DIAGNOSIS — L03.116 CELLULITIS OF LEFT LOWER EXTREMITY: ICD-10-CM

## 2019-01-01 LAB
ALBUMIN SERPL-MCNC: 2.7 G/DL (ref 3.4–5)
ALBUMIN SERPL-MCNC: 2.9 G/DL (ref 3.4–5)
ALBUMIN UR-MCNC: NEGATIVE MG/DL
ALP SERPL-CCNC: 69 U/L (ref 40–150)
ALP SERPL-CCNC: 69 U/L (ref 40–150)
ALT SERPL W P-5'-P-CCNC: 12 U/L (ref 0–70)
ALT SERPL W P-5'-P-CCNC: 12 U/L (ref 0–70)
ANION GAP SERPL CALCULATED.3IONS-SCNC: 1 MMOL/L (ref 3–14)
ANION GAP SERPL CALCULATED.3IONS-SCNC: 2 MMOL/L (ref 3–14)
ANION GAP SERPL CALCULATED.3IONS-SCNC: 3 MMOL/L (ref 3–14)
ANION GAP SERPL CALCULATED.3IONS-SCNC: 3 MMOL/L (ref 3–14)
ANION GAP SERPL CALCULATED.3IONS-SCNC: 5 MMOL/L (ref 3–14)
ANION GAP SERPL CALCULATED.3IONS-SCNC: 5 MMOL/L (ref 3–14)
ANION GAP SERPL CALCULATED.3IONS-SCNC: 7 MMOL/L (ref 6–17)
ANION GAP SERPL CALCULATED.3IONS-SCNC: 8 MMOL/L (ref 3–14)
APPEARANCE UR: CLEAR
AST SERPL W P-5'-P-CCNC: 26 U/L (ref 0–45)
AST SERPL W P-5'-P-CCNC: 30 U/L (ref 0–45)
BACTERIA SPEC CULT: ABNORMAL
BACTERIA SPEC CULT: NO GROWTH
BASE EXCESS BLDV CALC-SCNC: 4.8 MMOL/L
BASOPHILS # BLD AUTO: 0 10E9/L (ref 0–0.2)
BASOPHILS NFR BLD AUTO: 0 %
BASOPHILS NFR BLD AUTO: 0.2 %
BASOPHILS NFR BLD AUTO: 0.6 %
BILIRUB SERPL-MCNC: 0.3 MG/DL (ref 0.2–1.3)
BILIRUB SERPL-MCNC: 0.4 MG/DL (ref 0.2–1.3)
BILIRUB UR QL STRIP: NEGATIVE
BUN SERPL-MCNC: 16 MG/DL (ref 7–30)
BUN SERPL-MCNC: 21 MG/DL (ref 7–30)
BUN SERPL-MCNC: 21 MG/DL (ref 7–30)
BUN SERPL-MCNC: 22 MG/DL (ref 7–30)
BUN SERPL-MCNC: 27 MG/DL (ref 7–30)
BUN SERPL-MCNC: 28 MG/DL (ref 7–30)
BUN SERPL-MCNC: 29 MG/DL (ref 7–30)
BUN SERPL-MCNC: 29 MG/DL (ref 7–30)
CALCIUM SERPL-MCNC: 8.4 MG/DL (ref 8.5–10.1)
CALCIUM SERPL-MCNC: 8.6 MG/DL (ref 8.5–10.1)
CALCIUM SERPL-MCNC: 8.7 MG/DL (ref 8.5–10.1)
CALCIUM SERPL-MCNC: 8.9 MG/DL (ref 8.5–10.1)
CALCIUM SERPL-MCNC: 9.1 MG/DL (ref 8.5–10.1)
CALCIUM SERPL-MCNC: 9.9 MG/DL (ref 8.5–10.5)
CHLORIDE SERPL-SCNC: 103 MMOL/L (ref 98–107)
CHLORIDE SERPL-SCNC: 104 MMOL/L (ref 94–109)
CHLORIDE SERPL-SCNC: 105 MMOL/L (ref 94–109)
CHLORIDE SERPL-SCNC: 107 MMOL/L (ref 94–109)
CHLORIDE SERPL-SCNC: 108 MMOL/L (ref 94–109)
CK SERPL-CCNC: 45 U/L (ref 30–300)
CO2 BLDCOV-SCNC: 29 MMOL/L (ref 21–28)
CO2 SERPL-SCNC: 24 MMOL/L (ref 20–32)
CO2 SERPL-SCNC: 26 MMOL/L (ref 20–32)
CO2 SERPL-SCNC: 29 MMOL/L (ref 20–32)
CO2 SERPL-SCNC: 30 MMOL/L (ref 20–32)
CO2 SERPL-SCNC: 32 MMOL/L (ref 20–32)
CO2 SERPL-SCNC: 32 MMOL/L (ref 23–29)
COLOR UR AUTO: YELLOW
CREAT SERPL-MCNC: 0.62 MG/DL (ref 0.66–1.25)
CREAT SERPL-MCNC: 0.68 MG/DL (ref 0.66–1.25)
CREAT SERPL-MCNC: 0.7 MG/DL (ref 0.66–1.25)
CREAT SERPL-MCNC: 0.72 MG/DL (ref 0.66–1.25)
CREAT SERPL-MCNC: 0.92 MG/DL (ref 0.66–1.25)
CREAT SERPL-MCNC: 0.93 MG/DL (ref 0.66–1.25)
CREAT SERPL-MCNC: 1 MG/DL (ref 0.7–1.3)
CREAT SERPL-MCNC: 1.01 MG/DL (ref 0.66–1.25)
DIFFERENTIAL METHOD BLD: ABNORMAL
EOSINOPHIL # BLD AUTO: 0 10E9/L (ref 0–0.7)
EOSINOPHIL # BLD AUTO: 0.1 10E9/L (ref 0–0.7)
EOSINOPHIL # BLD AUTO: 0.1 10E9/L (ref 0–0.7)
EOSINOPHIL NFR BLD AUTO: 0 %
EOSINOPHIL NFR BLD AUTO: 0 %
EOSINOPHIL NFR BLD AUTO: 0.3 %
EOSINOPHIL NFR BLD AUTO: 0.8 %
EOSINOPHIL NFR BLD AUTO: 1.4 %
ERYTHROCYTE [DISTWIDTH] IN BLOOD BY AUTOMATED COUNT: 16.1 % (ref 10–15)
ERYTHROCYTE [DISTWIDTH] IN BLOOD BY AUTOMATED COUNT: 16.8 % (ref 10–15)
ERYTHROCYTE [DISTWIDTH] IN BLOOD BY AUTOMATED COUNT: 17 % (ref 10–15)
ERYTHROCYTE [DISTWIDTH] IN BLOOD BY AUTOMATED COUNT: 17.1 % (ref 10–15)
ERYTHROCYTE [DISTWIDTH] IN BLOOD BY AUTOMATED COUNT: 17.2 % (ref 10–15)
ERYTHROCYTE [DISTWIDTH] IN BLOOD BY AUTOMATED COUNT: 17.4 % (ref 10–15)
ERYTHROCYTE [DISTWIDTH] IN BLOOD BY AUTOMATED COUNT: 17.6 % (ref 10–15)
ERYTHROCYTE [DISTWIDTH] IN BLOOD BY AUTOMATED COUNT: 17.7 % (ref 10–15)
ERYTHROCYTE [DISTWIDTH] IN BLOOD BY AUTOMATED COUNT: 18 % (ref 10–15)
GFR SERPL CREATININE-BSD FRML MDRD: 67 ML/MIN/{1.73_M2}
GFR SERPL CREATININE-BSD FRML MDRD: 71 ML/MIN/{1.73_M2}
GFR SERPL CREATININE-BSD FRML MDRD: 75 ML/MIN/{1.73_M2}
GFR SERPL CREATININE-BSD FRML MDRD: 76 ML/MIN/{1.73_M2}
GFR SERPL CREATININE-BSD FRML MDRD: 85 ML/MIN/{1.73_M2}
GFR SERPL CREATININE-BSD FRML MDRD: 86 ML/MIN/{1.73_M2}
GFR SERPL CREATININE-BSD FRML MDRD: 87 ML/MIN/{1.73_M2}
GFR SERPL CREATININE-BSD FRML MDRD: >90 ML/MIN/{1.73_M2}
GLUCOSE BLDC GLUCOMTR-MCNC: 100 MG/DL (ref 70–99)
GLUCOSE BLDC GLUCOMTR-MCNC: 108 MG/DL (ref 70–99)
GLUCOSE BLDC GLUCOMTR-MCNC: 108 MG/DL (ref 70–99)
GLUCOSE BLDC GLUCOMTR-MCNC: 109 MG/DL (ref 70–99)
GLUCOSE BLDC GLUCOMTR-MCNC: 109 MG/DL (ref 70–99)
GLUCOSE BLDC GLUCOMTR-MCNC: 111 MG/DL (ref 70–99)
GLUCOSE BLDC GLUCOMTR-MCNC: 112 MG/DL (ref 70–99)
GLUCOSE BLDC GLUCOMTR-MCNC: 113 MG/DL (ref 70–99)
GLUCOSE BLDC GLUCOMTR-MCNC: 114 MG/DL (ref 70–99)
GLUCOSE BLDC GLUCOMTR-MCNC: 114 MG/DL (ref 70–99)
GLUCOSE BLDC GLUCOMTR-MCNC: 116 MG/DL (ref 70–99)
GLUCOSE BLDC GLUCOMTR-MCNC: 116 MG/DL (ref 70–99)
GLUCOSE BLDC GLUCOMTR-MCNC: 117 MG/DL (ref 70–99)
GLUCOSE BLDC GLUCOMTR-MCNC: 119 MG/DL (ref 70–99)
GLUCOSE BLDC GLUCOMTR-MCNC: 120 MG/DL (ref 70–99)
GLUCOSE BLDC GLUCOMTR-MCNC: 127 MG/DL (ref 70–99)
GLUCOSE BLDC GLUCOMTR-MCNC: 131 MG/DL (ref 70–99)
GLUCOSE BLDC GLUCOMTR-MCNC: 135 MG/DL (ref 70–99)
GLUCOSE BLDC GLUCOMTR-MCNC: 137 MG/DL (ref 70–99)
GLUCOSE BLDC GLUCOMTR-MCNC: 137 MG/DL (ref 70–99)
GLUCOSE BLDC GLUCOMTR-MCNC: 139 MG/DL (ref 70–99)
GLUCOSE BLDC GLUCOMTR-MCNC: 140 MG/DL (ref 70–99)
GLUCOSE BLDC GLUCOMTR-MCNC: 141 MG/DL (ref 70–99)
GLUCOSE BLDC GLUCOMTR-MCNC: 143 MG/DL (ref 70–99)
GLUCOSE BLDC GLUCOMTR-MCNC: 148 MG/DL (ref 70–99)
GLUCOSE BLDC GLUCOMTR-MCNC: 149 MG/DL (ref 70–99)
GLUCOSE BLDC GLUCOMTR-MCNC: 157 MG/DL (ref 70–99)
GLUCOSE BLDC GLUCOMTR-MCNC: 159 MG/DL (ref 70–99)
GLUCOSE BLDC GLUCOMTR-MCNC: 163 MG/DL (ref 70–99)
GLUCOSE BLDC GLUCOMTR-MCNC: 169 MG/DL (ref 70–99)
GLUCOSE BLDC GLUCOMTR-MCNC: 206 MG/DL (ref 70–99)
GLUCOSE BLDC GLUCOMTR-MCNC: 68 MG/DL (ref 70–99)
GLUCOSE BLDC GLUCOMTR-MCNC: 69 MG/DL (ref 70–99)
GLUCOSE BLDC GLUCOMTR-MCNC: 80 MG/DL (ref 70–99)
GLUCOSE BLDC GLUCOMTR-MCNC: 86 MG/DL (ref 70–99)
GLUCOSE BLDC GLUCOMTR-MCNC: 90 MG/DL (ref 70–99)
GLUCOSE BLDC GLUCOMTR-MCNC: 90 MG/DL (ref 70–99)
GLUCOSE BLDC GLUCOMTR-MCNC: 91 MG/DL (ref 70–99)
GLUCOSE BLDC GLUCOMTR-MCNC: 93 MG/DL (ref 70–99)
GLUCOSE BLDC GLUCOMTR-MCNC: 95 MG/DL (ref 70–99)
GLUCOSE BLDC GLUCOMTR-MCNC: 96 MG/DL (ref 70–99)
GLUCOSE BLDC GLUCOMTR-MCNC: 98 MG/DL (ref 70–99)
GLUCOSE SERPL-MCNC: 119 MG/DL (ref 70–99)
GLUCOSE SERPL-MCNC: 125 MG/DL (ref 70–99)
GLUCOSE SERPL-MCNC: 127 MG/DL (ref 70–99)
GLUCOSE SERPL-MCNC: 82 MG/DL (ref 70–105)
GLUCOSE SERPL-MCNC: 86 MG/DL (ref 70–99)
GLUCOSE SERPL-MCNC: 87 MG/DL (ref 70–99)
GLUCOSE SERPL-MCNC: 88 MG/DL (ref 70–99)
GLUCOSE SERPL-MCNC: 97 MG/DL (ref 70–99)
GLUCOSE UR STRIP-MCNC: NEGATIVE MG/DL
HBA1C MFR BLD: 5.7 % (ref 0–5.6)
HCO3 BLDV-SCNC: 30 MMOL/L (ref 21–28)
HCT VFR BLD AUTO: 28.3 % (ref 40–53)
HCT VFR BLD AUTO: 28.9 % (ref 40–53)
HCT VFR BLD AUTO: 30.4 % (ref 40–53)
HCT VFR BLD AUTO: 31.7 % (ref 40–53)
HCT VFR BLD AUTO: 32.1 % (ref 40–53)
HCT VFR BLD AUTO: 32.4 % (ref 40–53)
HCT VFR BLD AUTO: 32.8 % (ref 40–53)
HCT VFR BLD AUTO: 33.4 % (ref 40–53)
HCT VFR BLD AUTO: 33.4 % (ref 40–53)
HGB BLD-MCNC: 10.2 G/DL (ref 13.3–17.7)
HGB BLD-MCNC: 10.2 G/DL (ref 13.3–17.7)
HGB BLD-MCNC: 10.3 G/DL (ref 13.3–17.7)
HGB BLD-MCNC: 10.4 G/DL (ref 13.3–17.7)
HGB BLD-MCNC: 10.6 G/DL (ref 13.3–17.7)
HGB BLD-MCNC: 10.9 G/DL (ref 13.3–17.7)
HGB BLD-MCNC: 8.7 G/DL (ref 13.3–17.7)
HGB BLD-MCNC: 8.8 G/DL (ref 13.3–17.7)
HGB BLD-MCNC: 8.8 G/DL (ref 13.3–17.7)
HGB BLD-MCNC: 8.9 G/DL (ref 13.3–17.7)
HGB BLD-MCNC: 9 G/DL (ref 13.3–17.7)
HGB BLD-MCNC: 9.1 G/DL (ref 13.3–17.7)
HGB BLD-MCNC: 9.4 G/DL (ref 13.3–17.7)
HGB BLD-MCNC: 9.8 G/DL (ref 13.3–17.7)
HGB UR QL STRIP: NEGATIVE
HYALINE CASTS #/AREA URNS LPF: 1 /LPF (ref 0–2)
IMM GRANULOCYTES # BLD: 0 10E9/L (ref 0–0.4)
IMM GRANULOCYTES NFR BLD: 0.3 %
IMM GRANULOCYTES NFR BLD: 0.4 %
IMM GRANULOCYTES NFR BLD: 0.6 %
IMM GRANULOCYTES NFR BLD: 0.6 %
IMM GRANULOCYTES NFR BLD: 0.7 %
INR PPP: 2.04 (ref 0.86–1.14)
INR PPP: 2.06 (ref 0.86–1.14)
INR PPP: 2.15 (ref 0.86–1.14)
INR PPP: 2.18 (ref 0.86–1.14)
INR PPP: 2.32 (ref 0.86–1.14)
INR PPP: 2.33 (ref 0.86–1.14)
INR PPP: 2.34 (ref 0.86–1.14)
INR PPP: 2.53 (ref 0.86–1.14)
INR PPP: 2.53 (ref 0.86–1.14)
INR PPP: 2.54 (ref 0.86–1.14)
INR PPP: 3.03 (ref 0.86–1.14)
INR PPP: 3.21 (ref 0.86–1.14)
INR PPP: 3.28 (ref 0.86–1.14)
INR PPP: 3.61 (ref 0.86–1.14)
INTERPRETATION ECG - MUSE: NORMAL
INTERPRETATION ECG - MUSE: NORMAL
KETONES UR STRIP-MCNC: NEGATIVE MG/DL
LACTATE BLD-SCNC: 1 MMOL/L (ref 0.7–2)
LACTATE BLD-SCNC: 1.1 MMOL/L (ref 0.7–2)
LACTATE BLD-SCNC: 1.8 MMOL/L (ref 0.7–2.1)
LEUKOCYTE ESTERASE UR QL STRIP: ABNORMAL
LIPASE SERPL-CCNC: 77 U/L (ref 73–393)
LYMPHOCYTES # BLD AUTO: 0.6 10E9/L (ref 0.8–5.3)
LYMPHOCYTES # BLD AUTO: 0.8 10E9/L (ref 0.8–5.3)
LYMPHOCYTES # BLD AUTO: 1 10E9/L (ref 0.8–5.3)
LYMPHOCYTES # BLD AUTO: 1.3 10E9/L (ref 0.8–5.3)
LYMPHOCYTES # BLD AUTO: 1.5 10E9/L (ref 0.8–5.3)
LYMPHOCYTES NFR BLD AUTO: 17.7 %
LYMPHOCYTES NFR BLD AUTO: 25.7 %
LYMPHOCYTES NFR BLD AUTO: 26.1 %
LYMPHOCYTES NFR BLD AUTO: 8.1 %
LYMPHOCYTES NFR BLD AUTO: 8.7 %
Lab: ABNORMAL
Lab: NORMAL
MAGNESIUM SERPL-MCNC: 1.9 MG/DL (ref 1.6–2.3)
MCH RBC QN AUTO: 28.5 PG (ref 26.5–33)
MCH RBC QN AUTO: 28.8 PG (ref 26.5–33)
MCH RBC QN AUTO: 29.3 PG (ref 26.5–33)
MCH RBC QN AUTO: 29.3 PG (ref 26.5–33)
MCH RBC QN AUTO: 29.7 PG (ref 26.5–33)
MCH RBC QN AUTO: 29.9 PG (ref 26.5–33)
MCH RBC QN AUTO: 30 PG (ref 26.5–33)
MCH RBC QN AUTO: 30.4 PG (ref 26.5–33)
MCH RBC QN AUTO: 30.6 PG (ref 26.5–33)
MCHC RBC AUTO-ENTMCNC: 31.1 G/DL (ref 31.5–36.5)
MCHC RBC AUTO-ENTMCNC: 31.1 G/DL (ref 31.5–36.5)
MCHC RBC AUTO-ENTMCNC: 31.7 G/DL (ref 31.5–36.5)
MCHC RBC AUTO-ENTMCNC: 31.7 G/DL (ref 31.5–36.5)
MCHC RBC AUTO-ENTMCNC: 31.8 G/DL (ref 31.5–36.5)
MCHC RBC AUTO-ENTMCNC: 31.8 G/DL (ref 31.5–36.5)
MCHC RBC AUTO-ENTMCNC: 32.2 G/DL (ref 31.5–36.5)
MCHC RBC AUTO-ENTMCNC: 32.2 G/DL (ref 31.5–36.5)
MCHC RBC AUTO-ENTMCNC: 32.6 G/DL (ref 31.5–36.5)
MCV RBC AUTO: 92 FL (ref 78–100)
MCV RBC AUTO: 93 FL (ref 78–100)
MCV RBC AUTO: 93 FL (ref 78–100)
MCV RBC AUTO: 94 FL (ref 78–100)
MCV RBC AUTO: 94 FL (ref 78–100)
MCV RBC AUTO: 95 FL (ref 78–100)
MONOCYTES # BLD AUTO: 0.3 10E9/L (ref 0–1.3)
MONOCYTES # BLD AUTO: 0.4 10E9/L (ref 0–1.3)
MONOCYTES # BLD AUTO: 0.5 10E9/L (ref 0–1.3)
MONOCYTES # BLD AUTO: 0.5 10E9/L (ref 0–1.3)
MONOCYTES # BLD AUTO: 0.9 10E9/L (ref 0–1.3)
MONOCYTES NFR BLD AUTO: 10.1 %
MONOCYTES NFR BLD AUTO: 4.9 %
MONOCYTES NFR BLD AUTO: 6.3 %
MONOCYTES NFR BLD AUTO: 8.8 %
MONOCYTES NFR BLD AUTO: 9.4 %
MRSA DNA SPEC QL NAA+PROBE: NEGATIVE
MUCOUS THREADS #/AREA URNS LPF: PRESENT /LPF
NEUTROPHILS # BLD AUTO: 3 10E9/L (ref 1.6–8.3)
NEUTROPHILS # BLD AUTO: 3.7 10E9/L (ref 1.6–8.3)
NEUTROPHILS # BLD AUTO: 4.1 10E9/L (ref 1.6–8.3)
NEUTROPHILS # BLD AUTO: 6.5 10E9/L (ref 1.6–8.3)
NEUTROPHILS # BLD AUTO: 7.4 10E9/L (ref 1.6–8.3)
NEUTROPHILS NFR BLD AUTO: 61.6 %
NEUTROPHILS NFR BLD AUTO: 64.1 %
NEUTROPHILS NFR BLD AUTO: 75.4 %
NEUTROPHILS NFR BLD AUTO: 80.6 %
NEUTROPHILS NFR BLD AUTO: 86.6 %
NITRATE UR QL: NEGATIVE
NRBC # BLD AUTO: 0 10*3/UL
NRBC BLD AUTO-RTO: 0 /100
NT-PROBNP SERPL-MCNC: 591 PG/ML (ref 0–450)
O2/TOTAL GAS SETTING VFR VENT: ABNORMAL %
OXYHGB MFR BLDV: 80 %
PCO2 BLDV: 43 MM HG (ref 40–50)
PCO2 BLDV: 47 MM HG (ref 40–50)
PH BLDV: 7.41 PH (ref 7.32–7.43)
PH BLDV: 7.43 PH (ref 7.32–7.43)
PH UR STRIP: 7 PH (ref 5–7)
PLATELET # BLD AUTO: 122 10E9/L (ref 150–450)
PLATELET # BLD AUTO: 125 10E9/L (ref 150–450)
PLATELET # BLD AUTO: 140 10E9/L (ref 150–450)
PLATELET # BLD AUTO: 141 10E9/L (ref 150–450)
PLATELET # BLD AUTO: 145 10E9/L (ref 150–450)
PLATELET # BLD AUTO: 147 10E9/L (ref 150–450)
PLATELET # BLD AUTO: 149 10E9/L (ref 150–450)
PLATELET # BLD AUTO: 151 10E9/L (ref 150–450)
PLATELET # BLD AUTO: 151 10E9/L (ref 150–450)
PO2 BLDV: 22 MM HG (ref 25–47)
PO2 BLDV: 47 MM HG (ref 25–47)
POTASSIUM SERPL-SCNC: 3.5 MMOL/L (ref 3.4–5.3)
POTASSIUM SERPL-SCNC: 3.7 MMOL/L (ref 3.4–5.3)
POTASSIUM SERPL-SCNC: 3.9 MMOL/L (ref 3.4–5.3)
POTASSIUM SERPL-SCNC: 3.9 MMOL/L (ref 3.4–5.3)
POTASSIUM SERPL-SCNC: 4 MMOL/L (ref 3.4–5.3)
POTASSIUM SERPL-SCNC: 4 MMOL/L (ref 3.4–5.3)
POTASSIUM SERPL-SCNC: 4 MMOL/L (ref 3.5–5.1)
POTASSIUM SERPL-SCNC: 4.1 MMOL/L (ref 3.4–5.3)
PROT SERPL-MCNC: 7.5 G/DL (ref 6.8–8.8)
PROT SERPL-MCNC: 7.5 G/DL (ref 6.8–8.8)
RBC # BLD AUTO: 3.09 10E12/L (ref 4.4–5.9)
RBC # BLD AUTO: 3.13 10E12/L (ref 4.4–5.9)
RBC # BLD AUTO: 3.22 10E12/L (ref 4.4–5.9)
RBC # BLD AUTO: 3.41 10E12/L (ref 4.4–5.9)
RBC # BLD AUTO: 3.44 10E12/L (ref 4.4–5.9)
RBC # BLD AUTO: 3.51 10E12/L (ref 4.4–5.9)
RBC # BLD AUTO: 3.53 10E12/L (ref 4.4–5.9)
RBC # BLD AUTO: 3.55 10E12/L (ref 4.4–5.9)
RBC # BLD AUTO: 3.56 10E12/L (ref 4.4–5.9)
RBC #/AREA URNS AUTO: 14 /HPF (ref 0–2)
SAO2 % BLDV FROM PO2: 38 %
SODIUM SERPL-SCNC: 138 MMOL/L (ref 133–144)
SODIUM SERPL-SCNC: 138 MMOL/L (ref 133–144)
SODIUM SERPL-SCNC: 138 MMOL/L (ref 136–145)
SODIUM SERPL-SCNC: 139 MMOL/L (ref 133–144)
SODIUM SERPL-SCNC: 140 MMOL/L (ref 133–144)
SODIUM SERPL-SCNC: 140 MMOL/L (ref 133–144)
SOURCE: ABNORMAL
SP GR UR STRIP: 1.02 (ref 1–1.03)
SPECIMEN SOURCE: ABNORMAL
SPECIMEN SOURCE: NORMAL
TROPONIN I SERPL-MCNC: <0.015 UG/L (ref 0–0.04)
TROPONIN I SERPL-MCNC: <0.015 UG/L (ref 0–0.04)
UROBILINOGEN UR STRIP-MCNC: 6 MG/DL (ref 0–2)
WBC # BLD AUTO: 4.9 10E9/L (ref 4–11)
WBC # BLD AUTO: 5.2 10E9/L (ref 4–11)
WBC # BLD AUTO: 5.4 10E9/L (ref 4–11)
WBC # BLD AUTO: 5.8 10E9/L (ref 4–11)
WBC # BLD AUTO: 6.1 10E9/L (ref 4–11)
WBC # BLD AUTO: 7.3 10E9/L (ref 4–11)
WBC # BLD AUTO: 7.3 10E9/L (ref 4–11)
WBC # BLD AUTO: 7.5 10E9/L (ref 4–11)
WBC # BLD AUTO: 9.2 10E9/L (ref 4–11)
WBC #/AREA URNS AUTO: 2 /HPF (ref 0–5)

## 2019-01-01 PROCEDURE — 80048 BASIC METABOLIC PNL TOTAL CA: CPT | Performed by: INTERNAL MEDICINE

## 2019-01-01 PROCEDURE — 85018 HEMOGLOBIN: CPT | Performed by: PHYSICIAN ASSISTANT

## 2019-01-01 PROCEDURE — 25000128 H RX IP 250 OP 636: Performed by: INTERNAL MEDICINE

## 2019-01-01 PROCEDURE — 83605 ASSAY OF LACTIC ACID: CPT | Performed by: INTERNAL MEDICINE

## 2019-01-01 PROCEDURE — 36415 COLL VENOUS BLD VENIPUNCTURE: CPT | Performed by: INTERNAL MEDICINE

## 2019-01-01 PROCEDURE — 25000132 ZZH RX MED GY IP 250 OP 250 PS 637: Performed by: INTERNAL MEDICINE

## 2019-01-01 PROCEDURE — 99232 SBSQ HOSP IP/OBS MODERATE 35: CPT | Performed by: INTERNAL MEDICINE

## 2019-01-01 PROCEDURE — 97535 SELF CARE MNGMENT TRAINING: CPT | Mod: GO

## 2019-01-01 PROCEDURE — 96375 TX/PRO/DX INJ NEW DRUG ADDON: CPT

## 2019-01-01 PROCEDURE — 85610 PROTHROMBIN TIME: CPT | Performed by: EMERGENCY MEDICINE

## 2019-01-01 PROCEDURE — 25000132 ZZH RX MED GY IP 250 OP 250 PS 637: Performed by: PHYSICIAN ASSISTANT

## 2019-01-01 PROCEDURE — 97530 THERAPEUTIC ACTIVITIES: CPT | Mod: GP | Performed by: PHYSICAL THERAPIST

## 2019-01-01 PROCEDURE — 87640 STAPH A DNA AMP PROBE: CPT | Performed by: INTERNAL MEDICINE

## 2019-01-01 PROCEDURE — 85610 PROTHROMBIN TIME: CPT | Performed by: INTERNAL MEDICINE

## 2019-01-01 PROCEDURE — 85025 COMPLETE CBC W/AUTO DIFF WBC: CPT | Performed by: INTERNAL MEDICINE

## 2019-01-01 PROCEDURE — 99233 SBSQ HOSP IP/OBS HIGH 50: CPT | Performed by: INTERNAL MEDICINE

## 2019-01-01 PROCEDURE — 25000128 H RX IP 250 OP 636: Performed by: EMERGENCY MEDICINE

## 2019-01-01 PROCEDURE — 85610 PROTHROMBIN TIME: CPT | Performed by: PHYSICIAN ASSISTANT

## 2019-01-01 PROCEDURE — 25800030 ZZH RX IP 258 OP 636: Performed by: INTERNAL MEDICINE

## 2019-01-01 PROCEDURE — 40000986 XR CHEST PORT 1 VW

## 2019-01-01 PROCEDURE — 00000146 ZZHCL STATISTIC GLUCOSE BY METER IP

## 2019-01-01 PROCEDURE — 85025 COMPLETE CBC W/AUTO DIFF WBC: CPT | Performed by: EMERGENCY MEDICINE

## 2019-01-01 PROCEDURE — 99223 1ST HOSP IP/OBS HIGH 75: CPT | Mod: AI | Performed by: INTERNAL MEDICINE

## 2019-01-01 PROCEDURE — 80053 COMPREHEN METABOLIC PANEL: CPT | Performed by: EMERGENCY MEDICINE

## 2019-01-01 PROCEDURE — 99221 1ST HOSP IP/OBS SF/LOW 40: CPT | Performed by: SURGERY

## 2019-01-01 PROCEDURE — 99239 HOSP IP/OBS DSCHRG MGMT >30: CPT | Performed by: INTERNAL MEDICINE

## 2019-01-01 PROCEDURE — 25000131 ZZH RX MED GY IP 250 OP 636 PS 637: Performed by: INTERNAL MEDICINE

## 2019-01-01 PROCEDURE — 70450 CT HEAD/BRAIN W/O DYE: CPT

## 2019-01-01 PROCEDURE — 97116 GAIT TRAINING THERAPY: CPT | Mod: GP | Performed by: PHYSICAL THERAPIST

## 2019-01-01 PROCEDURE — 84484 ASSAY OF TROPONIN QUANT: CPT | Performed by: EMERGENCY MEDICINE

## 2019-01-01 PROCEDURE — 25500064 ZZH RX 255 OP 636: Performed by: INTERNAL MEDICINE

## 2019-01-01 PROCEDURE — 87040 BLOOD CULTURE FOR BACTERIA: CPT | Performed by: INTERNAL MEDICINE

## 2019-01-01 PROCEDURE — 83605 ASSAY OF LACTIC ACID: CPT

## 2019-01-01 PROCEDURE — 12000000 ZZH R&B MED SURG/OB

## 2019-01-01 PROCEDURE — 85018 HEMOGLOBIN: CPT | Performed by: INTERNAL MEDICINE

## 2019-01-01 PROCEDURE — 25000132 ZZH RX MED GY IP 250 OP 250 PS 637: Performed by: EMERGENCY MEDICINE

## 2019-01-01 PROCEDURE — 99207 ZZC CDG-CRITICAL CARE TIME NOT DOCUMENTED: CPT | Performed by: INTERNAL MEDICINE

## 2019-01-01 PROCEDURE — 25800030 ZZH RX IP 258 OP 636

## 2019-01-01 PROCEDURE — A9585 GADOBUTROL INJECTION: HCPCS | Performed by: INTERNAL MEDICINE

## 2019-01-01 PROCEDURE — 71046 X-RAY EXAM CHEST 2 VIEWS: CPT

## 2019-01-01 PROCEDURE — 25000125 ZZHC RX 250: Performed by: EMERGENCY MEDICINE

## 2019-01-01 PROCEDURE — 97161 PT EVAL LOW COMPLEX 20 MIN: CPT | Mod: GP | Performed by: PHYSICAL THERAPIST

## 2019-01-01 PROCEDURE — 97116 GAIT TRAINING THERAPY: CPT | Mod: GP | Performed by: PHYSICAL THERAPY ASSISTANT

## 2019-01-01 PROCEDURE — 85027 COMPLETE CBC AUTOMATED: CPT | Performed by: INTERNAL MEDICINE

## 2019-01-01 PROCEDURE — 83036 HEMOGLOBIN GLYCOSYLATED A1C: CPT | Performed by: PHYSICIAN ASSISTANT

## 2019-01-01 PROCEDURE — 20000003 ZZH R&B ICU

## 2019-01-01 PROCEDURE — 70553 MRI BRAIN STEM W/O & W/DYE: CPT

## 2019-01-01 PROCEDURE — 82550 ASSAY OF CK (CPK): CPT | Performed by: EMERGENCY MEDICINE

## 2019-01-01 PROCEDURE — 96374 THER/PROPH/DIAG INJ IV PUSH: CPT | Mod: 59

## 2019-01-01 PROCEDURE — 83735 ASSAY OF MAGNESIUM: CPT | Performed by: HOSPITALIST

## 2019-01-01 PROCEDURE — 96366 THER/PROPH/DIAG IV INF ADDON: CPT

## 2019-01-01 PROCEDURE — 99214 OFFICE O/P EST MOD 30 MIN: CPT | Performed by: INTERNAL MEDICINE

## 2019-01-01 PROCEDURE — 99285 EMERGENCY DEPT VISIT HI MDM: CPT | Mod: 25

## 2019-01-01 PROCEDURE — 27210347 ZZH DRESSING D STAT DRY WRAP SPEC

## 2019-01-01 PROCEDURE — 97165 OT EVAL LOW COMPLEX 30 MIN: CPT | Mod: GO | Performed by: REHABILITATION PRACTITIONER

## 2019-01-01 PROCEDURE — 82805 BLOOD GASES W/O2 SATURATION: CPT | Performed by: INTERNAL MEDICINE

## 2019-01-01 PROCEDURE — 36415 COLL VENOUS BLD VENIPUNCTURE: CPT | Performed by: EMERGENCY MEDICINE

## 2019-01-01 PROCEDURE — 85027 COMPLETE CBC AUTOMATED: CPT | Performed by: PHYSICIAN ASSISTANT

## 2019-01-01 PROCEDURE — 25000128 H RX IP 250 OP 636: Performed by: PHYSICIAN ASSISTANT

## 2019-01-01 PROCEDURE — 96365 THER/PROPH/DIAG IV INF INIT: CPT

## 2019-01-01 PROCEDURE — 97530 THERAPEUTIC ACTIVITIES: CPT | Mod: GP | Performed by: PHYSICAL THERAPY ASSISTANT

## 2019-01-01 PROCEDURE — 83690 ASSAY OF LIPASE: CPT | Performed by: EMERGENCY MEDICINE

## 2019-01-01 PROCEDURE — 36415 COLL VENOUS BLD VENIPUNCTURE: CPT | Performed by: PHYSICIAN ASSISTANT

## 2019-01-01 PROCEDURE — 87186 SC STD MICRODIL/AGAR DIL: CPT | Performed by: EMERGENCY MEDICINE

## 2019-01-01 PROCEDURE — 25800030 ZZH RX IP 258 OP 636: Performed by: EMERGENCY MEDICINE

## 2019-01-01 PROCEDURE — 80048 BASIC METABOLIC PNL TOTAL CA: CPT | Performed by: EMERGENCY MEDICINE

## 2019-01-01 PROCEDURE — 99232 SBSQ HOSP IP/OBS MODERATE 35: CPT | Performed by: SURGERY

## 2019-01-01 PROCEDURE — 71045 X-RAY EXAM CHEST 1 VIEW: CPT

## 2019-01-01 PROCEDURE — 36415 COLL VENOUS BLD VENIPUNCTURE: CPT | Performed by: HOSPITALIST

## 2019-01-01 PROCEDURE — 97110 THERAPEUTIC EXERCISES: CPT | Mod: GP | Performed by: PHYSICAL THERAPY ASSISTANT

## 2019-01-01 PROCEDURE — 80053 COMPREHEN METABOLIC PANEL: CPT | Performed by: PHYSICIAN ASSISTANT

## 2019-01-01 PROCEDURE — 27210509 ZZH TRAY VALVED DOUBLE LUMEN

## 2019-01-01 PROCEDURE — C9113 INJ PANTOPRAZOLE SODIUM, VIA: HCPCS | Performed by: PHYSICIAN ASSISTANT

## 2019-01-01 PROCEDURE — 25000128 H RX IP 250 OP 636

## 2019-01-01 PROCEDURE — 25000131 ZZH RX MED GY IP 250 OP 636 PS 637: Performed by: PHYSICIAN ASSISTANT

## 2019-01-01 PROCEDURE — 87077 CULTURE AEROBIC IDENTIFY: CPT | Performed by: EMERGENCY MEDICINE

## 2019-01-01 PROCEDURE — 97535 SELF CARE MNGMENT TRAINING: CPT | Mod: GO | Performed by: REHABILITATION PRACTITIONER

## 2019-01-01 PROCEDURE — 99207 ZZC CDG-CODE INCORRECT PER BILLING BASED ON TIME: CPT | Performed by: INTERNAL MEDICINE

## 2019-01-01 PROCEDURE — 93005 ELECTROCARDIOGRAM TRACING: CPT

## 2019-01-01 PROCEDURE — 76705 ECHO EXAM OF ABDOMEN: CPT

## 2019-01-01 PROCEDURE — 81001 URINALYSIS AUTO W/SCOPE: CPT | Performed by: EMERGENCY MEDICINE

## 2019-01-01 PROCEDURE — 36569 INSJ PICC 5 YR+ W/O IMAGING: CPT

## 2019-01-01 PROCEDURE — 74177 CT ABD & PELVIS W/CONTRAST: CPT

## 2019-01-01 PROCEDURE — 87040 BLOOD CULTURE FOR BACTERIA: CPT | Performed by: EMERGENCY MEDICINE

## 2019-01-01 PROCEDURE — 96368 THER/DIAG CONCURRENT INF: CPT

## 2019-01-01 PROCEDURE — 97162 PT EVAL MOD COMPLEX 30 MIN: CPT | Mod: GP | Performed by: PHYSICAL THERAPIST

## 2019-01-01 PROCEDURE — 25800025 ZZH RX 258: Performed by: PHYSICIAN ASSISTANT

## 2019-01-01 PROCEDURE — 82803 BLOOD GASES ANY COMBINATION: CPT

## 2019-01-01 PROCEDURE — 99222 1ST HOSP IP/OBS MODERATE 55: CPT | Mod: AI | Performed by: INTERNAL MEDICINE

## 2019-01-01 PROCEDURE — 87641 MR-STAPH DNA AMP PROBE: CPT | Performed by: INTERNAL MEDICINE

## 2019-01-01 PROCEDURE — G0463 HOSPITAL OUTPT CLINIC VISIT: HCPCS | Performed by: NURSE PRACTITIONER

## 2019-01-01 PROCEDURE — 83880 ASSAY OF NATRIURETIC PEPTIDE: CPT | Performed by: INTERNAL MEDICINE

## 2019-01-01 RX ORDER — SIMETHICONE 80 MG
80 TABLET,CHEWABLE ORAL EVERY 6 HOURS PRN
Status: DISCONTINUED | OUTPATIENT
Start: 2019-01-01 | End: 2019-01-01 | Stop reason: HOSPADM

## 2019-01-01 RX ORDER — BISACODYL 10 MG
10 SUPPOSITORY, RECTAL RECTAL DAILY PRN
Status: DISCONTINUED | OUTPATIENT
Start: 2019-01-01 | End: 2019-01-01 | Stop reason: HOSPADM

## 2019-01-01 RX ORDER — NICOTINE POLACRILEX 4 MG
15-30 LOZENGE BUCCAL
Status: DISCONTINUED | OUTPATIENT
Start: 2019-01-01 | End: 2019-01-01 | Stop reason: HOSPADM

## 2019-01-01 RX ORDER — METOPROLOL SUCCINATE 25 MG/1
12.5 TABLET, EXTENDED RELEASE ORAL EVERY EVENING
Qty: 30 TABLET | Status: ON HOLD
Start: 2019-01-01 | End: 2020-01-01

## 2019-01-01 RX ORDER — LEVOTHYROXINE SODIUM 150 UG/1
150 TABLET ORAL
Status: DISCONTINUED | OUTPATIENT
Start: 2019-01-01 | End: 2019-01-01 | Stop reason: HOSPADM

## 2019-01-01 RX ORDER — CEFAZOLIN SODIUM 1 G/50ML
1 INJECTION, SOLUTION INTRAVENOUS EVERY 8 HOURS
Status: DISCONTINUED | OUTPATIENT
Start: 2019-01-01 | End: 2019-01-01

## 2019-01-01 RX ORDER — WARFARIN SODIUM 1 MG/1
1 TABLET ORAL
Status: COMPLETED | OUTPATIENT
Start: 2019-01-01 | End: 2019-01-01

## 2019-01-01 RX ORDER — WARFARIN SODIUM 1 MG/1
1.5 TABLET ORAL
Status: ON HOLD | COMMUNITY
End: 2019-01-01

## 2019-01-01 RX ORDER — CEFAZOLIN SODIUM 1 G/50ML
1250 SOLUTION INTRAVENOUS EVERY 12 HOURS
Status: DISCONTINUED | OUTPATIENT
Start: 2019-01-01 | End: 2019-01-01 | Stop reason: CLARIF

## 2019-01-01 RX ORDER — ONDANSETRON 2 MG/ML
4 INJECTION INTRAMUSCULAR; INTRAVENOUS EVERY 6 HOURS PRN
Status: DISCONTINUED | OUTPATIENT
Start: 2019-01-01 | End: 2019-01-01 | Stop reason: HOSPADM

## 2019-01-01 RX ORDER — CLINDAMYCIN PHOSPHATE 10 UG/ML
LOTION TOPICAL DAILY PRN
COMMUNITY
End: 2020-01-01

## 2019-01-01 RX ORDER — WARFARIN SODIUM 4 MG/1
4 TABLET ORAL
Status: DISCONTINUED | OUTPATIENT
Start: 2019-01-01 | End: 2019-01-01 | Stop reason: CLARIF

## 2019-01-01 RX ORDER — METOCLOPRAMIDE HYDROCHLORIDE 5 MG/ML
5 INJECTION INTRAMUSCULAR; INTRAVENOUS EVERY 6 HOURS PRN
Status: DISCONTINUED | OUTPATIENT
Start: 2019-01-01 | End: 2019-01-01

## 2019-01-01 RX ORDER — PROCHLORPERAZINE 25 MG
12.5 SUPPOSITORY, RECTAL RECTAL EVERY 12 HOURS PRN
Status: DISCONTINUED | OUTPATIENT
Start: 2019-01-01 | End: 2019-01-01

## 2019-01-01 RX ORDER — AMOXICILLIN 250 MG
1 CAPSULE ORAL 2 TIMES DAILY PRN
Status: DISCONTINUED | OUTPATIENT
Start: 2019-01-01 | End: 2019-01-01 | Stop reason: HOSPADM

## 2019-01-01 RX ORDER — METOCLOPRAMIDE HYDROCHLORIDE 5 MG/ML
10 INJECTION INTRAMUSCULAR; INTRAVENOUS ONCE
Status: COMPLETED | OUTPATIENT
Start: 2019-01-01 | End: 2019-01-01

## 2019-01-01 RX ORDER — MORPHINE SULFATE 4 MG/ML
4 INJECTION, SOLUTION INTRAMUSCULAR; INTRAVENOUS ONCE
Status: COMPLETED | OUTPATIENT
Start: 2019-01-01 | End: 2019-01-01

## 2019-01-01 RX ORDER — ACETAMINOPHEN 325 MG/1
650 TABLET ORAL ONCE
Status: COMPLETED | OUTPATIENT
Start: 2019-01-01 | End: 2019-01-01

## 2019-01-01 RX ORDER — TRAMADOL HYDROCHLORIDE 50 MG/1
50 TABLET ORAL EVERY 6 HOURS PRN
Status: DISCONTINUED | OUTPATIENT
Start: 2019-01-01 | End: 2019-01-01 | Stop reason: HOSPADM

## 2019-01-01 RX ORDER — SODIUM CHLORIDE, SODIUM LACTATE, POTASSIUM CHLORIDE, CALCIUM CHLORIDE 600; 310; 30; 20 MG/100ML; MG/100ML; MG/100ML; MG/100ML
INJECTION, SOLUTION INTRAVENOUS CONTINUOUS
Status: DISCONTINUED | OUTPATIENT
Start: 2019-01-01 | End: 2019-01-01

## 2019-01-01 RX ORDER — TRAMADOL HYDROCHLORIDE 50 MG/1
50 TABLET ORAL 2 TIMES DAILY PRN
Status: DISCONTINUED | OUTPATIENT
Start: 2019-01-01 | End: 2019-01-01 | Stop reason: HOSPADM

## 2019-01-01 RX ORDER — NICOTINE POLACRILEX 4 MG
15-30 LOZENGE BUCCAL
Status: DISCONTINUED | OUTPATIENT
Start: 2019-01-01 | End: 2019-01-01

## 2019-01-01 RX ORDER — PREDNISONE 10 MG/1
TABLET ORAL
Qty: 39 TABLET | Refills: 0 | Status: SHIPPED | OUTPATIENT
Start: 2019-01-01 | End: 2019-01-01

## 2019-01-01 RX ORDER — SIMETHICONE 80 MG
80 TABLET,CHEWABLE ORAL EVERY 6 HOURS PRN
Qty: 30 TABLET | Refills: 3 | Status: SHIPPED | OUTPATIENT
Start: 2019-01-01

## 2019-01-01 RX ORDER — WARFARIN SODIUM 1 MG/1
1 TABLET ORAL
Status: ON HOLD
Start: 2019-01-01 | End: 2020-01-01

## 2019-01-01 RX ORDER — DIVALPROEX SODIUM 250 MG/1
250 TABLET, DELAYED RELEASE ORAL AT BEDTIME
Status: DISCONTINUED | OUTPATIENT
Start: 2019-01-01 | End: 2019-01-01 | Stop reason: HOSPADM

## 2019-01-01 RX ORDER — ACETAMINOPHEN 325 MG/1
650 TABLET ORAL EVERY 4 HOURS PRN
Status: DISCONTINUED | OUTPATIENT
Start: 2019-01-01 | End: 2019-01-01 | Stop reason: HOSPADM

## 2019-01-01 RX ORDER — NALOXONE HYDROCHLORIDE 0.4 MG/ML
.1-.4 INJECTION, SOLUTION INTRAMUSCULAR; INTRAVENOUS; SUBCUTANEOUS
Status: DISCONTINUED | OUTPATIENT
Start: 2019-01-01 | End: 2019-01-01 | Stop reason: HOSPADM

## 2019-01-01 RX ORDER — HEPARIN SODIUM,PORCINE 10 UNIT/ML
2-5 VIAL (ML) INTRAVENOUS
Status: DISCONTINUED | OUTPATIENT
Start: 2019-01-01 | End: 2019-01-01

## 2019-01-01 RX ORDER — HYDROMORPHONE HYDROCHLORIDE 1 MG/ML
0.3 INJECTION, SOLUTION INTRAMUSCULAR; INTRAVENOUS; SUBCUTANEOUS
Status: DISCONTINUED | OUTPATIENT
Start: 2019-01-01 | End: 2019-01-01

## 2019-01-01 RX ORDER — ONDANSETRON 4 MG/1
4 TABLET, ORALLY DISINTEGRATING ORAL EVERY 6 HOURS PRN
Status: DISCONTINUED | OUTPATIENT
Start: 2019-01-01 | End: 2019-01-01

## 2019-01-01 RX ORDER — TRAMADOL HYDROCHLORIDE 50 MG/1
50 TABLET ORAL EVERY 6 HOURS PRN
Status: DISCONTINUED | OUTPATIENT
Start: 2019-01-01 | End: 2019-01-01

## 2019-01-01 RX ORDER — SIMETHICONE 80 MG
80 TABLET,CHEWABLE ORAL ONCE
Status: COMPLETED | OUTPATIENT
Start: 2019-01-01 | End: 2019-01-01

## 2019-01-01 RX ORDER — ONDANSETRON 2 MG/ML
4 INJECTION INTRAMUSCULAR; INTRAVENOUS EVERY 6 HOURS PRN
Status: DISCONTINUED | OUTPATIENT
Start: 2019-01-01 | End: 2019-01-01

## 2019-01-01 RX ORDER — LIDOCAINE 40 MG/G
CREAM TOPICAL
Status: DISCONTINUED | OUTPATIENT
Start: 2019-01-01 | End: 2019-01-01 | Stop reason: HOSPADM

## 2019-01-01 RX ORDER — IOPAMIDOL 755 MG/ML
500 INJECTION, SOLUTION INTRAVASCULAR ONCE
Status: COMPLETED | OUTPATIENT
Start: 2019-01-01 | End: 2019-01-01

## 2019-01-01 RX ORDER — CALCIUM CARBONATE 500 MG/1
1000 TABLET, CHEWABLE ORAL EVERY 4 HOURS PRN
Status: DISCONTINUED | OUTPATIENT
Start: 2019-01-01 | End: 2019-01-01 | Stop reason: HOSPADM

## 2019-01-01 RX ORDER — WARFARIN SODIUM 1 MG/1
1 TABLET ORAL
Status: ON HOLD | COMMUNITY
End: 2019-01-01

## 2019-01-01 RX ORDER — PREDNISONE 1 MG/1
1 TABLET ORAL DAILY
Status: ON HOLD | COMMUNITY
End: 2019-01-01

## 2019-01-01 RX ORDER — PANTOPRAZOLE SODIUM 40 MG/1
40 TABLET, DELAYED RELEASE ORAL 2 TIMES DAILY
Qty: 60 TABLET | Refills: 3 | Status: SHIPPED | OUTPATIENT
Start: 2019-01-01

## 2019-01-01 RX ORDER — DEXTROSE MONOHYDRATE 25 G/50ML
25-50 INJECTION, SOLUTION INTRAVENOUS
Status: DISCONTINUED | OUTPATIENT
Start: 2019-01-01 | End: 2019-01-01 | Stop reason: HOSPADM

## 2019-01-01 RX ORDER — SODIUM CHLORIDE 9 MG/ML
INJECTION, SOLUTION INTRAVENOUS CONTINUOUS
Status: DISCONTINUED | OUTPATIENT
Start: 2019-01-01 | End: 2019-01-01

## 2019-01-01 RX ORDER — SIMVASTATIN 40 MG
40 TABLET ORAL
Status: DISCONTINUED | OUTPATIENT
Start: 2019-01-01 | End: 2019-01-01 | Stop reason: HOSPADM

## 2019-01-01 RX ORDER — AMPICILLIN AND SULBACTAM 2; 1 G/1; G/1
3 INJECTION, POWDER, FOR SOLUTION INTRAMUSCULAR; INTRAVENOUS EVERY 6 HOURS
Status: DISCONTINUED | OUTPATIENT
Start: 2019-01-01 | End: 2019-01-01

## 2019-01-01 RX ORDER — ONDANSETRON 4 MG/1
4 TABLET, ORALLY DISINTEGRATING ORAL EVERY 6 HOURS PRN
Status: DISCONTINUED | OUTPATIENT
Start: 2019-01-01 | End: 2019-01-01 | Stop reason: HOSPADM

## 2019-01-01 RX ORDER — PANTOPRAZOLE SODIUM 40 MG/1
40 TABLET, DELAYED RELEASE ORAL
Status: DISCONTINUED | OUTPATIENT
Start: 2019-01-01 | End: 2019-01-01 | Stop reason: HOSPADM

## 2019-01-01 RX ORDER — PREDNISONE 10 MG/1
10 TABLET ORAL DAILY
Status: ON HOLD | COMMUNITY
End: 2019-01-01

## 2019-01-01 RX ORDER — FUROSEMIDE 20 MG
40 TABLET ORAL EVERY OTHER DAY
Start: 2019-01-01 | End: 2020-01-01

## 2019-01-01 RX ORDER — ALUMINA, MAGNESIA, AND SIMETHICONE 2400; 2400; 240 MG/30ML; MG/30ML; MG/30ML
15 SUSPENSION ORAL EVERY 4 HOURS PRN
Status: DISCONTINUED | OUTPATIENT
Start: 2019-01-01 | End: 2019-01-01 | Stop reason: HOSPADM

## 2019-01-01 RX ORDER — MECLIZINE HYDROCHLORIDE 25 MG/1
25 TABLET ORAL 3 TIMES DAILY PRN
Status: DISCONTINUED | OUTPATIENT
Start: 2019-01-01 | End: 2019-01-01

## 2019-01-01 RX ORDER — AMOXICILLIN 250 MG
2 CAPSULE ORAL 2 TIMES DAILY PRN
Status: DISCONTINUED | OUTPATIENT
Start: 2019-01-01 | End: 2019-01-01 | Stop reason: HOSPADM

## 2019-01-01 RX ORDER — POLYETHYLENE GLYCOL 3350 17 G/17G
17 POWDER, FOR SOLUTION ORAL DAILY PRN
Status: DISCONTINUED | OUTPATIENT
Start: 2019-01-01 | End: 2019-01-01 | Stop reason: HOSPADM

## 2019-01-01 RX ORDER — CEFAZOLIN SODIUM 1 G/50ML
1750 SOLUTION INTRAVENOUS ONCE
Status: COMPLETED | OUTPATIENT
Start: 2019-01-01 | End: 2019-01-01

## 2019-01-01 RX ORDER — MECLIZINE HCL 12.5 MG 12.5 MG/1
12.5 TABLET ORAL 3 TIMES DAILY
Status: DISCONTINUED | OUTPATIENT
Start: 2019-01-01 | End: 2019-01-01 | Stop reason: HOSPADM

## 2019-01-01 RX ORDER — LIDOCAINE 40 MG/G
CREAM TOPICAL
Status: DISCONTINUED | OUTPATIENT
Start: 2019-01-01 | End: 2019-01-01

## 2019-01-01 RX ORDER — DEXTROSE MONOHYDRATE 25 G/50ML
25-50 INJECTION, SOLUTION INTRAVENOUS
Status: DISCONTINUED | OUTPATIENT
Start: 2019-01-01 | End: 2019-01-01

## 2019-01-01 RX ORDER — FUROSEMIDE 40 MG
40 TABLET ORAL EVERY OTHER DAY
Status: DISCONTINUED | OUTPATIENT
Start: 2019-01-01 | End: 2019-01-01

## 2019-01-01 RX ORDER — ACETAMINOPHEN 500 MG
1000 TABLET ORAL ONCE
Status: COMPLETED | OUTPATIENT
Start: 2019-01-01 | End: 2019-01-01

## 2019-01-01 RX ORDER — METOPROLOL SUCCINATE 25 MG/1
25 TABLET, EXTENDED RELEASE ORAL EVERY EVENING
Status: DISCONTINUED | OUTPATIENT
Start: 2019-01-01 | End: 2019-01-01

## 2019-01-01 RX ORDER — CEFAZOLIN SODIUM 2 G/100ML
2 INJECTION, SOLUTION INTRAVENOUS ONCE
Status: COMPLETED | OUTPATIENT
Start: 2019-01-01 | End: 2019-01-01

## 2019-01-01 RX ORDER — TERAZOSIN 5 MG/1
5 CAPSULE ORAL AT BEDTIME
Status: DISCONTINUED | OUTPATIENT
Start: 2019-01-01 | End: 2019-01-01 | Stop reason: HOSPADM

## 2019-01-01 RX ORDER — PREDNISONE 20 MG/1
40 TABLET ORAL DAILY
Status: DISCONTINUED | OUTPATIENT
Start: 2019-01-01 | End: 2019-01-01 | Stop reason: HOSPADM

## 2019-01-01 RX ORDER — MULTIPLE VITAMINS W/ MINERALS TAB 9MG-400MCG
1 TAB ORAL 2 TIMES DAILY WITH MEALS
Status: DISCONTINUED | OUTPATIENT
Start: 2019-01-01 | End: 2019-01-01 | Stop reason: HOSPADM

## 2019-01-01 RX ORDER — GADOBUTROL 604.72 MG/ML
7.5 INJECTION INTRAVENOUS ONCE
Status: COMPLETED | OUTPATIENT
Start: 2019-01-01 | End: 2019-01-01

## 2019-01-01 RX ORDER — DIPHENHYDRAMINE HYDROCHLORIDE 50 MG/ML
25 INJECTION INTRAMUSCULAR; INTRAVENOUS ONCE
Status: COMPLETED | OUTPATIENT
Start: 2019-01-01 | End: 2019-01-01

## 2019-01-01 RX ORDER — NOREPINEPHRINE BITARTRATE 0.06 MG/ML
0.03-0.4 INJECTION, SOLUTION INTRAVENOUS CONTINUOUS
Status: DISCONTINUED | OUTPATIENT
Start: 2019-01-01 | End: 2019-01-01

## 2019-01-01 RX ORDER — PREDNISONE 1 MG/1
6 TABLET ORAL
COMMUNITY
End: 2020-01-01

## 2019-01-01 RX ORDER — WARFARIN SODIUM 1 MG/1
1.5 TABLET ORAL
Status: ON HOLD
Start: 2019-01-01 | End: 2020-01-01

## 2019-01-01 RX ORDER — PANTOPRAZOLE SODIUM 40 MG/1
40 TABLET, DELAYED RELEASE ORAL
Status: DISCONTINUED | OUTPATIENT
Start: 2019-01-01 | End: 2019-01-01

## 2019-01-01 RX ORDER — PROCHLORPERAZINE MALEATE 5 MG
5 TABLET ORAL EVERY 6 HOURS PRN
Status: DISCONTINUED | OUTPATIENT
Start: 2019-01-01 | End: 2019-01-01

## 2019-01-01 RX ORDER — METOCLOPRAMIDE 5 MG/1
5 TABLET ORAL EVERY 6 HOURS PRN
Status: DISCONTINUED | OUTPATIENT
Start: 2019-01-01 | End: 2019-01-01

## 2019-01-01 RX ORDER — PREDNISONE 5 MG/1
5 TABLET ORAL DAILY
Status: ON HOLD | COMMUNITY
End: 2019-01-01

## 2019-01-01 RX ORDER — MECLIZINE HCL 12.5 MG 12.5 MG/1
12.5 TABLET ORAL 3 TIMES DAILY
Qty: 30 TABLET | Refills: 1 | Status: SHIPPED | OUTPATIENT
Start: 2019-01-01 | End: 2020-01-01

## 2019-01-01 RX ADMIN — SIMVASTATIN 40 MG: 40 TABLET, FILM COATED ORAL at 18:28

## 2019-01-01 RX ADMIN — CEFAZOLIN SODIUM 1 G: 1 INJECTION, SOLUTION INTRAVENOUS at 20:34

## 2019-01-01 RX ADMIN — CALCIUM CARBONATE (ANTACID) CHEW TAB 500 MG 1000 MG: 500 CHEW TAB at 14:51

## 2019-01-01 RX ADMIN — TRAMADOL HYDROCHLORIDE 50 MG: 50 TABLET, COATED ORAL at 19:14

## 2019-01-01 RX ADMIN — PREDNISONE 6 MG: 5 TABLET ORAL at 08:38

## 2019-01-01 RX ADMIN — SIMVASTATIN 40 MG: 40 TABLET, FILM COATED ORAL at 17:09

## 2019-01-01 RX ADMIN — MECLIZINE 12.5 MG: 12.5 TABLET ORAL at 19:03

## 2019-01-01 RX ADMIN — HYDROCORTISONE SODIUM SUCCINATE 50 MG: 100 INJECTION, POWDER, FOR SOLUTION INTRAMUSCULAR; INTRAVENOUS at 20:11

## 2019-01-01 RX ADMIN — VANCOMYCIN HYDROCHLORIDE 1250 MG: 10 INJECTION, POWDER, LYOPHILIZED, FOR SOLUTION INTRAVENOUS at 01:00

## 2019-01-01 RX ADMIN — FUROSEMIDE 40 MG: 40 TABLET ORAL at 14:34

## 2019-01-01 RX ADMIN — PREDNISONE 40 MG: 20 TABLET ORAL at 09:06

## 2019-01-01 RX ADMIN — CEFAZOLIN SODIUM 1 G: 1 INJECTION, SOLUTION INTRAVENOUS at 04:32

## 2019-01-01 RX ADMIN — MULTIPLE VITAMINS W/ MINERALS TAB 1 TABLET: TAB at 08:38

## 2019-01-01 RX ADMIN — SODIUM CHLORIDE, POTASSIUM CHLORIDE, SODIUM LACTATE AND CALCIUM CHLORIDE: 600; 310; 30; 20 INJECTION, SOLUTION INTRAVENOUS at 05:28

## 2019-01-01 RX ADMIN — PANTOPRAZOLE SODIUM 40 MG: 40 TABLET, DELAYED RELEASE ORAL at 09:18

## 2019-01-01 RX ADMIN — SODIUM CHLORIDE, POTASSIUM CHLORIDE, SODIUM LACTATE AND CALCIUM CHLORIDE 500 ML: 600; 310; 30; 20 INJECTION, SOLUTION INTRAVENOUS at 13:05

## 2019-01-01 RX ADMIN — Medication 1.5 MG: at 17:56

## 2019-01-01 RX ADMIN — SIMVASTATIN 40 MG: 40 TABLET, FILM COATED ORAL at 17:17

## 2019-01-01 RX ADMIN — ONDANSETRON 4 MG: 4 TABLET, ORALLY DISINTEGRATING ORAL at 18:06

## 2019-01-01 RX ADMIN — ACETAMINOPHEN 650 MG: 325 TABLET, FILM COATED ORAL at 13:04

## 2019-01-01 RX ADMIN — SODIUM CHLORIDE, POTASSIUM CHLORIDE, SODIUM LACTATE AND CALCIUM CHLORIDE: 600; 310; 30; 20 INJECTION, SOLUTION INTRAVENOUS at 06:37

## 2019-01-01 RX ADMIN — PREDNISONE 6 MG: 5 TABLET ORAL at 08:45

## 2019-01-01 RX ADMIN — HYDROCORTISONE SODIUM SUCCINATE 50 MG: 100 INJECTION, POWDER, FOR SOLUTION INTRAMUSCULAR; INTRAVENOUS at 17:17

## 2019-01-01 RX ADMIN — ALUMINUM HYDROXIDE, MAGNESIUM HYDROXIDE, AND DIMETHICONE 15 ML: 400; 400; 40 SUSPENSION ORAL at 21:44

## 2019-01-01 RX ADMIN — CEFAZOLIN SODIUM 1 G: 1 INJECTION, SOLUTION INTRAVENOUS at 20:16

## 2019-01-01 RX ADMIN — METOPROLOL SUCCINATE 25 MG: 25 TABLET, FILM COATED, EXTENDED RELEASE ORAL at 19:52

## 2019-01-01 RX ADMIN — PANTOPRAZOLE SODIUM 40 MG: 40 INJECTION, POWDER, FOR SOLUTION INTRAVENOUS at 13:44

## 2019-01-01 RX ADMIN — SIMVASTATIN 40 MG: 40 TABLET, FILM COATED ORAL at 17:30

## 2019-01-01 RX ADMIN — CALCIUM CARBONATE (ANTACID) CHEW TAB 500 MG 1000 MG: 500 CHEW TAB at 20:03

## 2019-01-01 RX ADMIN — TRAMADOL HYDROCHLORIDE 50 MG: 50 TABLET, FILM COATED ORAL at 17:43

## 2019-01-01 RX ADMIN — HYDROCORTISONE SODIUM SUCCINATE 50 MG: 100 INJECTION, POWDER, FOR SOLUTION INTRAMUSCULAR; INTRAVENOUS at 01:00

## 2019-01-01 RX ADMIN — MULTIPLE VITAMINS W/ MINERALS TAB 1 TABLET: TAB at 17:30

## 2019-01-01 RX ADMIN — MECLIZINE 12.5 MG: 12.5 TABLET ORAL at 09:06

## 2019-01-01 RX ADMIN — PREDNISONE 6 MG: 5 TABLET ORAL at 09:05

## 2019-01-01 RX ADMIN — ACETAMINOPHEN 650 MG: 325 TABLET, FILM COATED ORAL at 19:02

## 2019-01-01 RX ADMIN — ACETAMINOPHEN 650 MG: 325 TABLET, FILM COATED ORAL at 09:23

## 2019-01-01 RX ADMIN — Medication 12.5 MG: at 19:03

## 2019-01-01 RX ADMIN — LEVOTHYROXINE SODIUM 150 MCG: 150 TABLET ORAL at 06:03

## 2019-01-01 RX ADMIN — ACETAMINOPHEN 650 MG: 325 TABLET, FILM COATED ORAL at 09:17

## 2019-01-01 RX ADMIN — PANTOPRAZOLE SODIUM 40 MG: 40 TABLET, DELAYED RELEASE ORAL at 17:09

## 2019-01-01 RX ADMIN — TRAMADOL HYDROCHLORIDE 50 MG: 50 TABLET, FILM COATED ORAL at 01:32

## 2019-01-01 RX ADMIN — ONDANSETRON 4 MG: 4 TABLET, ORALLY DISINTEGRATING ORAL at 13:04

## 2019-01-01 RX ADMIN — SODIUM CHLORIDE, POTASSIUM CHLORIDE, SODIUM LACTATE AND CALCIUM CHLORIDE: 600; 310; 30; 20 INJECTION, SOLUTION INTRAVENOUS at 17:30

## 2019-01-01 RX ADMIN — TRAMADOL HYDROCHLORIDE 50 MG: 50 TABLET, COATED ORAL at 11:03

## 2019-01-01 RX ADMIN — METOPROLOL SUCCINATE 25 MG: 25 TABLET, FILM COATED, EXTENDED RELEASE ORAL at 20:10

## 2019-01-01 RX ADMIN — TRAMADOL HYDROCHLORIDE 50 MG: 50 TABLET, COATED ORAL at 21:27

## 2019-01-01 RX ADMIN — SODIUM CHLORIDE, POTASSIUM CHLORIDE, SODIUM LACTATE AND CALCIUM CHLORIDE 1000 ML: 600; 310; 30; 20 INJECTION, SOLUTION INTRAVENOUS at 21:39

## 2019-01-01 RX ADMIN — LEVOTHYROXINE SODIUM 150 MCG: 150 TABLET ORAL at 08:20

## 2019-01-01 RX ADMIN — CEFAZOLIN SODIUM 1 G: 1 INJECTION, SOLUTION INTRAVENOUS at 03:42

## 2019-01-01 RX ADMIN — SIMVASTATIN 40 MG: 40 TABLET, FILM COATED ORAL at 18:07

## 2019-01-01 RX ADMIN — SODIUM CHLORIDE, POTASSIUM CHLORIDE, SODIUM LACTATE AND CALCIUM CHLORIDE: 600; 310; 30; 20 INJECTION, SOLUTION INTRAVENOUS at 18:06

## 2019-01-01 RX ADMIN — HYDROCORTISONE SODIUM SUCCINATE 50 MG: 100 INJECTION, POWDER, FOR SOLUTION INTRAMUSCULAR; INTRAVENOUS at 13:34

## 2019-01-01 RX ADMIN — WARFARIN SODIUM 1 MG: 1 TABLET ORAL at 17:31

## 2019-01-01 RX ADMIN — DIPHENHYDRAMINE HYDROCHLORIDE 25 MG: 50 INJECTION, SOLUTION INTRAMUSCULAR; INTRAVENOUS at 07:33

## 2019-01-01 RX ADMIN — CEFAZOLIN SODIUM 1 G: 1 INJECTION, SOLUTION INTRAVENOUS at 20:47

## 2019-01-01 RX ADMIN — SODIUM CHLORIDE 1000 ML: 9 INJECTION, SOLUTION INTRAVENOUS at 11:10

## 2019-01-01 RX ADMIN — TRAMADOL HYDROCHLORIDE 50 MG: 50 TABLET, FILM COATED ORAL at 13:18

## 2019-01-01 RX ADMIN — TERAZOSIN HYDROCHLORIDE ANHYDROUS 5 MG: 5 CAPSULE ORAL at 21:09

## 2019-01-01 RX ADMIN — DIVALPROEX SODIUM 250 MG: 250 TABLET, DELAYED RELEASE ORAL at 21:09

## 2019-01-01 RX ADMIN — LEVOTHYROXINE SODIUM 150 MCG: 150 TABLET ORAL at 06:55

## 2019-01-01 RX ADMIN — Medication 12.5 MG: at 21:07

## 2019-01-01 RX ADMIN — DIVALPROEX SODIUM 250 MG: 250 TABLET, DELAYED RELEASE ORAL at 21:25

## 2019-01-01 RX ADMIN — SODIUM CHLORIDE, POTASSIUM CHLORIDE, SODIUM LACTATE AND CALCIUM CHLORIDE 500 ML: 600; 310; 30; 20 INJECTION, SOLUTION INTRAVENOUS at 14:36

## 2019-01-01 RX ADMIN — Medication 12.5 MG: at 21:50

## 2019-01-01 RX ADMIN — PANTOPRAZOLE SODIUM 40 MG: 40 INJECTION, POWDER, FOR SOLUTION INTRAVENOUS at 20:44

## 2019-01-01 RX ADMIN — ACETAMINOPHEN 650 MG: 325 TABLET, FILM COATED ORAL at 10:06

## 2019-01-01 RX ADMIN — LEVOTHYROXINE SODIUM 150 MCG: 150 TABLET ORAL at 09:18

## 2019-01-01 RX ADMIN — PANTOPRAZOLE SODIUM 40 MG: 40 TABLET, DELAYED RELEASE ORAL at 16:42

## 2019-01-01 RX ADMIN — HYDROCORTISONE SODIUM SUCCINATE 50 MG: 100 INJECTION, POWDER, FOR SOLUTION INTRAMUSCULAR; INTRAVENOUS at 01:49

## 2019-01-01 RX ADMIN — PANTOPRAZOLE SODIUM 40 MG: 40 TABLET, DELAYED RELEASE ORAL at 06:35

## 2019-01-01 RX ADMIN — ACETAMINOPHEN 650 MG: 325 TABLET, FILM COATED ORAL at 08:38

## 2019-01-01 RX ADMIN — SIMVASTATIN 40 MG: 40 TABLET, FILM COATED ORAL at 16:42

## 2019-01-01 RX ADMIN — HYDROCORTISONE SODIUM SUCCINATE 50 MG: 100 INJECTION, POWDER, FOR SOLUTION INTRAMUSCULAR; INTRAVENOUS at 13:09

## 2019-01-01 RX ADMIN — SIMETHICONE CHEW TAB 80 MG 80 MG: 80 TABLET ORAL at 10:26

## 2019-01-01 RX ADMIN — DIVALPROEX SODIUM 250 MG: 250 TABLET, DELAYED RELEASE ORAL at 22:09

## 2019-01-01 RX ADMIN — HYDROCORTISONE SODIUM SUCCINATE 50 MG: 100 INJECTION, POWDER, FOR SOLUTION INTRAMUSCULAR; INTRAVENOUS at 18:07

## 2019-01-01 RX ADMIN — MORPHINE SULFATE 4 MG: 4 INJECTION INTRAVENOUS at 11:49

## 2019-01-01 RX ADMIN — TRAMADOL HYDROCHLORIDE 50 MG: 50 TABLET, COATED ORAL at 09:19

## 2019-01-01 RX ADMIN — CEFAZOLIN SODIUM 1 G: 1 INJECTION, SOLUTION INTRAVENOUS at 12:28

## 2019-01-01 RX ADMIN — CEFAZOLIN SODIUM 1 G: 1 INJECTION, SOLUTION INTRAVENOUS at 12:49

## 2019-01-01 RX ADMIN — SODIUM CHLORIDE: 9 INJECTION, SOLUTION INTRAVENOUS at 13:45

## 2019-01-01 RX ADMIN — ALUMINUM HYDROXIDE, MAGNESIUM HYDROXIDE, AND DIMETHICONE 15 ML: 400; 400; 40 SUSPENSION ORAL at 19:03

## 2019-01-01 RX ADMIN — IOPAMIDOL 98 ML: 755 INJECTION, SOLUTION INTRAVENOUS at 09:14

## 2019-01-01 RX ADMIN — MECLIZINE 12.5 MG: 12.5 TABLET ORAL at 14:28

## 2019-01-01 RX ADMIN — MULTIPLE VITAMINS W/ MINERALS TAB 1 TABLET: TAB at 17:56

## 2019-01-01 RX ADMIN — Medication 1.5 MG: at 18:02

## 2019-01-01 RX ADMIN — CEFAZOLIN SODIUM 1 G: 1 INJECTION, SOLUTION INTRAVENOUS at 20:27

## 2019-01-01 RX ADMIN — CALCIUM CARBONATE (ANTACID) CHEW TAB 500 MG 1000 MG: 500 CHEW TAB at 21:58

## 2019-01-01 RX ADMIN — ACETAMINOPHEN 650 MG: 325 TABLET, FILM COATED ORAL at 12:29

## 2019-01-01 RX ADMIN — DIVALPROEX SODIUM 250 MG: 250 TABLET, DELAYED RELEASE ORAL at 21:29

## 2019-01-01 RX ADMIN — SODIUM CHLORIDE, POTASSIUM CHLORIDE, SODIUM LACTATE AND CALCIUM CHLORIDE: 600; 310; 30; 20 INJECTION, SOLUTION INTRAVENOUS at 20:10

## 2019-01-01 RX ADMIN — MECLIZINE HYDROCHLORIDE 25 MG: 25 TABLET ORAL at 21:44

## 2019-01-01 RX ADMIN — MULTIPLE VITAMINS W/ MINERALS TAB 1 TABLET: TAB at 09:18

## 2019-01-01 RX ADMIN — SIMVASTATIN 40 MG: 40 TABLET, FILM COATED ORAL at 19:03

## 2019-01-01 RX ADMIN — HYDROCORTISONE SODIUM SUCCINATE 50 MG: 100 INJECTION, POWDER, FOR SOLUTION INTRAMUSCULAR; INTRAVENOUS at 07:03

## 2019-01-01 RX ADMIN — PANTOPRAZOLE SODIUM 40 MG: 40 TABLET, DELAYED RELEASE ORAL at 08:38

## 2019-01-01 RX ADMIN — TRAMADOL HYDROCHLORIDE 50 MG: 50 TABLET, FILM COATED ORAL at 21:24

## 2019-01-01 RX ADMIN — DIVALPROEX SODIUM 250 MG: 250 TABLET, DELAYED RELEASE ORAL at 22:10

## 2019-01-01 RX ADMIN — AMOXICILLIN AND CLAVULANATE POTASSIUM 1 TABLET: 875; 125 TABLET, FILM COATED ORAL at 19:04

## 2019-01-01 RX ADMIN — CEFAZOLIN SODIUM 2 G: 2 INJECTION, SOLUTION INTRAVENOUS at 11:45

## 2019-01-01 RX ADMIN — TRAMADOL HYDROCHLORIDE 50 MG: 50 TABLET, FILM COATED ORAL at 09:05

## 2019-01-01 RX ADMIN — TERAZOSIN HYDROCHLORIDE ANHYDROUS 5 MG: 5 CAPSULE ORAL at 21:57

## 2019-01-01 RX ADMIN — Medication 12.5 MG: at 20:09

## 2019-01-01 RX ADMIN — PHYTONADIONE 2 MG: 2 INJECTION, EMULSION INTRAMUSCULAR; INTRAVENOUS; SUBCUTANEOUS at 10:48

## 2019-01-01 RX ADMIN — LEVOTHYROXINE SODIUM 150 MCG: 150 TABLET ORAL at 08:38

## 2019-01-01 RX ADMIN — SODIUM CHLORIDE 64 ML: 9 INJECTION, SOLUTION INTRAVENOUS at 09:14

## 2019-01-01 RX ADMIN — Medication 1.5 MG: at 18:24

## 2019-01-01 RX ADMIN — CEFAZOLIN SODIUM 1 G: 1 INJECTION, SOLUTION INTRAVENOUS at 05:07

## 2019-01-01 RX ADMIN — SODIUM CHLORIDE, POTASSIUM CHLORIDE, SODIUM LACTATE AND CALCIUM CHLORIDE: 600; 310; 30; 20 INJECTION, SOLUTION INTRAVENOUS at 11:03

## 2019-01-01 RX ADMIN — ACETAMINOPHEN 650 MG: 325 TABLET, FILM COATED ORAL at 04:49

## 2019-01-01 RX ADMIN — LEVOTHYROXINE SODIUM 150 MCG: 150 TABLET ORAL at 05:57

## 2019-01-01 RX ADMIN — CEFAZOLIN SODIUM 1 G: 1 INJECTION, SOLUTION INTRAVENOUS at 20:03

## 2019-01-01 RX ADMIN — TRAMADOL HYDROCHLORIDE 50 MG: 50 TABLET, COATED ORAL at 02:40

## 2019-01-01 RX ADMIN — Medication 0.5 MG: at 18:07

## 2019-01-01 RX ADMIN — HYDROCORTISONE SODIUM SUCCINATE 50 MG: 100 INJECTION, POWDER, FOR SOLUTION INTRAMUSCULAR; INTRAVENOUS at 00:10

## 2019-01-01 RX ADMIN — AMPICILLIN AND SULBACTAM 3 G: 2; 1 INJECTION, POWDER, FOR SOLUTION INTRAVENOUS at 13:45

## 2019-01-01 RX ADMIN — ALUMINUM HYDROXIDE, MAGNESIUM HYDROXIDE, AND DIMETHICONE 15 ML: 400; 400; 40 SUSPENSION ORAL at 05:52

## 2019-01-01 RX ADMIN — PANTOPRAZOLE SODIUM 40 MG: 40 TABLET, DELAYED RELEASE ORAL at 16:31

## 2019-01-01 RX ADMIN — LEVOTHYROXINE SODIUM 150 MCG: 150 TABLET ORAL at 06:20

## 2019-01-01 RX ADMIN — DIVALPROEX SODIUM 250 MG: 250 TABLET, DELAYED RELEASE ORAL at 21:57

## 2019-01-01 RX ADMIN — HYDROCORTISONE SODIUM SUCCINATE 50 MG: 100 INJECTION, POWDER, FOR SOLUTION INTRAMUSCULAR; INTRAVENOUS at 09:04

## 2019-01-01 RX ADMIN — TERAZOSIN HYDROCHLORIDE ANHYDROUS 5 MG: 5 CAPSULE ORAL at 21:23

## 2019-01-01 RX ADMIN — SIMVASTATIN 40 MG: 40 TABLET, FILM COATED ORAL at 17:07

## 2019-01-01 RX ADMIN — DIVALPROEX SODIUM 250 MG: 250 TABLET, DELAYED RELEASE ORAL at 21:51

## 2019-01-01 RX ADMIN — METOPROLOL SUCCINATE 25 MG: 25 TABLET, FILM COATED, EXTENDED RELEASE ORAL at 22:10

## 2019-01-01 RX ADMIN — SIMVASTATIN 40 MG: 40 TABLET, FILM COATED ORAL at 18:00

## 2019-01-01 RX ADMIN — PREDNISONE 6 MG: 5 TABLET ORAL at 09:18

## 2019-01-01 RX ADMIN — CEFAZOLIN SODIUM 1 G: 1 INJECTION, SOLUTION INTRAVENOUS at 03:44

## 2019-01-01 RX ADMIN — DEXTROSE MONOHYDRATE 25 ML: 500 INJECTION PARENTERAL at 15:54

## 2019-01-01 RX ADMIN — ALUMINUM HYDROXIDE, MAGNESIUM HYDROXIDE, AND DIMETHICONE 15 ML: 400; 400; 40 SUSPENSION ORAL at 01:31

## 2019-01-01 RX ADMIN — GUAIFENESIN 10 ML: 100 SOLUTION ORAL at 14:59

## 2019-01-01 RX ADMIN — PANTOPRAZOLE SODIUM 40 MG: 40 TABLET, DELAYED RELEASE ORAL at 06:03

## 2019-01-01 RX ADMIN — Medication 1.5 MG: at 17:17

## 2019-01-01 RX ADMIN — AMOXICILLIN AND CLAVULANATE POTASSIUM 1 TABLET: 875; 125 TABLET, FILM COATED ORAL at 09:06

## 2019-01-01 RX ADMIN — HYDROCORTISONE SODIUM SUCCINATE 50 MG: 100 INJECTION, POWDER, FOR SOLUTION INTRAMUSCULAR; INTRAVENOUS at 02:51

## 2019-01-01 RX ADMIN — Medication 1.5 MG: at 19:04

## 2019-01-01 RX ADMIN — TERAZOSIN HYDROCHLORIDE ANHYDROUS 5 MG: 5 CAPSULE ORAL at 22:10

## 2019-01-01 RX ADMIN — CEFAZOLIN SODIUM 1 G: 1 INJECTION, SOLUTION INTRAVENOUS at 05:16

## 2019-01-01 RX ADMIN — VANCOMYCIN HYDROCHLORIDE 1250 MG: 10 INJECTION, POWDER, LYOPHILIZED, FOR SOLUTION INTRAVENOUS at 00:10

## 2019-01-01 RX ADMIN — ACETAMINOPHEN 650 MG: 325 TABLET, FILM COATED ORAL at 20:54

## 2019-01-01 RX ADMIN — TRAMADOL HYDROCHLORIDE 50 MG: 50 TABLET, COATED ORAL at 21:29

## 2019-01-01 RX ADMIN — ACETAMINOPHEN 650 MG: 325 TABLET, FILM COATED ORAL at 00:30

## 2019-01-01 RX ADMIN — ACETAMINOPHEN 650 MG: 325 TABLET, FILM COATED ORAL at 23:44

## 2019-01-01 RX ADMIN — HYDROCORTISONE SODIUM SUCCINATE 50 MG: 100 INJECTION, POWDER, FOR SOLUTION INTRAMUSCULAR; INTRAVENOUS at 19:03

## 2019-01-01 RX ADMIN — DIVALPROEX SODIUM 250 MG: 250 TABLET, DELAYED RELEASE ORAL at 21:24

## 2019-01-01 RX ADMIN — SODIUM CHLORIDE, POTASSIUM CHLORIDE, SODIUM LACTATE AND CALCIUM CHLORIDE 1000 ML: 600; 310; 30; 20 INJECTION, SOLUTION INTRAVENOUS at 11:10

## 2019-01-01 RX ADMIN — VANCOMYCIN HYDROCHLORIDE 1250 MG: 10 INJECTION, POWDER, LYOPHILIZED, FOR SOLUTION INTRAVENOUS at 14:05

## 2019-01-01 RX ADMIN — WARFARIN SODIUM 1 MG: 1 TABLET ORAL at 17:07

## 2019-01-01 RX ADMIN — SIMETHICONE CHEW TAB 80 MG 80 MG: 80 TABLET ORAL at 01:27

## 2019-01-01 RX ADMIN — VANCOMYCIN HYDROCHLORIDE 1750 MG: 1 INJECTION, POWDER, LYOPHILIZED, FOR SOLUTION INTRAVENOUS at 13:33

## 2019-01-01 RX ADMIN — Medication 12.5 MG: at 09:06

## 2019-01-01 RX ADMIN — ALUMINUM HYDROXIDE, MAGNESIUM HYDROXIDE, AND DIMETHICONE 15 ML: 400; 400; 40 SUSPENSION ORAL at 23:45

## 2019-01-01 RX ADMIN — MULTIPLE VITAMINS W/ MINERALS TAB 1 TABLET: TAB at 18:28

## 2019-01-01 RX ADMIN — HYDROCORTISONE SODIUM SUCCINATE 50 MG: 100 INJECTION, POWDER, FOR SOLUTION INTRAMUSCULAR; INTRAVENOUS at 06:37

## 2019-01-01 RX ADMIN — SODIUM CHLORIDE, POTASSIUM CHLORIDE, SODIUM LACTATE AND CALCIUM CHLORIDE: 600; 310; 30; 20 INJECTION, SOLUTION INTRAVENOUS at 03:42

## 2019-01-01 RX ADMIN — LEVOTHYROXINE SODIUM 150 MCG: 150 TABLET ORAL at 06:35

## 2019-01-01 RX ADMIN — ACETAMINOPHEN 1000 MG: 500 TABLET ORAL at 11:23

## 2019-01-01 RX ADMIN — METOCLOPRAMIDE 10 MG: 5 INJECTION, SOLUTION INTRAMUSCULAR; INTRAVENOUS at 07:34

## 2019-01-01 RX ADMIN — HYDROCORTISONE SODIUM SUCCINATE 50 MG: 100 INJECTION, POWDER, FOR SOLUTION INTRAMUSCULAR; INTRAVENOUS at 09:17

## 2019-01-01 RX ADMIN — Medication 12.5 MG: at 09:21

## 2019-01-01 RX ADMIN — SIMETHICONE CHEW TAB 80 MG 80 MG: 80 TABLET ORAL at 13:41

## 2019-01-01 RX ADMIN — HYDROCORTISONE SODIUM SUCCINATE 50 MG: 100 INJECTION, POWDER, FOR SOLUTION INTRAMUSCULAR; INTRAVENOUS at 15:13

## 2019-01-01 RX ADMIN — DIVALPROEX SODIUM 250 MG: 250 TABLET, DELAYED RELEASE ORAL at 21:07

## 2019-01-01 RX ADMIN — CEFAZOLIN SODIUM 1 G: 1 INJECTION, SOLUTION INTRAVENOUS at 11:29

## 2019-01-01 RX ADMIN — CEFAZOLIN SODIUM 1 G: 1 INJECTION, SOLUTION INTRAVENOUS at 13:19

## 2019-01-01 RX ADMIN — PANTOPRAZOLE SODIUM 40 MG: 40 INJECTION, POWDER, FOR SOLUTION INTRAVENOUS at 09:24

## 2019-01-01 RX ADMIN — PREDNISONE 40 MG: 20 TABLET ORAL at 11:32

## 2019-01-01 RX ADMIN — ONDANSETRON 4 MG: 4 TABLET, ORALLY DISINTEGRATING ORAL at 09:24

## 2019-01-01 RX ADMIN — PREDNISONE 40 MG: 20 TABLET ORAL at 09:21

## 2019-01-01 RX ADMIN — GADOBUTROL 7.5 ML: 604.72 INJECTION INTRAVENOUS at 16:50

## 2019-01-01 RX ADMIN — ACETAMINOPHEN 650 MG: 325 TABLET, FILM COATED ORAL at 17:09

## 2019-01-01 RX ADMIN — SODIUM CHLORIDE, POTASSIUM CHLORIDE, SODIUM LACTATE AND CALCIUM CHLORIDE: 600; 310; 30; 20 INJECTION, SOLUTION INTRAVENOUS at 03:16

## 2019-01-01 RX ADMIN — ACETAMINOPHEN 650 MG: 325 TABLET, FILM COATED ORAL at 04:30

## 2019-01-01 RX ADMIN — SODIUM CHLORIDE 500 ML: 9 INJECTION, SOLUTION INTRAVENOUS at 14:07

## 2019-01-01 RX ADMIN — TRAMADOL HYDROCHLORIDE 50 MG: 50 TABLET, FILM COATED ORAL at 00:57

## 2019-01-01 RX ADMIN — CEFAZOLIN SODIUM 1 G: 1 INJECTION, SOLUTION INTRAVENOUS at 13:34

## 2019-01-01 RX ADMIN — MULTIPLE VITAMINS W/ MINERALS TAB 1 TABLET: TAB at 09:05

## 2019-01-01 RX ADMIN — MULTIPLE VITAMINS W/ MINERALS TAB 1 TABLET: TAB at 18:00

## 2019-01-01 RX ADMIN — Medication 12.5 MG: at 09:16

## 2019-01-01 RX ADMIN — WARFARIN SODIUM 1 MG: 1 TABLET ORAL at 18:28

## 2019-01-01 RX ADMIN — INSULIN ASPART 1 UNITS: 100 INJECTION, SOLUTION INTRAVENOUS; SUBCUTANEOUS at 12:25

## 2019-01-01 RX ADMIN — ACETAMINOPHEN 650 MG: 325 TABLET, FILM COATED ORAL at 05:20

## 2019-01-01 RX ADMIN — LEVOTHYROXINE SODIUM 150 MCG: 150 TABLET ORAL at 05:37

## 2019-01-01 RX ADMIN — PROCHLORPERAZINE EDISYLATE 5 MG: 5 INJECTION, SOLUTION INTRAMUSCULAR; INTRAVENOUS at 14:07

## 2019-01-01 RX ADMIN — TRAMADOL HYDROCHLORIDE 50 MG: 50 TABLET, COATED ORAL at 13:42

## 2019-01-01 RX ADMIN — MULTIPLE VITAMINS W/ MINERALS TAB 1 TABLET: TAB at 08:45

## 2019-01-01 ASSESSMENT — MIFFLIN-ST. JEOR
SCORE: 1630.67
SCORE: 1560.36
SCORE: 1637.47
SCORE: 1565.79
SCORE: 1553.38
SCORE: 1582.57
SCORE: 1568.38
SCORE: 1581.67
SCORE: 1579.4
SCORE: 1580.38
SCORE: 1573.05
SCORE: 1655.38

## 2019-01-01 ASSESSMENT — ACTIVITIES OF DAILY LIVING (ADL)
ADLS_ACUITY_SCORE: 21
ADLS_ACUITY_SCORE: 17
ADLS_ACUITY_SCORE: 20
ADLS_ACUITY_SCORE: 20
ADLS_ACUITY_SCORE: 16
ADLS_ACUITY_SCORE: 17
ADLS_ACUITY_SCORE: 17
ADLS_ACUITY_SCORE: 20
ADLS_ACUITY_SCORE: 16
ADLS_ACUITY_SCORE: 17
ADLS_ACUITY_SCORE: 21
ADLS_ACUITY_SCORE: 17
ADLS_ACUITY_SCORE: 20
ADLS_ACUITY_SCORE: 17
ADLS_ACUITY_SCORE: 20
ADLS_ACUITY_SCORE: 17
ADLS_ACUITY_SCORE: 20
ADLS_ACUITY_SCORE: 17
ADLS_ACUITY_SCORE: 19
ADLS_ACUITY_SCORE: 17
ADLS_ACUITY_SCORE: 20
ADLS_ACUITY_SCORE: 17
ADLS_ACUITY_SCORE: 20
ADLS_ACUITY_SCORE: 17
ADLS_ACUITY_SCORE: 20
ADLS_ACUITY_SCORE: 20
ADLS_ACUITY_SCORE: 17
ADLS_ACUITY_SCORE: 21
ADLS_ACUITY_SCORE: 16
ADLS_ACUITY_SCORE: 17
ADLS_ACUITY_SCORE: 16
ADLS_ACUITY_SCORE: 17
ADLS_ACUITY_SCORE: 16
ADLS_ACUITY_SCORE: 17
ADLS_ACUITY_SCORE: 16
ADLS_ACUITY_SCORE: 17
ADLS_ACUITY_SCORE: 19
ADLS_ACUITY_SCORE: 16
ADLS_ACUITY_SCORE: 17
ADLS_ACUITY_SCORE: 17
ADLS_ACUITY_SCORE: 20
ADLS_ACUITY_SCORE: 17
ADLS_ACUITY_SCORE: 16
ADLS_ACUITY_SCORE: 20
ADLS_ACUITY_SCORE: 18
ADLS_ACUITY_SCORE: 17
ADLS_ACUITY_SCORE: 18
ADLS_ACUITY_SCORE: 16
ADLS_ACUITY_SCORE: 16

## 2019-01-01 ASSESSMENT — ENCOUNTER SYMPTOMS
COUGH: 1
WEAKNESS: 1
ABDOMINAL PAIN: 0
COLOR CHANGE: 1
DYSURIA: 0
VOMITING: 0
COUGH: 0
DIARRHEA: 0
HEADACHES: 1
FEVER: 1
FATIGUE: 1
HEADACHES: 1
FEVER: 0
DIARRHEA: 0
SHORTNESS OF BREATH: 0
ABDOMINAL PAIN: 1
ROS GI COMMENTS: BURPING
NAUSEA: 1
VOMITING: 0

## 2019-01-15 ENCOUNTER — OFFICE VISIT (OUTPATIENT)
Dept: CARDIOLOGY | Facility: CLINIC | Age: 84
End: 2019-01-15
Payer: COMMERCIAL

## 2019-01-15 VITALS
SYSTOLIC BLOOD PRESSURE: 109 MMHG | DIASTOLIC BLOOD PRESSURE: 59 MMHG | HEIGHT: 69 IN | WEIGHT: 185 LBS | BODY MASS INDEX: 27.4 KG/M2 | HEART RATE: 80 BPM

## 2019-01-15 DIAGNOSIS — R06.09 DYSPNEA ON EXERTION: ICD-10-CM

## 2019-01-15 DIAGNOSIS — G47.33 OSA (OBSTRUCTIVE SLEEP APNEA): ICD-10-CM

## 2019-01-15 DIAGNOSIS — R06.02 SOB (SHORTNESS OF BREATH): ICD-10-CM

## 2019-01-15 DIAGNOSIS — I27.20 PULMONARY HYPERTENSION (H): ICD-10-CM

## 2019-01-15 DIAGNOSIS — I51.9 LV DYSFUNCTION: ICD-10-CM

## 2019-01-15 DIAGNOSIS — I50.23 ACUTE ON CHRONIC SYSTOLIC CONGESTIVE HEART FAILURE (H): ICD-10-CM

## 2019-01-15 DIAGNOSIS — I27.81 COR PULMONALE (H): ICD-10-CM

## 2019-01-15 DIAGNOSIS — I27.20 PULMONARY HYPERTENSION (H): Primary | ICD-10-CM

## 2019-01-15 LAB
ANION GAP SERPL CALCULATED.3IONS-SCNC: 5 MMOL/L (ref 6–17)
BUN SERPL-MCNC: 18 MG/DL (ref 7–30)
CALCIUM SERPL-MCNC: 9.9 MG/DL (ref 8.5–10.5)
CHLORIDE SERPL-SCNC: 105 MMOL/L (ref 98–107)
CO2 SERPL-SCNC: 29 MMOL/L (ref 23–29)
CREAT SERPL-MCNC: 0.97 MG/DL (ref 0.7–1.3)
ERYTHROCYTE [DISTWIDTH] IN BLOOD BY AUTOMATED COUNT: 16.6 % (ref 10–15)
GFR SERPL CREATININE-BSD FRML MDRD: 74 ML/MIN/{1.73_M2}
GLUCOSE SERPL-MCNC: 142 MG/DL (ref 70–105)
HCT VFR BLD AUTO: 32.5 % (ref 40–53)
HGB BLD-MCNC: 10.2 G/DL (ref 13.3–17.7)
MCH RBC QN AUTO: 29.1 PG (ref 26.5–33)
MCHC RBC AUTO-ENTMCNC: 31.4 G/DL (ref 31.5–36.5)
MCV RBC AUTO: 93 FL (ref 78–100)
NT-PROBNP SERPL-MCNC: 812 PG/ML (ref 0–450)
PLATELET # BLD AUTO: 134 10E9/L (ref 150–450)
POTASSIUM SERPL-SCNC: 4 MMOL/L (ref 3.5–5.1)
RBC # BLD AUTO: 3.5 10E12/L (ref 4.4–5.9)
SODIUM SERPL-SCNC: 135 MMOL/L (ref 136–145)
WBC # BLD AUTO: 3.8 10E9/L (ref 4–11)

## 2019-01-15 PROCEDURE — 36415 COLL VENOUS BLD VENIPUNCTURE: CPT | Performed by: INTERNAL MEDICINE

## 2019-01-15 PROCEDURE — 83880 ASSAY OF NATRIURETIC PEPTIDE: CPT | Performed by: INTERNAL MEDICINE

## 2019-01-15 PROCEDURE — 80048 BASIC METABOLIC PNL TOTAL CA: CPT | Performed by: INTERNAL MEDICINE

## 2019-01-15 PROCEDURE — 85027 COMPLETE CBC AUTOMATED: CPT | Performed by: INTERNAL MEDICINE

## 2019-01-15 PROCEDURE — 99215 OFFICE O/P EST HI 40 MIN: CPT | Performed by: INTERNAL MEDICINE

## 2019-01-15 ASSESSMENT — MIFFLIN-ST. JEOR: SCORE: 1519.53

## 2019-01-15 NOTE — PROGRESS NOTES
Problem List:    1. H/o cor pulmonale with know 3-4+ TR and RV dysfunction in past- slight increase in RV size and decline in RV systolic function compared to 2011 echo  2. Chronic afib- VR well controlled, on coumadin  3. Moderate obstruction on PFT 2011  4. STEWART- no compliant with CPAP  5. H/o PE- no PE on CT chest this admission  6. Non obstructive CAD CT cor angio 2011, negative stress test this admission  7. Obesity- lost 100 lbs over last several years  8. Cellulitis of lower extremity  9. Recurrent headaches requiring Tramadol    Dear  Oscar Parekh    The patient returns for follow-up of heart failure.  There is no interim history of chest pain, tightness, paroxysmal nocturnal dyspnea, orthopnea, peripheral edema, palpitation, pre-syncope, syncope, device discharge.  He complains of excessive fatigue and being cold.  He has history of BROOKE.   Exercise tolerance is adequate to get through grocery  The patient is attempting to exercise regularly and following a sodium restricted, calorically appropriate diet.  Medications are reviewed and the patient is taking medications as prescribed.  The patient is generally sleeping variably.  Most of day is spent reading or playing cards. He is moving furniture!.      PAST MEDICAL HISTORY:  Past Medical History:   Diagnosis Date     Atrial fibrillation (H) 12/27/2011    Sees Dr Carcamo, avoid amio due to cor pulmonale, asymptomatic rates up to160 with exersion.        Bilateral leg edema     compression wraps     CAD (coronary artery disease)     Nonobstructive     Cardiomyopathy (H) 5/1/2012     Carotid stenosis      Cellulitis of left lower extremity 11/5/2018     COPD (chronic obstructive pulmonary disease) (H)      Cor pulmonale (H) 5/1/2012     Diastolic dysfunction      DM2 (diabetes mellitus, type 2) (H)      IYER (dyspnea on exertion)      GERD (gastroesophageal reflux disease)      HTN (hypertension)      Hypothyroidism      Leg wound, left      LV  dysfunction 05/01/2012     Due to cor pulmonale and a fib related tachycardia      Morbid obesity (H) 5/1/2012     On supplemental oxygen by nasal cannula     at [facility]     STEWART (obstructive sleep apnea) 05/01/2012    Non compliant with CPAP     Pulmonary embolism (H)      Pulmonary HTN (H)      Varicose veins of bilateral lower extremities with other complications      Vitamin B12 deficiency disease 5/1/2012    On oral replacement        FAMILY HISTORY:  History reviewed. No pertinent family history.      SOCIAL HISTORY:  Social History     Social History     Marital status:      Spouse name: N/A     Number of children: N/A     Years of education: N/A     Social History Main Topics     Smoking status: Never Smoker     Smokeless tobacco: Never Used     Alcohol use No     Drug use: No     Sexual activity: Not on file     Other Topics Concern     Not on file     Social History Narrative       CURRENT MEDICATIONS:  Current Outpatient Medications   Medication Sig Dispense Refill     cyanocolbalamin (VITAMIN  B-12) 1000 MCG tablet Take 3,000 mcg by mouth every morning       divalproex sodium delayed-release (DEPAKOTE) 250 MG DR tablet Take 250 mg by mouth At Bedtime       furosemide (LASIX) 20 MG tablet Take 20 mg by mouth daily       levothyroxine (SYNTHROID) 150 MCG tablet Take 150 mcg by mouth every morning (before breakfast)        metoprolol succinate (TOPROL-XL) 25 MG 24 hr tablet Take 1 tablet (25 mg) by mouth every evening 30 tablet      multivitamin, therapeutic with minerals (THERA-VIT-M) TABS tablet Take 1 tablet by mouth 2 times daily (with meals)       OMEPRAZOLE PO Take 40 mg by mouth every morning        simvastatin (ZOCOR) 40 MG tablet Take 40 mg by mouth daily (with dinner)        terazosin (HYTRIN) 5 MG capsule Take 5 mg by mouth At Bedtime       traMADol (ULTRAM) 50 MG tablet Take 50 mg by mouth every 6 hours as needed for moderate pain        warfarin (COUMADIN) 1 MG tablet Take 0.5 tablets (0.5  mg) by mouth daily On Wednesday (evening) 30 tablet      ACCU-CHEK MULTICLIX LANCETS MISC by Lancet route 2 times daily. Use to test blood sugar twice daily or as directed. 102 each prn     clindamycin (CLEOCIN T) 1 % lotion Apply topically daily as needed (open sore on leg)       docusate sodium (COLACE) 100 MG capsule Take 100 mg by mouth 2 times daily as needed for constipation         ROS:   10 point ROS negative except HPI    EXAM:  Home weight  General: appears comfortable, alert and articulate  Head: normocephalic, atraumatic  Eyes: anicteric sclera, EOMI  Neck: no adenopathy  Orophyarynx: moist mucosa, no lesions, dentition intact  Heart: regular, S1/S2, no murmur, gallop, rub, estimated JVP WNL  Lungs: clear, no rales or wheezing  Abdomen: soft, non-tender, bowel sounds present, no hepatosplenomegaly  Extremities: no clubbing, cyanosis or edema  Neurological: normal speech and affect, no gross motor deficits      Wt Readings from Last 24 Encounters:   01/15/19 83.9 kg (185 lb)   12/19/18 85.2 kg (187 lb 14.4 oz)   11/08/18 85.6 kg (188 lb 11.2 oz)   10/30/18 93 kg (205 lb)   09/06/18 87.4 kg (192 lb 9.6 oz)   05/15/18 87 kg (191 lb 12.8 oz)   10/17/17 88.7 kg (195 lb 8 oz)   06/04/17 88.5 kg (195 lb)   04/18/17 94.3 kg (208 lb)   04/11/17 91.6 kg (201 lb 15.1 oz)   04/04/17 93 kg (205 lb 1.6 oz)   02/27/17 100.9 kg (222 lb 6 oz)   01/08/15 96.6 kg (213 lb)   12/13/14 98.7 kg (217 lb 8 oz)   12/27/13 100.7 kg (222 lb)   05/17/12 117.9 kg (260 lb)       Labs:      CBC RESULTS:  Lab Results   Component Value Date    WBC 3.2 (L) 11/08/2018    RBC 3.41 (L) 11/08/2018    HGB 10.3 (L) 11/08/2018    HCT 32.7 (L) 11/08/2018    MCV 96 11/08/2018    MCH 30.2 11/08/2018    MCHC 31.5 11/08/2018    RDW 14.6 11/08/2018     (L) 11/08/2018       CMP RESULTS:  Lab Results   Component Value Date     (L) 01/15/2019    POTASSIUM 4.0 01/15/2019    CHLORIDE 105 01/15/2019    CO2 29 01/15/2019    ANIONGAP 5 (L)  01/15/2019     (H) 01/15/2019    BUN 18 01/15/2019    CR 0.97 01/15/2019    GFRESTIMATED 74 01/15/2019    GFRESTBLACK 89 01/15/2019    TYSON 9.9 01/15/2019    BILITOTAL 0.4 11/08/2018    ALBUMIN 2.7 (L) 11/08/2018    ALKPHOS 52 11/08/2018    ALT 9 11/08/2018    AST 22 11/08/2018        INR RESULTS:  Lab Results   Component Value Date    INR 2.84 (H) 11/08/2018       No components found for: CK  Lab Results   Component Value Date    MAG 2.0 11/06/2018     Lab Results   Component Value Date    NTBNPI 787 02/24/2017       Echocardiogram (2/25/2017):  Interpretation Summary  The left ventricle is normal in size. There is normal left ventricular wall  thickness. Left ventricular systolic function is normal. The visual ejection  fraction is estimated at 55-60%. Flattened septum is consistent with RV  pressure/volume overload. There is no thrombus seen in the left ventricle.  The right ventricle is severely dilated. The right ventricular systolic  function is mild to moderately reduced.  The left atrium is moderately dilated. The right atrium is severely dilated.  There is severe (4+) tricuspid regurgitation. The right ventricular systolic  pressure is approximated at 30.9 mmHg plus the right atrial pressure. This  most likely represents an underestimation of the true RVSP.  Mild aortic stenosis.  In direct comparison to the previous study dated 11/07/2012, there has been a  mild interval increase in right ventricular size and deterioration in right  ventricular systolic function.      CT Chest Pulmonary (2/24/2017):  COMPARISON: 4/13/2011     FINDINGS: Evaluation of the pulmonary arterial system shows no  evidence of embolus. The thoracic aorta is calcified and slightly  tortuous. No aortic aneurysm or dissection. There are coronary artery  atherosclerotic calcifications. The heart is enlarged. There is no  mediastinal, hilar or axillary lymph node enlargement. There are a few  tiny calcified granulomas in the right  upper lobe. Mild dependent  atelectasis bilaterally. A few bands of scar or atelectasis  bilaterally. Small bilateral pleural effusions. Images through the  upper abdomen are remarkable for small amount of ascites. The inferior  vena cava is very large which may be due to right heart dysfunction.  The liver has a slightly nodular contour suggesting cirrhosis.     IMPRESSION:  1. There is no pulmonary embolus, aortic aneurysm or dissection.  2. Coronary artery atherosclerotic calcification. Cardiomegaly.  3. Small bilateral pleural effusions.  4. Probable hepatic cirrhosis. Small amount of ascites.      XR Chest 2 Views (4/5/2017):  HISTORY: Other secondary pulmonary hypertension  COMPARISON: 2/24/2017.  IMPRESSION: No acute abnormality.       Right Heart Catheterization (4/11/2017):  82-year-old gentleman with unexplained pulmonary hypertension, severe  right heart failure and right ventricular dysfunction, and severe  tricuspid regurgitation is referred for further evaluation of his  pulmonary hypertension etiology 2 guide further evaluation and  treatment.    Catheterization results  Baseline with thermal dilution cardiac output  -RA pressure mean = 12. Patient is defibrillation  -RV pressure-49/11  -Pulmonary artery pressure 48/21 (31)  -Pulmonary capillary wedge pressure -/24 (15)  -Thermal dilution cardiac output/index 4.9/2.4  -Calculated pulmonary vascular arteriolar resistance 3.2 Woods units    With estimated Rachael cardiac output  -Pulmonary pressure 47/19 (31)  -Pulmonary capillary wedge pressure-/27 (17)  -Pulmonary artery oxygen saturation 67% aortic oxygen saturation 98  percent on room air  -Estimated Rachael cardiac output/index 5.1/2.5  -Pulmonary vascular arteriolar resistance 2.6 Woods units       VQ Scan (4/5/2017):  IMPRESSION:   1. Central airways deposition, suggestive of chronic obstructive  pulmonary disease.  2. Mild diffusely heterogeneous pulmonary perfusion, similar to that  of the  ventilation images. These findings most likely relate to  underlying pulmonary disease. There are no focal pulmonary perfusion  defects.       NM MPI w Lexiscan (2/24/2017):  Indication/Clinical History: Shortness of breath, chest pain     Impression  1. Myocardial perfusion imaging using single isotope technique  demonstrated no evidence of ischemia or infarction.   2. Gated images demonstrated normal size left ventricle with normal  wall motion. The left ventricular systolic function is normal at 76%.  3. Compared to the prior study from  .     Procedure  Pharmacologic stress testing was performed with Lexiscan at a rate of  0.08 mg/ml rapid bolus injection, for 15 seconds, 0.4 mg/5ml  intravenously. Low-level exercise was not performed along with the  vasodilator infusion. The heart rate was 6767 at baseline and jb to  82 beats per minute during the Lexiscan infusion. The rest blood  pressure was 118/64 mmHg and was 118/57 mm Hg during Lexiscan  infusion. The patient experienced no chest pain during the test.     Myocardial perfusion imaging was performed at rest, approximately 45  minutes after the injection intravenously of 11 mCi of Tc-99m Myoview.  At peak pharmacologic effect, 10-20 seconds after Lexiscan, the  patient was injected intravenously with 32 mCi of Tc-99m Myoview. The  post-stress tomographic imaging was performed approximately 60 minutes  after stress.     EKG Findings  The resting EKG demonstrated sinus rhythm with poor R wave progression  in the low voltage pattern. The stress EKG demonstrated no EKG  changes suggestive of ischemia.     Tomographic Findings  Overall, the study quality is fair. Mild visceral tracer artifact is  noted . On the stress images, mild inferior count reduction is noted.  On the rest images, mild inferior count reduction is seen and likely  due to diaphragmatic attenuation and visceral tracer artifact . Gated  images demonstrated normal size left ventricle with  normal wall  motion. The left ventricular ejection fraction was calculated to be  76%. TID was absent.      Assessment and Plan:   1. Cold all the time and history of BROOKE, will check iron levels and if low replace with parenteral iron  Will also check TSH, free T4  There is no history of melena/hematochezia,    2, Return to see Dr. Rolon regarding headaches  This appears to be main problem.    3. Continue current CHF regimen as he is functional class 2-3 and doing everything he desires    4. See Dr. Parekh for follow-up of right leg swelling and sore.  Consider support stockings          .

## 2019-01-15 NOTE — PATIENT INSTRUCTIONS
.Medication Changes:   -No medication changes at this time. Please continue current medication regiment.    Patient Instructions:  1. Continue staying active and eat a heart healthy diet.    2. Please keep current list of medications with you at all times.    3. Remember to weigh yourself daily after voiding and before you consume any food or beverages and log the numbers.  If you have gained/lost 2 pounds overnight or 5 pounds in a week contact us immediately for medication adjustments or further instructions.    4. **Please call us immediately if you have any syncope (fainting or passing out), chest pain, edema (swelling or weight gain), or decline in your functional status (general decline in how you are feeling).    Follow up Appointment Information:  1. Follow up with Dr. Joyce in July w/Labs prior  2. Return to see Dr. Rolon regarding headaches  3. See Dr. Parekh for follow-up of right leg swelling and sore.  Consider support stockings  4. If any labs require f/u, we will call you.    Results:  Component      Latest Ref Rng & Units 1/15/2019   Sodium      136 - 145 mmol/L 135 (L)   Potassium      3.5 - 5.1 mmol/L 4.0   Chloride      98 - 107 mmol/L 105   Carbon Dioxide      23 - 29 mmol/L 29   Anion Gap      6 - 17 mmol/L 5 (L)   Glucose      70 - 105 mg/dL 142 (H)   Urea Nitrogen      7 - 30 mg/dL 18   Creatinine      0.70 - 1.30 mg/dL 0.97   GFR Estimate      >60 mL/min/1.73:m2 74   GFR Estimate If Black      >60 mL/min/1.73:m2 89   Calcium      8.5 - 10.5 mg/dL 9.9     We are located on the third floor [facility] on the [facility].  Our address is     [address] [place] [address] [place] [address] [place] [address]      Thank you for allowing us to be a part of your care here at the [facility]    If you have questions or concerns please contact us at:    Porsha Dawn, RN, BSN    Chilo Mon (Schedule,Prior Auth)  Nurse  Coordinator     Clinic   Pulmonary Hypertension   Pulmonary Hypertension  [facility] [place]  [phone])[phone]    (P) [phone]        (F)[phone]                ** Please note that you will NOT receive a reminder call regarding your scheduled testing, reminder calls are for provider appointments only.  If you are scheduled for testing within the [employer] system you may receive a call regarding pre-registration for billing purposes only.**     Support Group:  Pulmonary Hypertension Association  Https://www.phassociation.org/  **Look at the Events Tab** They even have Support Groups that you can call into    [facility] PH Support Group  Second Saturday of the Month from 1-3 PM   Location: [address] [place] [id number]  Leader: Marj Torres and Mary Liao  Phone: [phone] or [phone]  Email: [email or web address]

## 2019-01-15 NOTE — NURSING NOTE
Med Reconcile: Reviewed and verified all current medications with the patient. The updated medication list was printed and given to the patient.    Labs: Patient was given results of the laboratory testing obtained today. Patient demonstrated understanding of this information and agreed to call with further questions or concerns.     Diet: Patient instructed regarding a heart healthy diet, including discussion of reduced fat and sodium intake. Patient demonstrated understanding of this information and agreed to call with further questions or concerns.    Return Appointment: Patient given instructions regarding scheduling next clinic visit. Patient demonstrated understanding of this information and agreed to call with further questions or concerns.    Patient stated he understood all health information given and agreed to call with further questions or concerns.    .Medication Changes:   -No medication changes at this time. Please continue current medication regiment.    Patient Instructions:  1. Continue staying active and eat a heart healthy diet.    2. Please keep current list of medications with you at all times.    3. Remember to weigh yourself daily after voiding and before you consume any food or beverages and log the numbers.  If you have gained/lost 2 pounds overnight or 5 pounds in a week contact us immediately for medication adjustments or further instructions.    4. **Please call us immediately if you have any syncope (fainting or passing out), chest pain, edema (swelling or weight gain), or decline in your functional status (general decline in how you are feeling).    Follow up Appointment Information:  1. Follow up with Dr. Joyce in July w/Labs prior  2. Return to see Dr. Rolon regarding headaches  3. See Dr. Parekh for follow-up of right leg swelling and sore.  Consider support stockings  4. If any labs require f/u, we will call you.    Results:  Component      Latest Ref Rng & Units 1/15/2019    Sodium      136 - 145 mmol/L 135 (L)   Potassium      3.5 - 5.1 mmol/L 4.0   Chloride      98 - 107 mmol/L 105   Carbon Dioxide      23 - 29 mmol/L 29   Anion Gap      6 - 17 mmol/L 5 (L)   Glucose      70 - 105 mg/dL 142 (H)   Urea Nitrogen      7 - 30 mg/dL 18   Creatinine      0.70 - 1.30 mg/dL 0.97   GFR Estimate      >60 mL/min/1.73:m2 74   GFR Estimate If Black      >60 mL/min/1.73:m2 89   Calcium      8.5 - 10.5 mg/dL 9.9     **Iron & TSH/T4 labs were added on to today's sample. Sent myself a reminder to look for results later.  patient and wife were escorted to scheduling. Linda Saha RN on 1/15/2019 at 1:02 PM

## 2019-01-15 NOTE — LETTER
1/15/2019    Oscar Parekh MD  [address] [address]    RE: Sean Brunner       Dear Colleague,    I had the pleasure of seeing Sean Brunner in the [facility].    Problem List:    1. H/o cor pulmonale with know 3-4+ TR and RV dysfunction in past- slight increase in RV size and decline in RV systolic function compared to 2011 echo  2. Chronic afib- VR well controlled, on coumadin  3. Moderate obstruction on PFT 2011  4. STEWART- no compliant with CPAP  5. H/o PE- no PE on CT chest this admission  6. Non obstructive CAD CT cor angio 2011, negative stress test this admission  7. Obesity- lost 100 lbs over last several years  8. Cellulitis of lower extremity  9. Recurrent headaches requiring Tramadol    Dear  Oscar Parekh    The patient returns for follow-up of heart failure.  There is no interim history of chest pain, tightness, paroxysmal nocturnal dyspnea, orthopnea, peripheral edema, palpitation, pre-syncope, syncope, device discharge.  He complains of excessive fatigue and being cold.  He has history of BROOKE.   Exercise tolerance is adequate to get through grocery  The patient is attempting to exercise regularly and following a sodium restricted, calorically appropriate diet.  Medications are reviewed and the patient is taking medications as prescribed.  The patient is generally sleeping variably.  Most of day is spent reading or playing cards. He is moving furniture!.      PAST MEDICAL HISTORY:  Past Medical History:   Diagnosis Date     Atrial fibrillation (H) 12/27/2011    Sees Dr Carcamo, avoid amio due to cor pulmonale, asymptomatic rates up to160 with exersion.        Bilateral leg edema     compression wraps     CAD (coronary artery disease)     Nonobstructive     Cardiomyopathy (H) 5/1/2012     Carotid stenosis      Cellulitis of left lower extremity 11/5/2018     COPD (chronic obstructive pulmonary disease)  (H)      Cor pulmonale (H) 5/1/2012     Diastolic dysfunction      DM2 (diabetes mellitus, type 2) (H)      IYER (dyspnea on exertion)      GERD (gastroesophageal reflux disease)      HTN (hypertension)      Hypothyroidism      Leg wound, left      LV dysfunction 05/01/2012     Due to cor pulmonale and a fib related tachycardia      Morbid obesity (H) 5/1/2012     On supplemental oxygen by nasal cannula     at [facility]     STEWART (obstructive sleep apnea) 05/01/2012    Non compliant with CPAP     Pulmonary embolism (H)      Pulmonary HTN (H)      Varicose veins of bilateral lower extremities with other complications      Vitamin B12 deficiency disease 5/1/2012    On oral replacement        FAMILY HISTORY:  History reviewed. No pertinent family history.      SOCIAL HISTORY:  Social History     Social History     Marital status:      Spouse name: N/A     Number of children: N/A     Years of education: N/A     Social History Main Topics     Smoking status: Never Smoker     Smokeless tobacco: Never Used     Alcohol use No     Drug use: No     Sexual activity: Not on file     Other Topics Concern     Not on file     Social History Narrative       CURRENT MEDICATIONS:  Current Outpatient Medications   Medication Sig Dispense Refill     cyanocolbalamin (VITAMIN  B-12) 1000 MCG tablet Take 3,000 mcg by mouth every morning       divalproex sodium delayed-release (DEPAKOTE) 250 MG DR tablet Take 250 mg by mouth At Bedtime       furosemide (LASIX) 20 MG tablet Take 20 mg by mouth daily       levothyroxine (SYNTHROID) 150 MCG tablet Take 150 mcg by mouth every morning (before breakfast)        metoprolol succinate (TOPROL-XL) 25 MG 24 hr tablet Take 1 tablet (25 mg) by mouth every evening 30 tablet      multivitamin, therapeutic with minerals (THERA-VIT-M) TABS tablet Take 1 tablet by mouth 2 times daily (with meals)       OMEPRAZOLE PO Take 40 mg by mouth every morning        simvastatin (ZOCOR) 40 MG tablet Take 40 mg by mouth  daily (with dinner)        terazosin (HYTRIN) 5 MG capsule Take 5 mg by mouth At Bedtime       traMADol (ULTRAM) 50 MG tablet Take 50 mg by mouth every 6 hours as needed for moderate pain        warfarin (COUMADIN) 1 MG tablet Take 0.5 tablets (0.5 mg) by mouth daily On Wednesday (evening) 30 tablet      ACCU-CHEK MULTICLIX LANCETS MISC by Lancet route 2 times daily. Use to test blood sugar twice daily or as directed. 102 each prn     clindamycin (CLEOCIN T) 1 % lotion Apply topically daily as needed (open sore on leg)       docusate sodium (COLACE) 100 MG capsule Take 100 mg by mouth 2 times daily as needed for constipation         ROS:   10 point ROS negative except HPI    EXAM:  Home weight  General: appears comfortable, alert and articulate  Head: normocephalic, atraumatic  Eyes: anicteric sclera, EOMI  Neck: no adenopathy  Orophyarynx: moist mucosa, no lesions, dentition intact  Heart: regular, S1/S2, no murmur, gallop, rub, estimated JVP WNL  Lungs: clear, no rales or wheezing  Abdomen: soft, non-tender, bowel sounds present, no hepatosplenomegaly  Extremities: no clubbing, cyanosis or edema  Neurological: normal speech and affect, no gross motor deficits      Wt Readings from Last 24 Encounters:   01/15/19 83.9 kg (185 lb)   12/19/18 85.2 kg (187 lb 14.4 oz)   11/08/18 85.6 kg (188 lb 11.2 oz)   10/30/18 93 kg (205 lb)   09/06/18 87.4 kg (192 lb 9.6 oz)   05/15/18 87 kg (191 lb 12.8 oz)   10/17/17 88.7 kg (195 lb 8 oz)   06/04/17 88.5 kg (195 lb)   04/18/17 94.3 kg (208 lb)   04/11/17 91.6 kg (201 lb 15.1 oz)   04/04/17 93 kg (205 lb 1.6 oz)   02/27/17 100.9 kg (222 lb 6 oz)   01/08/15 96.6 kg (213 lb)   12/13/14 98.7 kg (217 lb 8 oz)   12/27/13 100.7 kg (222 lb)   05/17/12 117.9 kg (260 lb)       Labs:      CBC RESULTS:  Lab Results   Component Value Date    WBC 3.2 (L) 11/08/2018    RBC 3.41 (L) 11/08/2018    HGB 10.3 (L) 11/08/2018    HCT 32.7 (L) 11/08/2018    MCV 96 11/08/2018    MCH 30.2 11/08/2018     MCHC 31.5 11/08/2018    RDW 14.6 11/08/2018     (L) 11/08/2018       CMP RESULTS:  Lab Results   Component Value Date     (L) 01/15/2019    POTASSIUM 4.0 01/15/2019    CHLORIDE 105 01/15/2019    CO2 29 01/15/2019    ANIONGAP 5 (L) 01/15/2019     (H) 01/15/2019    BUN 18 01/15/2019    CR 0.97 01/15/2019    GFRESTIMATED 74 01/15/2019    GFRESTBLACK 89 01/15/2019    TYSON 9.9 01/15/2019    BILITOTAL 0.4 11/08/2018    ALBUMIN 2.7 (L) 11/08/2018    ALKPHOS 52 11/08/2018    ALT 9 11/08/2018    AST 22 11/08/2018        INR RESULTS:  Lab Results   Component Value Date    INR 2.84 (H) 11/08/2018       No components found for: CK  Lab Results   Component Value Date    MAG 2.0 11/06/2018     Lab Results   Component Value Date    NTBNPI 787 02/24/2017       Echocardiogram (2/25/2017):  Interpretation Summary  The left ventricle is normal in size. There is normal left ventricular wall  thickness. Left ventricular systolic function is normal. The visual ejection  fraction is estimated at 55-60%. Flattened septum is consistent with RV  pressure/volume overload. There is no thrombus seen in the left ventricle.  The right ventricle is severely dilated. The right ventricular systolic  function is mild to moderately reduced.  The left atrium is moderately dilated. The right atrium is severely dilated.  There is severe (4+) tricuspid regurgitation. The right ventricular systolic  pressure is approximated at 30.9 mmHg plus the right atrial pressure. This  most likely represents an underestimation of the true RVSP.  Mild aortic stenosis.  In direct comparison to the previous study dated 11/07/2012, there has been a  mild interval increase in right ventricular size and deterioration in right  ventricular systolic function.      CT Chest Pulmonary (2/24/2017):  COMPARISON: 4/13/2011     FINDINGS: Evaluation of the pulmonary arterial system shows no  evidence of embolus. The thoracic aorta is calcified and  slightly  tortuous. No aortic aneurysm or dissection. There are coronary artery  atherosclerotic calcifications. The heart is enlarged. There is no  mediastinal, hilar or axillary lymph node enlargement. There are a few  tiny calcified granulomas in the right upper lobe. Mild dependent  atelectasis bilaterally. A few bands of scar or atelectasis  bilaterally. Small bilateral pleural effusions. Images through the  upper abdomen are remarkable for small amount of ascites. The inferior  vena cava is very large which may be due to right heart dysfunction.  The liver has a slightly nodular contour suggesting cirrhosis.     IMPRESSION:  1. There is no pulmonary embolus, aortic aneurysm or dissection.  2. Coronary artery atherosclerotic calcification. Cardiomegaly.  3. Small bilateral pleural effusions.  4. Probable hepatic cirrhosis. Small amount of ascites.      XR Chest 2 Views (4/5/2017):  HISTORY: Other secondary pulmonary hypertension  COMPARISON: 2/24/2017.  IMPRESSION: No acute abnormality.       Right Heart Catheterization (4/11/2017):  82-year-old gentleman with unexplained pulmonary hypertension, severe  right heart failure and right ventricular dysfunction, and severe  tricuspid regurgitation is referred for further evaluation of his  pulmonary hypertension etiology 2 guide further evaluation and  treatment.    Catheterization results  Baseline with thermal dilution cardiac output  -RA pressure mean = 12. Patient is defibrillation  -RV pressure-49/11  -Pulmonary artery pressure 48/21 (31)  -Pulmonary capillary wedge pressure -/24 (15)  -Thermal dilution cardiac output/index 4.9/2.4  -Calculated pulmonary vascular arteriolar resistance 3.2 Woods units    With estimated Rachael cardiac output  -Pulmonary pressure 47/19 (31)  -Pulmonary capillary wedge pressure-/27 (17)  -Pulmonary artery oxygen saturation 67% aortic oxygen saturation 98  percent on room air  -Estimated Rachael cardiac output/index 5.1/2.5  -Pulmonary  vascular arteriolar resistance 2.6 Woods units       VQ Scan (4/5/2017):  IMPRESSION:   1. Central airways deposition, suggestive of chronic obstructive  pulmonary disease.  2. Mild diffusely heterogeneous pulmonary perfusion, similar to that  of the ventilation images. These findings most likely relate to  underlying pulmonary disease. There are no focal pulmonary perfusion  defects.       NM MPI w Lexiscan (2/24/2017):  Indication/Clinical History: Shortness of breath, chest pain     Impression  1. Myocardial perfusion imaging using single isotope technique  demonstrated no evidence of ischemia or infarction.   2. Gated images demonstrated normal size left ventricle with normal  wall motion. The left ventricular systolic function is normal at 76%.  3. Compared to the prior study from  .     Procedure  Pharmacologic stress testing was performed with Lexiscan at a rate of  0.08 mg/ml rapid bolus injection, for 15 seconds, 0.4 mg/5ml  intravenously. Low-level exercise was not performed along with the  vasodilator infusion. The heart rate was 6767 at baseline and bj to  82 beats per minute during the Lexiscan infusion. The rest blood  pressure was 118/64 mmHg and was 118/57 mm Hg during Lexiscan  infusion. The patient experienced no chest pain during the test.     Myocardial perfusion imaging was performed at rest, approximately 45  minutes after the injection intravenously of 11 mCi of Tc-99m Myoview.  At peak pharmacologic effect, 10-20 seconds after Lexiscan, the  patient was injected intravenously with 32 mCi of Tc-99m Myoview. The  post-stress tomographic imaging was performed approximately 60 minutes  after stress.     EKG Findings  The resting EKG demonstrated sinus rhythm with poor R wave progression  in the low voltage pattern. The stress EKG demonstrated no EKG  changes suggestive of ischemia.     Tomographic Findings  Overall, the study quality is fair. Mild visceral tracer artifact is  noted . On the  stress images, mild inferior count reduction is noted.  On the rest images, mild inferior count reduction is seen and likely  due to diaphragmatic attenuation and visceral tracer artifact . Gated  images demonstrated normal size left ventricle with normal wall  motion. The left ventricular ejection fraction was calculated to be  76%. TID was absent.      Assessment and Plan:   1. Cold all the time and history of BROOKE, will check iron levels and if low replace with parenteral iron  Will also check TSH, free T4  There is no history of melena/hematochezia,    2, Return to see Dr. Rolon regarding headaches  This appears to be main problem.    3. Continue current CHF regimen as he is functional class 2-3 and doing everything he desires    4. See Dr. Parekh for follow-up of right leg swelling and sore.  Consider support stockings          .                              Thank you for allowing me to participate in the care of your patient.      Sincerely,     Julian Joyce MD     [facility]    cc:   Julian Joyce MD  [address] [place] [address] [id number]  [place] [address]

## 2019-01-15 NOTE — LETTER
1/15/2019    Oscar Parekh MD  [address] [address]    RE: Sean Brunner       Dear Colleague,    I had the pleasure of seeing Sean Brunner in the [facility].    Problem List:    1. H/o cor pulmonale with know 3-4+ TR and RV dysfunction in past- slight increase in RV size and decline in RV systolic function compared to 2011 echo  2. Chronic afib- VR well controlled, on coumadin  3. Moderate obstruction on PFT 2011  4. STEWART- no compliant with CPAP  5. H/o PE- no PE on CT chest this admission  6. Non obstructive CAD CT cor angio 2011, negative stress test this admission  7. Obesity- lost 100 lbs over last several years  8. Cellulitis of lower extremity  9. Recurrent headaches requiring Tramadol    Dear  Oscar Parekh    The patient returns for follow-up of heart failure.  There is no interim history of chest pain, tightness, paroxysmal nocturnal dyspnea, orthopnea, peripheral edema, palpitation, pre-syncope, syncope, device discharge.  He complains of excessive fatigue and being cold.  He has history of BROOKE.   Exercise tolerance is adequate to get through grocery  The patient is attempting to exercise regularly and following a sodium restricted, calorically appropriate diet.  Medications are reviewed and the patient is taking medications as prescribed.  The patient is generally sleeping variably.  Most of day is spent reading or playing cards. He is moving furniture!.      PAST MEDICAL HISTORY:  Past Medical History:   Diagnosis Date     Atrial fibrillation (H) 12/27/2011    Sees Dr Carcamo, avoid amio due to cor pulmonale, asymptomatic rates up to160 with exersion.        Bilateral leg edema     compression wraps     CAD (coronary artery disease)     Nonobstructive     Cardiomyopathy (H) 5/1/2012     Carotid stenosis      Cellulitis of left lower extremity 11/5/2018     COPD (chronic obstructive pulmonary disease)  (H)      Cor pulmonale (H) 5/1/2012     Diastolic dysfunction      DM2 (diabetes mellitus, type 2) (H)      IYER (dyspnea on exertion)      GERD (gastroesophageal reflux disease)      HTN (hypertension)      Hypothyroidism      Leg wound, left      LV dysfunction 05/01/2012     Due to cor pulmonale and a fib related tachycardia      Morbid obesity (H) 5/1/2012     On supplemental oxygen by nasal cannula     at [facility]     STEWART (obstructive sleep apnea) 05/01/2012    Non compliant with CPAP     Pulmonary embolism (H)      Pulmonary HTN (H)      Varicose veins of bilateral lower extremities with other complications      Vitamin B12 deficiency disease 5/1/2012    On oral replacement        FAMILY HISTORY:  History reviewed. No pertinent family history.      SOCIAL HISTORY:  Social History     Social History     Marital status:      Spouse name: N/A     Number of children: N/A     Years of education: N/A     Social History Main Topics     Smoking status: Never Smoker     Smokeless tobacco: Never Used     Alcohol use No     Drug use: No     Sexual activity: Not on file     Other Topics Concern     Not on file     Social History Narrative       CURRENT MEDICATIONS:  Current Outpatient Medications   Medication Sig Dispense Refill     cyanocolbalamin (VITAMIN  B-12) 1000 MCG tablet Take 3,000 mcg by mouth every morning       divalproex sodium delayed-release (DEPAKOTE) 250 MG DR tablet Take 250 mg by mouth At Bedtime       furosemide (LASIX) 20 MG tablet Take 20 mg by mouth daily       levothyroxine (SYNTHROID) 150 MCG tablet Take 150 mcg by mouth every morning (before breakfast)        metoprolol succinate (TOPROL-XL) 25 MG 24 hr tablet Take 1 tablet (25 mg) by mouth every evening 30 tablet      multivitamin, therapeutic with minerals (THERA-VIT-M) TABS tablet Take 1 tablet by mouth 2 times daily (with meals)       OMEPRAZOLE PO Take 40 mg by mouth every morning        simvastatin (ZOCOR) 40 MG tablet Take 40 mg by mouth  daily (with dinner)        terazosin (HYTRIN) 5 MG capsule Take 5 mg by mouth At Bedtime       traMADol (ULTRAM) 50 MG tablet Take 50 mg by mouth every 6 hours as needed for moderate pain        warfarin (COUMADIN) 1 MG tablet Take 0.5 tablets (0.5 mg) by mouth daily On Wednesday (evening) 30 tablet      ACCU-CHEK MULTICLIX LANCETS MISC by Lancet route 2 times daily. Use to test blood sugar twice daily or as directed. 102 each prn     clindamycin (CLEOCIN T) 1 % lotion Apply topically daily as needed (open sore on leg)       docusate sodium (COLACE) 100 MG capsule Take 100 mg by mouth 2 times daily as needed for constipation         ROS:   10 point ROS negative except HPI    EXAM:  Home weight  General: appears comfortable, alert and articulate  Head: normocephalic, atraumatic  Eyes: anicteric sclera, EOMI  Neck: no adenopathy  Orophyarynx: moist mucosa, no lesions, dentition intact  Heart: regular, S1/S2, no murmur, gallop, rub, estimated JVP WNL  Lungs: clear, no rales or wheezing  Abdomen: soft, non-tender, bowel sounds present, no hepatosplenomegaly  Extremities: no clubbing, cyanosis or edema  Neurological: normal speech and affect, no gross motor deficits      Wt Readings from Last 24 Encounters:   01/15/19 83.9 kg (185 lb)   12/19/18 85.2 kg (187 lb 14.4 oz)   11/08/18 85.6 kg (188 lb 11.2 oz)   10/30/18 93 kg (205 lb)   09/06/18 87.4 kg (192 lb 9.6 oz)   05/15/18 87 kg (191 lb 12.8 oz)   10/17/17 88.7 kg (195 lb 8 oz)   06/04/17 88.5 kg (195 lb)   04/18/17 94.3 kg (208 lb)   04/11/17 91.6 kg (201 lb 15.1 oz)   04/04/17 93 kg (205 lb 1.6 oz)   02/27/17 100.9 kg (222 lb 6 oz)   01/08/15 96.6 kg (213 lb)   12/13/14 98.7 kg (217 lb 8 oz)   12/27/13 100.7 kg (222 lb)   05/17/12 117.9 kg (260 lb)       Labs:      CBC RESULTS:  Lab Results   Component Value Date    WBC 3.2 (L) 11/08/2018    RBC 3.41 (L) 11/08/2018    HGB 10.3 (L) 11/08/2018    HCT 32.7 (L) 11/08/2018    MCV 96 11/08/2018    MCH 30.2 11/08/2018     MCHC 31.5 11/08/2018    RDW 14.6 11/08/2018     (L) 11/08/2018       CMP RESULTS:  Lab Results   Component Value Date     (L) 01/15/2019    POTASSIUM 4.0 01/15/2019    CHLORIDE 105 01/15/2019    CO2 29 01/15/2019    ANIONGAP 5 (L) 01/15/2019     (H) 01/15/2019    BUN 18 01/15/2019    CR 0.97 01/15/2019    GFRESTIMATED 74 01/15/2019    GFRESTBLACK 89 01/15/2019    TYSON 9.9 01/15/2019    BILITOTAL 0.4 11/08/2018    ALBUMIN 2.7 (L) 11/08/2018    ALKPHOS 52 11/08/2018    ALT 9 11/08/2018    AST 22 11/08/2018        INR RESULTS:  Lab Results   Component Value Date    INR 2.84 (H) 11/08/2018       No components found for: CK  Lab Results   Component Value Date    MAG 2.0 11/06/2018     Lab Results   Component Value Date    NTBNPI 787 02/24/2017       Echocardiogram (2/25/2017):  Interpretation Summary  The left ventricle is normal in size. There is normal left ventricular wall  thickness. Left ventricular systolic function is normal. The visual ejection  fraction is estimated at 55-60%. Flattened septum is consistent with RV  pressure/volume overload. There is no thrombus seen in the left ventricle.  The right ventricle is severely dilated. The right ventricular systolic  function is mild to moderately reduced.  The left atrium is moderately dilated. The right atrium is severely dilated.  There is severe (4+) tricuspid regurgitation. The right ventricular systolic  pressure is approximated at 30.9 mmHg plus the right atrial pressure. This  most likely represents an underestimation of the true RVSP.  Mild aortic stenosis.  In direct comparison to the previous study dated 11/07/2012, there has been a  mild interval increase in right ventricular size and deterioration in right  ventricular systolic function.      CT Chest Pulmonary (2/24/2017):  COMPARISON: 4/13/2011     FINDINGS: Evaluation of the pulmonary arterial system shows no  evidence of embolus. The thoracic aorta is calcified and  slightly  tortuous. No aortic aneurysm or dissection. There are coronary artery  atherosclerotic calcifications. The heart is enlarged. There is no  mediastinal, hilar or axillary lymph node enlargement. There are a few  tiny calcified granulomas in the right upper lobe. Mild dependent  atelectasis bilaterally. A few bands of scar or atelectasis  bilaterally. Small bilateral pleural effusions. Images through the  upper abdomen are remarkable for small amount of ascites. The inferior  vena cava is very large which may be due to right heart dysfunction.  The liver has a slightly nodular contour suggesting cirrhosis.     IMPRESSION:  1. There is no pulmonary embolus, aortic aneurysm or dissection.  2. Coronary artery atherosclerotic calcification. Cardiomegaly.  3. Small bilateral pleural effusions.  4. Probable hepatic cirrhosis. Small amount of ascites.      XR Chest 2 Views (4/5/2017):  HISTORY: Other secondary pulmonary hypertension  COMPARISON: 2/24/2017.  IMPRESSION: No acute abnormality.       Right Heart Catheterization (4/11/2017):  82-year-old gentleman with unexplained pulmonary hypertension, severe  right heart failure and right ventricular dysfunction, and severe  tricuspid regurgitation is referred for further evaluation of his  pulmonary hypertension etiology 2 guide further evaluation and  treatment.    Catheterization results  Baseline with thermal dilution cardiac output  -RA pressure mean = 12. Patient is defibrillation  -RV pressure-49/11  -Pulmonary artery pressure 48/21 (31)  -Pulmonary capillary wedge pressure -/24 (15)  -Thermal dilution cardiac output/index 4.9/2.4  -Calculated pulmonary vascular arteriolar resistance 3.2 Woods units    With estimated Rachael cardiac output  -Pulmonary pressure 47/19 (31)  -Pulmonary capillary wedge pressure-/27 (17)  -Pulmonary artery oxygen saturation 67% aortic oxygen saturation 98  percent on room air  -Estimated Rachael cardiac output/index 5.1/2.5  -Pulmonary  vascular arteriolar resistance 2.6 Woods units       VQ Scan (4/5/2017):  IMPRESSION:   1. Central airways deposition, suggestive of chronic obstructive  pulmonary disease.  2. Mild diffusely heterogeneous pulmonary perfusion, similar to that  of the ventilation images. These findings most likely relate to  underlying pulmonary disease. There are no focal pulmonary perfusion  defects.       NM MPI w Lexiscan (2/24/2017):  Indication/Clinical History: Shortness of breath, chest pain     Impression  1. Myocardial perfusion imaging using single isotope technique  demonstrated no evidence of ischemia or infarction.   2. Gated images demonstrated normal size left ventricle with normal  wall motion. The left ventricular systolic function is normal at 76%.  3. Compared to the prior study from  .     Procedure  Pharmacologic stress testing was performed with Lexiscan at a rate of  0.08 mg/ml rapid bolus injection, for 15 seconds, 0.4 mg/5ml  intravenously. Low-level exercise was not performed along with the  vasodilator infusion. The heart rate was 6767 at baseline and bj to  82 beats per minute during the Lexiscan infusion. The rest blood  pressure was 118/64 mmHg and was 118/57 mm Hg during Lexiscan  infusion. The patient experienced no chest pain during the test.     Myocardial perfusion imaging was performed at rest, approximately 45  minutes after the injection intravenously of 11 mCi of Tc-99m Myoview.  At peak pharmacologic effect, 10-20 seconds after Lexiscan, the  patient was injected intravenously with 32 mCi of Tc-99m Myoview. The  post-stress tomographic imaging was performed approximately 60 minutes  after stress.     EKG Findings  The resting EKG demonstrated sinus rhythm with poor R wave progression  in the low voltage pattern. The stress EKG demonstrated no EKG  changes suggestive of ischemia.     Tomographic Findings  Overall, the study quality is fair. Mild visceral tracer artifact is  noted . On the  stress images, mild inferior count reduction is noted.  On the rest images, mild inferior count reduction is seen and likely  due to diaphragmatic attenuation and visceral tracer artifact . Gated  images demonstrated normal size left ventricle with normal wall  motion. The left ventricular ejection fraction was calculated to be  76%. TID was absent.      Assessment and Plan:   1. Cold all the time and history of BROOKE, will check iron levels and if low replace with parenteral iron  Will also check TSH, free T4  There is no history of melena/hematochezia,    2, Return to see Dr. Rolon regarding headaches  This appears to be main problem.    3. Continue current CHF regimen as he is functional class 2-3 and doing everything he desires    4. See Dr. Parekh for follow-up of right leg swelling and sore.  Consider support stockings        Thank you for allowing me to participate in the care of your patient.    Sincerely,     Julian Joyce MD     [facility]

## 2019-01-24 ENCOUNTER — TELEPHONE (OUTPATIENT)
Dept: CARDIOLOGY | Facility: CLINIC | Age: 84
End: 2019-01-24

## 2019-01-24 NOTE — TELEPHONE ENCOUNTER
Health Call Center    Phone Message    May a detailed message be left on voicemail: yes    Reason for Call: Requesting Results   Name/type of test: Labs  Date of test: 01/15/19  Was test done at a location other than [facility] (Please fill in the location if not [facility])?: Mohini    Kimi at [facility] called in and said pt had contacted her for results for labs done at the [facility] on 1/15. Please give pt's wife a call back, Kimi said the number was [phone]... I am not sure that is accurate, almost seems like a [employer] number. If that number does not work, please try the home/cell number listed at [phone]. Thank you.    Action Taken: Message routed to:  [facility] ([facility]): Cardiology

## 2019-01-24 NOTE — TELEPHONE ENCOUNTER
I called and left a message for patient regarding recent lab results. I requested that he call us back. Porsha Dawn RN on 1/24/2019 at 4:45 PM     I called pt to follow up on his recent labs.  I reviewed recent labs as well as sent additional labs to the [facility] [place].  [phone] (fax)  Pt states he will have then drawn on 2/11.  I will follow up at that time. Porsha Dawn RN on 1/28/2019 at 12:59 PM    I called pt and let him know that the TSH that was drawn was normal.  I left a voicemail with information. Porsha Dawn RN on 2/13/2019 at 12:45 PM

## 2019-05-31 NOTE — ED NOTES
Pt's SBP and MAP meeting MD's requires for past 30 mins. MD doesn't feel central line is necessary. Will hold off on central line and levophed at this time.

## 2019-05-31 NOTE — PHARMACY-ANTICOAGULATION SERVICE
Clinical Pharmacy - Warfarin Dosing Consult     Pharmacy has been consulted to manage this patient s warfarin therapy.  Indication: Atrial Fibrillation;DVT/ PE Treatment(DVT of RLE (2006))  Therapy Goal: INR 2-3  OP Anticoag Clinic: [facility] (Clinic, [facility])  Warfarin Prior to Admission: Yes  Warfarin PTA Regimen: 1.5 mg MWFSat, 1 mg SunTuTh    INR   Date Value Ref Range Status   05/31/2019 2.33 (H) 0.86 - 1.14 Final   11/08/2018 2.84 (H) 0.86 - 1.14 Final       Recommend warfarin 1.5 mg today.  Pharmacy will monitor Sean Brunner daily and order warfarin doses to achieve specified goal.      Please contact pharmacy as soon as possible if the warfarin needs to be held for a procedure or if the warfarin goals change.

## 2019-05-31 NOTE — PHARMACY-VANCOMYCIN DOSING SERVICE
Pharmacy Vancomycin Initial Note  Date of Service May 31, 2019  Patient's  1934  84 year old, male    Indication: Skin and Soft Tissue Infection- Cellulitis    Current estimated CrCl = Estimated Creatinine Clearance: 64.6 mL/min (based on SCr of 0.93 mg/dL).    Creatinine for last 3 days  2019: 11:04 AM Creatinine 0.93 mg/dL    Recent Vancomycin Level(s) for last 3 days  No results found for requested labs within last 72 hours.      Vancomycin IV Administrations (past 72 hours)                   vancomycin (VANCOCIN) 1,750 mg in sodium chloride 0.9 % 500 mL intermittent infusion (mg) 1,750 mg Given 19 1333                Nephrotoxins and other renal medications (From now, onward)    Start     Dose/Rate Route Frequency Ordered Stop    19 0100  vancomycin (VANCOCIN) 1,250 mg in sodium chloride 0.9 % 250 mL intermittent infusion      1,250 mg  over 90 Minutes Intravenous EVERY 12 HOURS 19 1704            Contrast Orders - past 72 hours (72h ago, onward)    None                Plan:  1.  Start vancomycin  1250 mg IV q12h.    1750mg load in ER   2.  Goal Trough Level: 10-15 mg/L   3.  Pharmacy will check trough levels as appropriate in 1-3 Days.    4. Serum creatinine levels will be ordered a minimum of twice weekly.    5. [place] method utilized to dose vancomycin therapy: Method 1    Drea Bonilla

## 2019-05-31 NOTE — H&P
[facility]    History and Physical  Hospitalist       Date of Admission:  5/31/2019    Assessment & Plan   Sean Brunner is a 84 year old male who presents with fall and inability to get up, fever and chills, left lower extremity redness.    Patient has a history of old injury to his left lower extremity.  He was last admitted in November 2018 for left lower extremity cellulitis with septic shock.  Previous admissions for similar issue.    He was overall doing well until last night.  This morning he had a fall and he could not get up.  He looked very weak and disoriented.  Patient was brought into the emergency room and was evaluated.  He was found to have high-grade fever.  He had subjective fever at home with chills.  The source of infection is thought to be his left lower extremity cellulitis.  No pulmonary or urinary symptoms.  No abdominal symptoms.    Patient was hypotensive in the emergency room with tachycardia.  Underlying rhythm is A. fib.  His heart rate responded to IV fluid resuscitation but his blood pressure remains low.  Patient may need vasopressors.    It appears that he was started on prednisone in the outpatient setting, sometimes last month for possible polymyalgia rheumatica.  As far as we can figure out he takes 10 mg of prednisone.  Patient was given 50 mg of IV hydrocortisone in the emergency room.    Problem List:    Severe sepsis.  Hypotension.  Left lower extremity cellulitis.  - Broad-spectrum antibiotics with IV cefazolin and IV vancomycin.  Check MRSA nares.  - IV fluids.  May need vasopressors.  - Stress dose of IV hydrocortisone due to prednisone use at home.  -May repeat IV fluid bolus.  -Admission to ICU.  No evidence of necrotizing fasciitis on exam.    Atrial fibrillation.  Tachycardia.  -Heart rate is responding to IV fluid.  Continue to monitor.  - If heart rate is persistently elevated then he may need digoxin or amiodarone due to his hypotension.  - Pharmacy  consult for Coumadin dosing.    Diabetes  - Insulin sliding scale.    Hypothyroidism  - Last TSH in September 2018 was normal.  Obtain home medication dosing.    Plan discussed with the patient and patient's son.      DVT Prophylaxis: Warfarin  Code Status: DNR / DNI.  Patient wishes to be DNR/DNI.  I discussed that in the presence of patient's son.    Critical care time spent 50 minutes.    Gustavo Marie MD    Primary Care Physician   Oscar Parekh    Chief Complaint   Fall, fever, chills, left lower extremity redness.      History of Present Illness   Sean Brunner is a 84 year old male presented to the emergency room with the above symptoms.  Being admitted to ICU for sepsis, hypertension, A. fib, left lower extremity cellulitis.      Past Medical History    I have reviewed this patient's medical history and updated it with pertinent information if needed.   Past Medical History:   Diagnosis Date     Atrial fibrillation (H) 12/27/2011    Sees Dr Carcamo, avoid amio due to cor pulmonale, asymptomatic rates up to160 with exersion.        Bilateral leg edema     compression wraps     CAD (coronary artery disease)     Nonobstructive     Cardiomyopathy (H) 5/1/2012     Carotid stenosis      Cellulitis of left lower extremity 11/5/2018     COPD (chronic obstructive pulmonary disease) (H)      Cor pulmonale (H) 5/1/2012     Diastolic dysfunction      DM2 (diabetes mellitus, type 2) (H)      IYER (dyspnea on exertion)      GERD (gastroesophageal reflux disease)      HTN (hypertension)      Hypothyroidism      Leg wound, left      LV dysfunction 05/01/2012     Due to cor pulmonale and a fib related tachycardia      Morbid obesity (H) 5/1/2012     On supplemental oxygen by nasal cannula     at [facility]     STEWART (obstructive sleep apnea) 05/01/2012    Non compliant with CPAP     Pulmonary embolism (H)      Pulmonary HTN (H)      Varicose veins of bilateral lower extremities with other complications      Vitamin B12  deficiency disease 5/1/2012    On oral replacement        Past Surgical History   I have reviewed this patient's surgical history and updated it with pertinent information if needed.  Past Surgical History:   Procedure Laterality Date     JOINT REPLACEMENT         Prior to Admission Medications   Prior to Admission Medications   Prescriptions Last Dose Informant Patient Reported? Taking?   OMEPRAZOLE PO 5/31/2019 at am Self Yes Yes   Sig: Take 40 mg by mouth every morning    cyanocolbalamin (VITAMIN  B-12) 1000 MCG tablet 5/31/2019 at am  Yes Yes   Sig: Take 3,000 mcg by mouth every morning   divalproex sodium delayed-release (DEPAKOTE) 250 MG DR tablet 5/30/2019 at hs Self Yes Yes   Sig: Take 250 mg by mouth At Bedtime   furosemide (LASIX) 20 MG tablet 5/31/2019 at am  Yes Yes   Sig: Take 20 mg by mouth daily   levothyroxine (SYNTHROID) 150 MCG tablet 5/31/2019 at am  Yes Yes   Sig: Take 150 mcg by mouth every morning (before breakfast)    metoprolol succinate (TOPROL-XL) 25 MG 24 hr tablet 5/30/2019 at pm   No Yes   Sig: Take 1 tablet (25 mg) by mouth every evening   multivitamin, therapeutic with minerals (THERA-VIT-M) TABS tablet 5/31/2019 at x 1 Self Yes Yes   Sig: Take 1 tablet by mouth 2 times daily (with meals)   predniSONE (DELTASONE) 1 MG tablet NEW RX at has not started  Yes Yes   Sig: Take 1 mg by mouth daily In addition to 5 mg tablet for total dose of 6 mg   predniSONE (DELTASONE) 10 MG tablet 5/31/2019 at am  Yes Yes   Sig: Take 10 mg by mouth daily   predniSONE (DELTASONE) 5 MG tablet NEW RX at has not started  Yes Yes   Sig: Take 5 mg by mouth daily In addition to 1 mg tablet for total dose of 6 mg   simvastatin (ZOCOR) 40 MG tablet 5/30/2019 at pm  Yes Yes   Sig: Take 40 mg by mouth daily (with dinner)    terazosin (HYTRIN) 5 MG capsule 5/30/2019 at hs  Yes Yes   Sig: Take 5 mg by mouth At Bedtime   traMADol (ULTRAM) 50 MG tablet Past Month at Unknown time  Yes Yes   Sig: Take 50 mg by mouth every  6 hours as needed for moderate pain    warfarin (COUMADIN) 1 MG tablet 5/29/2019 at pm  Yes Yes   Sig: Take 1.5 mg by mouth four times a week Monday, Wednesday, Friday, Saturday   warfarin (COUMADIN) 1 MG tablet 5/28/2019 at pm  Yes Yes   Sig: Take 1 mg by mouth three times a week Tuesday, Thursday, Sunday      Facility-Administered Medications: None     Allergies   No Known Allergies    Social History   I have reviewed this patient's social history and updated it with pertinent information if needed. Sean Brunner  reports that he has never smoked. He has never used smokeless tobacco. He reports that he does not drink alcohol or use drugs.    Family History   Discussed but none significant.    Review of Systems   The 10 point Review of Systems is negative other than noted in the HPI or here.     Physical Exam   Temp: 99.9  F (37.7  C) Temp src: Temporal BP: (!) 79/58 Pulse: 117 Heart Rate: 104 Resp: 23 SpO2: 95 % O2 Device: None (Room air)    Vital Signs with Ranges  Temp:  [99.9  F (37.7  C)-102.9  F (39.4  C)] 99.9  F (37.7  C)  Pulse:  [] 117  Heart Rate:  [] 104  Resp:  [13-24] 23  BP: ()/(39-86) 79/58  SpO2:  [91 %-100 %] 95 %  194 lbs 0 oz    Constitutional: Awake, alert, cooperative, no apparent distress  Eyes: Conjunctiva and pupils examined and normal.  HEENT: Dry mucous membranes, normal dentition.  Respiratory: Clear to auscultation bilaterally, no crackles or wheezing.  Cardiovascular: Tachycardic.  Elevated JVD.  No obvious murmur.  GI: Soft, non-distended, non-tender, normal bowel sounds.  Lymph/Hematologic: No anterior cervical or supraclavicular adenopathy.  Musculoskeletal: No joint tenderness.  No joint effusion or pain.  Neurologic: Cranial nerves 2-12 intact, normal strength and sensation.  Psychiatric: Alert, oriented to person, place and time, no obvious anxiety or depression.  Lower extremity:  Left lower extremity is slightly more swollen than the right one.  According  to patient's son this is his normal.  There is skin excoriation on the left side with small ulcers.  There is also obvious redness.  Left leg is much warmer to touch.  Slight tenderness as well.  No abscess.  No blister formation.  Has underlying old discoloration of the skin suggestive of chronic venous stasis.  No evidence of necrotizing fasciitis.    Data     Recent Labs   Lab 05/31/19  1104   WBC 9.2   HGB 10.6*   MCV 95      INR 2.33*      POTASSIUM 4.1   CHLORIDE 104   CO2 30   BUN 27   CR 0.93   ANIONGAP 5   TYSON 8.9   GLC 86   TROPI <0.015       Recent Results (from the past 24 hour(s))   Chest  XR, 1 view PORTABLE    Narrative    CHEST ONE VIEW PORTABLE   5/31/2019 11:14 AM     HISTORY: Weakness.    COMPARISON: 11/4/2018      Impression    IMPRESSION: No radiographic evidence of acute chest abnormality.    NICHO CALDERON MD

## 2019-05-31 NOTE — ED NOTES
Bed: ED03  Expected date: 5/31/19  Expected time:   Means of arrival: Ambulance  Comments:  [id number] Red team

## 2019-05-31 NOTE — ED NOTES
Pt given urinal to see if he can pee. Pt states he doesn't have to go now but will let us know when he has to. Son at bedside agreed that pt will tell staff when he needs to void. Urinal left at bedside.

## 2019-05-31 NOTE — ED TRIAGE NOTES
Pt arrives via EMS from home d/t generalized weakness. Per EMS, pt tried to transfer from bed to chair, but fell around 0800. No injuries sustained. Fever 103.5 by EMS. Pt with wound to L ankle from previous encounter. Also c/o cough. Pt A&O x4. BG 84.

## 2019-05-31 NOTE — PROVIDER NOTIFICATION
PICC Insertion Time-Out   Proceduralist prior to procedure:    Confirmed provider order for procedure    Verified appropriate supplies/equipment are available for procedure    Verified appropriate assessments have been completed     Prior to procedure the proceduralist instituted a 'Cease all activity' to confirm with a second nursing staff member the following:     Confirm patient identifiers using patient name and date of birth    Verify procedure to be performed    Verify consent was signed and witnessed    Verbalize any allergies    Verify code status     [Co-signature verification:  IV Access flowsheet > click any insertion column associated to a note > view Value Information)     María Elena Watson RN  Time out performed with María Elena Robertson RN

## 2019-05-31 NOTE — PLAN OF CARE
"ICU End of Shift Summary.  For vital signs and complete assessments, please see documentation flowsheets.     Pertinent assessments: A&Ox3, when asked why he thinks he is in hospital he states \"I dont really know\" \"Im sick.\" Lethargic. Cheesh-Na; son stated he will bring in HAs tomorrow, VSS, tele shows afib, BPs soft however MAPs continue to stay >60, afebrile upon admission to ICU. Remains on RA, lungs clear but diminished throughout. Able to void in urinal. Cellulitis to LLE, edema, pressure injury to coccxy area with purple/red bottom; mepilex placed for protection. WOC consult will be placed. PICC line placed d/t soft BPs and labs. Son at bedside.     Major Shift Events: Admission to ICU  Plan (Upcoming Events): Monitor BPs and temps.   Discharge/Transfer Needs: TBD    Bedside Shift Report Completed : yes   Bedside Safety Check Completed: yes     "

## 2019-05-31 NOTE — ED PROVIDER NOTES
"  History     Chief Complaint:  Fall     The history is provided by the EMS personnel, the patient and the spouse.      Sean Brunner is an 84 year old male with a history of atrial fibrillation on Coumadin and diastolic dysfunction on Lasix who presents via EMS for weakness. This morning Shahriar had a fall when trying to get out of bed. This was witnessed by wife and both parties deny injury or loss of consciousness. However, he was too weak to get off the floor. Ultimately paramedics were called. They found Shahriar to be febrile to 103.5F and tachycardic with heart rates 100-150 bpm and administered 250 ml IV fluids en route with systolic BP 90s. Paramedics noted the patient to have a cough, though Shahriar and his wife report this is unchanged from baseline. He does have a headache and states he feels fatigued, but denies chest pain, dyspnea, vomiting, diarrhea, abdominal pain, and dysuria. He has had no antipyretics prior to arrival. He has chronic issues with his left lower leg following a fall in 1965. Most recently he was admitted for septic shock due to cellulitis of the left lower extremity in November 2018. Notably, patient and his wife report he is taking prednisone for the last few weeks due to \"muscle inflammation.\"    Allergies:  NKDA     Medications:    Lasix  Levothyroxine  Metoprolol  Omeprazole  Zocor  Terazosin  Tramadol  Warfarin   Prednisone    Past Medical History:    Atrial fibrillation  Bilateral leg edema  CAD  Cardiomyopathy  Carotid stenosis  COPD  Cor pulmonale  Diastolic dysfunction  Type 2 diabetes  GERD  Hypertension  Hypothyroidism  Left leg wound  LV dysfunction  Obstructive sleep apnea  PE  Pulmonary hypertension  Varicose veins    Past Surgical History:    Joint replacement    Family History:    No past pertinent family history.     Social History:  Presents via EMS with his wife.   Negative for alcohol use.  Negative for tobacco use.   Marital Status:       Review of Systems "   Constitutional: Positive for fatigue and fever.   Respiratory: Negative for cough (unchanged) and shortness of breath.    Cardiovascular: Negative for chest pain.   Gastrointestinal: Negative for abdominal pain, diarrhea and vomiting.   Genitourinary: Negative for dysuria.   Skin: Positive for color change.   Neurological: Positive for weakness (generalized) and headaches. Negative for syncope.   All other systems reviewed and are negative.      Physical Exam     Patient Vitals for the past 24 hrs:   BP Temp Temp src Pulse Heart Rate Resp SpO2 Weight   05/31/19 1545 (!) 89/61 -- -- 107 98 22 94 % --   05/31/19 1540 (!) 87/56 -- -- 97 95 22 94 % --   05/31/19 1525 (!) 83/54 -- -- 101 99 23 94 % --   05/31/19 1520 (!) 83/55 -- -- 96 98 23 95 % --   05/31/19 1515 (!) 82/53 -- -- 96 99 24 94 % --   05/31/19 1510 (!) 82/53 -- -- 107 104 24 94 % --   05/31/19 1505 (!) 89/51 -- -- 98 101 23 94 % --   05/31/19 1435 (!) 84/53 -- -- 111 111 23 94 % --   05/31/19 1420 (!) 84/54 -- -- 131 121 23 95 % --   05/31/19 1355 (!) 80/50 -- -- 112 121 22 95 % --   05/31/19 1350 (!) 79/58 -- -- 117 104 23 95 % --   05/31/19 1345 (!) 77/39 -- -- 110 99 23 95 % --   05/31/19 1340 (!) 82/52 -- -- 124 113 24 95 % --   05/31/19 1335 (!) 83/49 -- -- 97 109 23 96 % --   05/31/19 1325 (!) 81/49 -- -- 105 98 23 96 % --   05/31/19 1315 (!) 81/55 -- -- 110 107 24 96 % --   05/31/19 1313 -- 99.9  F (37.7  C) Temporal -- -- -- -- --   05/31/19 1310 (!) 80/44 -- -- 108 115 24 96 % --   05/31/19 1300 (!) 74/50 -- -- 114 104 23 97 % --   05/31/19 1245 (!) 76/63 -- -- -- 122 18 97 % --   05/31/19 1230 -- -- -- -- 109 23 97 % --   05/31/19 1215 -- -- -- 100 111 21 97 % --   05/31/19 1213 -- 101.5  F (38.6  C) Temporal -- -- -- -- --   05/31/19 1200 98/63 -- -- 112 110 14 100 % --   05/31/19 1145 107/66 -- -- 121 111 13 100 % --   05/31/19 1130 -- -- -- 116 122 22 91 % --   05/31/19 1115 91/66 -- -- 128 154 22 98 % --   05/31/19 1100 90/60 -- -- 133 130  18 98 % --   05/31/19 1059 103/86 102.9  F (39.4  C) Oral -- 128 20 99 % 88 kg (194 lb)        Physical Exam  General: Well-developed and well-nourished. Ill, non-toxic appearing elderly  man. Cooperative.  Head:  Atraumatic.  Eyes:  Conjunctivae, lids, and sclerae are normal.  ENT:    Normal nose. Dry mucous membranes.  Hard of hearing.  Neck:  Supple. Normal range of motion.  CV:  Tachycardic rate and irregularly irregular rhythm. Normal heart sounds with no murmurs, rubs, or gallops detected.  Resp:  No respiratory distress. Clear to auscultation bilaterally without decreased breath sounds, wheezing, rales, or rhonchi.  GI:  Soft. Non-distended. Non-tender.    MS:  Normal ROM. Left lower extremity edema with hot, erythematous skin particularly on the medial ankle and foot.  1 cm open wound on the anterior mid left lower leg without active drainage.  There is no crepitus or fluctuance.  Skin:  Warm. Non-diaphoretic. No pallor.  As above.  Neuro: Awake. A&Ox3. Normal strength.  Psych: Normal mood and affect. Normal speech.  Vitals reviewed.    Emergency Department Course   EKG  Indication: fall  Time: 1101  Rate 124 bpm. QRS duration 90. QT/QTc 316/453.   Atrial fibrillation with RVR with premature ventricular or aberrantly conducted complexes. Left axis deviation. Low voltage QRS. Cannot rule out anterior infarct, age undetermined. Abnormal ECG.   No acute ST changes.  No significant changes as compared to prior, dated 11/4/18.     Imaging:  Radiographic findings were communicated with the patient who voiced understanding of the findings.  XR Chest Port 1 view:   No radiographic evidence of acute chest abnormality. As per radiology.       Laboratory:  CBC: WBC: 9.2, HGB: 10.6 (L), PLT: 151  BMP: all WNL (Creatinine: 0.93)    UA with micro: urobilinogen: 6.0 (H), trace leukocyte esterase, RBC: 14 (H), mucous present, o/w negative  ISTAT gases lactate cass POCT: O2: 22 (L), bicarbonate: 29 (H) o/w WNL    Troponin: <0.015  CK total: 45  INR: 2.33 (H)  Blood cultures: pending    Interventions:   1110 0.9% Sodium Chloride Bolus, 1L, IV   1110 Lactated ringers, 1 L, IV  1123 Tylenol, 1000 mg, PO   1145 Ancef, 2 g, IV  1305 Lactated ringers, 500 mL, IV  1309 Solu-Cortef, 50 mg, IV  1333 Vancomycin, 1750 mg, IV    Emergency Department Course:  1054 Patient arrived via EMS. I performed an exam of the patient as documented above.     IV inserted. Medicine administered as documented above. Blood drawn. This was sent to the lab for further testing, results above.    The patient was sent for a chest XR while in the emergency department, findings above.     1118 I rechecked the patient. Straight cath in process.     1145 I rechecked the patient. Blood pressure was 86 systolic, with MAP of 71. He is resting comfortably.     1234  I consulted with Emilee Wilson PA-C of the hospitalist services. She recommended admission to ICU.     1236 I rechecked the patient. He seems to be more awake and states that his baseline blood pressure is usually around 90 systolic.     1247  I consulted with Dr. Marie of the hospitalist services. He is in agreement to accept the patient for admission.    1303 I rechecked the patient. He is feeling improved, but he is in the 70s systolic.     1447 I rechecked the patient. His blood pressure is decreasing to the 60s systolic. I received verbal consent for central line placement.     1459 I received written consent from the patient.     1526 The patient's last three blood pressures were above 80 systolic, MAP greater than 60, and therefore central line is not required at this time. Stable for transfer to the floor.    Findings and plan explained to the patient, spouse, and son who consent to admission. Discussed the patient with Dr. Marie, who will admit the patient to an ICU bed for further monitoring, evaluation, and treatment.     Impression & Plan      Medical Decision Making:  Shahriar spicer  "84-year-old man who felt very weak today and had a fall.  This was witnessed by wife and she reported there was no trauma. Patient has no injuries however, he was too weak to get off the floor and was transferred by EMS.  He was found to be tachycardic and febrile en route with blood pressure between 90 and 100 systolic.  Upon arrival, patient states he feels fatigued and may have a headache but otherwise denies contributory symptoms.  He appears ill but nontoxic.  He has obvious cellulitis to the left lower extremity without evidence of necrotizing fascitis including no crepitus or rapid expansion.  He is febrile here for which he was given 1g of Tylenol.  He is atrial fibrillation with RVR and EKG shows no acute ST changes.  He is appropriately anticoagulated with therapeutic INR of 2.23.  Blood cultures were obtained and patient was given Ancef for presumptive cellulitis as he has significant erythema, warmth, and edema to the left lower externally.  Notably, he was admitted for sepsis related to cellulitis in this extremity about 6 months ago.  It should be noted that there is no evidence of urinary tract infection or pneumonia.  He has no evidence of demand ischemia with a normal troponin.  Despite report of \"muscle inflammation\" his CK is normal.  However, he has been taking prednisone for this for at least the few weeks.  Today, patient has no significant electrolyte disturbances or kidney injury and no leukocytosis or anemia.  Despite borderline blood pressures, he has no lactic acidosis.  Patient was given a liter of normal saline and a liter of LR with continued blood pressures between 90 and 100 systolic.  Patient self reports that his blood pressure typically runs around 90 mmHg systolic.  Patient had appropriate defervescence with the aforementioned Tylenol and had marked improvement in his heart rate after these fluids.  He did have some lower blood pressures with MAPs less than 65 and systolics in the " 60s to 70s.  However, these seem to be when he was resting on his right side and when he rested supine he had 4 consecutive blood pressures in the 80s systolic range with MAPs greater than 60.  Despite a discussion of central line placement, this is not currently indicated and vasopressors are not required and central line was not placed in the emergency department.  I discussed patient's case with Emilee Wilson who felt patient was best served in the ICU.  I discussed patient's case with Dr. Marie who accepts admission.  He did recommend addition of vancomycin which was started in the emergency department as well as stress dose steroids and patient was also given Solu-Cortef.  Shahriar was given a third liter of IV fluids prior to transfer to the ICU.  I updated the patient and family multiple times on the results and plan and answered all their questions.  They verbalized understanding and are amenable.  Patient was transferred the ICU in stable condition with blood pressures greater than 80 systolic and MAP greater than 60.    Critical Care time:  was 50 minutes for this patient excluding procedures.     Diagnosis:    ICD-10-CM    1. Sepsis, due to unspecified organism (H) A41.9    2. Cellulitis of left lower extremity L03.116    3. Anticoagulated on Coumadin Z51.81     Z79.01          Disposition:  Admitted to Dr. Marie    I, Mirella Batista, am serving as a scribe on 5/31/2019 at 12:23 PM to personally document services performed by Julia Summers MD based on my observations and the provider's statements to me.      Mirella Batista  5/31/2019   [facility] EMERGENCY DEPARTMENT       Julia Summers MD  06/02/19 0806

## 2019-05-31 NOTE — PROVIDER NOTIFICATION
[facility]   Procedure Note           Peripherally Inserted Central Line Catheter (PICC):       Sean Brunner  MRN# [id number]   May 31, 2019, 5:04 PM Indication: Hypotension           Pause for the cause: Consent for catheter placement procedure signed  Time out completed  Patient ID's verified using two distinct indicators  All necessary equipment is present   Type of line to be used: PICC   Full barrier precautions used: Yes   Skin preparation: Chloraprep   Date of insertion: May 31, 2019, 5:04 PM   Device type: Double lumen, valved, 5.0   Catheter brand: [employer]   Lot number: [id number]   Insertion location: Right basilic vein   Method of placement: Venipuncture  MST  Ultrasound   Number of attempts: With ultrasound: 1   Without ultrasound: 0   Difficulty threading: No   Midline IV device: Dressing dry and intact  Dressing changed  Transparent semmipermeable dressing applied  Chlorhexidine patch  Catheter securement device   Arm circumference: Adults 10 cm   Midline extremity circumference: 27 cm   Internal length: 43 cm   Midline visible catheter length: 3 cm   Total catheter length: 46 cm   Tip termination: SVC/RA Junction   Method of verification: Chest x-ray   Midline patency post placement: Positive blood return  Flushes without difficulty  Saline locked       Placement verified by: Physician   Catheter placed by: María Elena Watson   Discontinuation form initiated: Yes   Patient tolerance: Tolerated well  Intradermal injection      Summary:  This procedure was performed without difficulty and he tolerated the procedure well with no immediate complications.       Recorded by María Elena Watson RN,BSN, [facility]-BC    OK to use PICC, verified by xray. KENRICK Martinez.

## 2019-05-31 NOTE — PHARMACY-ADMISSION MEDICATION HISTORY
"Admission medication history interview status for this patient is complete. See [facility] admission navigator for allergy information, prior to admission medications and immunization status.     Medication history interview source(s):Patient and Family  Medication history resources (including written lists, pill bottles, clinic record): patient's own printed med list  Primary pharmacy: [place] [id number] [facility] [id number]    Changes made to PTA medication list:  Added: prednisone 10 mg and [prednisone 1 mg and 5 mg tablets (NEW prescriptions that have NOT been started yet)]  Deleted: none  Changed: updated warfarin to current dosing    Actions taken by pharmacist (provider contacted, etc):None   Additional medication history information: patient is \"tapering down\" on prednisone but as of now he reports he is taking 10 mg (1 tablet) daily  Medication reconciliation/reorder completed by provider prior to medication history? No  Do you take OTC medications (eg tylenol, ibuprofen, fish oil, eye/ear drops, etc)? yes      Prior to Admission medications    Medication Sig Last Dose Taking? Auth Provider   cyanocolbalamin (VITAMIN  B-12) 1000 MCG tablet Take 3,000 mcg by mouth every morning 5/31/2019 at am Yes Unknown, Entered By History   divalproex sodium delayed-release (DEPAKOTE) 250 MG DR tablet Take 250 mg by mouth At Bedtime 5/30/2019 at hs Yes Reported, Patient   furosemide (LASIX) 20 MG tablet Take 20 mg by mouth daily 5/31/2019 at am Yes Unknown, Entered By History   levothyroxine (SYNTHROID) 150 MCG tablet Take 150 mcg by mouth every morning (before breakfast)  5/31/2019 at am Yes Reported, Patient   metoprolol succinate (TOPROL-XL) 25 MG 24 hr tablet Take 1 tablet (25 mg) by mouth every evening 5/30/2019 at pm  Yes Kimi Gil MD   multivitamin, therapeutic with minerals (THERA-VIT-M) TABS tablet Take 1 tablet by mouth 2 times daily (with meals) 5/31/2019 at x 1 Yes Reported, Patient "   OMEPRAZOLE PO Take 40 mg by mouth every morning  5/31/2019 at am Yes Unknown, Entered By History   predniSONE (DELTASONE) 1 MG tablet Take 1 mg by mouth daily In addition to 5 mg tablet for total dose of 6 mg NEW RX at has not started Yes Unknown, Entered By History   predniSONE (DELTASONE) 10 MG tablet Take 10 mg by mouth daily 5/31/2019 at am Yes Unknown, Entered By History   predniSONE (DELTASONE) 5 MG tablet Take 5 mg by mouth daily In addition to 1 mg tablet for total dose of 6 mg NEW RX at has not started Yes Unknown, Entered By History   simvastatin (ZOCOR) 40 MG tablet Take 40 mg by mouth daily (with dinner)  5/30/2019 at pm Yes Reported, Patient   terazosin (HYTRIN) 5 MG capsule Take 5 mg by mouth At Bedtime 5/30/2019 at hs Yes Unknown, Entered By History   traMADol (ULTRAM) 50 MG tablet Take 50 mg by mouth every 6 hours as needed for moderate pain  Past Month at Unknown time Yes Reported, Patient   warfarin (COUMADIN) 1 MG tablet Take 1.5 mg by mouth four times a week Monday, Wednesday, Friday, Saturday 5/29/2019 at pm Yes Unknown, Entered By History   warfarin (COUMADIN) 1 MG tablet Take 1 mg by mouth three times a week Tuesday, Thursday, Sunday 5/28/2019 at pm Yes Unknown, Entered By History

## 2019-06-01 NOTE — PLAN OF CARE
ICU End of Shift Summary.  For vital signs and complete assessments, please see documentation flowsheets.         Pertinent assessments: Pt A&O, hard of hearing- hearaides at home, wears glasses which are on bedside table, double lumen PICC to the right, x2 PIVs, lung sounds clear, left lower leg is garry & swollen  Major Shift Events: Corley placed, 1L LR bolus given for soft BPs, Vanco started- BCX= gram + cocci in pairs and chains-strepto agalactiae  Plan (Upcoming Events): Continue to monitor BPs closely   Discharge/Transfer Needs: TBD, possible TCU when time appropriate?    Bedside Shift Report Completed : y  Bedside Safety Check Completed: y

## 2019-06-01 NOTE — PROGRESS NOTES
[facility]    Hospitalist Progress Note      Assessment & Plan   Sean Brunner is a 84 year old male who was admitted on 5/31/2019.    Summary of Stay:   Sean Brunner is a 84 year old male who presents with fall and inability to get up, fever and chills, left lower extremity redness.    He was admitted to the ICU for left lower extremity cellulitis, hypotension, sepsis.  He has been kept on broad-spectrum antibiotics.  Also on stress dose hydrocortisone because he has been taking 10 mg of prednisone every day for almost a month for possible polymyalgia rheumatica.    Plan:    Sepsis.  Hypotension.  Left lower extremity cellulitis.  Bacteremia, possible group B strep.  -Continue broad-spectrum antibiotics with IV cefazolin and IV vancomycin.  MRSA negative.  -Can potentially DC IV vancomycin tomorrow once cultures are finalized.  - Continue IV fluid.  Continue IV hydrocortisone for stress dose.  - Blood pressure remains low but so far he has not required vasopressors.  - No evidence of necrotizing fasciitis.    Atrial fibrillation.  -The rate is better controlled today.  -Warfarin dosing per pharmacy.    Diabetes  - Blood glucose is adequately controlled.    Hypothyroidism  -Resume levothyroxine.  Last TSH checked in September 2018 was normal.    Dyslipidemia  -Resume simvastatin    On Depakote, likely for chronic headaches.  Resume.    On 10 mg prednisone.  Started last month, for possible polymyalgia rheumatica.    DVT Prophylaxis: Warfarin  Code Status: DNR/DNI    Critical care time 35 minutes.  Patient's son was also updated.    Gustavo Marie MD  Text Page (7am - 6pm, M-F)    Interval History   Patient was evaluated with nursing staff. Overnight issues discussed.  Patient is feeling somewhat better.  Still has dry mouth.  Denies any chest pain, shortness of breath, nausea, vomiting, abdominal pain.  Left leg pain is better.  The swelling is also better.  - Blood pressure is still on the lower  side.  Map is borderline, close to 65.  So far has not required vasopressors.    -Data reviewed today: Labs and medications.    Physical Exam   Temp: 98.1  F (36.7  C) Temp src: Bladder BP: 91/52 Pulse: 80 Heart Rate: 80 Resp: 21 SpO2: 98 % O2 Device: None (Room air) Oxygen Delivery: 2 LPM  Vitals:    05/31/19 1059 05/31/19 1615 06/01/19 0030   Weight: 88 kg (194 lb) 87.3 kg (192 lb 7.4 oz) 88.8 kg (195 lb 12.3 oz)     Vital Signs with Ranges  Temp:  [97.5  F (36.4  C)-98.6  F (37  C)] 98.1  F (36.7  C)  Pulse:  [71-97] 80  Heart Rate:  [71-94] 80  Resp:  [8-35] 21  BP: ()/(42-94) 91/52  SpO2:  [86 %-100 %] 98 %  I/O last 3 completed shifts:  In: 3603.34 [P.O.:120; I.V.:2483.34; IV Piggyback:1000]  Out: 1350 [Urine:1350]    Constitutional: Awake, alert, cooperative, no apparent distress  HEENT: Trachea midline, sclera is clear   Respiratory: Clear to auscultation bilaterally, no crackles or wheezing  Cardiovascular: Regular rate and rhythm, normal S1 and S2, and no murmur noted  GI: Normal bowel sounds, soft, non-distended, non-tender  Skin/Integumen:   Left leg: Swelling is significantly improved.  Redness is also better.  No blisters.  No dark-colored spots.  Minimally tender.    Medications     lactated ringers 100 mL/hr at 06/01/19 0715     - MEDICATION INSTRUCTIONS -       Warfarin Therapy Reminder         ceFAZolin  1 g Intravenous Q8H     hydrocortisone sodium succinate PF  50 mg Intravenous Q6H     sodium chloride (PF)  10 mL Intracatheter Q7 Days     vancomycin (VANCOCIN) IV  1,250 mg Intravenous Q12H     warfarin  0.5 mg Oral ONCE at 18:00       Data   Recent Labs   Lab 06/01/19  0515 05/31/19  1104   WBC 7.5 9.2   HGB 9.8* 10.6*   MCV 94 95   * 151   INR 3.03* 2.33*    139   POTASSIUM 3.7 4.1   CHLORIDE 107 104   CO2 30 30   BUN 22 27   CR 0.72 0.93   ANIONGAP 2* 5   TYSON 8.4* 8.9   * 86   TROPI  --  <0.015       Recent Results (from the past 24 hour(s))   XR Chest Port 1 View     Narrative    CHEST SINGLE VIEW PORTABLE   5/31/2019 5:00 PM     HISTORY: Peripherally inserted central catheter.    COMPARISON: 5/31/2019 at 1108 hours.      Impression    IMPRESSION:   1. Interval placement of a right upper extremity peripherally inserted  central catheter with distal catheter tip in the superior vena cava,  near its junction with right atrium.  2. No other significant interval change since the recent comparison  study.    KAROLINA BISWAS MD

## 2019-06-01 NOTE — PLAN OF CARE
PT:  Orders received. Pt just admitted 5/31/19 with severe sepsis, hypotension, LE cellulitis.  Will allow 24-48 hrs medical management in order to optimize medical recovery and tolerance with therapy assessment/intervention.

## 2019-06-01 NOTE — PLAN OF CARE
ICU End of Shift Summary.  For vital signs and complete assessments, please see documentation flowsheets.      Pertinent assessments: A&Ox4, [place]; right HA in place. VSS, tele shows afib; rate controlled. BPs soft however MAPs continue to stay >60. Afrebrile. Remains on RA, lungs clear but diminished throughout. Corley in place with good UOP. Up to commode with Ax2. Large soft BM. BG rising, changed diet to MOD Carb diet. Eating well. Cellulitis to LLE, edema, pressure injury to coccxy area with purple/red bottom; mepilex placed for protection. Son at bedside.      Major Shift Events: Strength to stand and pivot to commode.   Plan (Upcoming Events): Monitor BPs. Transfer orders??  Discharge/Transfer Needs: TBD     Bedside Shift Report Completed : yes   Bedside Safety Check Completed: yes

## 2019-06-01 NOTE — PROGRESS NOTES
Tele ICU notified of BPs following 1L bolus. MAPs low to mid 60s.    Response: continue to monitor and notify MD if MAPs fall below 60.

## 2019-06-02 NOTE — PLAN OF CARE
ICU End of Shift Summary.  For vital signs and complete assessments, please see documentation flowsheets.     Pertinent assessments: VSS. Alert & oriented, [place]. Tele: a fib CVR; HR 70s-80s. Room air, lung sounds clear to diminished. PICC in place and infusing LR @ 100ml/hr. Corley in place with adequate output, [place]. Left lower extremity is blotchy and garry, scabs, blanchable redness on coccyx covered with mepilex. Complains of headache, repositioned. Ax1 to turn and reposition.    Major Shift Events: VSS, MAPs > 60, intermittent abx,   Plan (Upcoming Events): woc consult. PT?  Discharge/Transfer Needs: TBD    Bedside Shift Report Completed : yes  Bedside Safety Check Completed: yes

## 2019-06-02 NOTE — PROGRESS NOTES
Report given to Ortho nurse for Rm [id number] at 0930. Pt stable and alert and oriented awaiting for transfer to ortho floor.

## 2019-06-02 NOTE — PROGRESS NOTES
[facility]    Hospitalist Progress Note      Assessment & Plan   Sean Brunner is a 84 year old male who was admitted on 5/31/2019.    Summary of Stay:   Sean Brunner is a 84 year old male who presents with fall and inability to get up, fever and chills, left lower extremity redness.     He was admitted to the ICU for left lower extremity cellulitis, hypotension, sepsis.  He has been kept on broad-spectrum antibiotics.  Also on stress dose hydrocortisone because he has been taking 10 mg of prednisone every day for almost a month for possible polymyalgia rheumatica.    During his ICU stay, patient's blood pressure remained borderline low.  But he did not require vasopressors.  He was initially treated with IV cefazolin and IV vancomycin.  Today, after his blood cultures finalized to be group B strep with susceptibilities, his vancomycin has been stopped.    Overall he is feeling much better.  Blood pressure is much better now.  He will be transferred out of the ICU.    Plan:    Sepsis.  Hypotension.  Left lower extremity cellulitis.  Group B strep bacteremia.  - Continue IV cefazolin.  DC IV vancomycin.  - IV hydrocortisone for stress dose as he is on 10 mg prednisone at home.  Can probably start weaning the hydrocortisone from tomorrow.  -No evidence of necrotizing fasciitis.  The leg is improving.    Atrial fibrillation  - The rate is controlled.  Resume oral metoprolol starting tonight at a lower dose.  -Warfarin dosing per pharmacy.    Hypothyroidism  -levothyroxine.  Last TSH in September was normal.    Dyslipidemia  Simvastatin.    Diabetes  -Well-controlled.    On Depakote, likely for chronic headaches.    On 10 mg prednisone which was started last month for possible polymyalgia rheumatica.    DVT Prophylaxis: Warfarin  Code Status: DNR/DNI  Expected discharge: 1 to 2 days.    Transferred out of the ICU.    Gustavo Marie MD  Text Page (7am - 6pm, M-F)    Interval History   Patient was  evaluated with nursing staff. Overnight issues discussed.  Patient is feeling much better.  No fever or chills.  Tolerating a diet.  Denies any lower extremity pain.  No blisters.    -Data reviewed today: Labs and medications.    Physical Exam   Temp: 98.4  F (36.9  C) Temp src: Oral BP: 106/59 Pulse: 92 Heart Rate: 133 Resp: 16 SpO2: 98 % O2 Device: None (Room air)    Vitals:    05/31/19 1615 06/01/19 0030 06/02/19 0600   Weight: 87.3 kg (192 lb 7.4 oz) 88.8 kg (195 lb 12.3 oz) 90 kg (198 lb 6.6 oz)     Vital Signs with Ranges  Temp:  [97.5  F (36.4  C)-99  F (37.2  C)] 98.4  F (36.9  C)  Pulse:  [] 92  Heart Rate:  [] 133  Resp:  [11-50] 16  BP: ()/(42-94) 106/59  SpO2:  [96 %-100 %] 98 %  I/O last 3 completed shifts:  In: 2771.67 [P.O.:120; I.V.:2651.67]  Out: 1209 [Urine:1209]    Constitutional: Awake, alert, cooperative, no apparent distress  HEENT: Trachea midline, sclera is clear   Respiratory: Clear to auscultation bilaterally, no crackles or wheezing  Cardiovascular: Irregular rate and rhythm, normal S1 and S2, and no murmur noted  GI: Normal bowel sounds, soft, non-distended, non-tender  Skin/Integumen: Left lower extremity redness is almost resolved.  There is chronically dark-colored skin due to venous stasis.  Similar to the right side.  No blisters.  Well-perfused.  Normal temperature.    Medications     lactated ringers 75 mL/hr at 06/02/19 1000     - MEDICATION INSTRUCTIONS -       Warfarin Therapy Reminder         ceFAZolin  1 g Intravenous Q8H     divalproex sodium delayed-release  250 mg Oral At Bedtime     hydrocortisone sodium succinate PF  50 mg Intravenous Q6H     levothyroxine  150 mcg Oral QAM AC     metoprolol tartrate  12.5 mg Oral BID     simvastatin  40 mg Oral Daily with supper     sodium chloride (PF)  10 mL Intracatheter Q7 Days     warfarin-No DOSE today  1 each Does not apply no dose today (warfarin)       Data   Recent Labs   Lab 06/02/19  0540 06/01/19  0515  05/31/19  1104   WBC  --  7.5 9.2   HGB  --  9.8* 10.6*   MCV  --  94 95   PLT  --  122* 151   INR 3.21* 3.03* 2.33*   NA  --  139 139   POTASSIUM  --  3.7 4.1   CHLORIDE  --  107 104   CO2  --  30 30   BUN  --  22 27   CR  --  0.72 0.93   ANIONGAP  --  2* 5   TYSON  --  8.4* 8.9   GLC  --  125* 86   TROPI  --   --  <0.015       No results found for this or any previous visit (from the past 24 hour(s)).

## 2019-06-02 NOTE — PROGRESS NOTES
06/02/19 1446   Quick Adds   Type of Visit Initial PT Evaluation   Living Environment   Lives With spouse;child(arabella), adult   Living Arrangements house  ([facility])   Home Accessibility stairs to enter home;stairs within home   Number of Stairs, Main Entrance 1  (small threshold step)   Stair Railings, Main Entrance none   Number of Stairs, Within Home, Primary   (lower level of home, son lives down there)   Stair Railings, Within Home, Primary railing on left side (ascending)   Transportation Anticipated car, drives self;family or friend will provide   Living Environment Comment Lives with spouse (who is 10 days s/p TKA) and son who has down syndrome who lives in lower level of home.   Self-Care   Usual Activity Tolerance moderate   Current Activity Tolerance fair   Regular Exercise No   Equipment Currently Used at Home cane, straight   Activity/Exercise/Self-Care Comment Pt functions fairly independently at baseline, uses SEC for stability. Does own FWW. Currently has RTS over toilet due to spouse with recent TKA. Walk in shower with built in seat.   Functional Level Prior   Ambulation 1-->assistive equipment   Transferring 0-->independent   Toileting 0-->independent   Bathing 0-->independent   Communication 0-->understands/communicates without difficulty   Swallowing 0-->swallows foods/liquids without difficulty   Cognition 0 - no cognition issues reported   Fall history within last six months yes   Number of times patient has fallen within last six months 1   Which of the above functional risks had a recent onset or change? ambulation;transferring;fall history   Prior Functional Level Comment Pt states he can ambulate functional community distances at baseline, uses SEC for stability.   General Information   Onset of Illness/Injury or Date of Surgery - Date 05/31/19   Referring Physician Gustavo Marie MD   Patient/Family Goals Statement pt hopes to dc home   Pertinent History of Current Problem (include  personal factors and/or comorbidities that impact the POC) Pt is admitted after a fall, inability to get up, left lower extremity cellulitis, hypotension, sepsis.  Was in ICU, now on floor, improving medical status, BPs soft.   Precautions/Limitations fall precautions   General Observations pt awake and alert, spouse at bedside.   General Info Comments VSS at rest /66, HR 88 bpm, SaO2 99% on RA sitting EOB.  Pt asymptomatic.   Cognitive Status Examination   Orientation orientation to person, place and time   Pain Assessment   Patient Currently in Pain Yes, see Vital Sign flowsheet  (L lower leg mild pain reported)   Integumentary/Edema   Integumentary/Edema Comments L lower leg redness/swelling, pt reports this at baseline, chronic problems with leg   Posture    Posture Forward head position;Protracted shoulders;Kyphosis   Range of Motion (ROM)   ROM Comment WFL all extremities, AROM hips/knees > 90 deg, AROM shoulders > 90 deg   Strength   Strength Comments WFL all extremities, mild proximal hip weakness consistent with deconditioning from prolonged bedrest. 5/5 B ankle DF, knee extension, 4-/5 B seated hip flexion.  4+/5 B shoulder flexion.   Bed Mobility   Bed Mobility Comments SBA supine > sit EOB with use of bed railing.   Transfer Skills   Transfer Comments CGA sit <> stand transfers with FWW support, mild tendency for posterior lean upon standing.   Gait   Gait Comments Amb with FWW support and CGA for safety, mild/moderate forward flexed posture, amb distances > 100ft with good tolerance, pt asymptomatic, reciprocal gait pattern.   Balance   Balance Comments Impaired standing balance and gait stability, needing Ax1 and FWW support. Fal risk identified.   Sensory Examination   Sensory Perception no deficits were identified   General Therapy Interventions   Planned Therapy Interventions balance training;bed mobility training;gait training;neuromuscular re-education;strengthening;ROM;transfer  "training;risk factor education;home program guidelines;progressive activity/exercise   Clinical Impression   Criteria for Skilled Therapeutic Intervention yes, treatment indicated   PT Diagnosis Impaired functional mobility, deconditioning   Influenced by the following impairments Deconditioning/weakness, impaired balance, impaired cardiac status, impaired posture   Functional limitations due to impairments Impaired bed mobility, transfers, gait skills, fall risk, ADLs   Clinical Presentation Evolving/Changing   Clinical Presentation Rationale acute/changing medical status, PMH, PLOF   Clinical Decision Making (Complexity) Moderate complexity   Therapy Frequency` daily   Predicted Duration of Therapy Intervention (days/wks) 3-5 days   Anticipated Equipment Needs at Discharge   (owns SEC and FWW)   Anticipated Discharge Disposition Home with Assist;Home with Home Therapy  (pending progress, consider TCU)   Risk & Benefits of therapy have been explained Yes   Patient, Family & other staff in agreement with plan of care Yes   [facility] TM \"6 Clicks\"   2016, Trustees of [facility], under license to [employer].  All rights reserved.   6 Clicks Short Forms Basic Mobility Inpatient Short Form   [facility]  \"6 Clicks\" V.2 Basic Mobility Inpatient Short Form   1. Turning from your back to your side while in a flat bed without using bedrails? 3 - A Little   2. Moving from lying on your back to sitting on the side of a flat bed without using bedrails? 3 - A Little   3. Moving to and from a bed to a chair (including a wheelchair)? 3 - A Little   4. Standing up from a chair using your arms (e.g., wheelchair, or bedside chair)? 3 - A Little   5. To walk in hospital room? 3 - A Little   6. Climbing 3-5 steps with a railing? 3 - A Little   Basic Mobility Raw Score (Score out of 24.Lower scores equate to lower levels of function) 18   Total Evaluation Time   Total Evaluation Time (Minutes) 15 "

## 2019-06-02 NOTE — PLAN OF CARE
PT:  Eval complete, treatment initiated. Pt is currently admitted after a fall, inability to get up, left lower extremity cellulitis, hypotension, sepsis.  At baseline, pt functions independently, lives with spouse (who is POD 10 s/p TKA) and adult child who has Down Syndrome, [place] to [place]. Pt uses cane at baseline.    Discharge Planner PT   Patient plan for discharge: home  Current status: Pt demonstrates good functional strength, mild deconditioning related to current illness and relative prolonged bedrest.  AxO x 3.  Denies pain, dizziness.  Amb x 125ft with FWW CGA, functional sit <> stand transfers CGA/SBA.  VSS initially to start session (/66, HR 88bpm, SaO2 99%), tolerated activity well with mild dyspnea, but noted HR to elevate acutely during session to 170bpm, needed ~3 min seated  rest to recover to baseline 80-90bpm range.  BP stable just prior to this 138/67, Sao2 >96% on RA throughout.  RN updated and aware of HR, confirmed with tele. Pt up to chair end of session, tolerating well.  Barriers to return to prior living situation: limited mobility/activity tolerance, stable vitals response to activity  Recommendations for discharge: home with home PT  Rationale for recommendations: Pt demonstrates good functional mobility and strength despite relative immobility and bedrest during ICU stay.  Limited activity tolerance today due to abnormal HR response, will benefit from continued close monitoring.  Will benefit from IP PT to progress strength, gait stability, and activity tolerance.  Consider TCU if limited progress with therapy, as spouse and son would not be able to provide much assistance at home.         Entered by: Arben New 06/02/2019 3:05 PM

## 2019-06-03 NOTE — PROGRESS NOTES
"Paged/texted hospitalist \"FYI: Per tele tech, patient having two second pauses.  Patient is asymptomatic. Has headache but states these are chronic.   "

## 2019-06-03 NOTE — PROGRESS NOTES
"Paged/texted hospitalist  \". Order to initiate DIAB continuous Insulin if glucose above 150.  Do you still want this?  Order from 5/31   Thank you\"  "

## 2019-06-03 NOTE — PLAN OF CARE
Discharge Planner PT   Patient plan for discharge: home  Current status:  PT - Pt amb 110' with ww with SBA with flexed posture. PT - Pt transfers sit to/from stand with SBA.  vitals in sitting : 90/53 & O2 sats 99 / standin/52 & O2 sats 100% BP following ambulation 116/53.  Barriers to return to prior living situation: limited mobility/activity tolerance, stable vitals response to activity  Recommendations for discharge: home with home PT per plan established by the PT.    Rationale for recommendations: Pt demonstrates good functional mobility and strength despite relative immobility and bedrest during ICU stay.  Limited activity tolerance today due to abnormal HR response, will benefit from continued close monitoring.  Will benefit from IP PT to progress strength, gait stability, and activity tolerance.  Consider TCU if limited progress with therapy, as spouse and son would not be able to provide much assistance at home.         Entered by: Samantha Bañuelos 2019 9:35 AM

## 2019-06-03 NOTE — PROGRESS NOTES
.Discharge Planner   Discharge Plans in progress: home ( home care services recommended)  Barriers to discharge plan: none anticipated  Follow up plan: follow-up with pt regarding home care services recommendation for his consideration.        Entered by: SARAH Staton 06/03/2019 11:56 AM

## 2019-06-03 NOTE — CONSULTS
".Care Transition Initial Assessment -   Discharge planning: Chart reviewed noting status, noted also PT assessment with anticipation of pt's discharge home with recommendation of home PT services. Discussed with Md who expresses support of the home care services.   Met with:  Patient    Active Problems:    Cellulitis       DATA  Lives With: spouse, child(arabella), adult(two sons at home, youngest son has Down's syndrome)   Living Arrangements: house([facility])  Quality of Family Relationships: involved, supportive  Description of Support System: Involved, Supportive  Who is your support system?: Wife, Children           Quality of Family Relationships: involved, supportive  Transportation Anticipated: family or friend will provide    INTERVENTION     Met with pt who affirms planning for his return home on discharge where he resides with his wife and two sons, one of which has Down's syndrome. Pt notes that this son attends a sheltered workshop called [employer], his other son (the older) works during the day. Pt also notes that his wife had \"partial knee surgery\" about two weeks ago, and  is recovering well. Pt notes also that they had a daughter but she was killed in a pedestrian/MVA accident many years ago.       Pt affirms planning for his return home on discharge, noting that he does not have any concerns regarding meals, household chores, laundry, etc. Wife is not driving at this time but his older son is able to provide transportation for them as needed. Discussed with pt the recommendation for home PT on discharge, he informs that he is not certain that he would want to have the home PT but will think about it.       SW has provided pt with information regarding the [employer] insurance program [employer] which will provide up to 10 meals delivered to his home post-hospitalization, he has note that his wife has received this also after her recent surgery.         ASSESSMENT  Cognitive Status:  alert and oriented     " PLAN    Patient given options and choices for discharge: Yes  Patient/family is agreeable to the plan?  Yes:   Patient Goals and Preferences: independence with ambulation and ADLs  Patient anticipates discharging to:  Home    SW will follow-up with pt tomorrow, or day of discharge to further discuss the opportunity for home care.

## 2019-06-03 NOTE — PROGRESS NOTES
[facility]    Hospitalist Progress Note      Assessment & Plan   Sean Brunner is a 84 year old male who was admitted on 5/31/2019.    Summary of Stay:   Sean Brunner is a 84 year old male patient with past medical history of coronary artery disease, diabetes mellitus type 2, hypertension, pulmonary hypertension, cor pulmonale, COPD, diastolic dysfunction, GERD, hypothyroidism, who was admitted with fall and inability to get up, fever and chills, left lower extremity redness.     He was admitted to the ICU for left lower extremity cellulitis, hypotension, sepsis. He has been kept on broad-spectrum antibiotics. Also on stress dose hydrocortisone because he has been taking 10 mg of prednisone every day for almost a month for possible polymyalgia rheumatica.    During his ICU stay, patient's blood pressure remained borderline low. But he did not require vasopressors.  He was initially treated with IV cefazolin and IV vancomycin. Today, after his blood cultures finalized to be group B strep with susceptibilities, his vancomycin has been stopped.    Overall he is feeling much better.  Blood pressure is much better now.  He was transferred from ICU on 6/2    Plan:    1. Sepsis with hypotension, Left lower extremity cellulitis.  Group B strep bacteremia.  -Continue IV cefazolin.  Day #4.  Vancomycin discontinued on 6/2.  Vital signs improving  - IV hydrocortisone for stress dose as he is on 10 mg prednisone at home.  Will decrease Solu-Cortef to 50 mg every 8.  -No evidence of necrotizing fasciitis. The leg is improving.    2. Atrial fibrillation  - The rate is controlled.  continue oral metoprolol 12.5 mg p.o.  twice daily  -Warfarin dosing per pharmacy.  INR 2.53    3. Hypothyroidism  -Continue levothyroxine 150 mcg daily.  Last TSH in September was normal.    4. Dyslipidemia  Simvastatin.    5. Diabetes  -Well-controlled.    6. On Depakote, likely for chronic headaches.    7.  History of possible  polymyalgia rheumatica: On 10 mg prednisone which was started last month for possible polymyalgia rheumatica. Currently on Solu-Cortef.  Solu-Cortef dose decreased to 50 mg every 8.  Likely transition to oral prednisone tomorrow.    8.  COPD without exacerbation  -Currently on Solu-Cortef.  Dose decreased to 50 mg IV every 8.  -Continue oxygen support and wean as tolerated  -Continue home medications  -Nebs as needed    9.  History of coronary artery disease: Currently has no chest pain.  -Continue PTA medications including metoprolol, simvastatin    DVT Prophylaxis: Warfarin  Code Status: DNR/DNI  Expected discharge: 1 to 2 days if he continues to improve.    Tin Lane MD, MD    Interval History   Patient seen and examined. He is sitting in chair. He stated that he is feeling better. He has no nausea or vomiting    -Data reviewed today: Labs and medications.    Physical Exam   Temp: 96.6  F (35.9  C) Temp src: Oral BP: 90/53 Pulse: 83 Heart Rate: 88 Resp: 18 SpO2: 96 % O2 Device: None (Room air)    Vitals:    06/01/19 0030 06/02/19 0600 06/03/19 0638   Weight: 88.8 kg (195 lb 12.3 oz) 90 kg (198 lb 6.6 oz) 97.5 kg (214 lb 15.2 oz)     Vital Signs with Ranges  Temp:  [96.5  F (35.8  C)-97.3  F (36.3  C)] 96.6  F (35.9  C)  Pulse:  [83] 83  Heart Rate:  [] 88  Resp:  [16-20] 18  BP: ()/(53-77) 90/53  SpO2:  [96 %-99 %] 96 %  I/O last 3 completed shifts:  In: 250 [P.O.:250]  Out: 1250 [Urine:1250]    Constitutional: Awake, alert, cooperative, no apparent distress  HEENT: Trachea midline, sclera is clear   Respiratory: Clear to auscultation bilaterally, no crackles or wheezing  Cardiovascular: Irregular rate and rhythm, normal S1 and S2, and no murmur noted  GI: Normal bowel sounds, soft, non-distended, non-tender  Skin/Integumen: Left lower extremity redness is almost resolved.  There is chronically dark-colored skin due to venous stasis.  Similar to the right side.  No blisters.   Well-perfused.  Normal temperature.    Medications     lactated ringers 75 mL/hr at 06/03/19 1103     - MEDICATION INSTRUCTIONS -       Warfarin Therapy Reminder         ceFAZolin  1 g Intravenous Q8H     divalproex sodium delayed-release  250 mg Oral At Bedtime     hydrocortisone sodium succinate PF  50 mg Intravenous Q8H     levothyroxine  150 mcg Oral QAM AC     metoprolol tartrate  12.5 mg Oral BID     simvastatin  40 mg Oral Daily with supper     sodium chloride (PF)  10 mL Intracatheter Q7 Days     warfarin  1.5 mg Oral ONCE at 18:00       Data   Recent Labs   Lab 06/03/19  0610 06/02/19  0540 06/01/19  0515 05/31/19  1104   WBC 7.3  --  7.5 9.2   HGB 10.2*  --  9.8* 10.6*   MCV 93  --  94 95   *  --  122* 151   INR 2.53* 3.21* 3.03* 2.33*     --  139 139   POTASSIUM 3.9  --  3.7 4.1   CHLORIDE 107  --  107 104   CO2 30  --  30 30   BUN 28  --  22 27   CR 0.68  --  0.72 0.93   ANIONGAP 3  --  2* 5   TYSON 8.7  --  8.4* 8.9   *  --  125* 86   TROPI  --   --   --  <0.015       No results found for this or any previous visit (from the past 24 hour(s)).

## 2019-06-03 NOTE — PLAN OF CARE
A/O. Patient up ast x 1. Corley in place.   Tele: several 2-3 second pauses overnight.  Paged MD to let know.   Asymptomatic.   Tramadol for headache.  Has chronic headaches.   PICC - IV fluids running.

## 2019-06-03 NOTE — PLAN OF CARE
Pt A&Ox4. Up with A1, walker/GB. Reported headache, pain managed with tramadol. LLE garry, scabs. +2 edema. ABX ancef. Due to void. Bowels active. BM today. VSS

## 2019-06-03 NOTE — PROGRESS NOTES
[facility] Nurse Inpatient Wound Assessment     Assessment of wound(s) on pt's:   Coccyx        Data:   Patient History:   Per Provider notes, Sean Brunner is a 84 year old male who presents with fall and inability to get up, fever and chills, left lower extremity redness. Hx of LLE Cellulitis and septic shock.     Moisture Management:  Adult briefs    Current Diet / Nutrition:       Orders Placed This Encounter        Advance Diet as Tolerated: Regular Diet Adult; 4809-0010 Calories: Moderate Consistent CHO (4-6 CHO units/meal)                    [place]ham Assessment and sub scores:   No data recorded     Labs:         Recent Labs   Lab Test 06/03/19  0610  11/08/18  0639  09/01/18  1120  12/08/14  1435   ALBUMIN  --   --  2.7*   < >  --    < > 3.2*   HGB 10.2*   < > 10.3*   < >  --    < > 12.9*   RBC 3.41*   < > 3.41*   < >  --    < > 4.18*   WBC 7.3   < > 3.2*   < >  --    < > 5.0   *   < > 121*   < >  --    < > 126*   INR 2.53*   < > 2.84*   < >  --    < > 4.67*   A1C  --   --   --   --  4.9   < >  --    CRP  --   --   --   --   --   --  30.3*    < > = values in this interval not displayed.          Wound Assessment (location #1 Coccyx):     Wound History:  Unknown, acquired prior to hospitalization    Specific Dimensions (length x width x depth, in cm):   0.2 x 0.3 x 0.    Wound Base: pink tissue    Palpation of the wound bed:  firm    Slough appearance:  none         Eschar appearance:  none    Periwound Skin: intact,      Color: normal and consistent with surrounding tissue    Temperature  normal     Drainage:  none     Odor: none    Pain:  absent           Intervention:     Patient's chart evaluated.      Wound(s) was assessed    Wound Care: was done:  Mepliex sacral reapplied    Orders  Written    Supplies  floor stock    Discussed plan of care with Patient and Nurse          Assessment:       Tiny open slit at coccyx, probably from moisture, community acquired. Will start Mepilex  sacral dressing to protect.         Plan:     Nursing to notify the Provider(s) and re-consult the WOC Nurse if wound deteriorates or if the wound care plan needs reevaluation.    Plan of care for wound located on Coccyx: Cleanse with Microklenz, pat dry. Apply Cavilon no-sting barrier film. Apply Mepilex sacral dressing every 3 days.    WOC Nurse will return: weekly

## 2019-06-04 NOTE — PLAN OF CARE
Orientation: Alert and oriented x 4  VSS. 98% on RA.   HR 83.   LS: diminished  GI:  Passing gas. No BM. Denies N/V.   : Adequate urine output.  Skin: clean and dry w/ ruddiness and swollen to LLE. Coccyx has mepilex in place  Activity: assist x 1 w/ walker and GB. . Pt slept comfortably throughout shift.   Pain: 0/10. Denies  Plan: Continue with current cares. [place]. Hx of A fib, DM2 (diet controlled). On IV ancef and PICC line w/ LR running at 75 ml/hr. Discharge home in 1-2 days.

## 2019-06-04 NOTE — PLAN OF CARE
Pt A&O x4. Up with A1 walker and gait belt. Voided x1 since catheter was removed earlier but unable to measure, pvr of 29 ml. Mod carb diet. IV ancef given. As needed Tramadol given for headache. CMS intact. LLE remains swollen and has a garry appearance, elevated on pillows. Plan to discharge home in 1-2 days.

## 2019-06-04 NOTE — PROGRESS NOTES
[facility]    Hospitalist Progress Note      Assessment & Plan   Sean Brunner is a 84 year old male who was admitted on 5/31/2019.    Summary of Stay:   Sean Brunner is a 84 year old male patient with past medical history of coronary artery disease, diabetes mellitus type 2, hypertension, pulmonary hypertension, cor pulmonale, COPD, diastolic dysfunction, GERD, hypothyroidism, who was admitted with fall and inability to get up, fever and chills, left lower extremity redness.     He was initially admitted to the ICU for left lower extremity cellulitis, hypotension, sepsis. He has been kept on broad-spectrum antibiotics.  He also required stress dose hydrocortisone because he has been taking 10 mg of prednisone every day for almost a month for possible polymyalgia rheumatica.    During his ICU stay, patient's blood pressure remained borderline low. But he did not require vasopressors.  He was initially treated with IV cefazolin and IV vancomycin.  His blood culture grew Streptococcus agalactiae serogroup B.  His vancomycin has been stopped.  He is continued on cefazolin IV.  Patient continued to improve and he was transferred from ICU on 6/2.    Plan:    1. Sepsis with hypotension, Left lower extremity cellulitis.  Group B strep bacteremia.  -Continue IV cefazolin.  Day #5.  Vancomycin discontinued on 6/2.  Vital signs improving  - IV hydrocortisone for stress dose as he is on 10 mg prednisone at home.  He was initially on Solu-Cortef to 50 mg every 6 which was decreased to every 8 on 6/3.  Will discontinue IV Solu-Cortef today.  Will start him on prednisone 40 mg p.o. Daily. Needs to gradually taper down to his home dose. He was on prednisone 10 mg daily for a month prior to admission.   -No evidence of necrotizing fasciitis. The left leg redness is improving.  --Will closely monitor vital signs.    2. Atrial fibrillation  - The rate is controlled.  continue oral metoprolol 12.5 mg p.o.  twice  daily  -Warfarin dosing per pharmacy.  INR therapeutic at 2.54.    3. Hypothyroidism  -Continue levothyroxine 150 mcg daily.  Last TSH in September was normal.    4. Dyslipidemia  --Continue simvastatin.    5. Diabetes mellitus type II  --Diet controlled  --Blood sugar 119 today.  His but blood sugar has been ranging from 110- 150s.  We will continue to monitor    6.  Chronic headaches: On Depakote PTA continue Depakote.  We will also continue Tylenol as needed.    7.  History of possible polymyalgia rheumatica: On 10 mg prednisone which was started last month for possible polymyalgia rheumatica.  He was on IV Solu-Cortef.  Discontinued IV Solu-Cortef today and started on oral prednisone 40 mg daily.     8.  COPD without exacerbation  --Solu-Cortef discontinued today and started on oral prednisone.  --Continue oxygen support and wean as tolerated  --Continue home medications  --Nebs as needed    9.  History of coronary artery disease: Currently has no chest pain.  --Continue PTA medications including metoprolol, simvastatin    DVT Prophylaxis: Warfarin  Code Status: DNR/DNI  Expected discharge: Discharged home in 1 to 2 days on oral antibiotics if he continues to improve.  Physical therapy recommending discharge to home.    Tin Lane MD, MD    Interval History   Patient seen and examined.  He stated that he is feeling much better today.  He is sitting in chair.  He has no fever, nausea, vomiting. Left lower extremity redness improving    -Data reviewed today: Labs and medications.    Physical Exam   Temp: 98  F (36.7  C) Temp src: Oral BP: 114/67   Heart Rate: 86 Resp: 16 SpO2: 98 % O2 Device: None (Room air)    Vitals:    06/01/19 0030 06/02/19 0600 06/03/19 0638   Weight: 88.8 kg (195 lb 12.3 oz) 90 kg (198 lb 6.6 oz) 97.5 kg (214 lb 15.2 oz)     Vital Signs with Ranges  Temp:  [96.1  F (35.6  C)-98  F (36.7  C)] 98  F (36.7  C)  Heart Rate:  [83-89] 86  Resp:  [16-18] 16  BP: ()/(58-78)  114/67  SpO2:  [98 %-100 %] 98 %  I/O last 3 completed shifts:  In: 2483 [P.O.:660; I.V.:1823]  Out: 375 [Urine:375]    Constitutional: Awake, alert, cooperative, no apparent distress  HEENT: Trachea midline, sclera is clear   Respiratory: Clear to auscultation bilaterally, no crackles or wheezing  Cardiovascular: Irregular rate and rhythm, normal S1 and S2, and no murmur noted  GI: Normal bowel sounds, soft, non-distended, non-tender  Skin/Integumen: Left lower extremity redness is almost resolved.  There is chronically dark-colored skin due to venous stasis.  Similar to the right side.  No blisters.  Well-perfused.  Normal temperature.    Medications     lactated ringers 75 mL/hr at 06/04/19 0316     - MEDICATION INSTRUCTIONS -       Warfarin Therapy Reminder         ceFAZolin  1 g Intravenous Q8H     divalproex sodium delayed-release  250 mg Oral At Bedtime     levothyroxine  150 mcg Oral QAM AC     metoprolol tartrate  12.5 mg Oral BID     predniSONE  40 mg Oral Daily     simvastatin  40 mg Oral Daily with supper     sodium chloride (PF)  10 mL Intracatheter Q7 Days     warfarin  1 mg Oral ONCE at 18:00       Data   Recent Labs   Lab 06/04/19  0700 06/03/19  0610 06/02/19  0540 06/01/19  0515 05/31/19  1104   WBC 5.4 7.3  --  7.5 9.2   HGB 10.2* 10.2*  --  9.8* 10.6*   MCV 93 93  --  94 95   * 141*  --  122* 151   INR 2.54* 2.53* 3.21* 3.03* 2.33*    140  --  139 139   POTASSIUM 4.0 3.9  --  3.7 4.1   CHLORIDE 107 107  --  107 104   CO2 29 30  --  30 30   BUN 29 28  --  22 27   CR 0.62* 0.68  --  0.72 0.93   ANIONGAP 3 3  --  2* 5   TYSON 8.6 8.7  --  8.4* 8.9   * 127*  --  125* 86   TROPI  --   --   --   --  <0.015       No results found for this or any previous visit (from the past 24 hour(s)).

## 2019-06-04 NOTE — PLAN OF CARE
Alert and oriented. [place]. Tolerating moderate carb diet. Transfers with Ax1, gait belt, and walker. Dressing to bottom CDI, blanchable redness noted. LLE garry with blanchable redness. Voiding in small amounts. Pain managed with prn Tramadol for headaches. PICC to R arm patent, on IV Ancef. On PO Prednisone. Had loose nonproductive cough this evening, cleared with Robitussin and IS. Plans d/c to home potentially tomorrow if medically cleared.

## 2019-06-04 NOTE — PLAN OF CARE
Discharge Planner PT   Patient plan for discharge: home tomorrow  Current status: gait with RW to 400 feet up and down 4 steps ( only one at home) basic tranfers with S to Mod I only  Barriers to return to prior living situation: none able to do stairs   Recommendations for discharge: home with home PT per plan established by the PT    Rationale for recommendations: demonstrating ability to move as needed for safe return home with continued skilled PT in his home.       Entered by: Cher Benoit 06/04/2019 10:59 AM

## 2019-06-05 NOTE — PLAN OF CARE
Discharge Planner PT   Patient plan for discharge: home tomorrow  Current status: Pt amb 200' with mod indep with ww.  Note pt to amb with flexed posture. Goal met  Pt performed 4 steps with SBA with B rails. Goal met Pt transfers supine to/from sit independently Goal met Pt transfers sit to/from stand and bed to/from chair with ww indep. Goal met  Barriers to return to prior living situation: none able to do stairs   Recommendations for discharge: home with home PT per plan established by the PT    Rationale for recommendations: Due to recent hospitalization and to insure safety @ home.     PT - Collaborated with PT - all goals met.  Recommend home Safety eval to allow        Entered by: Samantha Bañuelos 06/05/2019 11:06 AM

## 2019-06-05 NOTE — PLAN OF CARE
Alert and oriented. [place]. Tolerating moderate carb diet. Transfers with SBA, gait belt, and walker. Dressing to coccyx CDI, changed today. Jevon KOROMA, blanchable. Voiding in small amounts. Denies pain. PICC patent, on IV Ancef. On Prednisone. Plans d/c to home, potentially today if medically cleared.

## 2019-06-05 NOTE — PROGRESS NOTES
[facility]    Hospitalist Progress Note      Assessment & Plan   Sean Brunner is a 84 year old male who was admitted on 5/31/2019.  Past medical history significant for coronary artery disease, diabetes mellitus type 2, hypertension, pulmonary hypertension, cor pulmonale, COPD, diastolic dysfunction, GERD, and hypothyroidism.  Patient also has history of old injury to his left lower extremity followed by previous admissions for left lower extremity cellulitis with septic shock.  Patient presented to emergency department after having a fall from which she cannot get up.  Patient was weak and disoriented.  He was found to have high-grade fever.  Source was felt to be left lower extremity cellulitis and patient was admitted to the ICU for septic shock.    Septic shock  Left lower extremity cellulitis  Group B strep bacteremia  No evidence of necrotizing fasciitis on admission.  Chronic venous stasis at baseline with erythema to left leg improving.  Patient has been on IV cefazolin based on sensitivities.  Vancomycin discontinued 6/2.  Patient has had negative cultures since initial positive culture.  Changing patient to oral Augmentin this evening and if has good response possible discharge tomorrow.  Patient was initially given IV hydrocortisone for stress dose steroids followed by Christopher.  This was changed to prednisone 40 mg on 6/4.  Patient has recently been started on prednisone 10 mg for PMR and will need to be tapered at discharge with close primary care follow-up.    Chronic atrial fibrillation  Continue rate control with metoprolol.  Warfarin dose per pharmacy.    Hypothyroidism  Continue PTA levothyroxine.    Hyperlipidemia  Continue PTA simvastatin.    Type 2 diabetes mellitus, diet-controlled  Blood sugars are well controlled with no insulin requirements.  Diabetic diet.    Chronic headaches  Continue PTA Depakote with as needed Tylenol.    Possible polymyalgia rheumatica  This is  being evaluated with outpatient primary care provider.  Patient was started on prednisone 10 mg daily last month with plan to slowly decreased from that.  Patient started on high-dose steroids as above and is now on prednisone 5 mg daily.  Patient will need to taper at discharge.    COPD without exacerbation  Patient now on oral prednisone 40 mg daily.  Patient is now on room air.  Continue PTA medications.    Coronary artery disease  Continue PTA medications.    DVT Prophylaxis: Warfarin  Code Status: DNR/DNI  Expected discharge: Anticipate discharge to home tomorrow if tolerated change to Augmentin.     Lynnette Solomon MD FACP  Hospitalist Service  [facility]  Text Page (7am - 6pm)      Interval History   Patient reports continued improvement in erythema to her left leg.  Patient has continued on IV cefazolin.  Physical therapy is recommending home with home PT.  Planning to change to oral antibiotic and discharge tomorrow if continues to improve.    -Data reviewed today: I reviewed all new labs and imaging results over the last 24 hours.     Physical Exam   Temp: 97.7  F (36.5  C) Temp src: Oral BP: 121/67 Pulse: 82 Heart Rate: 88 Resp: 16 SpO2: 98 % O2 Device: None (Room air)    Vitals:    06/02/19 0600 06/03/19 0638 06/05/19 0557   Weight: 90 kg (198 lb 6.6 oz) 97.5 kg (214 lb 15.2 oz) 95.7 kg (211 lb)     Vital Signs with Ranges  Temp:  [95.4  F (35.2  C)-97.7  F (36.5  C)] 97.7  F (36.5  C)  Pulse:  [82-84] 82  Heart Rate:  [62-88] 88  Resp:  [14-16] 16  BP: (107-125)/(56-73) 121/67  SpO2:  [96 %-99 %] 98 %  I/O last 3 completed shifts:  In: 1140 [P.O.:1140]  Out: 700 [Urine:700]    Constitutional: Pleasant elderly gentleman.  Alert and oriented x3. No acute distress.  Nontoxic.  Resting in bed.  Son at bedside.  HEENT: NCAT. EOMI. Moist oral mucosa.  Respiratory: Clear to auscultation bilaterally. No crackles or wheezes.  Cardiovascular: Irregularly irregular rhythm.  Regular rate.  No  murmur appreciated.    GI: Soft, nontender, nondistended.    Musculoskeletal: Chronic venous stasis changes bilaterally.  Evidence of old injury to left lower leg.  Continued slight erythema and warmth to left lower extremity, but patient and son state that this is significantly improved from admission.  Neurologic: Alert and oriented x3. No focal neurologic deficits.       Medications     lactated ringers 75 mL/hr at 06/04/19 0316     - MEDICATION INSTRUCTIONS -       Warfarin Therapy Reminder         amoxicillin-clavulanate  1 tablet Oral Q12H TYRONE     divalproex sodium delayed-release  250 mg Oral At Bedtime     levothyroxine  150 mcg Oral QAM AC     metoprolol tartrate  12.5 mg Oral BID     predniSONE  40 mg Oral Daily     simvastatin  40 mg Oral Daily with supper     sodium chloride (PF)  10 mL Intracatheter Q7 Days     warfarin  1.5 mg Oral ONCE at 18:00       Data   Recent Labs   Lab 06/05/19  0510 06/04/19  0700 06/03/19  0610  05/31/19  1104   WBC 5.8 5.4 7.3   < > 9.2   HGB 10.3* 10.2* 10.2*   < > 10.6*   MCV 92 93 93   < > 95   * 145* 141*   < > 151   INR 2.53* 2.54* 2.53*   < > 2.33*    139 140   < > 139   POTASSIUM 4.0 4.0 3.9   < > 4.1   CHLORIDE 105 107 107   < > 104   CO2 32 29 30   < > 30   BUN 29 29 28   < > 27   CR 0.70 0.62* 0.68   < > 0.93   ANIONGAP 1* 3 3   < > 5   TYSON 8.7 8.6 8.7   < > 8.9   GLC 97 119* 127*   < > 86   TROPI  --   --   --   --  <0.015    < > = values in this interval not displayed.       No results found for this or any previous visit (from the past 24 hour(s)).

## 2019-06-05 NOTE — PLAN OF CARE
Neuro: A&O  Vitally stable.  Respiratory: Diminished breath sounds. Denies SOB  Gi: Bowel sounds audible and active x4  Gu: Pt has voided twice, 100mL each time, bladder scanned for 0  Skin: Open wound on coccyx. Mepilex dressing to be changed q3 days. + for group B strep.  Pain: Denies  Iv: Double lumen PICC in right arm.  Transfer: A1 GB/walker  Diet: mod cho  Plan Continue plan of care.

## 2019-06-06 NOTE — PHARMACY-ANTICOAGULATION SERVICE
Clinical Pharmacy- Warfarin Discharge Note  This patient is currently on warfarin for the treatment of Atrial fibrillation.  INR Goal= 2-3  Expected length of therapy lifetime.    Warfarin PTA Regimen: 1.5 mg MWFSat, 1 mg SunTuTh      Anticoagulation Dose History     Recent Dosing and Labs Latest Ref Rng & Units 5/31/2019 6/1/2019 6/2/2019 6/3/2019 6/4/2019 6/5/2019 6/6/2019    Warfarin 0.5 mg - - 0.5 mg - - - - -    Warfarin 1 mg - - - - - 1 mg - -    Warfarin 1.5 mg - 1.5 mg - - 1.5 mg - 1.5 mg -    INR 0.86 - 1.14 2.33(H) 3.03(H) 3.21(H) 2.53(H) 2.54(H) 2.53(H) 2.34(H)          Vitamin K doses administered during the last 7 days: None  FFP administered during the last 7 days: None    Warfarin discharge reconciliation complete  Agree with plan to discharge patient on prior to admission warfarin regimen: 1mg Sun/Tue/Thur and 1.5mg ROW with an INR check within 7 days.       Allen Matthews, PharmD./PGY-1 Resident

## 2019-06-06 NOTE — PLAN OF CARE
Noc RN- Pt awake most of the night. Pt c/o headache & relieved with tramadol.LLE garry w/decreased swelling per pt. Rt foot cool touch, huong pedal pulses weak. VSS

## 2019-06-06 NOTE — PROGRESS NOTES
Care Transitions Team: Following for CC, discharge planning, and disposition.      Met with pt at bedside to discuss Home PT, He is in agreement for, choice is for [facility].   Referral placed and updated AVS with contact information.     NO further needs

## 2019-06-06 NOTE — PROGRESS NOTES
PICC removed per orders. Reviewed discharge instructions and medications with patient and son. Questions answered. Patient discharged to home with son, discharge instructions, Prednisone script, and belongings at this time. Instructed to  Augmentin at home pharmacy.

## 2019-06-06 NOTE — DISCHARGE SUMMARY
[facility]    Discharge Summary  Hospitalist    Date of Admission:  5/31/2019  Date of Discharge:  6/6/2019  Discharging Provider: Lynnette Solomon MD  Date of Service (when I saw the patient): 06/06/19    Discharge Diagnoses   Septic shock  Left lower extremity cellulitis  Group B strep bacteremia  Chronic atrial fibrillation  Hypothyroidism   Hyperlipidemia  Type 2 diabetes mellitus, diet-controlled  Chronic headaches  Possible polymyalgia rheumatica  COPD without exacerbation  Coronary artery disease  Hypertension    History of Present Illness   Sean Brunner is a 84 year old man who was admitted on 5/31/2019.  Past medical history significant for coronary artery disease, diabetes mellitus type 2, hypertension, pulmonary hypertension, cor pulmonale, COPD, diastolic dysfunction, GERD, and hypothyroidism.  Patient also has history of old injury to his left lower extremity followed by previous admissions for left lower extremity cellulitis with septic shock.  Patient presented to emergency department after having a fall from which she cannot get up.  Patient was weak and disoriented.  He was found to have high-grade fever.  Source was felt to be left lower extremity cellulitis and patient was admitted to the ICU for septic shock.    Hospital Course   Sean Brunner was admitted on 5/31/2019.  The following problems were addressed during his hospitalization:     Septic shock  Left lower extremity cellulitis  Group B strep bacteremia  No evidence of necrotizing fasciitis on admission.  Chronic venous stasis at baseline with erythema to left leg improving.  Patient has been on IV cefazolin based on sensitivities. Vancomycin discontinued 6/2.  Patient has had negative cultures since initial positive culture.  Changed patient to oral Augmentin 6/5 and has tolerated well.  Continues to have improvement in left lower extremity.   Will discharge today with Augmentin to complete 14-day course.  In the  ICU, patient was initially given IV hydrocortisone for stress dose steroids followed by Christopher.  This was changed to prednisone 40 mg on 6/4.  Patient had recently been started on prednisone 10 mg for PMR.  Planning to discharge patient on prednisone with 30 mg daily starting tomorrow x3 days, 20 mg daily x3 days, and then 10 mg daily indefinitely until primary care wishes to further taper.     Chronic atrial fibrillation  Continue rate control with metoprolol.  Warfarin dosed per pharmacy.  Patient's INR has been therapeutic at home dose for the last several days.  We will continue home dosing on discharge with patient to follow with primary care next week for repeat INR.     Hypothyroidism  Continue PTA levothyroxine.     Hyperlipidemia  Continue PTA simvastatin.     Type 2 diabetes mellitus, diet-controlled  Blood sugars are well controlled with no insulin requirements.  Diabetic diet.     Chronic headaches  Continue PTA Depakote with as needed Tylenol.     Possible polymyalgia rheumatica  This is being evaluated with outpatient primary care provider. Patient was started on prednisone 10 mg daily last month with plan to slowly decreased from that.   Steroid taper as described above with primary care to follow and taper further as indicated.     COPD without exacerbation  Patient is on room air.  Continue PTA medications.  Steroid taper as above.     Coronary artery disease  Continue PTA medications.    Hypertension  Blood pressure has been well controlled.  Continue PTA metoprolol and Lasix on discharge.    Lynnette Solomon MD FACP  Hospitalist Service  [facility]    Significant Results and Procedures   PICC placed 5/31/19 and removed 6/6/19.     Pending Results   These results will be followed up by primary care provider.  Unresulted Labs Ordered in the Past 30 Days of this Admission     Date and Time Order Name Status Description    6/2/2019 1302 Blood culture Preliminary     6/2/2019 0801  Blood culture Preliminary           Code Status   DNR / DNI       Primary Care Physician   Oscar Parekh    Physical Exam   Temp: 96.4  F (35.8  C) Temp src: Oral BP: 114/68 Pulse: 83 Heart Rate: 88 Resp: 16 SpO2: 100 % O2 Device: None (Room air)    Vitals:    06/03/19 0638 06/05/19 0557 06/06/19 0601   Weight: 97.5 kg (214 lb 15.2 oz) 95.7 kg (211 lb) 95 kg (209 lb 8 oz)     Vital Signs with Ranges  Temp:  [96.4  F (35.8  C)-97.7  F (36.5  C)] 96.4  F (35.8  C)  Pulse:  [64-87] 83  Heart Rate:  [88] 88  Resp:  [16] 16  BP: (105-121)/(57-70) 114/68  SpO2:  [98 %-100 %] 100 %  I/O last 3 completed shifts:  In: 1180 [P.O.:1180]  Out: 1300 [Urine:1300]    Constitutional: Pleasant, elderly gentleman.  Alert and oriented x3. No acute distress.  Nontoxic.  Seen ambulating in hallway.  HEENT: NCAT. EOMI. Moist oral mucosa.  Respiratory: Clear to auscultation bilaterally. No crackles or wheezes.  Cardiovascular: Irregularly irregular rhythm.  Regular rate.  No murmur appreciated.    GI: Soft, nontender, nondistended.    Musculoskeletal: Chronic venous stasis changes bilaterally.  Evidence of old injury to left lower leg.  Very trace erythema noted to leg.  No swelling.  No warmth.  Otherwise no gross deformities.   Neurologic: Alert and oriented x3. No focal neurologic deficits.       Discharge Disposition   Discharged to home with home PT for home safety evaluation.  Condition at discharge: Stable    Consultations This Hospital Stay   PHARMACY TO DOSE VANCO  PHARMACY TO DOSE VANCO  PHARMACY TO DOSE WARFARIN  PHARMACY TO DOSE VANCO  VASCULAR ACCESS ADULT IP CONSULT  PHYSICAL THERAPY ADULT IP CONSULT  WOUND OSTOMY CONTINENCE NURSE  IP CONSULT  SOCIAL WORK IP CONSULT    Time Spent on this Encounter   ILynnette, personally saw the patient today and spent greater than 30 minutes discharging this patient.    Discharge Orders      Home Care PT Referral for Hospital Discharge      Reason for your hospital  stay    Septic shock requiring admission to ICU secondary to Group B Strep bacteremia and left lower extremity cellulitis.     Activity    Your activity upon discharge: activity as tolerated     MD face to face encounter    Documentation of Face to Face and Certification for Home Health Services    I certify that patient: Sean Brunner is under my care and that I, or a nurse practitioner or physician's assistant working with me, had a face-to-face encounter that meets the physician face-to-face encounter requirements with this patient on: 6/6/2019.    This encounter with the patient was in whole, or in part, for the following medical condition, which is the primary reason for home health care: sepsis, bacteremia, cellulitis.    I certify that, based on my findings, the following services are medically necessary home health services: Physical Therapy.    My clinical findings support the need for the above services because: Physical Therapy Services are needed to assess and treat the following functional impairments: home safety evaluation.    Further, I certify that my clinical findings support that this patient is homebound (i.e. absences from home require considerable and taxing effort and are for medical reasons or [employer] services or infrequently or of short duration when for other reasons) because: Requires assistance of another person or specialized equipment to access medical services because patient: Is unable to exit home safely on own.    Based on the above findings. I certify that this patient is confined to the home and needs intermittent skilled nursing care, physical therapy and/or speech therapy.  The patient is under my care, and I have initiated the establishment of the plan of care.  This patient will be followed by a physician who will periodically review the plan of care.  Physician/Provider to provide follow up care: Oscar Parekh    Attending hospital physician (the Medicare  certified [place] provider): Lynnette Solomon  Physician Signature: See electronic signature associated with these discharge orders.  Date: 6/6/2019     Follow-up and recommended labs and tests     Follow up with primary care provider, Oscar Parekh, within 7 days to evaluate medication change, for hospital follow- up and to follow up on results. Monitor for signs of infection to left leg to prompt further evaluation. Follow steroid taper and ensure doing well with decreases in dose. Discharging on home warfarin dosing. The following labs/tests are recommended at appointment within 7 days: CBC, BMP, INR.     DNR/DNI     Diet    Follow this diet upon discharge: Diabetic diet (6492-0992 Calories: Moderate Consistent Carb diet, 4-6 carb units/meal)     Discharge Medications   Current Discharge Medication List      START taking these medications    Details   amoxicillin-clavulanate (AUGMENTIN) 875-125 MG tablet Take 1 tablet by mouth 2 times daily for 16 doses  Qty: 16 tablet, Refills: 0    Associated Diagnoses: Sepsis, due to unspecified organism (H)         CONTINUE these medications which have CHANGED    Details   predniSONE (DELTASONE) 10 MG tablet Take 3 tablets (30 mg) by mouth daily for 3 days, THEN 2 tablets (20 mg) daily for 3 days, THEN 1 tablet (10 mg) daily for 24 days. Further taper and prescriptions per your primary care doctor.  Qty: 39 tablet, Refills: 0    Associated Diagnoses: Sepsis, due to unspecified organism (H)         CONTINUE these medications which have NOT CHANGED    Details   cyanocolbalamin (VITAMIN  B-12) 1000 MCG tablet Take 3,000 mcg by mouth every morning      divalproex sodium delayed-release (DEPAKOTE) 250 MG DR tablet Take 250 mg by mouth At Bedtime      furosemide (LASIX) 20 MG tablet Take 20 mg by mouth daily      levothyroxine (SYNTHROID) 150 MCG tablet Take 150 mcg by mouth every morning (before breakfast)       metoprolol succinate (TOPROL-XL) 25 MG 24 hr tablet Take  1 tablet (25 mg) by mouth every evening  Qty: 30 tablet    Associated Diagnoses: Atrial fibrillation, unspecified type (H)      multivitamin, therapeutic with minerals (THERA-VIT-M) TABS tablet Take 1 tablet by mouth 2 times daily (with meals)      OMEPRAZOLE PO Take 40 mg by mouth every morning       simvastatin (ZOCOR) 40 MG tablet Take 40 mg by mouth daily (with dinner)       terazosin (HYTRIN) 5 MG capsule Take 5 mg by mouth At Bedtime      traMADol (ULTRAM) 50 MG tablet Take 50 mg by mouth every 6 hours as needed for moderate pain       !! warfarin (COUMADIN) 1 MG tablet Take 1.5 mg by mouth four times a week Monday, Wednesday, Friday, Saturday      !! warfarin (COUMADIN) 1 MG tablet Take 1 mg by mouth three times a week Tuesday, Thursday, Sunday       !! - Potential duplicate medications found. Please discuss with provider.        Allergies   No Known Allergies  Data   Most Recent 3 CBC's:  Recent Labs   Lab Test 06/06/19  0504 06/05/19  0510 06/04/19  0700   WBC 7.3 5.8 5.4   HGB 10.4* 10.3* 10.2*   MCV 92 92 93    140* 145*      Most Recent 3 BMP's:  Recent Labs   Lab Test 06/05/19  0510 06/04/19  0700 06/03/19  0610    139 140   POTASSIUM 4.0 4.0 3.9   CHLORIDE 105 107 107   CO2 32 29 30   BUN 29 29 28   CR 0.70 0.62* 0.68   ANIONGAP 1* 3 3   TYSON 8.7 8.6 8.7   GLC 97 119* 127*     Most Recent 2 LFT's:  Recent Labs   Lab Test 11/08/18  0639 11/07/18  0647   AST 22 22   ALT 9 13   ALKPHOS 52 54   BILITOTAL 0.4 0.6     Most Recent INR's and Anticoagulation Dosing History:  Anticoagulation Dose History     Recent Dosing and Labs Latest Ref Rng & Units 5/31/2019 6/1/2019 6/2/2019 6/3/2019 6/4/2019 6/5/2019 6/6/2019    Warfarin 0.5 mg - - 0.5 mg - - - - -    Warfarin 1 mg - - - - - 1 mg - -    Warfarin 1.5 mg - 1.5 mg - - 1.5 mg - 1.5 mg -    INR 0.86 - 1.14 2.33(H) 3.03(H) 3.21(H) 2.53(H) 2.54(H) 2.53(H) 2.34(H)        Most Recent 3 Troponin's:  Recent Labs   Lab Test 05/31/19  1104 11/04/18  2076  09/01/18  0747  02/24/17  1114   TROPI <0.015 0.017 <0.015   < >  --    TROPONIN  --   --   --   --  0.01    < > = values in this interval not displayed.     Most Recent Cholesterol Panel:No lab results found.  Most Recent 6 Bacteria Isolates From Any Culture (See EPIC Reports for Culture Details):  Recent Labs   Lab Test 06/02/19  1325 06/02/19  1321 05/31/19  1219 05/31/19  1102 11/04/18  1659 11/04/18  1644   CULT No growth after 4 days No growth after 4 days No growth Cultured on the 1st day of incubation:  Streptococcus agalactiae sero group B  *  Critical Value/Significant Value, preliminary result only, called to and read back by  Sun Pope RN on 05.31.2019 at 2358.  JRT    (Note)  POSITIVE for STREPTOCOCCUS AGALACTIAE (Group B Strep) by [employer]  multiplex nucleic acid test. Penicillin and ampicillin are drugs of  choice, nonsusceptible isolates have not been reported. Final  identification and antimicrobial susceptibility testing will be  verified by standard methods.    Specimen tested with Verigene multiplex, gram-positive blood culture  nucleic acid test for the following targets: Staph aureus, Staph  epidermidis, Staph lugdunensis, other Staph species, Enterococcus  faecalis, Enterococcus faecium, Streptococcus species, S. agalactiae,  S. anginosus grp., S. pneumoniae, S. pyogenes, Listeria sp., mecA  (methicillin resistance) and Zaria/B (vancomycin resistance).    Critical Value/Significant Value called to and read back by Sun Pope RN 0335 06/01/19 AA.     No growth No growth     Most Recent TSH, T4 and A1c Labs:  Recent Labs   Lab Test 09/06/18  0648 09/01/18  1120  01/03/15  1653   TSH 3.47  --    < > 0.36*   T4  --   --   --  1.29   A1C  --  4.9   < >  --     < > = values in this interval not displayed.     Results for orders placed or performed during the hospital encounter of 05/31/19   Chest  XR, 1 view PORTABLE    Narrative    CHEST ONE VIEW PORTABLE   5/31/2019 11:14 AM     HISTORY:  Weakness.    COMPARISON: 11/4/2018      Impression    IMPRESSION: No radiographic evidence of acute chest abnormality.    NICHO CALDERON MD   XR Chest Port 1 View    Narrative    CHEST SINGLE VIEW PORTABLE   5/31/2019 5:00 PM     HISTORY: Peripherally inserted central catheter.    COMPARISON: 5/31/2019 at 1108 hours.      Impression    IMPRESSION:   1. Interval placement of a right upper extremity peripherally inserted  central catheter with distal catheter tip in the superior vena cava,  near its junction with right atrium.  2. No other significant interval change since the recent comparison  study.    KAROLINA BISWAS MD

## 2019-06-06 NOTE — DISCHARGE INSTRUCTIONS
[facility] will call you within 38 hours to scheduled a home visit.  They will work with you on Physical Therapy exercises. Their contact number is  [phone]

## 2019-06-06 NOTE — PLAN OF CARE
Pt A&O, vitals monitored.  Up with AxChelsea gb.  Pain managed with prn tramadol. CMS intact, LLE garry with scabs.  Mepilex dressing to coccyx CDI, was changed on days.  PICC line in place.  Mod carb diet.  Was switched from IV abx to po.  Will likely discharge home Thursday.

## 2019-06-17 NOTE — TELEPHONE ENCOUNTER
[facility] now requests orders and shares plan of care/discharge summaries for some patients through [employer].  Please REPLY TO THIS MESSAGE OR ROUTE BACK TO THE AUTHOR in order to give authorization for orders when needed.  This is considered a verbal order, you will still receive a faxed copy of orders for signature.  Thank you for your assistance in improving collaboration for our patients.    ORDER    SN 2w2, 1w2, 3 PRN for CP focused assessments, edema mgmt, teaching needs, skin integrity.     Thank you.

## 2019-07-16 NOTE — PATIENT INSTRUCTIONS
Medication Changes:   No medication changes at this time. Please continue current medication regiment.    Patient Instructions:  1. Continue staying active and eat a heart healthy diet.    2. Please keep current list of medications with you at all times.    3. Remember to weigh yourself daily after voiding and before you consume any food or beverages and log the numbers.  If you have gained 2 pounds overnight or 5 pounds in a week contact us immediately for medication adjustments or further instructions.    4. **Please call us immediately if you have any syncope (fainting or passing out), chest pain, edema (swelling or weight gain), or decline in your functional status (general decline in how you are feeling).    Follow up Appointment Information:  Follow up in October and Decide on Right heart catheterization at that time    Results:  Component      Latest Ref Rng & Units 7/16/2019   Sodium      136 - 145 mmol/L 138   Potassium      3.5 - 5.1 mmol/L 4.0   Chloride      98 - 107 mmol/L 103   Carbon Dioxide      23 - 29 mmol/L 32 (H)   Anion Gap      6 - 17 mmol/L 7   Glucose      70 - 105 mg/dL 82   Urea Nitrogen      7 - 30 mg/dL 21   Creatinine      0.70 - 1.30 mg/dL 1.00   GFR Estimate      >60 mL/min/1.73:m2 71   GFR Estimate If Black      >60 mL/min/1.73:m2 86   Calcium      8.5 - 10.5 mg/dL 9.9   WBC      4.0 - 11.0 10e9/L 6.1   RBC Count      4.4 - 5.9 10e12/L 3.56 (L)   Hemoglobin      13.3 - 17.7 g/dL 10.9 (L)   Hematocrit      40.0 - 53.0 % 33.4 (L)   MCV      78 - 100 fl 94   MCH      26.5 - 33.0 pg 30.6   MCHC      31.5 - 36.5 g/dL 32.6   RDW      10.0 - 15.0 % 16.1 (H)   Platelet Count      150 - 450 10e9/L 147 (L)   N-Terminal Pro Bnp      0 - 450 pg/mL 591 (H)     For scheduling at [facility] please call [phone]  For scheduling at the [facility] please call [phone]  We are located on the second floor [address] at the [facility].  Our address is     [address] [place] [address].,    [address]  [place]  [address]    Thank you for allowing us to be a part of your care here at the [facility]    If you have questions or concerns please contact us at:    Porsha Dawn RN, BSN      Nurse Coordinator       Pulmonary Hypertension     [facility]   [phone])[phone]  (F)[phone]    ** Please note that you will NOT receive a reminder call regarding your scheduled testing, reminder calls are for provider appointments only.  If you are scheduled for testing within the [employer] system you may receive a call regarding pre-registration for billing purposes only.**     Remember to weigh yourself daily after voiding and before you consume any food or beverages and log the numbers.  If you have gained/lost 2 pounds overnight or 5 pounds in a week contact us immediately for medication adjustments or further instructions.    Support Group:  Pulmonary Hypertension Association  Https://www.phassociation.org/  **Look at the Events Tab** They even have Support Groups that you can call into    [facility] PH Support Group  Second Saturday of the Month from 1-3 PM   Location: [address] [place] [address]  Leader: Marj Torres and Mary Liao  Phone: [phone] or [phone]  Email: [email or web address]

## 2019-07-16 NOTE — LETTER
"7/16/2019    Oscar Parekh MD  [facility] [address] [address]    RE: Sean Brunner       Dear Colleague,    I had the pleasure of seeing Sean Brunner in the [facility].    Problem List:    1. H/o cor pulmonale with know 3-4+ TR and RV dysfunction in past- slight increase in RV size and decline in RV systolic function compared to 2011 echo  2. Chronic afib- VR well controlled, on coumadin  3. Moderate obstruction on PFT 2011  4. STEWART- no compliant with CPAP  5. H/o PE- no PE on CT chest this admission  6. Non obstructive CAD CT cor angio 2011, negative stress test this admission  7. Obesity- lost 100 lbs over last several years  8. Cellulitis of lower extremity  9. Recurrent headaches requiring Tramadol    Oscar Parekh MD    [address] [place] [id number] [phone] (Fax)    The patient returns for follow-up of heart failure.  There is no interim history of chest pain, tightness, paroxysmal nocturnal dyspnea, orthopnea, peripheral edema, palpitation, pre-syncope, syncope, device discharge.  He complains of excessive fatigue and being cold.  He has history of BROOKE.   Exercise tolerance is adequate to get through grocery . The patient is attempting to exercise regularly and following a sodium restricted, calorically appropriate diet.  Medications are reviewed and the patient is taking medications as prescribed.  The patient is generally sleeping variably.  Most of day is spent reading or playing cards. He is moving furniture!.    His interim history is significant for \"sepsis\" treated at [facility] and sources to venous ulceration, open wounds from edema.  He also provides interim history of myalgia and has been on steroids after being found to have elevated ESR/CRP.      PAST MEDICAL HISTORY:  Past Medical History:   Diagnosis Date     Atrial fibrillation (H) 12/27/2011    Sees Dr Carcamo, avoid " amio due to cor pulmonale, asymptomatic rates up to160 with exersion.        Bilateral leg edema     compression wraps     CAD (coronary artery disease)     Nonobstructive     Cardiomyopathy (H) 5/1/2012     Carotid stenosis      Cellulitis of left lower extremity 11/5/2018     COPD (chronic obstructive pulmonary disease) (H)      Cor pulmonale (H) 5/1/2012     Diastolic dysfunction      DM2 (diabetes mellitus, type 2) (H)      IYER (dyspnea on exertion)      GERD (gastroesophageal reflux disease)      HTN (hypertension)      Hypothyroidism      Leg wound, left      LV dysfunction 05/01/2012     Due to cor pulmonale and a fib related tachycardia      Morbid obesity (H) 5/1/2012     On supplemental oxygen by nasal cannula     at [facility]     STEWART (obstructive sleep apnea) 05/01/2012    Non compliant with CPAP     Pulmonary embolism (H)      Pulmonary HTN (H)      Varicose veins of bilateral lower extremities with other complications      Vitamin B12 deficiency disease 5/1/2012    On oral replacement        SOCIAL HISTORY:  Social History     Social History     Marital status:      Spouse name: N/A     Number of children: N/A     Years of education: N/A     Social History Main Topics     Smoking status: Never Smoker     Smokeless tobacco: Never Used     Alcohol use No     Drug use: No     Sexual activity: Not on file     Other Topics Concern     Not on file     Social History Narrative       CURRENT MEDICATIONS:  Current Outpatient Medications   Medication     cyanocolbalamin (VITAMIN  B-12) 1000 MCG tablet     divalproex sodium delayed-release (DEPAKOTE) 250 MG DR tablet     furosemide (LASIX) 20 MG tablet     levothyroxine (SYNTHROID) 150 MCG tablet     metoprolol succinate (TOPROL-XL) 25 MG 24 hr tablet     multivitamin, therapeutic with minerals (THERA-VIT-M) TABS tablet     OMEPRAZOLE PO     predniSONE (DELTASONE) 10 MG tablet     simvastatin (ZOCOR) 40 MG tablet     terazosin (HYTRIN) 5 MG capsule     traMADol  (ULTRAM) 50 MG tablet     warfarin (COUMADIN) 1 MG tablet     warfarin (COUMADIN) 1 MG tablet     No current facility-administered medications for this visit.        ROS:   10 point ROS negative except HPI    EXAM:  Home weight  General: appears comfortable, alert and articulate  Head: normocephalic, atraumatic  Eyes: anicteric sclera, EOMI  Neck: no adenopathy  Orophyarynx: moist mucosa, no lesions, dentition intact  Heart: regular, S1/S2, no murmur, gallop, rub, estimated JVP WNL  Lungs: clear, no rales or wheezing  Abdomen: soft, non-tender, bowel sounds present, no hepatosplenomegaly  Extremities: no clubbing, cyanosis or edema  Neurological: normal speech and affect, no gross motor deficits      Wt Readings from Last 24 Encounters:   07/16/19 88 kg (194 lb)   06/06/19 95 kg (209 lb 8 oz)   01/15/19 83.9 kg (185 lb)   12/19/18 85.2 kg (187 lb 14.4 oz)   11/08/18 85.6 kg (188 lb 11.2 oz)   10/30/18 93 kg (205 lb)   09/06/18 87.4 kg (192 lb 9.6 oz)   05/15/18 87 kg (191 lb 12.8 oz)   10/17/17 88.7 kg (195 lb 8 oz)   06/04/17 88.5 kg (195 lb)   04/18/17 94.3 kg (208 lb)   04/11/17 91.6 kg (201 lb 15.1 oz)   04/04/17 93 kg (205 lb 1.6 oz)   02/27/17 100.9 kg (222 lb 6 oz)   01/08/15 96.6 kg (213 lb)   12/13/14 98.7 kg (217 lb 8 oz)   12/27/13 100.7 kg (222 lb)   05/17/12 117.9 kg (260 lb)       Labs:      CBC RESULTS:  Lab Results   Component Value Date    WBC 6.1 07/16/2019    RBC 3.56 (L) 07/16/2019    HGB 10.9 (L) 07/16/2019    HCT 33.4 (L) 07/16/2019    MCV 94 07/16/2019    MCH 30.6 07/16/2019    MCHC 32.6 07/16/2019    RDW 16.1 (H) 07/16/2019     (L) 07/16/2019       CMP RESULTS:  Lab Results   Component Value Date     07/16/2019    POTASSIUM 4.0 07/16/2019    CHLORIDE 103 07/16/2019    CO2 32 (H) 07/16/2019    ANIONGAP 7 07/16/2019    GLC 82 07/16/2019    BUN 21 07/16/2019    CR 1.00 07/16/2019    GFRESTIMATED 71 07/16/2019    GFRESTBLACK 86 07/16/2019    TYSON 9.9 07/16/2019    BILITOTAL 0.4  11/08/2018    ALBUMIN 2.7 (L) 11/08/2018    ALKPHOS 52 11/08/2018    ALT 9 11/08/2018    AST 22 11/08/2018        INR RESULTS:  Lab Results   Component Value Date    INR 2.34 (H) 06/06/2019       No components found for: CK  Lab Results   Component Value Date    MAG 2.0 11/06/2018     Lab Results   Component Value Date    NTBNPI 787 02/24/2017       Echocardiogram (2/25/2017):  Interpretation Summary  The left ventricle is normal in size. There is normal left ventricular wall  thickness. Left ventricular systolic function is normal. The visual ejection  fraction is estimated at 55-60%. Flattened septum is consistent with RV  pressure/volume overload. There is no thrombus seen in the left ventricle.  The right ventricle is severely dilated. The right ventricular systolic  function is mild to moderately reduced.  The left atrium is moderately dilated. The right atrium is severely dilated.  There is severe (4+) tricuspid regurgitation. The right ventricular systolic  pressure is approximated at 30.9 mmHg plus the right atrial pressure. This  most likely represents an underestimation of the true RVSP.  Mild aortic stenosis.  In direct comparison to the previous study dated 11/07/2012, there has been a  mild interval increase in right ventricular size and deterioration in right  ventricular systolic function.      CT Chest Pulmonary (2/24/2017):  COMPARISON: 4/13/2011     FINDINGS: Evaluation of the pulmonary arterial system shows no  evidence of embolus. The thoracic aorta is calcified and slightly  tortuous. No aortic aneurysm or dissection. There are coronary artery  atherosclerotic calcifications. The heart is enlarged. There is no  mediastinal, hilar or axillary lymph node enlargement. There are a few  tiny calcified granulomas in the right upper lobe. Mild dependent  atelectasis bilaterally. A few bands of scar or atelectasis  bilaterally. Small bilateral pleural effusions. Images through the  upper abdomen are  remarkable for small amount of ascites. The inferior  vena cava is very large which may be due to right heart dysfunction.  The liver has a slightly nodular contour suggesting cirrhosis.     IMPRESSION:  1. There is no pulmonary embolus, aortic aneurysm or dissection.  2. Coronary artery atherosclerotic calcification. Cardiomegaly.  3. Small bilateral pleural effusions.  4. Probable hepatic cirrhosis. Small amount of ascites.      XR Chest 2 Views (4/5/2017):  HISTORY: Other secondary pulmonary hypertension  COMPARISON: 2/24/2017.  IMPRESSION: No acute abnormality.       Right Heart Catheterization (4/11/2017):  82-year-old gentleman with unexplained pulmonary hypertension, severe  right heart failure and right ventricular dysfunction, and severe  tricuspid regurgitation is referred for further evaluation of his  pulmonary hypertension etiology 2 guide further evaluation and  treatment.    Catheterization results  Baseline with thermal dilution cardiac output  -RA pressure mean = 12. Patient is defibrillation  -RV pressure-49/11  -Pulmonary artery pressure 48/21 (31)  -Pulmonary capillary wedge pressure -/24 (15)  -Thermal dilution cardiac output/index 4.9/2.4  -Calculated pulmonary vascular arteriolar resistance 3.2 Woods units    With estimated Rachael cardiac output  -Pulmonary pressure 47/19 (31)  -Pulmonary capillary wedge pressure-/27 (17)  -Pulmonary artery oxygen saturation 67% aortic oxygen saturation 98  percent on room air  -Estimated Rachael cardiac output/index 5.1/2.5  -Pulmonary vascular arteriolar resistance 2.6 Woods units       VQ Scan (4/5/2017):  IMPRESSION:   1. Central airways deposition, suggestive of chronic obstructive  pulmonary disease.  2. Mild diffusely heterogeneous pulmonary perfusion, similar to that  of the ventilation images. These findings most likely relate to  underlying pulmonary disease. There are no focal pulmonary perfusion  defects.       NM MPI w Lexiscan  (2/24/2017):  Indication/Clinical History: Shortness of breath, chest pain     Impression  1. Myocardial perfusion imaging using single isotope technique  demonstrated no evidence of ischemia or infarction.   2. Gated images demonstrated normal size left ventricle with normal  wall motion. The left ventricular systolic function is normal at 76%.  3. Compared to the prior study from  .     Procedure  Pharmacologic stress testing was performed with Lexiscan at a rate of  0.08 mg/ml rapid bolus injection, for 15 seconds, 0.4 mg/5ml  intravenously. Low-level exercise was not performed along with the  vasodilator infusion. The heart rate was 6767 at baseline and bj to  82 beats per minute during the Lexiscan infusion. The rest blood  pressure was 118/64 mmHg and was 118/57 mm Hg during Lexiscan  infusion. The patient experienced no chest pain during the test.     Myocardial perfusion imaging was performed at rest, approximately 45  minutes after the injection intravenously of 11 mCi of Tc-99m Myoview.  At peak pharmacologic effect, 10-20 seconds after Lexiscan, the  patient was injected intravenously with 32 mCi of Tc-99m Myoview. The  post-stress tomographic imaging was performed approximately 60 minutes  after stress.     EKG Findings  The resting EKG demonstrated sinus rhythm with poor R wave progression  in the low voltage pattern. The stress EKG demonstrated no EKG  changes suggestive of ischemia.     Tomographic Findings  Overall, the study quality is fair. Mild visceral tracer artifact is  noted . On the stress images, mild inferior count reduction is noted.  On the rest images, mild inferior count reduction is seen and likely  due to diaphragmatic attenuation and visceral tracer artifact . Gated  images demonstrated normal size left ventricle with normal wall  motion. The left ventricular ejection fraction was calculated to be  76%. TID was absent.      Assessment and Plan:   1. Appears to be clinically  stable though tenuous.  His iron appeared to be low and will recheck it.  He is on steroid taper.      2. He is being fitted for compression stocking and will need these indefinitely  He is to call immediately should legs start to swell.      3. Today has no organ specific symptoms of inflammation, myositis today.    4. He has PH and RV dysfunction and probably needs more diuretics to keep edema at bay, though does not appear to have ascites.    Will have him return after recover to consider if reassessment of PH is desirable or warranted.      CC: Dr. Parekh        .                              Thank you for allowing me to participate in the care of your patient.      Sincerely,     Julian Joyce MD     [facility]    cc:   Julian Joyce MD  [address] [place] [address] [id number]  [place] [address]

## 2019-07-16 NOTE — NURSING NOTE
New Medication: Patient was educated regarding newly prescribed medication, including discussion of  the indication, administration, side effects, and when to report to MD or RN. Patient demonstrated understanding of this information and agreed to call with further questions or concerns.  Diet: Patient instructed regarding a heart healthy diet, including discussion of reduced fat and sodium intake. Patient demonstrated understanding of this information and agreed to call with further questions or concerns.  Return Appointment: Patient given instructions regarding scheduling next clinic visit. Patient demonstrated understanding of this information and agreed to call with further questions or concerns.  Patient stated he understood all health information given and agreed to call with further questions or concerns.     **We will decide on if a Right heart catheterization is needed when pt is seen in October.    Medication Changes:   No medication changes at this time. Please continue current medication regiment.    Patient Instructions:  1. Continue staying active and eat a heart healthy diet.    2. Please keep current list of medications with you at all times.    3. Remember to weigh yourself daily after voiding and before you consume any food or beverages and log the numbers.  If you have gained 2 pounds overnight or 5 pounds in a week contact us immediately for medication adjustments or further instructions.    4. **Please call us immediately if you have any syncope (fainting or passing out), chest pain, edema (swelling or weight gain), or decline in your functional status (general decline in how you are feeling).    Follow up Appointment Information:  Follow up in October and Decide on Right heart catheterization at that time    Results:  Component      Latest Ref Rng & Units 7/16/2019   Sodium      136 - 145 mmol/L 138   Potassium      3.5 - 5.1 mmol/L 4.0   Chloride      98 - 107 mmol/L 103   Carbon Dioxide       23 - 29 mmol/L 32 (H)   Anion Gap      6 - 17 mmol/L 7   Glucose      70 - 105 mg/dL 82   Urea Nitrogen      7 - 30 mg/dL 21   Creatinine      0.70 - 1.30 mg/dL 1.00   GFR Estimate      >60 mL/min/1.73:m2 71   GFR Estimate If Black      >60 mL/min/1.73:m2 86   Calcium      8.5 - 10.5 mg/dL 9.9   WBC      4.0 - 11.0 10e9/L 6.1   RBC Count      4.4 - 5.9 10e12/L 3.56 (L)   Hemoglobin      13.3 - 17.7 g/dL 10.9 (L)   Hematocrit      40.0 - 53.0 % 33.4 (L)   MCV      78 - 100 fl 94   MCH      26.5 - 33.0 pg 30.6   MCHC      31.5 - 36.5 g/dL 32.6   RDW      10.0 - 15.0 % 16.1 (H)   Platelet Count      150 - 450 10e9/L 147 (L)   N-Terminal Pro Bnp      0 - 450 pg/mL 591 (H)

## 2019-07-16 NOTE — LETTER
"7/16/2019    Oscar Parekh MD  [facility] [address] [address]    RE: Sean Brunner       Dear Colleague,    I had the pleasure of seeing Sean Brunner in the [facility].    Problem List:    1. H/o cor pulmonale with know 3-4+ TR and RV dysfunction in past- slight increase in RV size and decline in RV systolic function compared to 2011 echo  2. Chronic afib- VR well controlled, on coumadin  3. Moderate obstruction on PFT 2011  4. STEWART- no compliant with CPAP  5. H/o PE- no PE on CT chest this admission  6. Non obstructive CAD CT cor angio 2011, negative stress test this admission  7. Obesity- lost 100 lbs over last several years  8. Cellulitis of lower extremity  9. Recurrent headaches requiring Tramadol    Oscar Parekh MD    [address] [place] [address] [id number] [phone] (Fax)    The patient returns for follow-up of heart failure.  There is no interim history of chest pain, tightness, paroxysmal nocturnal dyspnea, orthopnea, peripheral edema, palpitation, pre-syncope, syncope, device discharge.  He complains of excessive fatigue and being cold.  He has history of BROOKE.   Exercise tolerance is adequate to get through grocery . The patient is attempting to exercise regularly and following a sodium restricted, calorically appropriate diet.  Medications are reviewed and the patient is taking medications as prescribed.  The patient is generally sleeping variably.  Most of day is spent reading or playing cards. He is moving furniture!.    His interim history is significant for \"sepsis\" treated at [facility] and sources to venous ulceration, open wounds from edema.  He also provides interim history of myalgia and has been on steroids after being found to have elevated ESR/CRP.      PAST MEDICAL HISTORY:  Past Medical History:   Diagnosis Date     Atrial fibrillation (H) 12/27/2011    Sees Dr Carcamo, avoid " amio due to cor pulmonale, asymptomatic rates up to160 with exersion.        Bilateral leg edema     compression wraps     CAD (coronary artery disease)     Nonobstructive     Cardiomyopathy (H) 5/1/2012     Carotid stenosis      Cellulitis of left lower extremity 11/5/2018     COPD (chronic obstructive pulmonary disease) (H)      Cor pulmonale (H) 5/1/2012     Diastolic dysfunction      DM2 (diabetes mellitus, type 2) (H)      IYER (dyspnea on exertion)      GERD (gastroesophageal reflux disease)      HTN (hypertension)      Hypothyroidism      Leg wound, left      LV dysfunction 05/01/2012     Due to cor pulmonale and a fib related tachycardia      Morbid obesity (H) 5/1/2012     On supplemental oxygen by nasal cannula     at [facility]     STEWART (obstructive sleep apnea) 05/01/2012    Non compliant with CPAP     Pulmonary embolism (H)      Pulmonary HTN (H)      Varicose veins of bilateral lower extremities with other complications      Vitamin B12 deficiency disease 5/1/2012    On oral replacement        SOCIAL HISTORY:  Social History     Social History     Marital status:      Spouse name: N/A     Number of children: N/A     Years of education: N/A     Social History Main Topics     Smoking status: Never Smoker     Smokeless tobacco: Never Used     Alcohol use No     Drug use: No     Sexual activity: Not on file     Other Topics Concern     Not on file     Social History Narrative       CURRENT MEDICATIONS:  Current Outpatient Medications   Medication     cyanocolbalamin (VITAMIN  B-12) 1000 MCG tablet     divalproex sodium delayed-release (DEPAKOTE) 250 MG DR tablet     furosemide (LASIX) 20 MG tablet     levothyroxine (SYNTHROID) 150 MCG tablet     metoprolol succinate (TOPROL-XL) 25 MG 24 hr tablet     multivitamin, therapeutic with minerals (THERA-VIT-M) TABS tablet     OMEPRAZOLE PO     predniSONE (DELTASONE) 10 MG tablet     simvastatin (ZOCOR) 40 MG tablet     terazosin (HYTRIN) 5 MG capsule     traMADol  (ULTRAM) 50 MG tablet     warfarin (COUMADIN) 1 MG tablet     warfarin (COUMADIN) 1 MG tablet     No current facility-administered medications for this visit.        ROS:   10 point ROS negative except HPI    EXAM:  Home weight  General: appears comfortable, alert and articulate  Head: normocephalic, atraumatic  Eyes: anicteric sclera, EOMI  Neck: no adenopathy  Orophyarynx: moist mucosa, no lesions, dentition intact  Heart: regular, S1/S2, no murmur, gallop, rub, estimated JVP WNL  Lungs: clear, no rales or wheezing  Abdomen: soft, non-tender, bowel sounds present, no hepatosplenomegaly  Extremities: no clubbing, cyanosis or edema  Neurological: normal speech and affect, no gross motor deficits      Wt Readings from Last 24 Encounters:   07/16/19 88 kg (194 lb)   06/06/19 95 kg (209 lb 8 oz)   01/15/19 83.9 kg (185 lb)   12/19/18 85.2 kg (187 lb 14.4 oz)   11/08/18 85.6 kg (188 lb 11.2 oz)   10/30/18 93 kg (205 lb)   09/06/18 87.4 kg (192 lb 9.6 oz)   05/15/18 87 kg (191 lb 12.8 oz)   10/17/17 88.7 kg (195 lb 8 oz)   06/04/17 88.5 kg (195 lb)   04/18/17 94.3 kg (208 lb)   04/11/17 91.6 kg (201 lb 15.1 oz)   04/04/17 93 kg (205 lb 1.6 oz)   02/27/17 100.9 kg (222 lb 6 oz)   01/08/15 96.6 kg (213 lb)   12/13/14 98.7 kg (217 lb 8 oz)   12/27/13 100.7 kg (222 lb)   05/17/12 117.9 kg (260 lb)       Labs:      CBC RESULTS:  Lab Results   Component Value Date    WBC 6.1 07/16/2019    RBC 3.56 (L) 07/16/2019    HGB 10.9 (L) 07/16/2019    HCT 33.4 (L) 07/16/2019    MCV 94 07/16/2019    MCH 30.6 07/16/2019    MCHC 32.6 07/16/2019    RDW 16.1 (H) 07/16/2019     (L) 07/16/2019       CMP RESULTS:  Lab Results   Component Value Date     07/16/2019    POTASSIUM 4.0 07/16/2019    CHLORIDE 103 07/16/2019    CO2 32 (H) 07/16/2019    ANIONGAP 7 07/16/2019    GLC 82 07/16/2019    BUN 21 07/16/2019    CR 1.00 07/16/2019    GFRESTIMATED 71 07/16/2019    GFRESTBLACK 86 07/16/2019    TYSON 9.9 07/16/2019    BILITOTAL 0.4  11/08/2018    ALBUMIN 2.7 (L) 11/08/2018    ALKPHOS 52 11/08/2018    ALT 9 11/08/2018    AST 22 11/08/2018        INR RESULTS:  Lab Results   Component Value Date    INR 2.34 (H) 06/06/2019       No components found for: CK  Lab Results   Component Value Date    MAG 2.0 11/06/2018     Lab Results   Component Value Date    NTBNPI 787 02/24/2017       Echocardiogram (2/25/2017):  Interpretation Summary  The left ventricle is normal in size. There is normal left ventricular wall  thickness. Left ventricular systolic function is normal. The visual ejection  fraction is estimated at 55-60%. Flattened septum is consistent with RV  pressure/volume overload. There is no thrombus seen in the left ventricle.  The right ventricle is severely dilated. The right ventricular systolic  function is mild to moderately reduced.  The left atrium is moderately dilated. The right atrium is severely dilated.  There is severe (4+) tricuspid regurgitation. The right ventricular systolic  pressure is approximated at 30.9 mmHg plus the right atrial pressure. This  most likely represents an underestimation of the true RVSP.  Mild aortic stenosis.  In direct comparison to the previous study dated 11/07/2012, there has been a  mild interval increase in right ventricular size and deterioration in right  ventricular systolic function.      CT Chest Pulmonary (2/24/2017):  COMPARISON: 4/13/2011     FINDINGS: Evaluation of the pulmonary arterial system shows no  evidence of embolus. The thoracic aorta is calcified and slightly  tortuous. No aortic aneurysm or dissection. There are coronary artery  atherosclerotic calcifications. The heart is enlarged. There is no  mediastinal, hilar or axillary lymph node enlargement. There are a few  tiny calcified granulomas in the right upper lobe. Mild dependent  atelectasis bilaterally. A few bands of scar or atelectasis  bilaterally. Small bilateral pleural effusions. Images through the  upper abdomen are  remarkable for small amount of ascites. The inferior  vena cava is very large which may be due to right heart dysfunction.  The liver has a slightly nodular contour suggesting cirrhosis.     IMPRESSION:  1. There is no pulmonary embolus, aortic aneurysm or dissection.  2. Coronary artery atherosclerotic calcification. Cardiomegaly.  3. Small bilateral pleural effusions.  4. Probable hepatic cirrhosis. Small amount of ascites.      XR Chest 2 Views (4/5/2017):  HISTORY: Other secondary pulmonary hypertension  COMPARISON: 2/24/2017.  IMPRESSION: No acute abnormality.       Right Heart Catheterization (4/11/2017):  82-year-old gentleman with unexplained pulmonary hypertension, severe  right heart failure and right ventricular dysfunction, and severe  tricuspid regurgitation is referred for further evaluation of his  pulmonary hypertension etiology 2 guide further evaluation and  treatment.    Catheterization results  Baseline with thermal dilution cardiac output  -RA pressure mean = 12. Patient is defibrillation  -RV pressure-49/11  -Pulmonary artery pressure 48/21 (31)  -Pulmonary capillary wedge pressure -/24 (15)  -Thermal dilution cardiac output/index 4.9/2.4  -Calculated pulmonary vascular arteriolar resistance 3.2 Woods units    With estimated Rachael cardiac output  -Pulmonary pressure 47/19 (31)  -Pulmonary capillary wedge pressure-/27 (17)  -Pulmonary artery oxygen saturation 67% aortic oxygen saturation 98  percent on room air  -Estimated Rachael cardiac output/index 5.1/2.5  -Pulmonary vascular arteriolar resistance 2.6 Woods units       VQ Scan (4/5/2017):  IMPRESSION:   1. Central airways deposition, suggestive of chronic obstructive  pulmonary disease.  2. Mild diffusely heterogeneous pulmonary perfusion, similar to that  of the ventilation images. These findings most likely relate to  underlying pulmonary disease. There are no focal pulmonary perfusion  defects.       NM MPI w Lexiscan  (2/24/2017):  Indication/Clinical History: Shortness of breath, chest pain     Impression  1. Myocardial perfusion imaging using single isotope technique  demonstrated no evidence of ischemia or infarction.   2. Gated images demonstrated normal size left ventricle with normal  wall motion. The left ventricular systolic function is normal at 76%.  3. Compared to the prior study from  .     Procedure  Pharmacologic stress testing was performed with Lexiscan at a rate of  0.08 mg/ml rapid bolus injection, for 15 seconds, 0.4 mg/5ml  intravenously. Low-level exercise was not performed along with the  vasodilator infusion. The heart rate was 6767 at baseline and bj to  82 beats per minute during the Lexiscan infusion. The rest blood  pressure was 118/64 mmHg and was 118/57 mm Hg during Lexiscan  infusion. The patient experienced no chest pain during the test.     Myocardial perfusion imaging was performed at rest, approximately 45  minutes after the injection intravenously of 11 mCi of Tc-99m Myoview.  At peak pharmacologic effect, 10-20 seconds after Lexiscan, the  patient was injected intravenously with 32 mCi of Tc-99m Myoview. The  post-stress tomographic imaging was performed approximately 60 minutes  after stress.     EKG Findings  The resting EKG demonstrated sinus rhythm with poor R wave progression  in the low voltage pattern. The stress EKG demonstrated no EKG  changes suggestive of ischemia.     Tomographic Findings  Overall, the study quality is fair. Mild visceral tracer artifact is  noted . On the stress images, mild inferior count reduction is noted.  On the rest images, mild inferior count reduction is seen and likely  due to diaphragmatic attenuation and visceral tracer artifact . Gated  images demonstrated normal size left ventricle with normal wall  motion. The left ventricular ejection fraction was calculated to be  76%. TID was absent.      Assessment and Plan:   1. Appears to be clinically  stable though tenuous.  His iron appeared to be low and will recheck it.  He is on steroid taper.      2. He is being fitted for compression stocking and will need these indefinitely  He is to call immediately should legs start to swell.      3. Today has no organ specific symptoms of inflammation, myositis today.    4. He has PH and RV dysfunction and probably needs more diuretics to keep edema at bay, though does not appear to have ascites.    Will have him return after recover to consider if reassessment of PH is desirable or warranted.          Thank you for allowing me to participate in the care of your patient.    Sincerely,     Julian Joyce MD     [facility]

## 2019-07-18 NOTE — PLAN OF CARE
Pt transferred from ICU to room 623 at 1100. Pt vitals taken and pt and son educated on call light, bed alarm, and new orders released around 1200. Tele ordered. Pt complained of a headache around 1215 and this staff paged DR Marie with this text  pt is requesting his home medication of tramdol 50 mg he takes PRN for headache. Pt given ultram with relief.    Pt seen by PT and ambulated from his room to the PT room with assist of 1. Once in PT room this staff got a call from the tele tech that pts HR had increased to 170 for just a few minutes and was at 140. This staff checked on pt in the PT room and pt was sitting on the bed resting. Pt shaking a little in his arms but states that is her normal. Pt denied chest pain or even feeling his heart race. Apical was in the 90s and when this staff called back to the   Tele tech pts HR was back in the 90s. Pt assisted back into his room via WC.    This staff paged admit pager at 2050 with this text pt had an episode with PT today where his heart rate increased to 170 for a short period and than 140s until he rested after walking. pt asymptomatic. pt just has the cardiac continuous monitoring order. can we get the orders for the parameters when to call doctor on tele. thanks was an ICU transfer earlier today.     Infectious Diseases - Attending at Dr. Treviño    HPI:  22 y/o Male  no significant PMH, PSH presents to the ED for fever, nausea, vomiting, abdominal pain and diarrhea for past day.  Patient reports 1 week of not feeling like himself, felt weak, today was worse than prior days.  Has been having nonbloody diarrhea, now with mild nausea and RUQ abdominal pain, which has since resolved, had small nbnb vomiting in ER.  Does endorse sore throat and mild cough.  No chest pain, dyspnea, neck stiffness, urinary symptoms.  No known travel or sick contacts. On no medications.  . (2019 03:55)      PAST MEDICAL & SURGICAL HISTORY:  No pertinent past medical history  No significant past surgical history      Allergies    No Known Allergies    Intolerances        FAMILY HISTORY:  No pertinent family history in first degree relatives      SOCIAL HISTORY:    REVIEW OF SYSTEMS:    Constitutional: No fever, weight loss or fatigue  Eyes: No eye pain, visual disturbances, or discharge  ENT:  No difficulty hearing, tinnitus, vertigo; No sinus or throat pain  Neck: No pain or stiffness  Respiratory: No cough, wheezing, chills or hemoptysis  Cardiovascular: No chest pain, palpitations, shortness of breath, dizziness or leg swelling  Gastrointestinal: No abdominal or epigastric pain. No nausea, vomiting or hematemesis; No diarrhea or constipation. No melena or hematochezia.  Genitourinary: No dysuria, frequency, hematuria or incontinence  Neurological: No headaches, memory loss, loss of strength, numbness or tremors  Skin: No itching, burning, rashes or lesions       MEDICATIONS  (STANDING):  lactated ringers. 1000 milliLiter(s) (125 mL/Hr) IV Continuous <Continuous>    MEDICATIONS  (PRN):  acetaminophen   Tablet .. 650 milliGRAM(s) Oral every 6 hours PRN Temp greater or equal to 38C (100.4F), Mild Pain (1 - 3)  morphine  - Injectable 2 milliGRAM(s) IV Push every 4 hours PRN Severe Pain (7 - 10)  morphine  - Injectable 2 milliGRAM(s) IV Push every 6 hours PRN Moderate Pain (4 - 6)  ondansetron Injectable 4 milliGRAM(s) IV Push every 6 hours PRN Nausea and/or Vomiting      Vital Signs Last 24 Hrs  T(C): 37.2 (2019 11:39), Max: 39.4 (2019 20:49)  T(F): 98.9 (2019 11:39), Max: 103 (2019 20:49)  HR: 90 (2019 11:39) (90 - 126)  BP: 98/41 (2019 11:39) (95/34 - 156/83)  BP(mean): --  RR: 16 (2019 11:39) (15 - 22)  SpO2: 100% (2019 11:39) (95% - 100%)    PHYSICAL EXAM:    Constitutional: NAD, well-groomed, well-developed  HEENT: PERRLA, EOMI, Normal Hearing, MMM  Neck: No LAD, No JVD  Back: Normal spine flexure, No CVA tenderness  Respiratory: CTAB/L  Cardiovascular: S1 and S2, RRR, no M/G/R  Gastrointestinal: BS+, soft, NT/ND  Extremities: No peripheral edema  Vascular: 2+ peripheral pulses  Neurological: A/O x 3, no focal deficits  Skin: No rashes      LABS:  Flu negative                        13.3   12.04 )-----------( 198      ( 2019 05:22 )             40.4     07-18    138  |  107  |  14  ----------------------------<  122<H>  3.5   |  25  |  1.26    Ca    8.7      2019 05:22  Phos  1.4     07-18  Mg     1.6     07-18    TPro  7.2  /  Alb  3.5  /  TBili  0.6  /  DBili  .27<H>  /  AST  25  /  ALT  40  /  AlkPhos  68  07-18    PT/INR - ( 2019 21:57 )   PT: 11.6 sec;   INR: 1.03 ratio         PTT - ( 2019 21:57 )  PTT:34.2 sec  Urinalysis Basic - ( 2019 01:43 )    Color: Yellow / Appearance: Clear / S.005 / pH: x  Gluc: x / Ketone: Negative  / Bili: Negative / Urobili: Negative mg/dL   Blood: x / Protein: Negative mg/dL / Nitrite: Negative   Leuk Esterase: Negative / RBC: x / WBC x   Sq Epi: x / Non Sq Epi: x / Bacteria: x        MICROBIOLOGY:  RECENT CULTURES:        RADIOLOGY & ADDITIONAL STUDIES: Infectious Diseases - Attending at Dr. Treviño    HPI:  22 y/o Male  no significant PMH, PSH presents to the ED for fever, nausea, vomiting, abdominal pain and diarrhea for past day.  Patient reports 1 week of not feeling like himself, felt weak, today was worse than prior days.  Has been having nonbloody diarrhea, now with mild nausea and RUQ abdominal pain, which has since resolved, had small nbnb vomiting in ER.  Does endorse sore throat and mild cough.  No chest pain, dyspnea, neck stiffness, urinary symptoms.  No known travel or sick contacts. On no medications.  . (2019 03:55)      PAST MEDICAL & SURGICAL HISTORY:  No pertinent past medical history  No significant past surgical history      Allergies    No Known Allergies    Intolerances        FAMILY HISTORY:  No pertinent family history in first degree relatives      SOCIAL HISTORY:    REVIEW OF SYSTEMS:    Constitutional: resolved fever, weight loss or fatigue  Eyes: No eye pain, visual disturbances, or discharge  ENT:  No difficulty hearing, tinnitus, vertigo; No sinus or throat pain  Neck: No pain or stiffness  Respiratory: No cough, wheezing, chills or hemoptysis  Cardiovascular: No chest pain, palpitations, shortness of breath, dizziness or leg swelling  Gastrointestinal: No abdominal or epigastric pain. ++ vomiting or hematemesis; resolving diarrhea ,no blood in stool  Genitourinary: No dysuria, frequency, hematuria or incontinence  Neurological: No headaches, memory loss, loss of strength, numbness or tremors  Skin: No itching, burning, rashes or lesions       MEDICATIONS  (STANDING):  lactated ringers. 1000 milliLiter(s) (125 mL/Hr) IV Continuous <Continuous>    MEDICATIONS  (PRN):  acetaminophen   Tablet .. 650 milliGRAM(s) Oral every 6 hours PRN Temp greater or equal to 38C (100.4F), Mild Pain (1 - 3)  morphine  - Injectable 2 milliGRAM(s) IV Push every 4 hours PRN Severe Pain (7 - 10)  morphine  - Injectable 2 milliGRAM(s) IV Push every 6 hours PRN Moderate Pain (4 - 6)  ondansetron Injectable 4 milliGRAM(s) IV Push every 6 hours PRN Nausea and/or Vomiting      Vital Signs Last 24 Hrs  T(C): 37.2 (2019 11:39), Max: 39.4 (2019 20:49)  T(F): 98.9 (2019 11:39), Max: 103 (2019 20:49)  HR: 90 (2019 11:39) (90 - 126)  BP: 98/41 (2019 11:39) (95/34 - 156/83)  BP(mean): --  RR: 16 (2019 11:39) (15 - 22)  SpO2: 100% (2019 11:39) (95% - 100%)    PHYSICAL EXAM:    Constitutional: NAD, well-groomed, well-developed  HEENT: PERRLA, EOMI, Normal Hearing, MMM  Neck: No LAD, No JVD  Back: Normal spine flexure, No CVA tenderness  Respiratory: CTAB/L  Cardiovascular: S1 and S2, RRR, no M/G/R  Gastrointestinal: BS+, soft, NT/ND  Extremities: No peripheral edema  Vascular: 2+ peripheral pulses  Neurological: A/O x 3, no focal deficits  Skin: No rashes      LABS:  Flu negative                        13.3   12.04 )-----------( 198      ( 2019 05:22 )             40.4     07-18    138  |  107  |  14  ----------------------------<  122<H>  3.5   |  25  |  1.26    Ca    8.7      2019 05:22  Phos  1.4     07-18  Mg     1.6     -18    TPro  7.2  /  Alb  3.5  /  TBili  0.6  /  DBili  .27<H>  /  AST  25  /  ALT  40  /  AlkPhos  68  07-18    PT/INR - ( 2019 21:57 )   PT: 11.6 sec;   INR: 1.03 ratio         PTT - ( 2019 21:57 )  PTT:34.2 sec  Urinalysis Basic - ( 2019 01:43 )    Color: Yellow / Appearance: Clear / S.005 / pH: x  Gluc: x / Ketone: Negative  / Bili: Negative / Urobili: Negative mg/dL   Blood: x / Protein: Negative mg/dL / Nitrite: Negative   Leuk Esterase: Negative / RBC: x / WBC x   Sq Epi: x / Non Sq Epi: x / Bacteria: x        MICROBIOLOGY:  RECENT CULTURES:        RADIOLOGY & ADDITIONAL STUDIES: Infectious Diseases - Attending at Dr. Treviño    HPI:  22 y/o Male  no significant PMH, PSH presents to the ED for fever, nausea, vomiting, abdominal pain and diarrhea for past day.  Patient reports 1 week of not feeling like himself, felt weak, today was worse than prior days.  Has been having nonbloody diarrhea, now with mild nausea and RUQ abdominal pain, which has since resolved, had small nbnb vomiting in ER.  Does endorse sore throat and mild cough.  No chest pain, dyspnea, neck stiffness, urinary symptoms.  No known travel or sick contacts. On no medications.  . (2019 03:55)      PAST MEDICAL & SURGICAL HISTORY:  No pertinent past medical history  No significant past surgical history      Allergies    No Known Allergies    Intolerances        FAMILY HISTORY:  Mother : no history of DM,HTN  Father: healthy, no known medical problems      SOCIAL HISTORY: non smoker    REVIEW OF SYSTEMS:    Constitutional: resolved fever, weight loss or fatigue  Eyes: No eye pain, visual disturbances, or discharge  ENT:  No difficulty hearing, tinnitus, vertigo; No sinus or throat pain  Neck: No pain or stiffness  Respiratory: No cough, wheezing, chills or hemoptysis  Cardiovascular: No chest pain, palpitations, shortness of breath, dizziness or leg swelling  Gastrointestinal: No abdominal or epigastric pain. ++ vomiting or hematemesis; resolving diarrhea ,no blood in stool  Genitourinary: No dysuria, frequency, hematuria or incontinence  Neurological: No headaches, memory loss, loss of strength, numbness or tremors  Skin: No itching, burning, rashes or lesions       MEDICATIONS  (STANDING):  lactated ringers. 1000 milliLiter(s) (125 mL/Hr) IV Continuous <Continuous>    MEDICATIONS  (PRN):  acetaminophen   Tablet .. 650 milliGRAM(s) Oral every 6 hours PRN Temp greater or equal to 38C (100.4F), Mild Pain (1 - 3)  morphine  - Injectable 2 milliGRAM(s) IV Push every 4 hours PRN Severe Pain (7 - 10)  morphine  - Injectable 2 milliGRAM(s) IV Push every 6 hours PRN Moderate Pain (4 - 6)  ondansetron Injectable 4 milliGRAM(s) IV Push every 6 hours PRN Nausea and/or Vomiting      Vital Signs Last 24 Hrs  T(C): 37.2 (2019 11:39), Max: 39.4 (2019 20:49)  T(F): 98.9 (2019 11:39), Max: 103 (2019 20:49)  HR: 90 (2019 11:39) (90 - 126)  BP: 98/41 (2019 11:39) (95/34 - 156/83)  BP(mean): --  RR: 16 (2019 11:39) (15 - 22)  SpO2: 100% (2019 11:39) (95% - 100%)    PHYSICAL EXAM:    Constitutional: NAD, well-groomed, well-developed  HEENT: PERRLA, EOMI, Normal Hearing, MMM  Neck: No LAD, No JVD  Back: Normal spine flexure, No CVA tenderness  Respiratory: CTAB/L  Cardiovascular: S1 and S2, RRR, no M/G/R  Gastrointestinal: BS+, soft, NT/ND  Extremities: No peripheral edema  Vascular: 2+ peripheral pulses  Neurological: A/O x 3, no focal deficits  Skin: No rashes      LABS:  Flu negative                        13.3   12.04 )-----------( 198      ( 2019 05:22 )             40.4     07-18    138  |  107  |  14  ----------------------------<  122<H>  3.5   |  25  |  1.26    Ca    8.7      2019 05:22  Phos  1.4     07-18  Mg     1.6     07-18    TPro  7.2  /  Alb  3.5  /  TBili  0.6  /  DBili  .27<H>  /  AST  25  /  ALT  40  /  AlkPhos  68  07-18    PT/INR - ( 2019 21:57 )   PT: 11.6 sec;   INR: 1.03 ratio         PTT - ( 2019 21:57 )  PTT:34.2 sec  Urinalysis Basic - ( 2019 01:43 )    Color: Yellow / Appearance: Clear / S.005 / pH: x  Gluc: x / Ketone: Negative  / Bili: Negative / Urobili: Negative mg/dL   Blood: x / Protein: Negative mg/dL / Nitrite: Negative   Leuk Esterase: Negative / RBC: x / WBC x   Sq Epi: x / Non Sq Epi: x / Bacteria: x        MICROBIOLOGY:  RECENT CULTURES:        RADIOLOGY & ADDITIONAL STUDIES:

## 2019-12-06 PROBLEM — S30.1XXA ABDOMINAL WALL HEMATOMA: Status: ACTIVE | Noted: 2019-01-01

## 2019-12-06 NOTE — PLAN OF CARE
"Day RN  VS monitored, [place]: hearing aids and pocket talker at b/s, up w/A1 and SEC, voiding, hypo BS, flatus-, pt reports \"gas pain but denies nausea\", declines intervention at this time, main complaint is headache but he also declines intervention for this at this time, GI and surgery consulted and both are not recommending surgery at this time, see their notes in EHR, awaiting head MRI, checklist complete, wife Dona updated at b/s, will cont to monitor.  "

## 2019-12-06 NOTE — PROGRESS NOTES
Arrived to room 627 from ER at 1230 via cart, alert and oriented x 4, oriented to room and call system, IV patent and infusing, rates pain 8/10 but declines intervention at this time and just wants to sleep, wife Dona updated at b/s.

## 2019-12-06 NOTE — ED PROVIDER NOTES
"  History     Chief Complaint:  Abdominal Pain and Headache      HPI   Sean Brunner is a 85 year old male on Coumadin who presents with abdominal pain and headache. According to the patient, he has valdo dealing with headaches on and off for about three weeks, but this has worsened since last night. He reports his abdomen is \"sore all over\" and he has been burping and coughing all night long. He denies vomiting, diarrhea and fever. He denies past history of migraines. He also reports history of lower extremity edema.     Allergies:  No Known Allergies     Medications:    Depakote  Furosemide  Levothyroxine  Toprol  Omeprazole  Simvastatin  Terazosin  Tramadol  Coumadin   Prednisone     Past Medical History:    Atrial fibrillation  Bilateral leg edema  CAD  Cardiomyopathy  Carotid stenosis  Cellulitis  COPD  Cor pulmonale  Diastolic dysfunction  Type 2 diabetes  Dyspnea on exertion  GERD  Hypothyroidism  Hypertension  LV dysfunction  Morbid obesity  STEWART  Pulmonary embolism  Pulmonary hypertension   Varicose veins   Polymyalgia rheumatica  Pancytopenia  Laryngopharyngeal reflux  Hiatal hernia  Obesity    Past Surgical History:    Knee replacement x2  Arthroscopy knee x2  Tonsillectomy  Appendectomy  Rotator cuff repair x2  Cataract removal     Family History:    Cancer  Heart disease   Alzheimer's    Social History:  Smoking status: never smoker  Alcohol use: no  Drug use: no  PCP: Oscar Parekh  Presents to the ED with his wife  Marital Status:   [2]       Review of Systems   Constitutional: Negative for fever.   Respiratory: Positive for cough.    Gastrointestinal: Positive for abdominal pain (sore) and nausea. Negative for diarrhea and vomiting.        Burping     Neurological: Positive for headaches.   All other systems reviewed and are negative.      Physical Exam     Patient Vitals for the past 24 hrs:   BP Temp Temp src Pulse Heart Rate Resp SpO2   12/06/19 1015 137/80 -- -- 88 -- -- -- "   12/06/19 1000 138/87 -- -- 85 -- -- --   12/06/19 0945 133/76 -- -- 136 -- -- --   12/06/19 0930 (!) 120/95 -- -- -- -- -- 99 %   12/06/19 0900 128/79 -- -- 93 -- -- 94 %   12/06/19 0845 117/88 -- -- 93 -- -- 98 %   12/06/19 0835 -- -- -- -- -- -- 99 %   12/06/19 0830 123/81 -- -- -- -- -- --   12/06/19 0800 -- -- -- -- -- -- 96 %   12/06/19 0755 -- -- -- -- -- -- 98 %   12/06/19 0750 -- -- -- -- -- -- 98 %   12/06/19 0745 118/66 -- -- 85 -- -- 98 %   12/06/19 0740 -- -- -- -- -- -- 98 %   12/06/19 0735 -- -- -- -- -- -- 98 %   12/06/19 0730 116/76 -- -- 91 -- -- 98 %   12/06/19 0720 -- -- -- -- -- -- 98 %   12/06/19 0715 123/67 -- -- 103 -- -- 98 %   12/06/19 0710 -- -- -- -- -- -- 99 %   12/06/19 0705 -- -- -- 82 -- -- 99 %   12/06/19 0700 (!) 126/98 -- -- -- -- -- --   12/06/19 0658 113/80 98  F (36.7  C) Oral 89 89 20 98 %           Physical Exam     Constitutional: Vital signs reviewed as above.   HENT:               Head: No external signs of trauma noted.              Eyes: Pupils are equal, round, and reactive to light.   Cardiovascular: Normal rate, irregular rhythm, normal heart sounds and intact distal pulses.    Pulmonary/Chest: Effort normal and breath sounds normal. No respiratory distress. No wheezes noted.   Gastrointestinal: Soft. There is RUQ tenderness. There is no rebound.  Normal bowel sounds  Musculoskeletal:              No deformities appreciated              There is RLE edema  Neurological:               Patient is alert. GCS 15.              Speech is fluent, cognition appears normal.              CN 2-12 intact (PERRL, EOMI, symmetric smile, equal eye squeeze and forehead raise, normal and equal sensation to bilateral forehead/cheek/chin, equal hearing to finger rub, midline tongue protrusion, normal shoulder shrug).               RUE strength 5/5: , finger abd, wrist flex/ext, elbow flex/ext.               LUE strength 5/5: , finger abd, wrist flex/ext, elbow flex/ext.                RLE strength 5/5: ankle flex/ext, knee flex/ext, hip flex.               LLE strength 5/5: ankle flex/ext, knee flex/ext, hip flex.               Sensation equal in all 4 extremities.               No arm drift.                Cerebellar: Normal rapid alternating movements   Skin: Skin is warm and dry.   Psychiatric: The patient appears calm    Emergency Department Course     ECG:  Indication: abdominal pain, headache  Time: 0733  Vent. Rate 96 bpm. QRS duration 94. QT/QTc 346/43. P-R-T axis * 17 24.    Atrial fibrillation with premature ventricular or aberrantly conducted complexes.  Low voltage QRS.  Cannot rule out Anterior infarct, age undetermined.  Abnormal ECG. No significant change compared to EKG dated 5/31/19. Read time: 0736      Imaging:  Radiology findings were communicated with the patient and family who voiced understanding of the findings.    CT Abdomen Pelvis w/ IV contrast:   1. New tiny dense nodular lesion within the medial right abdominis  rectus muscle could be a small new acute or subacute hematoma.  2. Trace ascites in the abdomen and pelvis. Small bibasilar pleural  fluid also newly identified.  3. Diffuse edema again seen at the mesentery and other soft tissues.  This also surrounds the pancreas. Pancreatitis could have this  appearance. No evidence for pancreas necrosis.  4. Cholelithiasis with nonspecific mild gallbladder wall thickening.  Cholecystitis not excluded.  5. A few mildly larger retroperitoneal lymph nodes are indeterminant, as per radiology.    CT Head w/o IV contrast:   1. Some low density along the splenium of the corpus callosum which  appears slightly progressed since the prior exam. This is probably  just related to chronic ischemic change. However, if clinically  indicated, MRI of the brain without and with contrast might be helpful  in excluding an active process.  2. Cerebral atrophy with chronic white matter changes.  3. No evidence for intracranial hemorrhage,  or acute infarct, as per radiology.    XR Chest, PA & LAT:  No acute cardiopulmonary disease, as per radiology.      US Abdomen, Limited (RUQ only):  1. Cholelithiasis. Negative sonographic Gomez sign. Nonspecific  gallbladder wall thickening. No biliary dilatation. Cholecystitis not  entirely excluded.  2. Enlarged liver.  3. Trace ascites near the liver, as per radiology.       Laboratory:  Laboratory findings were communicated with the patient and family who voiced understanding of the findings.    CBC: WBC: 4.9, HGB: 8.8 (L), PLT: 149 (L)  CMP: Albumin 2.7 (L) o/w WNL (Creatinine 1.01)    Lipase: 77    0729 Troponin: <0.015      INR 3.28 (H)    Procedures:  None    Interventions:    0733 Benadryl 10 mg IV  0734 Reglan 25 mg IV  1006 Tylenol 650 mg PO    Emergency Department Course:  Past medical records, nursing notes, and vitals reviewed.    0712 I performed an exam of the patient as documented above.     EKG obtained in the ED, see results above.     IV was inserted and blood was drawn for laboratory testing, results above.    The patient was sent for a Head CT, Abdomen US, Abdomen Pelvis CT, and Chest X-Ray while in the emergency department, results above.     0726 Patient rechecked and updated.      0944    Patient rechecked and updated.     0958 I consulted with Alena Watson PA-C, on behalf of Dr. Leblanc of the hospitalist services. They are  in agreement to accept the patient for admission.    Findings and plan explained to the Patient and spouse who consents to admission. Discussed the patient with Dr. Leblanc, who will admit the patient to a Adult Med/Surg bed for further monitoring, evaluation, and treatment.    I personally reviewed the laboratory and imaging results with the Patient and spouse and answered all related questions prior to admission.     Impression & Plan     Medical Decision Making:  Sean Brunner is a 85 year old male who presents to the emergency department today with several  symptoms.  Please see the HPI and exam for specifics.  The patient's imaging studies are noted above.  At this time, I believe admission is warranted.  We will work on lowering the patient's Coumadin level with hopeful resolution of the rectus abdominis hematoma.  We will continue working on his headache, and after discussion with the hospitalist team, an MRI may be warranted.  Additionally, it may be reasonable to continue gentle diuresis for this patient.  At this time, given the lack of fever, LFT changes, and jaundice, I do not believe acute surgical intervention is needed for the gallstones though the patient does have some discomfort in along with his belching the gallstone could be a source for some of those symptoms.  The patient will be brought into the hospital for further monitoring and care.    Discharge Diagnosis:    ICD-10-CM    1. Abdominal wall hematoma, initial encounter S30.1XXA CANCELED: INR   2. Calculus of gallbladder without cholecystitis without obstruction K80.20    3. Supratherapeutic INR R79.1    4. Peripheral edema R60.9        Disposition:  Admission to Adult Med/Surg under the care of Dr. Leblanc.      Scribe Disclosure:  I, Mirella Dozier, am serving as a scribe at 7:12 AM on 12/6/2019 to document services personally performed by Christian Ya DO based on my observations and the provider's statements to me.     12/6/2019   [facility] EMERGENCY DEPARTMENT       Christian Ya DO  12/06/19 1041

## 2019-12-06 NOTE — H&P
[facility] Internal Medicine  History and Physical      Patient Name: Sean Brunner MRN# [id number]   Age: 85 year old YOB: 1934     Date of Admission:12/6/2019    Primary care provider: Oscar Parekh  Date of Service: 12/6/2019         Assessment and Plan:   Sean Brunner is a 85 year old male with a history of Polymyalgia Rheumatica, Pulmonary HTN, Iron Deficiency, Arthritis, DM Type 2, Hiatel Hernia, GERD, Venous Stasis, Hypothyroidism, PE, DVT, Chronic Atrial Fibrillation on warfarin, Chronic Headaches who presents to the ED today 2/2 abdominal pain, nausea, cough and headache.      Acute on Chronic Anemia  Supratherapeutic INR  Right Rectus Abdominis Hematoma - pt with RUQ pain on exam with a fall reported several months ago; no recent falls.  Hgb 8.8 down from most recent baseline 7/2019 of 10.9.  INR 3.28 on chronic warfarin.  CT abd/pelvis with a new tiny dense nodular lesion within the medial right abdominis rectus muscle could be a small new acute or subacute hematoma.  S/p IV Vitamin K in the ED.  - serial hemoglobin levels  - repeat INR in am; hold warfarin for now    Cholelithiasis - pt reports several weeks of nausea and gas pains with frequent bleching.  He has RUQ pain on exam with normal LFTs and Lipase.  Abdominal imaging with gallstones and gallbladdder wall thickening present, ascites and edema again seen at the mesentery and other soft tissues. This also surrounds the pancreas.  Pancreatitis seems less likely given labs and physical exam.  - will consult General Surgery to assess for biliary colic vs early cholecystitis  - start IV Unasyn for possible early cholecystitis    Acute on Chronic Headaches - patient with a hx of chronic headaches followed by Neurology on Ultram (of which he ran out one week ago) and Depakote.  Now presenting with worsening headache over the past 3 weeks without changes in vision.  CT head w/out contrast with some low-density in  the splenium of the corpus callosum which is slightly more pronounced than the prior exam but there is no mass effect. This is presumably due to some chronic ischemic change.    - continue pta Depakote.  Will resume pta Ultram prn  - will obtain MRI as recommended per Radiology  - consider Neurology consult    Chronic Atrial Fibrillation - HR 80 in the ED with a rate reported up to 136.  INR 3.28  - hold warfarin for now due to acute anemia and possible abdominal hematoma  - continue pta Toprol XL    CAD, CHF, HTN, HLD - hx of cor pulmonale with know 3-4+ TR and RV dysfunction and EF 55-60% 2/2017.  Hx of non obstructive CAD with last stress test negative 2017.  Pt with peripheral edema and exam and on imaging with trace ascites and bibasilar pleural fluid.  Stable on room air.  - continue pta Lasix, Toprol XL, Simvastatin    GERD - increased reflux symptoms recently with acute anemia.  No GI sources of blood reported.   - start IV Protonix and hold pta Omeprazole.  GI consult requested per Dr. Cruz to evaluate for GI source of blood loss while on warfarin given acute anemia.      DM Type 2 - diet controlled.  Last A1C 9/2018 4.9    Hypothyroidism - continue pta Levothyroxine      CODE: full  Diet/IVF: npo  GI ppx:  protonix  DVT ppx: scd    Patient discussed with Dr. Nancy Silva MS PA-C  Physician Assistant   Hospitalist Service  Pager: [phone]           Chief Complaint:   Abdominal Pain, Headache         HPI:   85 year old male with a history of Polymyalgia Rheumatica, Pulmonary HTN, Iron Deficiency, Arthritis, DM Type 2, Hiatel Hernia, GERD, Venous Stasis, Hypothyroidism, PE, DVT, Chronic Atrial Fibrillation on warfarin, Chronic Headaches who presents to the ED today 2/2 abdominal pain, nausea, cough and headache.    Patient reports a history of chronic headaches for which he is followed by Neurology.  He reports he had been on Tramadol and his provider is no longer going to prescribe this.  He  "has had a headache for the past 3 weeks progressively worsening.  He reports his headache feels similar in character to past headaches, but more severe today.  He ran out of Tramadol about one week ago.  He denies any neck pain or changes in vision.      Additionally, patient reports several weeks of nausea and indigestion, \"gas pains\" for which he has been taking TUMs frequently.  He reports a chronic cough and shortness of breath which he states is at baseline.  He denies chest pain, palpitations.  He has chronic LE edema L>R which has been at baseline.  His wife reports a fall several months ago and chronic hip pain for which he was seen at [facility] with no acute fractures identified.  He denies any black stools or hematochezia.    ED work up revealed patient hemodynamically stable and afebrile on room air.  Laboratory work up revealed hemoglobin 8.8, plt 149, INR 3.28 with otherwise normal CMP, Lipase, WBC.  EKG revealed Afib.  CXR with no acute cardiopulmonary disease.  Abd U/S revealed cholelithiasis with nonspecific gallbladder wall thickening, enlarged liver and trace ascites.  CT abd/pelvis revealed New tiny dense nodular lesion within the medial right abdominis rectus muscle could be a small new acute or subacute hematoma.  Trace ascites in the abdomen and pelvis. Small bibasilar pleural fluid also newly identified.  Diffuse edema again seen at the mesentery and other soft tissues.  This also surrounds the pancreas. Pancreatitis could have this appearance. No evidence for pancreas necrosis. Cholelithiasis with nonspecific mild gallbladder wall thickening.  Cholecystitis not excluded. A few mildly larger retroperitoneal lymph nodes are indeterminant.  CT head w/out contrast revealed Some low density along the splenium of the corpus callosum which appears slightly progressed since the prior exam. This is probably just related to chronic ischemic change. However, if clinically indicated, MRI of the brain without " and with contrast might be helpful in excluding an active process.  Cerebral atrophy with chronic white matter changes.  No evidence for intracranial hemorrhage, or acute infarct.  Patient received Tylenol, Benadryl, Reglan, Vitamin K and admitted for further management.         Past Medical History:     Past Medical History:   Diagnosis Date     Atrial fibrillation (H) 12/27/2011    Sees Dr Carcamo, avoid amio due to cor pulmonale, asymptomatic rates up to160 with exersion.        Bilateral leg edema     compression wraps     CAD (coronary artery disease)     Nonobstructive     Cardiomyopathy (H) 5/1/2012     Carotid stenosis      Cellulitis of left lower extremity 11/5/2018     COPD (chronic obstructive pulmonary disease) (H)      Cor pulmonale (H) 5/1/2012     Diastolic dysfunction      DM2 (diabetes mellitus, type 2) (H)      IYER (dyspnea on exertion)      GERD (gastroesophageal reflux disease)      HTN (hypertension)      Hypothyroidism      Leg wound, left      LV dysfunction 05/01/2012     Due to cor pulmonale and a fib related tachycardia      Morbid obesity (H) 5/1/2012     On supplemental oxygen by nasal cannula     at [facility]     STEWART (obstructive sleep apnea) 05/01/2012    Non compliant with CPAP     Pulmonary embolism (H)      Pulmonary HTN (H)      Varicose veins of bilateral lower extremities with other complications      Vitamin B12 deficiency disease 5/1/2012    On oral replacement           Past Surgical History:     Past Surgical History:   Procedure Laterality Date     JOINT REPLACEMENT            Social History:     Social History     Socioeconomic History     Marital status:      Spouse name: Not on file     Number of children: Not on file     Years of education: Not on file     Highest education level: Not on file   Occupational History     Not on file   Social Needs     Financial resource strain: Not on file     Food insecurity:     Worry: Not on file     Inability: Not on file      Transportation needs:     Medical: Not on file     Non-medical: Not on file   Tobacco Use     Smoking status: Never Smoker     Smokeless tobacco: Never Used   Substance and Sexual Activity     Alcohol use: No     Drug use: No     Sexual activity: Not on file   Lifestyle     Physical activity:     Days per week: Not on file     Minutes per session: Not on file     Stress: Not on file   Relationships     Social connections:     Talks on phone: Not on file     Gets together: Not on file     Attends Confucianist service: Not on file     Active member of club or organization: Not on file     Attends meetings of clubs or organizations: Not on file     Relationship status: Not on file     Intimate partner violence:     Fear of current or ex partner: Not on file     Emotionally abused: Not on file     Physically abused: Not on file     Forced sexual activity: Not on file   Other Topics Concern     Parent/sibling w/ CABG, MI or angioplasty before 65F 55M? Not Asked   Social History Narrative     Not on file          Family History:   Father - MI  Mother - Alzheimers       Allergies:    No Known Allergies       Medications:     Prior to Admission medications    Medication Sig Last Dose Taking? Auth Provider   cyanocolbalamin (VITAMIN  B-12) 1000 MCG tablet Take 3,000 mcg by mouth every morning   Unknown, Entered By History   divalproex sodium delayed-release (DEPAKOTE) 250 MG DR tablet Take 250 mg by mouth At Bedtime   Reported, Patient   furosemide (LASIX) 20 MG tablet Take 20 mg by mouth daily   Unknown, Entered By History   levothyroxine (SYNTHROID) 150 MCG tablet Take 150 mcg by mouth every morning (before breakfast)    Reported, Patient   metoprolol succinate (TOPROL-XL) 25 MG 24 hr tablet Take 1 tablet (25 mg) by mouth every evening   Kimi Gil MD   multivitamin, therapeutic with minerals (THERA-VIT-M) TABS tablet Take 1 tablet by mouth 2 times daily (with meals)   Reported, Patient   OMEPRAZOLE PO Take 40 mg by mouth  every morning    Unknown, Entered By History   simvastatin (ZOCOR) 40 MG tablet Take 40 mg by mouth daily (with dinner)    Reported, Patient   terazosin (HYTRIN) 5 MG capsule Take 5 mg by mouth At Bedtime   Unknown, Entered By History   traMADol (ULTRAM) 50 MG tablet Take 50 mg by mouth every 6 hours as needed for moderate pain    Reported, Patient   warfarin (COUMADIN) 1 MG tablet Take 1.5 mg by mouth four times a week Monday, Wednesday, Friday, Saturday   Unknown, Entered By History   warfarin (COUMADIN) 1 MG tablet Take 1 mg by mouth three times a week Tuesday, Thursday, Sunday   Unknown, Entered By History          Review of Systems:   A complete ROS was performed and is negative other than what is stated in the HPI.       Physical Exam:   Blood pressure 137/80, pulse 88, temperature 98  F (36.7  C), temperature source Oral, resp. rate 20, SpO2 99 %. RA  General: Alert, interactive, NAD, hard of hearing, wife at the bedside  HEENT: AT/NC, sclera anicteric, PERRL, EOMI, MMM  Neck: no pain or stiffness with ROM  Chest/Resp: clear to auscultation bilaterally, no wheezes  Heart/CV: irregular rate and rhythm, no murmur  Abdomen/GI: Soft, tender to palpation in the RUQ, nondistended. +BS.  Extremities/MSK: BLE edema, L>R with chronic venous stasis changes L>R  Skin: Warm and dry  Neuro: Alert & oriented, moves all extremities equally         Labs:   ROUTINE ICU LABS (Last four results)  CMP  Recent Labs   Lab 12/06/19  0729      POTASSIUM 3.5   CHLORIDE 108   CO2 24   ANIONGAP 8   GLC 87   BUN 21   CR 1.01   GFRESTIMATED 67   GFRESTBLACK 78   TYSON 8.7   PROTTOTAL 7.5   ALBUMIN 2.7*   BILITOTAL 0.3   ALKPHOS 69   AST 30   ALT 12     CBC  Recent Labs   Lab 12/06/19  0729   WBC 4.9   RBC 3.09*   HGB 8.8*   HCT 28.3*   MCV 92   MCH 28.5   MCHC 31.1*   RDW 17.6*   *     INR  Recent Labs   Lab 12/06/19  0729   INR 3.28*     Arterial Blood GasNo lab results found in last 7 days.       Imaging/Procedures:      Results for orders placed or performed during the hospital encounter of 12/06/19   Head CT w/o contrast    Narrative    CT SCAN OF THE HEAD WITHOUT CONTRAST   12/6/2019 8:34 AM     HISTORY: 2-3 weeks of headaches.    TECHNIQUE:  Axial images of the head and coronal reformations without  IV contrast material.  Radiation dose for this scan was reduced using  automated exposure control, adjustment of the mA and/or kV according  to patient size, or iterative reconstruction technique.    COMPARISON: 9/1/2018     FINDINGS:  There is some mild-to-moderate cerebral atrophy. There is  some low-density in the splenium of the corpus callosum which is  slightly more pronounced than the prior exam but there is no mass  effect. This is presumably due to some chronic ischemic change. There  are patchy white matter changes in both hemispheres without mass  effect. Incidental note is made of a tiny lipoma along the right  frontal parietal parafalcine falx. There is no evidence for acute  hemorrhage, acute infarct, mass effect, or skull fracture.      Impression    IMPRESSION:  1. Some low density along the splenium of the corpus callosum which  appears slightly progressed since the prior exam. This is probably  just related to chronic ischemic change. However, if clinically  indicated, MRI of the brain without and with contrast might be helpful  in excluding an active process.  2. Cerebral atrophy with chronic white matter changes.  3. No evidence for intracranial hemorrhage, or acute infarct.    MISAEL DOBBINS MD   Chest XR,  PA & LAT    Narrative    CHEST TWO VIEWS   12/6/2019 8:20 AM     HISTORY: Cough.    COMPARISON: 5/31/2019.      Impression    IMPRESSION: No acute cardiopulmonary disease.   Abdomen US, limited (RUQ only)    Narrative    ULTRASOUND ABDOMEN LIMITED   12/6/2019 8:22 AM     HISTORY:  Burping, right upper quadrant pain.     COMPARISON: CT abdomen and pelvis 5/16/2016.    FINDINGS:    Gallbladder: Negative  sonographic Gomez sign. Two gallstones.  Gallbladder wall thickening measuring 0.5 cm.    Bile ducts:  Common duct is 0.2 cm. No biliary dilatation.    Liver: Liver length is 20 cm. Somewhat coarse appearance of the  hepatic parenchyma. Trace ascites adjacent to the lower right liver.    Pancreas:  Partially obscured by overlying bowel gas,  but grossly  unremarkable.     Right kidney:  Normal.     Aorta and IVC:  Not specifically assessed.      Impression    IMPRESSION:    1. Cholelithiasis. Negative sonographic Gomez sign. Nonspecific  gallbladder wall thickening. No biliary dilatation. Cholecystitis not  entirely excluded.  2. Enlarged liver.  3. Trace ascites near the liver.   CT Abdomen Pelvis w Contrast    Narrative    CT ABDOMEN AND PELVIS WITH CONTRAST   12/6/2019 9:22 AM     HISTORY: Abdominal discomfort, anemia, slight free fluid on US.    TECHNIQUE:  CT abdomen and pelvis with 98mL Isovue-370 IV. Radiation  dose for this scan was reduced using automated exposure control,  adjustment of the mA and/or kV according to patient size, or iterative  reconstruction technique.    COMPARISON: CT abdomen and pelvis 5/16/2016. Ultrasound abdomen  12/6/2019.    FINDINGS:   Small bibasilar effusions newly identified since the prior CT from  2016. Trace ascites near the liver and within the deep pelvis. Diffuse  edema at the mesentery and surrounding the pancreas again noted.  Nodular configuration of the liver again seen. Cholelithiasis is  present. Mild gallbladder wall prominence. Pancreas parenchyma  enhances homogeneously with no focal fluid collection or duct  dilatation.    Adrenals, spleen, and kidneys do not show any acute abnormalities.  Probable cyst lower left kidney shows no significant change series 2  image 40. No hydronephrosis.    There is a new mildly dense 1.3 cm small nodule along the medial right  abdominal rectus muscle series 2 image 62.    There is a fat-containing ventral midline abdominal  wall hernia that  is 4.3 cm, previously 3.5 cm. No herniated bowel. No bowel  obstruction. No acute inflammatory change of the bowel. No abscess.    There are several mildly prominent lymph nodes seen in the  retroperitoneum again. A few are slightly larger. An example is at the  aortocaval region measuring 1.1 cm, previously 0.6 cm series 2 image  44.    Vascular calcifications. Scattered vascular varices again noted, in  particular at the upper pelvis extending to the mesentery, grossly  stable series 2 image 55.      Impression    IMPRESSION:  1. New tiny dense nodular lesion within the medial right abdominis  rectus muscle could be a small new acute or subacute hematoma.  2. Trace ascites in the abdomen and pelvis. Small bibasilar pleural  fluid also newly identified.  3. Diffuse edema again seen at the mesentery and other soft tissues.  This also surrounds the pancreas. Pancreatitis could have this  appearance. No evidence for pancreas necrosis.  4. Cholelithiasis with nonspecific mild gallbladder wall thickening.  Cholecystitis not excluded.  5. A few mildly larger retroperitoneal lymph nodes are indeterminant.

## 2019-12-06 NOTE — PROGRESS NOTES
Discussed with  with general surgery this afternoon again he reviewed the CT scan images  from 3years ago and compared to the CT today and they both looked the same with edema in the mesentery and thickened gall bladder so acute cholecystitis is less likely and patient also has symptoms of nausea and belching which are chronc and they are little worse today. Seen GI consultation note as well and they recommended no invasive procedures at this time so will not reverse the INR further. Repeat INR in AM if its in the therapeutic range will restart the coumadin. Started full liquid diet and stopped coumadin and restart lasix in AM     Tegan Cruz MD

## 2019-12-06 NOTE — ED NOTES
"[facility]  ED Nurse Handoff Report    Sean Brunner is a 85 year old male on Coumadin who presents with abdominal pain and headache. According to the patient, he has valdo dealing with headaches on and off for about three weeks, but has had \"the worst headache of his life\" since last night. He reports his abdomen is \"sore all over\" and he has been burping and coughing all night long. He denies vomiting, diarrhea and fever. He denies past history of migraines. He also reports history of lower extremity edema.   ED Chief complaint: Abdominal Pain and Headache  . ED Diagnosis:   Final diagnoses:   Abdominal wall hematoma, initial encounter   Calculus of gallbladder without cholecystitis without obstruction     Allergies: No Known Allergies    Code Status: DNR / DNI  Activity level - Baseline/Home:  Stand by Assist. Activity Level - Current:   Assist X 2. Lift room needed: No. Bariatric: No   Needed: No   Isolation: No. Infection: Not Applicable.     Vital Signs:   Vitals:    12/06/19 0835 12/06/19 0845 12/06/19 0900 12/06/19 0930   BP:  117/88 128/79 (!) 120/95   Pulse:  93 93    Resp:       Temp:       TempSrc:       SpO2: 99% 98% 94% 99%       Cardiac Rhythm:  ,      Pain level: 0-10 Pain Scale: 10  Patient confused: No. Patient Falls Risk: Yes.   Elimination Status: Has voided   Patient Report - Initial Complaint: abdominal pain/ headache. Focused Assessment:      07:36 Neurological Cognitive - Cognitive/Neuro/Behavioral WDL: -WDL except (Pt c/o headache xseveral weeks. pt states that his headache is \"a terrible terrible headache\" thats been going on for weeks. pt states that it hurts behind his eyes, on his forhead, and the top of his head itches.) Level Of Consciousness: alert  Arousal Level: opens eyes spontaneously  Orientation: oriented x 4  Follows Commands: yes  Speech: spontaneous; clear  Best Language: 0 - No aphasia  Mood/Behavior: cooperative   [place] Coma Scale - Best Eye " "Response: 4-->(E4) spontaneous  Best Motor Response: 6-->(M6) obeys commands  Best Verbal Response: 5-->(V5) oriented  [place] Coma Scale Score: 15   Pupils (CN II) - Pupil PERRLA: yes   Motor Strength - Left Upper: 5 - active movement against gravity and full resistance  Right Upper: 5 - active movement against gravity and full resistance  Left Lower: 5 - active movement against gravity and full resistance  Right Lower: 5 - active movement against gravity and full resistance   Sensory Assessment - Sensation - All Extremities: no impairment      07:38 Skin Color/Condition Skin - Skin WDL: -WDL except (pt c/o right leg swelling. wife states \"it looks much better than it used to. +2 edema in the right leg. painful upon palpation) Inspection of bony prominences: Focused Inspection (identify areas inspected)      07:40 Gastrointestinal Gastrointestinal - GI WDL: -WDL except (pt also c/o generalized abd pain x3 days. pt denies n/v/d) All Quadrants Bowel Sounds: audible and active in all quadrants  All Quadrants Abdominal Palpation: tender          Tests Performed: blood work, imaging. Abnormal Results:   Labs Ordered and Resulted from Time of ED Arrival Up to the Time of Departure from the ED   CBC WITH PLATELETS DIFFERENTIAL - Abnormal; Notable for the following components:       Result Value    RBC Count 3.09 (*)     Hemoglobin 8.8 (*)     Hematocrit 28.3 (*)     MCHC 31.1 (*)     RDW 17.6 (*)     Platelet Count 149 (*)     All other components within normal limits   COMPREHENSIVE METABOLIC PANEL - Abnormal; Notable for the following components:    Albumin 2.7 (*)     All other components within normal limits   INR - Abnormal; Notable for the following components:    INR 3.28 (*)     All other components within normal limits   LIPASE   TROPONIN I   PERIPHERAL IV CATHETER     CT Abdomen Pelvis w Contrast   Preliminary Result   IMPRESSION:   1. New tiny dense nodular lesion within the medial right abdominis   rectus " muscle could be a small new acute or subacute hematoma.   2. Trace ascites in the abdomen and pelvis. Small bibasilar pleural   fluid also newly identified.   3. Diffuse edema again seen at the mesentery and other soft tissues.   This also surrounds the pancreas. Pancreatitis could have this   appearance. No evidence for pancreas necrosis.   4. Cholelithiasis with nonspecific mild gallbladder wall thickening.   Cholecystitis not excluded.   5. A few mildly larger retroperitoneal lymph nodes are indeterminant.      Head CT w/o contrast   Final Result   IMPRESSION:   1. Some low density along the splenium of the corpus callosum which   appears slightly progressed since the prior exam. This is probably   just related to chronic ischemic change. However, if clinically   indicated, MRI of the brain without and with contrast might be helpful   in excluding an active process.   2. Cerebral atrophy with chronic white matter changes.   3. No evidence for intracranial hemorrhage, or acute infarct.      MISAEL DOBBINS MD      Abdomen US, limited (RUQ only)   Preliminary Result   IMPRESSION:     1. Cholelithiasis. Negative sonographic Gomez sign. Nonspecific   gallbladder wall thickening. No biliary dilatation. Cholecystitis not   entirely excluded.   2. Enlarged liver.   3. Trace ascites near the liver.      Chest XR,  PA & LAT   Preliminary Result   IMPRESSION: No acute cardiopulmonary disease.        .   Treatments provided: benadryl and reglan  Family Comments: wife Dona at bedside  OBS brochure/video discussed/provided to patient:  N/A  ED Medications:   Medications   metoclopramide (REGLAN) injection 10 mg (10 mg Intravenous Given 12/6/19 0734)   diphenhydrAMINE (BENADRYL) injection 25 mg (25 mg Intravenous Given 12/6/19 0733)   CT Scan Flush (64 mLs Intravenous Given 12/6/19 0914)   iopamidol (ISOVUE-370) solution 500 mL (98 mLs Intravenous Given 12/6/19 0914)     Drips infusing:  No  For the majority of the shift, the  patient's behavior Green. Interventions performed were frequent rounding, patient is very hard of hearing..     Severe Sepsis OR Septic Shock Diagnosis Present: No      ED Nurse Name/Phone Number: Annalise Jane RN,   9:47 AM    RECEIVING UNIT ED HANDOFF REVIEW    Above ED Nurse Handoff Report was reviewed: Yes  Reviewed by: Heather Eason RN on December 6, 2019 at 11:28 AM

## 2019-12-06 NOTE — CONSULTS
Chief complaint:  Abdominal pain    HPI:  This patient is a 85 year old male who presents with abdominal pain and headache.  The patient reports that he has had a headache for years and has also had nausea and bloating for years.  His abdomen feels very gassy.  He thinks his symptoms were a bit worse today, however.  The patient has known coronary artery disease and cardiomyopathy.  He has congestive heart failure.  He denies any lateralizing he denies any right upper quadrant pain now or earlier today.  Patient is interviewed using a hearing aid.  The patient does notice left leg swelling, which is chronic.  CT scan in the emergency room today showed gallstones and gallbladder wall thickening.  There was also some mesenteric edema.  The patient also has had some progression of anemia.  He is on Coumadin for atrial fibrillation and was found today to have a slightly hypertherapeutic INR.  The patient states that he is feeling better now than when he presented earlier.    Past Medical History:   has a past medical history of Atrial fibrillation (H) (12/27/2011), Bilateral leg edema, CAD (coronary artery disease), Cardiomyopathy (H) (5/1/2012), Carotid stenosis, Cellulitis of left lower extremity (11/5/2018), COPD (chronic obstructive pulmonary disease) (H), Cor pulmonale (H) (5/1/2012), Diastolic dysfunction, DM2 (diabetes mellitus, type 2) (H), IYER (dyspnea on exertion), GERD (gastroesophageal reflux disease), HTN (hypertension), Hypothyroidism, Leg wound, left, LV dysfunction (05/01/2012), Morbid obesity (H) (5/1/2012), On supplemental oxygen by nasal cannula, STEWART (obstructive sleep apnea) (05/01/2012), Pulmonary embolism (H), Pulmonary HTN (H), Varicose veins of bilateral lower extremities with other complications, and Vitamin B12 deficiency disease (5/1/2012).    Past Surgical History:  Past Surgical History:   Procedure Laterality Date     JOINT REPLACEMENT     Tonsillectomy  Appendectomy  Rotator cuff repair  x2  Cataract surgery left knee arthroscopy x2    Social History:  Social History     Socioeconomic History     Marital status:      Spouse name: Not on file     Number of children: Not on file     Years of education: Not on file     Highest education level: Not on file   Occupational History     Not on file   Social Needs     Financial resource strain: Not on file     Food insecurity:     Worry: Not on file     Inability: Not on file     Transportation needs:     Medical: Not on file     Non-medical: Not on file   Tobacco Use     Smoking status: Never Smoker     Smokeless tobacco: Never Used   Substance and Sexual Activity     Alcohol use: No     Drug use: No     Sexual activity: Not on file   Lifestyle     Physical activity:     Days per week: Not on file     Minutes per session: Not on file     Stress: Not on file   Relationships     Social connections:     Talks on phone: Not on file     Gets together: Not on file     Attends Baptism service: Not on file     Active member of club or organization: Not on file     Attends meetings of clubs or organizations: Not on file     Relationship status: Not on file     Intimate partner violence:     Fear of current or ex partner: Not on file     Emotionally abused: Not on file     Physically abused: Not on file     Forced sexual activity: Not on file   Other Topics Concern     Parent/sibling w/ CABG, MI or angioplasty before 65F 55M? Not Asked   Social History Narrative     Not on file        Family History:  Family history is significant for cancer, heart disease and Alzheimer's disease.    Review of Systems:  The 10 point Review of Systems is negative other than noted in the HPI and above.    Physical Exam:  General - This is a well developed, well nourished male in no apparent distress.  HEENT - Normocephalic, atraumatic.  No scleral icterus.  Neck - supple without masses  Lungs - clear to ascultation.    Heart - Regular rate & rhythm without  murmur  Abdomen:   soft, non-distended and without significant tenderness.  Specifically, there is no obvious right upper quadrant tenderness.  There is no particular tenderness along the right rectus muscle either.  There is a nontender fat-containing hernia near the umbilicus.  Extremities - warm without edema  Neurologic - nonfocal    Relevant labs:  White blood cell count is 4900.  Hemoglobin is 8.8 (it was 10.9 in July).  INR was 3.28 on admission today.  The patient received vitamin K in the ER and repeat this afternoon was 3.61.    Imaging:  Ultrasound shows gallstones and gallbladder wall thickening.  There was no evidence of a sonographic Gomez's sign.  CT scan showed some mesenteric edema as well as some gallbladder wall thickening and some ascites.  There is some nodularity to the liver and some gallstones.  Comparison to a CT scan from 2016 shows very similar findings.  Specifically, there is obvious edema of the mesentery and gallbladder wall thickening as well as gallstones.    Assessment and Plan:  This is a patient with fairly nonspecific and chronic symptoms.  CT scan raised some concerns about possible cholecystitis, but these findings are unchanged from a CT scan in 2016.  At this point, I see no indication that the patient has acute cholecystitis.  If the question arises due to specific right upper quadrant pain, consideration could be given to a HIDA scan, but I would not recommend that at this time.  I think the patient's abdominal symptoms are likely due to congestion from his congestive heart failure.  I think it is okay to go ahead and let him eat.  The patient has been seen by the GI service.  They do not immediately want to perform any sort of endoscopy.  If the patient has continued drop in his hemoglobin, that may need to be readdressed.      Fabian Feng MD  [facility]

## 2019-12-06 NOTE — CONSULTS
Consult Date:  12/06/2019      GASTROENTEROLOGY CONSULTATION      PRIMARY CARE PHYSICIAN:  Power Parekh MD      HISTORY OF PRESENT ILLNESS:  This is an 85-year-old male who presented to the hospital with a chief complaint of abdominal pain and headache.  The patient has had a headache which got worse over the last 3 weeks along with a sore abdomen.  The patient was taking Coumadin for atrial fibrillation.  The patient has a past history of an appendectomy, but no other abdominal surgery.  He says that he has had no diarrhea or vomiting, melena or bright red blood per rectum.  He has had no change in his bowel habits.  He has had no vomiting of blood.  When he was in the emergency room, he had a CT scan which showed a small amount of edema surrounding the pancreas and cholelithiasis but, other than that, there was a possibility of a small hematoma in the rectus abdominis sheath.  Labs were drawn and he was found to have a hemoglobin of 8.8 with microcytic indices.  His INR was elevated at 3.28.  We have old hemoglobin from 5 months ago which was 10.9.  He says that he has had no weight loss and no difficulty eating and is not taking any PPIs.      On examination now, the abdomen is nontender with no palpable masses and no enlarged organs present.        ASSESSMENT AND RECOMMENDATIONS:  At this point in time, I think that he is probably losing blood because of being on Coumadin for such a long time, he will lose a small amount of blood which will be imperceptible for him.  Because he is not actively bleeding, I do not think that I would want to do any invasive GI studies on him at this time.  I think we can correct his INR and get him on some iron and see if he can correct this problem.  It would not be a bad idea to put him on a small amount of Prilosec prophylactically to prevent any blood loss from his upper GI tract.      Thank you for allowing me to see this patient in consultation.         SWATI ALY,  MD             D: 2019   T: 2019   MT: BALWINDER      Name:     MAYITO ANSARI   MRN:      [id number]        Account:       [id number]   :      1934           Consult Date:  2019      Document: [id number]       cc: Oscar Parekh MD

## 2019-12-07 NOTE — PLAN OF CARE
Alert and oriented. [place].  Pain managed with tramadol. Assist of 1 with a walker and gait belt. On remote tele monitoring.  5975 Paged hospitalist: Patient has had 6 to 7 beats of VTach approximately every 1 to 2 hours. Please advice. Thank you.  0610 Orders for electrolytes this morning.

## 2019-12-07 NOTE — PLAN OF CARE
RN from [id number]:  Pt A&O. [place]. VSS. Tele. Full liquids. Up with A1 and cane. Voiding. Reports gas pains. Not passing flatus but is belching. Rates headache about a 5. Tylenol given. BG checks, was 68 at bedtime. Given orange juice and rae crackers, BG up to 141. Non productive congested cough. Pt hopes to go home tomorrow. Will continue to monitor.

## 2019-12-07 NOTE — PROGRESS NOTES
[facility]  Hospitalist Progress Note  Admit 12/6/2019  6:59 AM    Name: Sean Brunner    MRN: [id number]  Provider:  Epi He MD, [facility]    Date of Service: 12/07/2019     Reason for Stay (Diagnosis): Abdominal pain, acute on chronic anemia and supratherapeutic INR         Summary of hospital stay & Assessment/Plan:   Summary of Stay: Sean Brunner is a 85 year old male who was admitted on 12/6/2019  85 yr old male with h/o AFIB on coumadin, h/o DVT, PE, chronic head aches presented with nausea, abdominal pain and  worsening head aches found to have acute on chronic anemia at 8.8 and possible chlecystitis on CT abdomen and small rectus abdominis hematoma with INR of 3.28    Problem List:   1. Cholelithiasis and CT scan raised some concerns about possible cholecystitis, but these findings per surgery are unchanged from a CT scan in 2016.  At this point, I see no indication that the patient has acute cholecystitis.     2. Acute on chronic anemia suspected from Excedrin use and warfarin, seen by gastroenterology recommended PPI, avoiding nonsteroidal inflammatory drugs no endoscopy for now follow-up as outpatient    3. Known history of chronic right ventricular failure and +4 tricuspid regurgitation likely contributing to cor pulmonale and bowel edema, ascites seen on imaging which has been present before    4. Chronic Afib on coumadin     5. History of chronic headache.  MRI of the brain was negative for acute stroke or neurological finding    Since clinically improving and there is no plan for surgery, advance diet as tolerated and monitor for recurrence of symptoms, if he has recurrent symptoms consider HIDA scan  Keep Coumadin in the lower 2 range, monitor hemoglobin  Avoid nonsteroidal anti-inflammatory drugs  Consult physical Occupational Therapy for safety evaluation and discharge planning  Resume home medication including Depakote and Lasix        DVT Prophylaxis: Warfarin  Code  Status:  Full Code    Disposition Plan     Expected discharge in 1-2  days to prior living arrangement once if diet tolerated without increasing in pain, hemoglobin stable and cleared by therapy.     Entered: Epi He 12/07/2019, 2:04 PM                 Interval History:       Feels better today, wants to try to eat, no abdominal pain  Today's plan detailed above discussed with nursing               Physical Exam:   Physical Exam   Temp: 98.7  F (37.1  C) Temp src: Temporal BP: 119/74 Pulse: 100 Heart Rate: 116 Resp: 18 SpO2: 95 % O2 Device: None (Room air)    Vitals:    12/06/19 1627 12/07/19 0506   Weight: 89.8 kg (197 lb 14.4 oz) 90.4 kg (199 lb 4.8 oz)     I/O last 3 completed shifts:  In: 100 [P.O.:100]  Out: 425 [Urine:425]      GENERAL:  Comfortable.   PSYCH: pleasant, oriented, No acute distress.  EYES: PERRLA, Normal conjunctiva.  HEART:  Normal S1, S2 with +1 edema.  LUNGS: Scattered rhonchi , normal Respiratory effort.  ABDOMEN:  Soft, no hepatosplenomegaly, normal bowel sounds.  SKIN:  Dry to touch, No rash.      Medications       divalproex sodium delayed-release  250 mg Oral At Bedtime     furosemide  40 mg Oral Every Other Day     insulin aspart  1-3 Units Subcutaneous TID AC     insulin aspart  1-3 Units Subcutaneous At Bedtime     [START ON 12/8/2019] levothyroxine  150 mcg Oral QAM AC     metoprolol succinate ER  25 mg Oral QPM     multivitamin w/minerals  1 tablet Oral BID w/meals     pantoprazole  40 mg Oral BID AC     [START ON 12/8/2019] predniSONE  6 mg Oral Daily with breakfast     simvastatin  40 mg Oral Daily with supper     sodium chloride (PF)  3 mL Intracatheter Q8H     terazosin  5 mg Oral At Bedtime     Data     -Data reviewed today:  I personally reviewed  all new labs and imaging results over the last 24 hours.    Recent Labs   Lab 12/07/19  0712 12/06/19  2147 12/06/19  1349 12/06/19  0729   WBC 5.2  --   --  4.9   HGB 9.0* 8.9* 8.7* 8.8*   HCT 28.9*  --   --  28.3*   MCV  92  --   --  92   *  --   --  149*     Recent Labs   Lab 12/07/19  0712 12/06/19  0729    140   POTASSIUM 3.9 3.5   CHLORIDE 107 108   CO2 26 24   ANIONGAP 5 8   GLC 88 87   BUN 16 21   CR 0.92 1.01   GFRESTIMATED 76 67   GFRESTBLACK 88 78   TYSON 9.1 8.7       Recent Results (from the past 24 hour(s))   MR Brain w/o & w Contrast    Narrative    MRI BRAIN WITHOUT AND WITH CONTRAST  12/6/2019 5:15 PM     HISTORY: Headache.    TECHNIQUE: Multiplanar, multisequence MRI of the brain without and  with 7.5 mL Gadavist.    COMPARISON: Head CT from earlier the same day.     FINDINGS: There is no evidence of acute infarct, hemorrhage, mass, or  herniation. Moderate diffuse parenchymal volume loss. Moderate patchy  deep and subcortical white matter T2 hyperintensities which are  nonspecific, but likely related to chronic microvascular ischemic  disease. Ventricular size within normal limits without hydrocephalus.     There is no abnormal intracranial enhancement.     The facial structures appear normal. The major arterial T2 flow voids  at the base of the brain appear patent.       Impression    IMPRESSION:    1. No evidence of acute infarct, mass, hemorrhage, or herniation.  2. Moderate diffuse parenchymal volume loss and white matter changes  likely due to chronic microvascular ischemic disease.      GORDY PABLO MD       This document was produced using voice recognition software

## 2019-12-07 NOTE — PHARMACY-ANTICOAGULATION SERVICE
Clinical Pharmacy - Warfarin Dosing Consult     Pharmacy has been consulted to manage this patient s warfarin therapy.  Indication: Atrial Fibrillation  Therapy Goal: Other - see comments  Warfarin Prior to Admission: Yes  Warfarin PTA Regimen: 1 mg MWF and 1.5 mg ROW  Dose Comments: Goal Range INR 2 - 2.5     INR   Date Value Ref Range Status   12/07/2019 2.04 (H) 0.86 - 1.14 Final   12/06/2019 3.61 (H) 0.86 - 1.14 Final     Comment:     Reviewed: OK with previous       Recommend warfarin 1.5 mg today.  Pharmacy will monitor Sean LOUISE Lewiser daily and order warfarin doses to achieve specified goal.      Please contact pharmacy as soon as possible if the warfarin needs to be held for a procedure or if the warfarin goals change.

## 2019-12-07 NOTE — PLAN OF CARE
Pt A&O x4-forgetful @ times. VS stable; afebrile. Lung sounds: congested and inspiratory wheezes throughout-encouraged use of IS. PO tramadol and tylenol managing pain for L hip and headache. CMS intact. +1 edema to BLE's. Up w/ SBA, using gait belt, and cane. Voiding in good amts. BM x1 this shift. Tolerating full liquids; advanced to CHO diet-will try this evening. PO zofran given for mild nausea this AM-resolved. Tums given for gas discomfort. Plan is home @ discharge. Will continue to monitor.

## 2019-12-07 NOTE — PLAN OF CARE
Pt remains admitted for pain   A/Ox3, pleasant  Headache reported, PO tramadol given with relief  No nausea reported  Low BS 69, IV dextrose given, resolved  MRI performed during shift  On tele, afib  Up x1 with cane  Full liquid diet, tolerating well  Discharge pending  Will continue to monitor

## 2019-12-07 NOTE — PROGRESS NOTES
"[facility]  General Surgery Progress Note           Assessment and Plan:   Assessment:   Congestive failure with intra-abdominal imaging changes  Gallstones  Ventral hernia - asympromatic      Plan:   -no indication for surgery - if RUQ pain/cholecystitis suspected - could consider HIDA  Will follow peripherally - please call if we can help         Interval History:   Denies abdominal pain, wants more to eat         Physical Exam:   Blood pressure 119/74, pulse 100, temperature 98.7  F (37.1  C), temperature source Temporal, resp. rate 18, height 1.753 m (5' 9\"), weight 90.4 kg (199 lb 4.8 oz), SpO2 95 %.    I/O last 3 completed shifts:  In: 100 [P.O.:100]  Out: 425 [Urine:425]    Abdomen:   soft, non-distended, non-tender, voluntary guarding absent, no masses palpated and hernia present at umbilicus, irreducible but non-tender (\"it's been that way for 20 years\")             Data:     Recent Labs   Lab 12/07/19  0712 12/06/19  2147 12/06/19  1349 12/06/19  0729   WBC 5.2  --   --  4.9   HGB 9.0* 8.9* 8.7* 8.8*   HCT 28.9*  --   --  28.3*   MCV 92  --   --  92   *  --   --  149*     Recent Labs   Lab 12/07/19  0712 12/06/19  0729   AST 26 30   ALT 12 12   ALKPHOS 69 69   BILITOTAL 0.4 0.3       Dewayne Springer MD     "

## 2019-12-07 NOTE — PHARMACY-ADMISSION MEDICATION HISTORY
Admission medication history interview status for this patient is complete. See [facility] admission navigator for allergy information, prior to admission medications and immunization status.     Medication history interview source(s):Patient  Medication history resources (including written lists, pill bottles, clinic record):medlist    Changes made to PTA medication list:  Added: prednisone, clinda lotion  Deleted: none  Changed: lasix and warfarin    Actions taken by pharmacist (provider contacted, etc):None     Additional medication history information:None    Medication reconciliation/reorder completed by provider prior to medication history? Yes    For patients on insulin therapy: no (Yes/No)   Lantus/levemir/NPH/Mix 70/30 dose: ___ in AM/PM or twice daily   Sliding scale Novolog Y/N   If Yes, do you have a baseline novolog pre-meal dose: ______units with meals   Patients eat three meals a day: Y/N ---  How many episodes of hypoglycemia (low blood glucose) do you have weekly: ---   How many missed doses do you have a week: ---  How many times do you check your blood glucose per day: ---  Any Barriers to therapy: cost of medications/comfortable with giving injections (if applicable)/ comfortable and confident with current diabetes regimen ---      Prior to Admission medications    Medication Sig Last Dose Taking? Auth Provider   clindamycin (CLEOCIN T) 1 % external lotion Apply topically daily as needed (open leg sore) prn Yes Unknown, Entered By History   cyanocolbalamin (VITAMIN  B-12) 1000 MCG tablet Take 3,000 mcg by mouth every morning 12/6/2019 at Unknown time Yes Unknown, Entered By History   divalproex sodium delayed-release (DEPAKOTE) 250 MG DR tablet Take 250 mg by mouth At Bedtime 12/5/2019 Yes Reported, Patient   furosemide (LASIX) 20 MG tablet Take 40 mg by mouth every other day   Yes Unknown, Entered By History   levothyroxine (SYNTHROID) 150 MCG tablet Take 150 mcg by mouth every morning (before  breakfast)  12/6/2019 at Unknown time Yes Reported, Patient   metoprolol succinate (TOPROL-XL) 25 MG 24 hr tablet Take 1 tablet (25 mg) by mouth every evening 12/5/2019 Yes Kimi Gil MD   multivitamin, therapeutic with minerals (THERA-VIT-M) TABS tablet Take 1 tablet by mouth 2 times daily (with meals) 12/6/2019 at x1 Yes Reported, Patient   OMEPRAZOLE PO Take 40 mg by mouth every morning  12/6/2019 at Unknown time Yes Unknown, Entered By History   predniSONE (DELTASONE) 1 MG tablet Take 6 mg by mouth daily (with breakfast) 12/6/2019 at Unknown time Yes Unknown, Entered By History   simvastatin (ZOCOR) 40 MG tablet Take 40 mg by mouth daily (with dinner)  12/5/2019 Yes Reported, Patient   terazosin (HYTRIN) 5 MG capsule Take 5 mg by mouth At Bedtime 12/5/2019 Yes Unknown, Entered By History   traMADol (ULTRAM) 50 MG tablet Take 50 mg by mouth every 6 hours as needed for moderate pain   Yes Reported, Patient   warfarin (COUMADIN) 1 MG tablet Take 1.5 mg by mouth four times a week tue-thur-sat-sun 12/5/2019 Yes Unknown, Entered By History   warfarin (COUMADIN) 1 MG tablet Take 1 mg by mouth three times a week mon-wed-fri 12/4/2019 Yes Unknown, Entered By History

## 2019-12-08 NOTE — PROGRESS NOTES
Addendum 1:45 PM    Patient having nausea after lunch, not relieved with Zofran  Also blood pressure is 89 systolic and is not feeling well  500 cc of IV fluid bolus normal saline over 2 hours was ordered  Keep Lasix on hold  Add Compazine  Check hemoglobin stat  Close monitoring and update physician on status if has more issues

## 2019-12-08 NOTE — PLAN OF CARE
Alert and oriented. [place]. BPs soft. Dizzy 1 time, resolved. On remote tele monitoring. Took Tylenol for left hip pain. SBA with a cane. Voiding.

## 2019-12-08 NOTE — PLAN OF CARE
RN - HR tachycardic at times otherwise vitally stable. Tele SR/ST - noting ongoing 5-7 beat runs of V-tach. Seemingly less after receiving evening metoprolol. Remains forgetful. Pt denying pain aside from headache - receiving PRN ultram. Coumadin restarted this evening. Evening hgb 9.0 down from 9.4 previously. Up at bedside with SBA and use of cane/gait belt. Voiding frequently after receiving afternoon diuretic. Anticipating PT/OT eval tomorrow - possible discharge back to home with spouse.

## 2019-12-08 NOTE — PLAN OF CARE
OT eval complete and treatment initiated.  Pt is an 85 yr old male with h/o AFIB on coumadin, h/o DVT, PE, chronic head aches presented with nausea, abdominal pain and  worsening head aches found to have acute on chronic anemia at 8.8 and possible chlecystitis on CT abdomen and small rectus abdominis hematoma.    Discharge Planner OT   Patient plan for discharge: home with assist of Wife and Son  Current status: Pt supine upon arrival. Able to come to EOB with SBA.  Sit<>stand and functional mobility to bathroom with CGA using SEC.  OT provided education in safe toilet transfer technique with SBA using grab bar on L side.  Pt able to manage toilet hygiene and clothing management with SBA.  He then stood at sink for grooming/hygiene with SBA using vanity for support. Pt up to chair following ADLs with complaints of fatigue and SOB.  OT provided education in importance of using energy conservation/work simplification and pursed lip breathing techniques.  Pt verbalized/demonstrated understanding with O2 sats > 90%.  He was able to identify needing sitting rest breaks and not rushing himself as useful tips.  Pt encouraged to stay up in chair for lunch and able to read menu and order for himself.  RN updated.  Barriers to return to prior living situation: decreased strength/endurance for ADLs and mobility, fall risk  Recommendations for discharge: home with assist of Wife and Son for bathing, SBA with mobility and for continued support with cooking, laundry, homemaking and driving.  Rationale for recommendations: Pt would benefit from continued OT to maximize safety and independence in ADLs with increased strength and endurance.       Entered by: Bessie Ugalde 12/08/2019 2:12 PM

## 2019-12-08 NOTE — PLAN OF CARE
Pt A&O x4-forgetful @ times. VS: pt had an episode of nausea and dizziness while sitting up in the chair this afternoon; blood pressure as low as 84/40; pt was helped back to bed w/ A2, using gait belt, and cane. MD paged; zofran and IV compazine given for nausea. Bolus started. BP has now come up to 112/61. Afebrile. 2L O2 placed on pt while sleeping; this is baseline for him. PO tylenol managing pain. CMS: +1 edema to BLE's. Up w/ A1, using gait belt, and cane. Voiding in good amts-blood noted in urine today; MD notified. Tolerating CHO diet. Plan is home @ discharge. Will continue to monitor.

## 2019-12-08 NOTE — PROGRESS NOTES
[facility]  Hospitalist Progress Note  Admit 12/6/2019  6:59 AM    Name: Sean Brunner    MRN: [id number]  Provider:  Epi He MD, [facility]    Date of Service: 12/08/2019     Reason for Stay (Diagnosis): Abdominal pain, acute on chronic anemia and supratherapeutic INR         Summary of hospital stay & Assessment/Plan:   Summary of Stay: Sean Brunner is a 85 year old male who was admitted on 12/6/2019  85 yr old male with h/o AFIB on coumadin, h/o DVT, PE, chronic head aches presented with nausea, abdominal pain and  worsening head aches found to have acute on chronic anemia at 8.8 and possible chlecystitis on CT abdomen and small rectus abdominis hematoma with INR of 3.28    Problem List:   1. Cholelithiasis and CT scan raised some concerns about possible cholecystitis, but these findings per surgery are unchanged from a CT scan in 2016.  At this point, surgery sees no indication that the patient has acute cholecystitis.     2. Acute on chronic anemia suspected from Excedrin use and warfarin, seen by gastroenterology recommended PPI, avoiding nonsteroidal inflammatory drugs no endoscopy for now follow-up as outpatient    3. Hematuria needs monitoring if worsens or drop in hemoglobin consult urology    4. Known history of chronic right ventricular failure and +4 tricuspid regurgitation likely contributing to cor pulmonale and bowel edema, ascites seen on imaging which has been present before    5. Chronic Afib on coumadin     6. History of chronic headache.  MRI of the brain was negative for acute stroke or neurological finding    Seems to be tolerating diet without difficulty  Monitor hemoglobin recheck in the morning or earlier if hematuria increases  Keep Coumadin in the lower 2 range  Avoid nonsteroidal anti-inflammatory drugs  Needs physical therapy evaluation prior to discharge, still pending  Continue home medication including Depakote and Lasix        DVT Prophylaxis: Warfarin  Code  Status:  Full Code    Disposition Plan     Expected discharge hopefully tomorrow to prior living arrangement if no worsening hematuria with Coumadin resumption and hemoglobin stable, cleared by therapy.     Entered: Epi He 12/08/2019, 1:10 PM                 Interval History:       Patient denies complaints however per nursing staff, he did have some blood in his urine earlier this morning, no severe bleeding.  Still has not seen physical therapy scheduled for this afternoon  Today's plan detailed above discussed with nursing               Physical Exam:   Physical Exam   Temp: 97.8  F (36.6  C) Temp src: Axillary BP: 97/55   Heart Rate: 80 Resp: 16 SpO2: 96 % O2 Device: None (Room air) Oxygen Delivery: 2 LPM  Vitals:    12/06/19 1627 12/07/19 0506 12/08/19 0441   Weight: 89.8 kg (197 lb 14.4 oz) 90.4 kg (199 lb 4.8 oz) 90.6 kg (199 lb 12.8 oz)     I/O last 3 completed shifts:  In: 860 [P.O.:860]  Out: 1175 [Urine:1175]      GENERAL:  Comfortable.   PSYCH: pleasant,  No acute distress.  EYES: PERRLA, Normal conjunctiva.  HEART:  Normal S1, S2 with +1 edema.  LUNGS: Scattered rhonchi , normal Respiratory effort.  ABDOMEN:  Soft, no hepatosplenomegaly, normal bowel sounds.  SKIN:  Dry to touch, No rash.      Medications     Warfarin Therapy Reminder         divalproex sodium delayed-release  250 mg Oral At Bedtime     furosemide  40 mg Oral Every Other Day     insulin aspart  1-3 Units Subcutaneous TID AC     insulin aspart  1-3 Units Subcutaneous At Bedtime     levothyroxine  150 mcg Oral QAM AC     metoprolol succinate ER  25 mg Oral QPM     multivitamin w/minerals  1 tablet Oral BID w/meals     pantoprazole  40 mg Oral BID AC     predniSONE  6 mg Oral Daily with breakfast     simvastatin  40 mg Oral Daily with supper     sodium chloride (PF)  3 mL Intracatheter Q8H     terazosin  5 mg Oral At Bedtime     Data     -Data reviewed today:  I personally reviewed  all new labs and imaging results over the  last 24 hours.    Recent Labs   Lab 12/08/19  0656 12/07/19  2129 12/07/19  1531 12/07/19 0712 12/06/19  0729   WBC  --   --   --  5.2  --  4.9   HGB 8.8* 9.0* 9.4* 9.0*   < > 8.8*   HCT  --   --   --  28.9*  --  28.3*   MCV  --   --   --  92  --  92   PLT  --   --   --  125*  --  149*    < > = values in this interval not displayed.     Recent Labs   Lab 12/07/19 0712 12/06/19 0729    140   POTASSIUM 3.9 3.5   CHLORIDE 107 108   CO2 26 24   ANIONGAP 5 8   GLC 88 87   BUN 16 21   CR 0.92 1.01   GFRESTIMATED 76 67   GFRESTBLACK 88 78   TYSON 9.1 8.7       No results found for this or any previous visit (from the past 24 hour(s)).    This document was produced using voice recognition software

## 2019-12-08 NOTE — PLAN OF CARE
PT: Received orders for evaluation and treatment.  Attempted to see patient; per RN, patient with low blood pressure, was returning to bed.  RN recommended seeing patient either later in the day or tomorrow for PT eval.  Will reschedule.

## 2019-12-08 NOTE — PROGRESS NOTES
12/08/19 1100   Quick Adds   Type of Visit Initial Occupational Therapy Evaluation   Living Environment   Lives With spouse;child(arabella), adult;child(arabella), dependent   Living Arrangements other (see comments)  (townhouse)   Home Accessibility stairs to enter home;stairs within home   Number of Stairs, Main Entrance 1   Stair Railings, Main Entrance other (see comments)  (grab bar)   Number of Stairs, Within Home, Primary other (see comments)  (full flight to basement, but doesn't use)   Transportation Anticipated family or friend will provide   Living Environment Comment [facility] with assist of Wife and Son for IADLs.   Self-Care   Usual Activity Tolerance moderate   Current Activity Tolerance fair   Regular Exercise No   Equipment Currently Used at Home cane, straight;walker, rolling;shower chair;raised toilet   Activity/Exercise/Self-Care Comment Pt reported to be independent in ADLs and mobility using SEC for balance.   Functional Level   Ambulation 1-->assistive equipment   Transferring 1-->assistive equipment   Toileting 1-->assistive equipment   Bathing 1-->assistive equipment   Dressing 0-->independent   Eating 0-->independent   Communication 0-->understands/communicates without difficulty   Swallowing 0-->swallows foods/liquids without difficulty   Cognition 0 - no cognition issues reported   Fall history within last six months yes   Number of times patient has fallen within last six months 2       Present no   General Information   Onset of Illness/Injury or Date of Surgery - Date 12/06/19   Referring Physician Dr. Epi He   Patient/Family Goals Statement return to home   Additional Occupational Profile Info/Pertinent History of Current Problem Pt is an 85 yr old male with h/o AFIB on coumadin, h/o DVT, PE, chronic head aches presented with nausea, abdominal pain and  worsening head aches found to have acute on chronic anemia at 8.8 and possible  chlecystitis on CT abdomen and small rectus abdominis hematoma    Precautions/Limitations fall precautions   General Info Comments activity:  ambulate   Cognitive Status Examination   Orientation orientation to person, place and time   Level of Consciousness alert   Follows Commands (Cognition) WNL   Memory intact   Attention No deficits were identified   Organization/Problem Solving No deficits were identified   Executive Function No deficits were identified   Cognitive Comment [place]   Visual Perception   Visual Perception Wears glasses   Sensory Examination   Sensory Quick Adds No deficits were identified   Pain Assessment   Patient Currently in Pain No   Integumentary/Edema   Integumentary/Edema no deficits were identifed   Posture   Posture forward head position   Range of Motion (ROM)   ROM Quick Adds No deficits were identified   Strength   Manual Muscle Testing Quick Adds Other   Strength Comments B UE strength grossly 4/5   Hand Strength   Hand Strength Comments WFL   Muscle Tone Assessment   Muscle Tone Quick Adds No deficits were identified   Coordination   Upper Extremity Coordination No deficits were identified   Mobility   Bed Mobility Comments SBA   Transfer Skill: Bed to Chair/Chair to Bed   Level of [place]: Bed to Chair contact guard   Assistive Device - Transfer Skill Bed to Chair Chair to Bed Rehab Eval straight cane   Transfer Skill: Sit to Stand   Level of [place]: Sit/Stand contact guard   Assistive Device for Transfer: Sit/Stand straight cane   Transfer Skill: Toilet Transfer   Level of [place]: Toilet contact guard   Physical Assist/Nonphysical Assist: Toilet verbal cues   Assistive Device straight cane;grab bars   Upper Body Dressing   Level of [place]: Dress Upper Body stand-by assist   Physical Assist/Nonphysical Assist: Dress Upper Body set-up required   Lower Body Dressing   Level of [place]: Dress Lower Body stand-by assist   Physical Assist/Nonphysical Assist:  "Dress Lower Body set-up required   Toileting   Level of [place]: Toilet stand-by assist   Physical Assist/Nonphysical Assist: Toilet set-up required   Grooming   Level of [place]: Grooming stand-by assist   Physical Assist/Nonphysical Assist: Grooming set-up required   Eating/Self Feeding   Level of [place]: Eating independent   Physical Assist/Nonphysical Assist: Eating set-up required   Activities of Daily Living Analysis   Impairments Contributing to Impaired Activities of Daily Living balance impaired;strength decreased  (endurance decreased)   General Therapy Interventions   Planned Therapy Interventions ADL retraining;transfer training;strengthening   Clinical Impression   Criteria for Skilled Therapeutic Interventions Met yes, treatment indicated   OT Diagnosis decreased ADL performance   Influenced by the following impairments decreased strength/endurance, fall risk   Assessment of Occupational Performance 3-5 Performance Deficits   Identified Performance Deficits Pt with decreased ability to manage dressing, bathing, toileting, functional household mobility   Clinical Decision Making (Complexity) Low complexity   Therapy Frequency 5x/week   Predicted Duration of Therapy Intervention (days/wks) 1 week   Anticipated Discharge Disposition Home with Assist   Risks and Benefits of Treatment have been explained. Yes   Patient, Family & other staff in agreement with plan of care Yes   [facility]-PAC TM \"6 Clicks\"   2016, Trustees of [facility], under license to [employer].  All rights reserved.   6 Clicks Short Forms Daily Activity Inpatient Short Form   [facility] AM-PAC  \"6 Clicks\" Daily Activity Inpatient Short Form   1. Putting on and taking off regular lower body clothing? 3 - A Little   2. Bathing (including washing, rinsing, drying)? 3 - A Little   3. Toileting, which includes using toilet, bedpan or urinal? 3 - A Little   4. Putting on and taking off regular upper " body clothing? 4 - None   5. Taking care of personal grooming such as brushing teeth? 3 - A Little   6. Eating meals? 4 - None   Daily Activity Raw Score (Score out of 24.Lower scores equate to lower levels of function) 20   Total Evaluation Time   Total Evaluation Time (Minutes) 8

## 2019-12-09 NOTE — PROGRESS NOTES
[facility]  Hospitalist Progress Note  Patient Name: Sean Brunner    MRN: [id number]  Provider: Manny Pastor MD  12/09/19    Initial presenting complaint/issue to hospital (Diagnosis): abdominal pain          Assessment and Plan:      Summary of Stay: Sean Brunner is a 85 year old male with h/o AFIB for which he is on coumadin, DVT, PE, and chronic headaches. He presented to the ED on 12/6/19 with nausea, abdominal pain and  worsening headaches. ED work up found acute on chronic anemia with hemoglobin of 8.8, supra-therapeutic INR of 3.28,  possible chlecystitis on CT of abdomen, and small rectus abdominis hematoma on CT of abdomen. He was admitted for further evaluation and treatment. He was seen by GI and anemia was thought to possibly be due to some low grade NSAID related blood loss in setting of chronic anticoagulation. Protonix was started. No endoscopy was recommended at this time. INR came down to the low 2 range. HGB came up slowly. Surgery was consulted for possible cholecystitis. Imaging suggesting cholecystitis was thought to be unchanged from prior so cholecystectomy was not recommended. Abdominal pain improved. Shahriar had some low blood pressure with dizziness that improved.      Problem List:   1. Cholelithiasis with question of possible cholecystitis on CT of abdomen and pelvis. Surgery was consulted and thought that imaging findings suggestive of cholecystitis were unchanged from previous. No surgery was recommended. Tolerating PO. No abdominal pain today.      2. Acute on chronic anemia. Shahriar was seen by GI and this was suspected to be from low grade GI blood loss related to Excedrin use and chronic anticoagulation with warfarin. Continue Protonix (started here) and avoiding nonsteroidal inflammatory drugs. No endoscopy planned at this time. Outpatient GI follow up recommended.     3.   Rectus abdominus sheath hematoma, small. Keep INR closer to 2.     4.   Hematuria, on  "coumadin. Resolved.      5.   History of chronic right ventricular failure and +4 tricuspid regurgitation likely contributing to cor pulmonale, bowel edema, and ascites seen on imaging (has been present before).     6.   Chronic Afib. Continue PTA coumadin with goal of INR closer to 2.     7.   History of chronic headache.  MRI of the brain was negative for acute stroke or neurological finding     8.   Weakness. PT consult is pending.         Full code  DVT Prophylaxis: Warfarin  Disposition: Home tomorrow if passes PT and improvement continues.         Interval History:      Abdominal pain is gone. No headache. Feels better today. No hematuria                  Physical Exam:      Last Vital Signs:  /61 (BP Location: Right arm)   Pulse 105   Temp 97.4  F (36.3  C) (Oral)   Resp 16   Ht 1.753 m (5' 9\")   Wt 89 kg (196 lb 4.8 oz)   SpO2 98%   BMI 28.99 kg/m      Intake/Output Summary (Last 24 hours) at 12/9/2019 1003  Last data filed at 12/9/2019 0800  Gross per 24 hour   Intake 700 ml   Output 1000 ml   Net -300 ml       GENERAL:  Comfortable. Cooperative.  PSYCH: pleasant, oriented, No acute distress.  EYES: PERRLA, Normal conjunctiva.  HEART:  Regular rate and rhythm. No JVD. Pulses normal. No edema.  LUNGS:  Clear to auscultation, normal Respiratory effort.  ABDOMEN:  Soft, no hepatosplenomegaly, normal bowel sounds.  EXTREMETIES: No clubbing, cyanosis or ischemia  SKIN:  Dry to touch, No rash.           Medications:      All current medications were reviewed.         Data:      All new lab and imaging data was reviewed.   Labs:       Lab Results   Component Value Date     12/07/2019     12/06/2019     07/16/2019    Lab Results   Component Value Date    CHLORIDE 107 12/07/2019    CHLORIDE 108 12/06/2019    CHLORIDE 103 07/16/2019    Lab Results   Component Value Date    BUN 16 12/07/2019    BUN 21 12/06/2019    BUN 21 07/16/2019      Lab Results   Component Value Date    POTASSIUM " 3.9 12/07/2019    POTASSIUM 3.5 12/06/2019    POTASSIUM 4.0 07/16/2019    Lab Results   Component Value Date    CO2 26 12/07/2019    CO2 24 12/06/2019    CO2 32 07/16/2019    Lab Results   Component Value Date    CR 0.92 12/07/2019    CR 1.01 12/06/2019    CR 1.00 07/16/2019        Recent Labs   Lab 12/09/19  0632 12/08/19  1405 12/08/19  0656  12/07/19  0712 12/06/19  0729   WBC  --   --   --   --  5.2  --  4.9   HGB 9.1* 9.0* 8.8*   < > 9.0*   < > 8.8*   HCT  --   --   --   --  28.9*  --  28.3*   MCV  --   --   --   --  92  --  92   PLT  --   --   --   --  125*  --  149*    < > = values in this interval not displayed.

## 2019-12-09 NOTE — PLAN OF CARE
Discharge Planner OT   Patient plan for discharge: none stated   Current status: Educated on safe item retrieval. Pt completed clothing item retrieval with CGA and cane. While seated/standing pt completed don/doff socks with Mod I and don/doff pants with SBA.   Barriers to return to prior living situation: decreased strength/endurance for ADLs   Recommendations for discharge: home with assist of Wife and Son for bathing, SBA with dressing, and continued assist with cooking, laundry, homemaking and driving.  Rationale for recommendations: Pt would benefit from continued OT to maximize safety and independence in ADLs with increased strength and endurance       Entered by: Claribel James 12/09/2019 1:59 PM

## 2019-12-09 NOTE — PROGRESS NOTES
12/09/19 1557   Quick Adds   Type of Visit Initial PT Evaluation   Living Environment   Lives With spouse;child(arabella), adult;child(arabella), dependent   Living Arrangements other (see comments)  (townhouse)   Home Accessibility stairs to enter home;stairs within home   Stair Railings, Main Entrance other (see comments)  (grab bar)   Number of Stairs, Within Home, Primary other (see comments)  (full flight to basement, but doesn't use)   Transportation Anticipated family or friend will provide   Self-Care   Usual Activity Tolerance moderate   Current Activity Tolerance fair   Equipment Currently Used at Home cane, straight;walker, rolling;shower chair;raised toilet   Activity/Exercise/Self-Care Comment Pt reported to be independent in ADLs and mobility using SEC for balance   Functional Level Prior   Ambulation 1-->assistive equipment   Transferring 1-->assistive equipment   Toileting 1-->assistive equipment   Bathing 1-->assistive equipment   Communication 0-->understands/communicates without difficulty   Swallowing 0-->swallows foods/liquids without difficulty   Cognition 0 - no cognition issues reported   Fall history within last six months yes   Number of times patient has fallen within last six months 2   General Information   Onset of Illness/Injury or Date of Surgery - Date 12/06/19   Referring Physician Epi He MD   Patient/Family Goals Statement Pt would like to DC home   Pertinent History of Current Problem (include personal factors and/or comorbidities that impact the POC) Pt is an 85 yr old male with h/o AFIB on coumadin, h/o DVT, PE, chronic head aches presented with nausea, abdominal pain and  worsening head aches found to have acute on chronic anemia at 8.8 and possible chlecystitis on CT abdomen and small rectus abdominis hematoma   Cognitive Status Examination   Orientation orientation to person, place and time   Level of Consciousness alert   Follows Commands and Answers Questions 100%  "of the time;able to follow multistep instructions   Personal Safety and Judgment impulsive  (some poor choices with balance deficits)   Pain Assessment   Patient Currently in Pain Yes, see Vital Sign flowsheet   Integumentary/Edema   Integumentary/Edema Comments significant hemosiderin staining in B LEs   Posture    Posture Forward head position;Protracted shoulders;Kyphosis   Posture Comments significant thoracic kyphosis   Range of Motion (ROM)   ROM Comment WFL,    Strength   Strength Comments limited ankle DF 3-/5   Bed Mobility   Bed Mobility Comments inde   Transfer Skills   Transfer Comments SBA   Gait   Gait Comments SBA/ feet, SEC, eversion at B LEs, limited foot clearance, crouched gait pattern, 130s HR BP stable with activity   Balance   Balance Comments poor 1 LOB with attempts to fix sock in standing   General Therapy Interventions   Planned Therapy Interventions gait training;balance training;strengthening;transfer training;risk factor education;home program guidelines;progressive activity/exercise   Clinical Impression   Criteria for Skilled Therapeutic Intervention yes, treatment indicated   PT Diagnosis decreased functional mobility/poor balance with ambulation   Influenced by the following impairments decreased balance   Functional limitations due to impairments decreased balance, decreased safe ambulation, stairs   Clinical Presentation Stable/Uncomplicated   Clinical Presentation Rationale improving   Clinical Decision Making (Complexity) Low complexity   Therapy Frequency Daily   Predicted Duration of Therapy Intervention (days/wks) 2 days   Anticipated Discharge Disposition Home with Assist   Risk & Benefits of therapy have been explained Yes   Patient, Family & other staff in agreement with plan of care Yes   [facility] AM-PAC TM \"6 Clicks\"   2016, Trustees of [facility], under license to [employer].  All rights reserved.   6 Clicks Short Forms Basic Mobility Inpatient " "Short Form   [facility] AM-PAC  \"6 Clicks\" V.2 Basic Mobility Inpatient Short Form   1. Turning from your back to your side while in a flat bed without using bedrails? 4 - None   2. Moving from lying on your back to sitting on the side of a flat bed without using bedrails? 4 - None   3. Moving to and from a bed to a chair (including a wheelchair)? 4 - None   4. Standing up from a chair using your arms (e.g., wheelchair, or bedside chair)? 4 - None   5. To walk in hospital room? 3 - A Little   6. Climbing 3-5 steps with a railing? 3 - A Little   Basic Mobility Raw Score (Score out of 24.Lower scores equate to lower levels of function) 22   Total Evaluation Time   Total Evaluation Time (Minutes) 10     "

## 2019-12-09 NOTE — PLAN OF CARE
Alert and oriented. Hard of hearing. Vitals stable. Resting comfortably. Tylenol for pain. Also received Tums for indigestion. Blood glucose monitored. Tele monitoring ongoing. Assist of 1 with a walker and gait belt.

## 2019-12-09 NOTE — PLAN OF CARE
Pt A&O x4. Forgetful @ times. VS stable; afebrile. Placed on 2L O2 while sleeping. PO tylenol managing pain. CMS intact. Up w/ SBA, using gait belt, and cane. Voiding in good amts. Tolerating regular diet. Plan is for possible discharge to home tomorrow. Will continue to monitor.

## 2019-12-10 NOTE — PROGRESS NOTES
[facility]  Hospitalist Progress Note  Patient Name: Sean Brunner    MRN: [id number]  Provider: Manny Pastor MD  12/10/19    Initial presenting complaint/issue to hospital (Diagnosis): abdominal pain         Assessment and Plan:      Summary of Stay: Sean Brunner is an 85 year old male with h/o AFIB for which he is on coumadin, DVT, PE, and chronic headaches. He presented to the ED on 12/6/19 with nausea, abdominal pain and  worsening headaches. ED work up found acute on chronic anemia with hemoglobin of 8.8, supra-therapeutic INR of 3.28,  possible chlecystitis on CT of abdomen, and small rectus abdominis hematoma on CT of abdomen. He was admitted for further evaluation and treatment. He was seen by GI and anemia was thought to possibly be due to some low grade NSAID related blood loss in setting of chronic anticoagulation. Protonix was started. No endoscopy was recommended at this time. INR came down to the low 2 range. HGB came up slowly. Surgery was consulted for possible cholecystitis. Imaging suggesting cholecystitis was thought to be unchanged from prior so cholecystectomy was not recommended. Abdominal pain improved. Shahriar had some low blood pressure with dizziness that waxed and waned.     Problem List:   1. Cholelithiasis with question of possible cholecystitis on CT of abdomen and pelvis. Surgery was consulted and thought that imaging findings suggestive of cholecystitis were unchanged from previous. No surgery was recommended. Tolerating PO. No abdominal pain today.      2. Acute on chronic anemia. Shahriar was seen by GI and this was suspected to be from low grade GI blood loss related to Excedrin use and chronic anticoagulation with warfarin. Continue Protonix (started here) and avoiding nonsteroidal inflammatory drugs. No endoscopy planned at this time. Outpatient GI follow up recommended.      3.   Rectus abdominus sheath hematoma, small. Keep INR closer to 2.     4.  Dizziness,  "acute on chronic. Worse today. I decreased Metoprolol dose and scheduled low dosed Meclizine. Monitor another day.      5.   Hematuria, on coumadin. Resolved.      6.   History of chronic right ventricular failure and +4 tricuspid regurgitation likely contributing to cor pulmonale, bowel edema, and ascites seen on imaging (has been present before).     7.   Chronic Afib. Continue PTA coumadin with goal of INR closer to 2.     8.   History of chronic headache.  MRI of the brain was negative for acute stroke or neurological finding     9.   Weakness. PT consult is pending.      10. Bloatedness and gassy. Trial of Simethocone     Full code  DVT Prophylaxis: Warfarin  Disposition: Home tomorrow if dizziness improves and able to participate in therapies (refused today)            Interval History:      More dizzy today. Also complains of being gassy.                   Physical Exam:      Last Vital Signs:  /46   Pulse 81   Temp 95.9  F (35.5  C) (Oral)   Resp 16   Ht 1.753 m (5' 9\")   Wt 89 kg (196 lb 4.8 oz)   SpO2 91%   BMI 28.99 kg/m      Intake/Output Summary (Last 24 hours) at 12/10/2019 1545  Last data filed at 12/10/2019 1319  Gross per 24 hour   Intake 636 ml   Output --   Net 636 ml       GENERAL:  Comfortable. Cooperative.  PSYCH: pleasant, oriented, No acute distress.  EYES: PERRLA, Normal conjunctiva.  HEART:  Regular rate and rhythm. No JVD. Pulses normal. No edema.  LUNGS:  Clear to auscultation, normal Respiratory effort.  ABDOMEN:  Soft, no hepatosplenomegaly, normal bowel sounds.  EXTREMETIES: No clubbing, cyanosis or ischemia  SKIN:  Dry to touch, No rash.           Medications:      All current medications were reviewed.         Data:      All new lab and imaging data was reviewed.   Labs:       Lab Results   Component Value Date     12/07/2019     12/06/2019     07/16/2019    Lab Results   Component Value Date    CHLORIDE 107 12/07/2019    CHLORIDE 108 12/06/2019    " CHLORIDE 103 07/16/2019    Lab Results   Component Value Date    BUN 16 12/07/2019    BUN 21 12/06/2019    BUN 21 07/16/2019      Lab Results   Component Value Date    POTASSIUM 3.9 12/07/2019    POTASSIUM 3.5 12/06/2019    POTASSIUM 4.0 07/16/2019    Lab Results   Component Value Date    CO2 26 12/07/2019    CO2 24 12/06/2019    CO2 32 07/16/2019    Lab Results   Component Value Date    CR 0.92 12/07/2019    CR 1.01 12/06/2019    CR 1.00 07/16/2019        Recent Labs   Lab 12/09/19  0632 12/08/19  1405 12/08/19  0656  12/07/19  0712  12/06/19  0729   WBC  --   --   --   --  5.2  --  4.9   HGB 9.1* 9.0* 8.8*   < > 9.0*   < > 8.8*   HCT  --   --   --   --  28.9*  --  28.3*   MCV  --   --   --   --  92  --  92   PLT  --   --   --   --  125*  --  149*    < > = values in this interval not displayed.

## 2019-12-10 NOTE — PROGRESS NOTES
Notified of vertigo, reportedly patient gets this at home periodically.  --trial of meclizine prn.    Gabriel Menezes MD

## 2019-12-10 NOTE — PLAN OF CARE
Pt is up with standby assist and cane.  Pt has complained of having gas pains this afternoon. Pt was given simethicone and has been passing a little gas.  Pt is voiding. Pt is tolerating diet. Pt is planning to discharge to home tomorrow.

## 2019-12-10 NOTE — PLAN OF CARE
[id number]: Pt resting comfortably, did not sleep much during the night. Uses amplifier- [place]. VSS on 2L (at night). A&O- forgetful. Pain managed with tramadol, tylenol, Mylanta. BG 98. Transfers with Ax1 ambulated in hallways - occasinal dizziness PRN meclizine. Tele A-fib CVR will continue to monitor.

## 2019-12-10 NOTE — PLAN OF CARE
OT: attempted for OT treatment. Pt was up in bathroom on arrival, transfer mod I and managed hygiene mod I. Declined working with OT at this time as he stated he had stomach cramps and just wanted to sit for a bit. Nursing aware.

## 2019-12-10 NOTE — PLAN OF CARE
Pt alert and oriented. Up with SBA, cane. Reported dizziness that was lasting over 30 minutes.. MD ordered meclizine. Pt reported relief. Also started having increased abdominal pressure, gas, and cramping. Given mylanta/maalox. Pt reported that it helped. Bowels active, +flatus. Voiding. Mod crab diet. , 116.  Tele remote. Pt uses 2L of oxygen at bedtime.

## 2019-12-11 NOTE — PLAN OF CARE
Occupational Therapy Discharge Summary    Reason for therapy discharge:    Discharged to home.    Progress towards therapy goal(s). See goals on Care Plan in [facility] electronic health record for goal details.  Goals partially met.  Barriers to achieving goals:   discharge from facility.    Therapy recommendation(s):    No further therapy is recommended.

## 2019-12-11 NOTE — DISCHARGE SUMMARY
Discharge Summary    Sean Brunner MRN# [id number]   YOB: 1934 Age: 85 year old     Date of Admission:  12/6/2019  Date of Discharge:  12/11/2019  Admitting Physician:  Tegan Cruz MD  Discharge Physician:  Manny Pastor MD  Discharging Service:  Hospitalist       Primary Provider: Oscar Parekh          Discharge Diagnosis:   1. Cholelithiasis with question of possible cholecystitis on CT of abdomen and pelvis. Surgery was consulted and thought that imaging findings suggestive of cholecystitis were unchanged from previous. No surgery was recommended.      2. Acute on chronic anemia. Shahriar was seen by GI and this was suspected to be from low grade GI blood loss related to Excedrin use and chronic anticoagulation with warfarin. Prior to admission daily Omeprazole was changed to twice daily Protonix.     3.   Rectus abdominus sheath hematoma, small, due to Warfarin anticoagulation, s/p partial INR reversal. Keep INR closer to 2 than 3.     4.  Dizziness, acute on chronic. Prior to admission Metoprolol and Lasix doses were reduced. Seems back to baseline. As needed Meclizine has been added.      5.   Hematuria, on coumadin. Resolved.      6.   History of chronic right ventricular failure and +4 tricuspid regurgitation likely contributing to cor pulmonale, bowel edema, and ascites seen on imaging (has been present before).     7.   Chronic Atrial fibrillation. Continue PTA Metoprolol and Coumadin with goal of INR closer to 2.     8.   History of chronic headache.  MRI of the brain was negative for acute stroke or neurological finding     9.   Weakness.     10. Bloatedness and gassiness, improved with Simethocone             Discharge Disposition:   Discharged to home           Allergies:   No Known Allergies             Condition on Discharge:   Discharge condition: Stable   Discharge vitals: Blood pressure 125/60, pulse 91, temperature 95.9  F (35.5  C), temperature source Oral,  "resp. rate 20, height 1.753 m (5' 9\"), weight 90.7 kg (200 lb), SpO2 94 %.   Code status on discharge: Full Code   Physical exam on day of discharge:   GENERAL:  Comfortable. Cooperative.  PSYCH: pleasant, oriented, No acute distress.  EYES: PERRLA, Normal conjunctiva.  HEART:  Regular rate and rhythm. No JVD. Pulses normal. No edema.  LUNGS:  Clear to auscultation, normal Respiratory effort.  ABDOMEN:  Soft, no hepatosplenomegaly, normal bowel sounds.  EXTREMETIES: No clubbing, cyanosis or ischemia  SKIN:  Dry to touch, No rash.         History of Present Illness and Hospital Course:     See detailed admission note for full details.  Sean Brunner is an 85 year old male with h/o AFIB for which he is on coumadin, DVT, PE, and chronic headaches. He presented to the ED on 12/6/19 with nausea, abdominal pain and  worsening headaches. ED work up found acute on chronic anemia with hemoglobin of 8.8, supra-therapeutic INR of 3.28,  possible chlecystitis on CT of abdomen, and small rectus abdominis hematoma on CT of abdomen. He was admitted for further evaluation and treatment. He was seen by GI and anemia was thought to possibly be due to some low grade NSAID related blood loss in setting of chronic anticoagulation. Protonix was started. No endoscopy was recommended at this time. INR came down to the low 2 range. HGB came up slowly. Surgery was consulted for possible cholecystitis. Imaging suggesting cholecystitis was thought to be unchanged from prior so cholecystectomy was not recommended. Abdominal pain improved. Shahriar had some low blood pressure with dizziness that waxed and waned. His prior to admission doses of Metoprolol and Lasix were reduced which may have helped. Shahriar also had complaints of gassiness and chronic left hip pain. Simethicone was tried for gas discomfort and seemed to be helpful. Voltaren gel will be tried after discharge.               Procedures / Imaging:   CT of abdomen and pelvis  CT of " head  CXR  MRI of brain  US of abdomen           Consultations:   Consultation during this admission received from gastroenterology and surgery             Pending Results:   None           Discharge Instructions and Follow-Up:   Discharge diet: Regular   Discharge activity: Activity as tolerated   Discharge follow-up: Follow up with primary care provider in 1 week with INR and CBC   Outpatient therapy: None    Other instructions: None             Discharge Medications:   Current Discharge Medication List      START taking these medications    Details   diclofenac (VOLTAREN) 1 % topical gel Apply 2 g topically 4 times daily as needed for moderate pain Apply to hip Send dosing card with product.  Qty: 1 Tube, Refills: 3    Associated Diagnoses: Hip pain, left      meclizine (ANTIVERT) 12.5 MG tablet Take 1 tablet (12.5 mg) by mouth 3 times daily  Qty: 30 tablet, Refills: 1    Associated Diagnoses: Dizziness      pantoprazole (PROTONIX) 40 MG EC tablet Take 1 tablet (40 mg) by mouth 2 times daily  Qty: 60 tablet, Refills: 3    Comments: Take twice daily for 30 days then once daily. This replaced Omeprazole.  Associated Diagnoses: Gastritis with hemorrhage, unspecified chronicity, unspecified gastritis type      simethicone (MYLICON) 80 MG chewable tablet Take 1 tablet (80 mg) by mouth every 6 hours as needed for cramping or other (gas pains)  Qty: 30 tablet, Refills: 3    Associated Diagnoses: Dyspepsia         CONTINUE these medications which have CHANGED    Details   furosemide (LASIX) 20 MG tablet Take 2 tablets (40 mg) by mouth every other day    Associated Diagnoses: Edema, unspecified type      metoprolol succinate ER (TOPROL-XL) 25 MG 24 hr tablet Take 0.5 tablets (12.5 mg) by mouth every evening  Qty: 30 tablet    Associated Diagnoses: Atrial fibrillation, unspecified type (H)      !! warfarin ANTICOAGULANT (COUMADIN) 1 MG tablet Take 1.5 tablets (1.5 mg) by mouth three times a week Monday, Wednesday, and  Friday    Associated Diagnoses: Atrial fibrillation, unspecified type (H)      !! warfarin ANTICOAGULANT (COUMADIN) 1 MG tablet Take 1 tablet (1 mg) by mouth four times a week Sunday, Tuesday, Thursday, and Saturday    Associated Diagnoses: Atrial fibrillation, unspecified type (H)       !! - Potential duplicate medications found. Please discuss with provider.      CONTINUE these medications which have NOT CHANGED    Details   clindamycin (CLEOCIN T) 1 % external lotion Apply topically daily as needed (open leg sore)      cyanocolbalamin (VITAMIN  B-12) 1000 MCG tablet Take 3,000 mcg by mouth every morning      divalproex sodium delayed-release (DEPAKOTE) 250 MG DR tablet Take 250 mg by mouth At Bedtime      levothyroxine (SYNTHROID) 150 MCG tablet Take 150 mcg by mouth every morning (before breakfast)       multivitamin, therapeutic with minerals (THERA-VIT-M) TABS tablet Take 1 tablet by mouth 2 times daily (with meals)      predniSONE (DELTASONE) 1 MG tablet Take 6 mg by mouth daily (with breakfast)      simvastatin (ZOCOR) 40 MG tablet Take 40 mg by mouth daily (with dinner)       terazosin (HYTRIN) 5 MG capsule Take 5 mg by mouth At Bedtime      traMADol (ULTRAM) 50 MG tablet Take 50 mg by mouth every 6 hours as needed for moderate pain          STOP taking these medications       OMEPRAZOLE PO Comments:   Reason for Stopping:                  Total time spent in face to face contact with the patient and coordinating discharge was:  35 Minutes

## 2019-12-11 NOTE — PHARMACY-ANTICOAGULATION SERVICE
Clinical Pharmacy- Warfarin Discharge Note    Warfarin PTA Regimen: 1 mg MWF and 1.5 mg ROW      Anticoagulation Dose History     Recent Dosing and Labs Latest Ref Rng & Units 12/6/2019 12/6/2019 12/7/2019 12/8/2019 12/9/2019 12/10/2019 12/11/2019    Warfarin 1 mg - - - - 1 mg - 1 mg -    Warfarin 1.5 mg - - - 1.5 mg - 1.5 mg - -    INR 0.86 - 1.14 3.28(H) 3.61(H) 2.04(H) 2.06(H) 2.18(H) 2.32(H) 2.15(H)        Patient admitted with warfarin as pta med.   INR was elevated on admission and dosing resumed on 12/7.   Agree with plan to discharge  with pta dosing and  Close follow up .  The patient should have an INR checked in one week.

## 2019-12-11 NOTE — PLAN OF CARE
A&O x4 with forgetfulness. Pala. VSS. LS CTA all fields. BS active x4. Abdominal/Gas pains with distension, given maalox and simethicone with improvement reported. Up with SBA and cane. Voids frequently in small amts. On coumadin for afib. Ab mod CHO diet. Likely discharge home today.

## 2019-12-11 NOTE — PLAN OF CARE
Reviewed discharge instructions and meds with pt and spouse. Pt has a new voltaren cream for his hip pain . Demonstrated how to apply it. No further questions pt will discharge this afternoon.

## 2019-12-11 NOTE — PLAN OF CARE
Discharge Planner PT   Patient plan for discharge: home  Current status: gait with SEC and use of walls rails other objects within reach to steady self declined use of 4ww basic transfers with S to Mod I   Barriers to return to prior living situation:  poor balance  Recommendations for discharge: use of walker at home, per plan established by the PT.  assist with ADLs from son  Rationale for recommendations: planned discharge to home per MD plan. Goals are mostly met at this time recommend to PT for discharge.         Entered by: Cher Benoit 12/11/2019 10:45 AM

## 2019-12-11 NOTE — PLAN OF CARE
Alert and oriented x4 but forgetful at times.Falls risk precautions.  Vital signs stable, uses O2 at hs 2 lpm nasal cannula.  Lungs coarse, occasional non productive cough, encouraged inspirometer use.  Abdomen rounded, distended, c/o of gas pains, some acid reflux, nausea ,maalox and po zofran given with relief.Tylenol taken for headache with relief.  Pt ambulated in [place] and up to bathroom with walker, gait belt and assist of 1.  Voiding.  Tele monitoring.Care plan reviewed with pt and family.

## 2020-01-01 ENCOUNTER — DOCUMENTATION ONLY (OUTPATIENT)
Dept: OTHER | Facility: CLINIC | Age: 85
End: 2020-01-01

## 2020-01-01 ENCOUNTER — HOSPITAL ENCOUNTER (INPATIENT)
Facility: CLINIC | Age: 85
LOS: 2 days | Discharge: HOSPICE | DRG: 292 | End: 2020-02-22
Attending: EMERGENCY MEDICINE | Admitting: HOSPITALIST
Payer: COMMERCIAL

## 2020-01-01 ENCOUNTER — APPOINTMENT (OUTPATIENT)
Dept: GENERAL RADIOLOGY | Facility: CLINIC | Age: 85
DRG: 310 | End: 2020-01-01
Attending: EMERGENCY MEDICINE
Payer: COMMERCIAL

## 2020-01-01 ENCOUNTER — APPOINTMENT (OUTPATIENT)
Dept: CARDIOLOGY | Facility: CLINIC | Age: 85
DRG: 310 | End: 2020-01-01
Attending: PHYSICIAN ASSISTANT
Payer: COMMERCIAL

## 2020-01-01 ENCOUNTER — TELEPHONE (OUTPATIENT)
Dept: CARDIOLOGY | Facility: CLINIC | Age: 85
End: 2020-01-01

## 2020-01-01 ENCOUNTER — APPOINTMENT (OUTPATIENT)
Dept: CT IMAGING | Facility: CLINIC | Age: 85
End: 2020-01-01
Attending: EMERGENCY MEDICINE
Payer: COMMERCIAL

## 2020-01-01 ENCOUNTER — APPOINTMENT (OUTPATIENT)
Dept: GENERAL RADIOLOGY | Facility: CLINIC | Age: 85
DRG: 292 | End: 2020-01-01
Attending: EMERGENCY MEDICINE
Payer: COMMERCIAL

## 2020-01-01 ENCOUNTER — AMBULATORY - HEALTHEAST (OUTPATIENT)
Dept: OTHER | Facility: CLINIC | Age: 85
End: 2020-01-01

## 2020-01-01 ENCOUNTER — HOSPITAL ENCOUNTER (EMERGENCY)
Facility: CLINIC | Age: 85
Discharge: HOME | End: 2020-02-11
Attending: EMERGENCY MEDICINE | Admitting: EMERGENCY MEDICINE
Payer: COMMERCIAL

## 2020-01-01 ENCOUNTER — APPOINTMENT (OUTPATIENT)
Dept: PHYSICAL THERAPY | Facility: CLINIC | Age: 85
DRG: 310 | End: 2020-01-01
Payer: COMMERCIAL

## 2020-01-01 ENCOUNTER — APPOINTMENT (OUTPATIENT)
Dept: ULTRASOUND IMAGING | Facility: CLINIC | Age: 85
DRG: 310 | End: 2020-01-01
Attending: INTERNAL MEDICINE
Payer: COMMERCIAL

## 2020-01-01 ENCOUNTER — APPOINTMENT (OUTPATIENT)
Dept: CT IMAGING | Facility: CLINIC | Age: 85
DRG: 310 | End: 2020-01-01
Attending: PHYSICIAN ASSISTANT
Payer: COMMERCIAL

## 2020-01-01 ENCOUNTER — HOSPITAL ENCOUNTER (INPATIENT)
Facility: CLINIC | Age: 85
LOS: 4 days | Discharge: HOME HEALTH | DRG: 310 | End: 2020-02-04
Attending: EMERGENCY MEDICINE | Admitting: INTERNAL MEDICINE
Payer: COMMERCIAL

## 2020-01-01 VITALS
SYSTOLIC BLOOD PRESSURE: 120 MMHG | BODY MASS INDEX: 30.66 KG/M2 | DIASTOLIC BLOOD PRESSURE: 73 MMHG | RESPIRATION RATE: 18 BRPM | OXYGEN SATURATION: 99 % | HEART RATE: 80 BPM | WEIGHT: 207 LBS | TEMPERATURE: 97.8 F | HEIGHT: 69 IN

## 2020-01-01 VITALS
OXYGEN SATURATION: 92 % | HEART RATE: 115 BPM | HEIGHT: 69 IN | WEIGHT: 195.2 LBS | DIASTOLIC BLOOD PRESSURE: 76 MMHG | TEMPERATURE: 96.3 F | SYSTOLIC BLOOD PRESSURE: 100 MMHG | RESPIRATION RATE: 20 BRPM | BODY MASS INDEX: 28.91 KG/M2

## 2020-01-01 VITALS
RESPIRATION RATE: 10 BRPM | BODY MASS INDEX: 29.53 KG/M2 | WEIGHT: 200 LBS | TEMPERATURE: 98.4 F | HEART RATE: 72 BPM | DIASTOLIC BLOOD PRESSURE: 61 MMHG | SYSTOLIC BLOOD PRESSURE: 106 MMHG | OXYGEN SATURATION: 100 %

## 2020-01-01 DIAGNOSIS — N30.00 ACUTE CYSTITIS WITHOUT HEMATURIA: ICD-10-CM

## 2020-01-01 DIAGNOSIS — L03.116 CELLULITIS OF LEFT LOWER EXTREMITY: ICD-10-CM

## 2020-01-01 DIAGNOSIS — R42 LIGHT HEADEDNESS: ICD-10-CM

## 2020-01-01 DIAGNOSIS — M25.552 HIP PAIN, LEFT: ICD-10-CM

## 2020-01-01 DIAGNOSIS — I48.91 ATRIAL FIBRILLATION, UNSPECIFIED TYPE (H): ICD-10-CM

## 2020-01-01 DIAGNOSIS — K52.9 AGE (ACUTE GASTROENTERITIS): ICD-10-CM

## 2020-01-01 DIAGNOSIS — N39.0 URINARY TRACT INFECTION WITHOUT HEMATURIA, SITE UNSPECIFIED: Primary | ICD-10-CM

## 2020-01-01 DIAGNOSIS — D64.9 ANEMIA, UNSPECIFIED TYPE: ICD-10-CM

## 2020-01-01 DIAGNOSIS — S09.90XA CLOSED HEAD INJURY, INITIAL ENCOUNTER: ICD-10-CM

## 2020-01-01 DIAGNOSIS — I51.9 LV DYSFUNCTION: ICD-10-CM

## 2020-01-01 DIAGNOSIS — R52 PAIN: ICD-10-CM

## 2020-01-01 DIAGNOSIS — L03.90 CELLULITIS: ICD-10-CM

## 2020-01-01 DIAGNOSIS — I42.9 CARDIOMYOPATHY, UNSPECIFIED TYPE (H): ICD-10-CM

## 2020-01-01 DIAGNOSIS — E66.01 MORBID OBESITY (H): ICD-10-CM
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
## 2020-01-01 DIAGNOSIS — I42.9 CARDIOMYOPATHY (H): ICD-10-CM

## 2020-01-01 DIAGNOSIS — I95.9 HYPOTENSION, UNSPECIFIED HYPOTENSION TYPE: ICD-10-CM

## 2020-01-01 DIAGNOSIS — I48.91 ATRIAL FIBRILLATION WITH RAPID VENTRICULAR RESPONSE (H): ICD-10-CM

## 2020-01-01 DIAGNOSIS — I48.91 ATRIAL FIBRILLATION WITH RVR (H): ICD-10-CM

## 2020-01-01 DIAGNOSIS — I27.81 COR PULMONALE (H): ICD-10-CM

## 2020-01-01 DIAGNOSIS — M62.81 GENERALIZED MUSCLE WEAKNESS: ICD-10-CM

## 2020-01-01 DIAGNOSIS — D64.9 ACUTE ON CHRONIC ANEMIA: ICD-10-CM

## 2020-01-01 DIAGNOSIS — I48.91 ATRIAL FIBRILLATION, UNSPECIFIED TYPE (H): Primary | ICD-10-CM

## 2020-01-01 DIAGNOSIS — R53.1 GENERALIZED WEAKNESS: ICD-10-CM

## 2020-01-01 DIAGNOSIS — G47.33 OSA (OBSTRUCTIVE SLEEP APNEA): ICD-10-CM

## 2020-01-01 DIAGNOSIS — E53.8 VITAMIN B12 DEFICIENCY DISEASE: ICD-10-CM

## 2020-01-01 DIAGNOSIS — I69.919 COGNITIVE DEFICITS AS LATE EFFECT OF CEREBROVASCULAR DISEASE: ICD-10-CM

## 2020-01-01 DIAGNOSIS — S30.1XXA ABDOMINAL WALL HEMATOMA: ICD-10-CM

## 2020-01-01 LAB
ALBUMIN SERPL-MCNC: 2.7 G/DL (ref 3.4–5)
ALBUMIN UR-MCNC: NEGATIVE MG/DL
ALP SERPL-CCNC: 75 U/L (ref 40–150)
ALT SERPL W P-5'-P-CCNC: 15 U/L (ref 0–70)
ANION GAP SERPL CALCULATED.3IONS-SCNC: 2 MMOL/L (ref 3–14)
ANION GAP SERPL CALCULATED.3IONS-SCNC: 2 MMOL/L (ref 3–14)
ANION GAP SERPL CALCULATED.3IONS-SCNC: 4 MMOL/L (ref 3–14)
ANION GAP SERPL CALCULATED.3IONS-SCNC: 5 MMOL/L (ref 3–14)
ANION GAP SERPL CALCULATED.3IONS-SCNC: <1 MMOL/L (ref 3–14)
APPEARANCE UR: CLEAR
AST SERPL W P-5'-P-CCNC: 17 U/L (ref 0–45)
BACTERIA #/AREA URNS HPF: ABNORMAL /HPF
BACTERIA SPEC CULT: ABNORMAL
BACTERIA SPEC CULT: NO GROWTH
BACTERIA SPEC CULT: NORMAL
BACTERIA SPEC CULT: NORMAL
BASOPHILS # BLD AUTO: 0 10E9/L (ref 0–0.2)
BASOPHILS NFR BLD AUTO: 0.3 %
BASOPHILS NFR BLD AUTO: 0.4 %
BASOPHILS NFR BLD AUTO: 0.4 %
BILIRUB SERPL-MCNC: 0.7 MG/DL (ref 0.2–1.3)
BILIRUB UR QL STRIP: NEGATIVE
BUN SERPL-MCNC: 14 MG/DL (ref 7–30)
BUN SERPL-MCNC: 15 MG/DL (ref 7–30)
BUN SERPL-MCNC: 16 MG/DL (ref 7–30)
BUN SERPL-MCNC: 21 MG/DL (ref 7–30)
BUN SERPL-MCNC: 23 MG/DL (ref 7–30)
CALCIUM SERPL-MCNC: 8.8 MG/DL (ref 8.5–10.1)
CALCIUM SERPL-MCNC: 9.1 MG/DL (ref 8.5–10.1)
CALCIUM SERPL-MCNC: 9.2 MG/DL (ref 8.5–10.1)
CALCIUM SERPL-MCNC: 9.3 MG/DL (ref 8.5–10.1)
CALCIUM SERPL-MCNC: 9.4 MG/DL (ref 8.5–10.1)
CHLORIDE SERPL-SCNC: 101 MMOL/L (ref 94–109)
CHLORIDE SERPL-SCNC: 103 MMOL/L (ref 94–109)
CHLORIDE SERPL-SCNC: 105 MMOL/L (ref 94–109)
CO2 SERPL-SCNC: 29 MMOL/L (ref 20–32)
CO2 SERPL-SCNC: 31 MMOL/L (ref 20–32)
CO2 SERPL-SCNC: 34 MMOL/L (ref 20–32)
COLOR UR AUTO: ABNORMAL
COLOR UR AUTO: ABNORMAL
COLOR UR AUTO: YELLOW
CREAT SERPL-MCNC: 0.76 MG/DL (ref 0.66–1.25)
CREAT SERPL-MCNC: 0.77 MG/DL (ref 0.66–1.25)
CREAT SERPL-MCNC: 0.9 MG/DL (ref 0.66–1.25)
CREAT SERPL-MCNC: 0.99 MG/DL (ref 0.66–1.25)
CREAT SERPL-MCNC: 1.07 MG/DL (ref 0.66–1.25)
DIFFERENTIAL METHOD BLD: ABNORMAL
DIGOXIN SERPL-MCNC: 1 UG/L (ref 0.5–2)
EOSINOPHIL # BLD AUTO: 0 10E9/L (ref 0–0.7)
EOSINOPHIL # BLD AUTO: 0.1 10E9/L (ref 0–0.7)
EOSINOPHIL # BLD AUTO: 0.1 10E9/L (ref 0–0.7)
EOSINOPHIL NFR BLD AUTO: 0.5 %
EOSINOPHIL NFR BLD AUTO: 0.6 %
EOSINOPHIL NFR BLD AUTO: 1.5 %
ERYTHROCYTE [DISTWIDTH] IN BLOOD BY AUTOMATED COUNT: 17.2 % (ref 10–15)
ERYTHROCYTE [DISTWIDTH] IN BLOOD BY AUTOMATED COUNT: 17.2 % (ref 10–15)
ERYTHROCYTE [DISTWIDTH] IN BLOOD BY AUTOMATED COUNT: 17.3 % (ref 10–15)
ERYTHROCYTE [DISTWIDTH] IN BLOOD BY AUTOMATED COUNT: 17.4 % (ref 10–15)
ERYTHROCYTE [DISTWIDTH] IN BLOOD BY AUTOMATED COUNT: 17.4 % (ref 10–15)
GFR SERPL CREATININE-BSD FRML MDRD: 63 ML/MIN/{1.73_M2}
GFR SERPL CREATININE-BSD FRML MDRD: 69 ML/MIN/{1.73_M2}
GFR SERPL CREATININE-BSD FRML MDRD: 78 ML/MIN/{1.73_M2}
GFR SERPL CREATININE-BSD FRML MDRD: 83 ML/MIN/{1.73_M2}
GFR SERPL CREATININE-BSD FRML MDRD: 83 ML/MIN/{1.73_M2}
GLUCOSE BLDC GLUCOMTR-MCNC: 100 MG/DL (ref 70–99)
GLUCOSE BLDC GLUCOMTR-MCNC: 171 MG/DL (ref 70–99)
GLUCOSE SERPL-MCNC: 111 MG/DL (ref 70–99)
GLUCOSE SERPL-MCNC: 111 MG/DL (ref 70–99)
GLUCOSE SERPL-MCNC: 127 MG/DL (ref 70–99)
GLUCOSE SERPL-MCNC: 131 MG/DL (ref 70–99)
GLUCOSE SERPL-MCNC: 95 MG/DL (ref 70–99)
GLUCOSE UR STRIP-MCNC: NEGATIVE MG/DL
HCT VFR BLD AUTO: 25.4 % (ref 40–53)
HCT VFR BLD AUTO: 26.5 % (ref 40–53)
HCT VFR BLD AUTO: 27.1 % (ref 40–53)
HCT VFR BLD AUTO: 27.2 % (ref 40–53)
HCT VFR BLD AUTO: 28.1 % (ref 40–53)
HGB BLD-MCNC: 7.9 G/DL (ref 13.3–17.7)
HGB BLD-MCNC: 8.2 G/DL (ref 13.3–17.7)
HGB BLD-MCNC: 8.4 G/DL (ref 13.3–17.7)
HGB BLD-MCNC: 8.5 G/DL (ref 13.3–17.7)
HGB BLD-MCNC: 8.8 G/DL (ref 13.3–17.7)
HGB UR QL STRIP: ABNORMAL
HGB UR QL STRIP: NEGATIVE
HGB UR QL STRIP: NEGATIVE
IMM GRANULOCYTES # BLD: 0 10E9/L (ref 0–0.4)
IMM GRANULOCYTES # BLD: 0 10E9/L (ref 0–0.4)
IMM GRANULOCYTES # BLD: 0.1 10E9/L (ref 0–0.4)
IMM GRANULOCYTES NFR BLD: 0.4 %
IMM GRANULOCYTES NFR BLD: 0.4 %
IMM GRANULOCYTES NFR BLD: 0.8 %
INR PPP: 2.28 (ref 0.86–1.14)
INR PPP: 2.35 (ref 0.86–1.14)
INR PPP: 2.78 (ref 0.86–1.14)
INR PPP: 3.26 (ref 0.86–1.14)
INR PPP: 3.46 (ref 0.86–1.14)
INR PPP: 3.7 (ref 0.86–1.14)
INR PPP: 3.74 (ref 0.86–1.14)
INR PPP: 4.27 (ref 0.86–1.14)
INR PPP: 4.52 (ref 0.86–1.14)
INTERPRETATION ECG - MUSE: NORMAL
INTERPRETATION ECG - MUSE: NORMAL
KETONES UR STRIP-MCNC: NEGATIVE MG/DL
LACTATE BLD-SCNC: 0.7 MMOL/L (ref 0.7–2)
LACTATE BLD-SCNC: 0.8 MMOL/L (ref 0.7–2)
LACTATE BLD-SCNC: 1.3 MMOL/L (ref 0.7–2)
LEUKOCYTE ESTERASE UR QL STRIP: ABNORMAL
LYMPHOCYTES # BLD AUTO: 0.7 10E9/L (ref 0.8–5.3)
LYMPHOCYTES # BLD AUTO: 0.8 10E9/L (ref 0.8–5.3)
LYMPHOCYTES # BLD AUTO: 0.9 10E9/L (ref 0.8–5.3)
LYMPHOCYTES NFR BLD AUTO: 10.9 %
LYMPHOCYTES NFR BLD AUTO: 6.7 %
LYMPHOCYTES NFR BLD AUTO: 9.8 %
Lab: NORMAL
MAGNESIUM SERPL-MCNC: 1.8 MG/DL (ref 1.6–2.3)
MCH RBC QN AUTO: 27.8 PG (ref 26.5–33)
MCH RBC QN AUTO: 28 PG (ref 26.5–33)
MCH RBC QN AUTO: 28.1 PG (ref 26.5–33)
MCH RBC QN AUTO: 28.3 PG (ref 26.5–33)
MCH RBC QN AUTO: 28.5 PG (ref 26.5–33)
MCHC RBC AUTO-ENTMCNC: 29.9 G/DL (ref 31.5–36.5)
MCHC RBC AUTO-ENTMCNC: 30.3 G/DL (ref 31.5–36.5)
MCHC RBC AUTO-ENTMCNC: 30.9 G/DL (ref 31.5–36.5)
MCHC RBC AUTO-ENTMCNC: 31.1 G/DL (ref 31.5–36.5)
MCHC RBC AUTO-ENTMCNC: 31.3 G/DL (ref 31.5–36.5)
MCV RBC AUTO: 90 FL (ref 78–100)
MCV RBC AUTO: 91 FL (ref 78–100)
MCV RBC AUTO: 91 FL (ref 78–100)
MCV RBC AUTO: 93 FL (ref 78–100)
MCV RBC AUTO: 93 FL (ref 78–100)
MONOCYTES # BLD AUTO: 0.7 10E9/L (ref 0–1.3)
MONOCYTES # BLD AUTO: 0.9 10E9/L (ref 0–1.3)
MONOCYTES # BLD AUTO: 1 10E9/L (ref 0–1.3)
MONOCYTES NFR BLD AUTO: 10.9 %
MONOCYTES NFR BLD AUTO: 7.9 %
MONOCYTES NFR BLD AUTO: 9.3 %
MUCOUS THREADS #/AREA URNS LPF: PRESENT /LPF
NEUTROPHILS # BLD AUTO: 6.1 10E9/L (ref 1.6–8.3)
NEUTROPHILS # BLD AUTO: 6.8 10E9/L (ref 1.6–8.3)
NEUTROPHILS # BLD AUTO: 8.6 10E9/L (ref 1.6–8.3)
NEUTROPHILS NFR BLD AUTO: 75.9 %
NEUTROPHILS NFR BLD AUTO: 81 %
NEUTROPHILS NFR BLD AUTO: 82.3 %
NITRATE UR QL: NEGATIVE
NRBC # BLD AUTO: 0 10*3/UL
NRBC BLD AUTO-RTO: 0 /100
PH UR STRIP: 6.5 PH (ref 5–7)
PH UR STRIP: 7 PH (ref 5–7)
PH UR STRIP: 7 PH (ref 5–7)
PLATELET # BLD AUTO: 163 10E9/L (ref 150–450)
PLATELET # BLD AUTO: 165 10E9/L (ref 150–450)
PLATELET # BLD AUTO: 170 10E9/L (ref 150–450)
PLATELET # BLD AUTO: 173 10E9/L (ref 150–450)
PLATELET # BLD AUTO: 183 10E9/L (ref 150–450)
POTASSIUM SERPL-SCNC: 3.6 MMOL/L (ref 3.4–5.3)
POTASSIUM SERPL-SCNC: 3.9 MMOL/L (ref 3.4–5.3)
POTASSIUM SERPL-SCNC: 4.4 MMOL/L (ref 3.4–5.3)
POTASSIUM SERPL-SCNC: 4.4 MMOL/L (ref 3.4–5.3)
POTASSIUM SERPL-SCNC: 4.5 MMOL/L (ref 3.4–5.3)
PROT SERPL-MCNC: 7.4 G/DL (ref 6.8–8.8)
RBC # BLD AUTO: 2.82 10E12/L (ref 4.4–5.9)
RBC # BLD AUTO: 2.9 10E12/L (ref 4.4–5.9)
RBC # BLD AUTO: 2.92 10E12/L (ref 4.4–5.9)
RBC # BLD AUTO: 2.98 10E12/L (ref 4.4–5.9)
RBC # BLD AUTO: 3.02 10E12/L (ref 4.4–5.9)
RBC #/AREA URNS AUTO: 2 /HPF (ref 0–2)
RBC #/AREA URNS AUTO: 6 /HPF (ref 0–2)
SODIUM SERPL-SCNC: 134 MMOL/L (ref 133–144)
SODIUM SERPL-SCNC: 136 MMOL/L (ref 133–144)
SODIUM SERPL-SCNC: 136 MMOL/L (ref 133–144)
SODIUM SERPL-SCNC: 138 MMOL/L (ref 133–144)
SODIUM SERPL-SCNC: 139 MMOL/L (ref 133–144)
SOURCE: ABNORMAL
SP GR UR STRIP: 1.01 (ref 1–1.03)
SPECIMEN SOURCE: ABNORMAL
SPECIMEN SOURCE: NORMAL
SQUAMOUS #/AREA URNS AUTO: <1 /HPF (ref 0–1)
SQUAMOUS #/AREA URNS AUTO: <1 /HPF (ref 0–1)
TROPONIN I SERPL-MCNC: <0.015 UG/L (ref 0–0.04)
TSH SERPL DL<=0.005 MIU/L-ACNC: 1.27 MU/L (ref 0.4–4)
UROBILINOGEN UR STRIP-MCNC: 2 MG/DL (ref 0–2)
UROBILINOGEN UR STRIP-MCNC: NORMAL MG/DL (ref 0–2)
UROBILINOGEN UR STRIP-MCNC: NORMAL MG/DL (ref 0–2)
WBC # BLD AUTO: 10.4 10E9/L (ref 4–11)
WBC # BLD AUTO: 8.1 10E9/L (ref 4–11)
WBC # BLD AUTO: 8.3 10E9/L (ref 4–11)
WBC # BLD AUTO: 8.4 10E9/L (ref 4–11)
WBC # BLD AUTO: 9.4 10E9/L (ref 4–11)
WBC #/AREA URNS AUTO: 13 /HPF (ref 0–5)
WBC #/AREA URNS AUTO: 14 /HPF (ref 0–5)

## 2020-01-01 PROCEDURE — 99232 SBSQ HOSP IP/OBS MODERATE 35: CPT | Performed by: INTERNAL MEDICINE

## 2020-01-01 PROCEDURE — 93880 EXTRACRANIAL BILAT STUDY: CPT

## 2020-01-01 PROCEDURE — 85018 HEMOGLOBIN: CPT | Performed by: PHYSICIAN ASSISTANT

## 2020-01-01 PROCEDURE — 25000125 ZZHC RX 250: Performed by: EMERGENCY MEDICINE

## 2020-01-01 PROCEDURE — 25000132 ZZH RX MED GY IP 250 OP 250 PS 637: Performed by: HOSPITALIST

## 2020-01-01 PROCEDURE — 25000132 ZZH RX MED GY IP 250 OP 250 PS 637: Performed by: INTERNAL MEDICINE

## 2020-01-01 PROCEDURE — 25000128 H RX IP 250 OP 636: Performed by: EMERGENCY MEDICINE

## 2020-01-01 PROCEDURE — 70450 CT HEAD/BRAIN W/O DYE: CPT

## 2020-01-01 PROCEDURE — 93005 ELECTROCARDIOGRAM TRACING: CPT

## 2020-01-01 PROCEDURE — 87086 URINE CULTURE/COLONY COUNT: CPT | Performed by: EMERGENCY MEDICINE

## 2020-01-01 PROCEDURE — 25000131 ZZH RX MED GY IP 250 OP 636 PS 637: Performed by: PHYSICIAN ASSISTANT

## 2020-01-01 PROCEDURE — 84484 ASSAY OF TROPONIN QUANT: CPT | Performed by: EMERGENCY MEDICINE

## 2020-01-01 PROCEDURE — 97530 THERAPEUTIC ACTIVITIES: CPT | Mod: GP | Performed by: PHYSICAL THERAPIST

## 2020-01-01 PROCEDURE — 36415 COLL VENOUS BLD VENIPUNCTURE: CPT | Performed by: PHYSICIAN ASSISTANT

## 2020-01-01 PROCEDURE — 36415 COLL VENOUS BLD VENIPUNCTURE: CPT | Performed by: EMERGENCY MEDICINE

## 2020-01-01 PROCEDURE — 25800030 ZZH RX IP 258 OP 636: Performed by: EMERGENCY MEDICINE

## 2020-01-01 PROCEDURE — 87040 BLOOD CULTURE FOR BACTERIA: CPT | Performed by: INTERNAL MEDICINE

## 2020-01-01 PROCEDURE — 96374 THER/PROPH/DIAG INJ IV PUSH: CPT

## 2020-01-01 PROCEDURE — 81003 URINALYSIS AUTO W/O SCOPE: CPT | Performed by: EMERGENCY MEDICINE

## 2020-01-01 PROCEDURE — 85027 COMPLETE CBC AUTOMATED: CPT | Performed by: HOSPITALIST

## 2020-01-01 PROCEDURE — 80048 BASIC METABOLIC PNL TOTAL CA: CPT | Performed by: EMERGENCY MEDICINE

## 2020-01-01 PROCEDURE — 97161 PT EVAL LOW COMPLEX 20 MIN: CPT | Mod: GP | Performed by: PHYSICAL THERAPIST

## 2020-01-01 PROCEDURE — 00000146 ZZHCL STATISTIC GLUCOSE BY METER IP

## 2020-01-01 PROCEDURE — 93306 TTE W/DOPPLER COMPLETE: CPT | Mod: 26 | Performed by: INTERNAL MEDICINE

## 2020-01-01 PROCEDURE — 25000128 H RX IP 250 OP 636: Performed by: INTERNAL MEDICINE

## 2020-01-01 PROCEDURE — 80053 COMPREHEN METABOLIC PANEL: CPT | Performed by: EMERGENCY MEDICINE

## 2020-01-01 PROCEDURE — 85610 PROTHROMBIN TIME: CPT | Performed by: PHYSICIAN ASSISTANT

## 2020-01-01 PROCEDURE — 85025 COMPLETE CBC W/AUTO DIFF WBC: CPT | Performed by: EMERGENCY MEDICINE

## 2020-01-01 PROCEDURE — 25000132 ZZH RX MED GY IP 250 OP 250 PS 637: Performed by: NURSE PRACTITIONER

## 2020-01-01 PROCEDURE — 99223 1ST HOSP IP/OBS HIGH 75: CPT | Performed by: NURSE PRACTITIONER

## 2020-01-01 PROCEDURE — 80048 BASIC METABOLIC PNL TOTAL CA: CPT | Performed by: PHYSICIAN ASSISTANT

## 2020-01-01 PROCEDURE — 12000000 ZZH R&B MED SURG/OB

## 2020-01-01 PROCEDURE — 25000132 ZZH RX MED GY IP 250 OP 250 PS 637: Performed by: PHYSICIAN ASSISTANT

## 2020-01-01 PROCEDURE — 96361 HYDRATE IV INFUSION ADD-ON: CPT

## 2020-01-01 PROCEDURE — 83605 ASSAY OF LACTIC ACID: CPT | Performed by: INTERNAL MEDICINE

## 2020-01-01 PROCEDURE — 99233 SBSQ HOSP IP/OBS HIGH 50: CPT | Performed by: INTERNAL MEDICINE

## 2020-01-01 PROCEDURE — 99222 1ST HOSP IP/OBS MODERATE 55: CPT | Mod: 25 | Performed by: INTERNAL MEDICINE

## 2020-01-01 PROCEDURE — 25000131 ZZH RX MED GY IP 250 OP 636 PS 637: Performed by: HOSPITALIST

## 2020-01-01 PROCEDURE — 87186 SC STD MICRODIL/AGAR DIL: CPT | Performed by: PHYSICIAN ASSISTANT

## 2020-01-01 PROCEDURE — 25000125 ZZHC RX 250: Performed by: INTERNAL MEDICINE

## 2020-01-01 PROCEDURE — 85610 PROTHROMBIN TIME: CPT | Performed by: EMERGENCY MEDICINE

## 2020-01-01 PROCEDURE — 36415 COLL VENOUS BLD VENIPUNCTURE: CPT | Performed by: HOSPITALIST

## 2020-01-01 PROCEDURE — 87040 BLOOD CULTURE FOR BACTERIA: CPT | Performed by: PHYSICIAN ASSISTANT

## 2020-01-01 PROCEDURE — 81001 URINALYSIS AUTO W/SCOPE: CPT | Performed by: EMERGENCY MEDICINE

## 2020-01-01 PROCEDURE — 85610 PROTHROMBIN TIME: CPT | Performed by: HOSPITALIST

## 2020-01-01 PROCEDURE — 80048 BASIC METABOLIC PNL TOTAL CA: CPT | Performed by: HOSPITALIST

## 2020-01-01 PROCEDURE — 25000132 ZZH RX MED GY IP 250 OP 250 PS 637: Performed by: EMERGENCY MEDICINE

## 2020-01-01 PROCEDURE — 83605 ASSAY OF LACTIC ACID: CPT | Performed by: EMERGENCY MEDICINE

## 2020-01-01 PROCEDURE — 99239 HOSP IP/OBS DSCHRG MGMT >30: CPT | Performed by: HOSPITALIST

## 2020-01-01 PROCEDURE — 84443 ASSAY THYROID STIM HORMONE: CPT | Performed by: INTERNAL MEDICINE

## 2020-01-01 PROCEDURE — 83735 ASSAY OF MAGNESIUM: CPT | Performed by: EMERGENCY MEDICINE

## 2020-01-01 PROCEDURE — 36415 COLL VENOUS BLD VENIPUNCTURE: CPT | Performed by: INTERNAL MEDICINE

## 2020-01-01 PROCEDURE — 87077 CULTURE AEROBIC IDENTIFY: CPT | Performed by: PHYSICIAN ASSISTANT

## 2020-01-01 PROCEDURE — 99222 1ST HOSP IP/OBS MODERATE 55: CPT | Mod: AI | Performed by: INTERNAL MEDICINE

## 2020-01-01 PROCEDURE — 40000264 ECHOCARDIOGRAM COMPLETE

## 2020-01-01 PROCEDURE — 87800 DETECT AGNT MULT DNA DIREC: CPT | Performed by: PHYSICIAN ASSISTANT

## 2020-01-01 PROCEDURE — 96366 THER/PROPH/DIAG IV INF ADDON: CPT

## 2020-01-01 PROCEDURE — 96360 HYDRATION IV INFUSION INIT: CPT

## 2020-01-01 PROCEDURE — 99285 EMERGENCY DEPT VISIT HI MDM: CPT | Mod: 25

## 2020-01-01 PROCEDURE — 85027 COMPLETE CBC AUTOMATED: CPT | Performed by: PHYSICIAN ASSISTANT

## 2020-01-01 PROCEDURE — 96365 THER/PROPH/DIAG IV INF INIT: CPT

## 2020-01-01 PROCEDURE — 25500064 ZZH RX 255 OP 636: Performed by: INTERNAL MEDICINE

## 2020-01-01 PROCEDURE — 25000128 H RX IP 250 OP 636: Performed by: HOSPITALIST

## 2020-01-01 PROCEDURE — 71045 X-RAY EXAM CHEST 1 VIEW: CPT

## 2020-01-01 PROCEDURE — 99239 HOSP IP/OBS DSCHRG MGMT >30: CPT | Performed by: INTERNAL MEDICINE

## 2020-01-01 PROCEDURE — 99222 1ST HOSP IP/OBS MODERATE 55: CPT | Performed by: INTERNAL MEDICINE

## 2020-01-01 PROCEDURE — 99223 1ST HOSP IP/OBS HIGH 75: CPT | Mod: AI | Performed by: HOSPITALIST

## 2020-01-01 PROCEDURE — 80162 ASSAY OF DIGOXIN TOTAL: CPT | Performed by: EMERGENCY MEDICINE

## 2020-01-01 PROCEDURE — 99284 EMERGENCY DEPT VISIT MOD MDM: CPT | Mod: 25

## 2020-01-01 PROCEDURE — 99232 SBSQ HOSP IP/OBS MODERATE 35: CPT | Performed by: HOSPITALIST

## 2020-01-01 PROCEDURE — 71046 X-RAY EXAM CHEST 2 VIEWS: CPT

## 2020-01-01 PROCEDURE — 87086 URINE CULTURE/COLONY COUNT: CPT | Performed by: PHYSICIAN ASSISTANT

## 2020-01-01 PROCEDURE — 25000128 H RX IP 250 OP 636: Performed by: PHYSICIAN ASSISTANT

## 2020-01-01 PROCEDURE — 84443 ASSAY THYROID STIM HORMONE: CPT | Performed by: PHYSICIAN ASSISTANT

## 2020-01-01 PROCEDURE — 97116 GAIT TRAINING THERAPY: CPT | Mod: GP | Performed by: PHYSICAL THERAPIST

## 2020-01-01 PROCEDURE — 83605 ASSAY OF LACTIC ACID: CPT | Performed by: HOSPITALIST

## 2020-01-01 RX ORDER — ACETAMINOPHEN 325 MG/1
650 TABLET ORAL EVERY 6 HOURS PRN
Qty: 60 TABLET | Refills: 0 | Status: ON HOLD | OUTPATIENT
Start: 2020-01-01 | End: 2020-01-01

## 2020-01-01 RX ORDER — PREDNISONE 5 MG/1
5 TABLET ORAL DAILY
COMMUNITY

## 2020-01-01 RX ORDER — METOPROLOL TARTRATE 1 MG/ML
5 INJECTION, SOLUTION INTRAVENOUS EVERY 5 MIN PRN
Status: DISCONTINUED | OUTPATIENT
Start: 2020-01-01 | End: 2020-01-01 | Stop reason: HOSPADM

## 2020-01-01 RX ORDER — POTASSIUM CHLORIDE 29.8 MG/ML
20 INJECTION INTRAVENOUS
Status: DISCONTINUED | OUTPATIENT
Start: 2020-01-01 | End: 2020-01-01 | Stop reason: HOSPADM

## 2020-01-01 RX ORDER — HALOPERIDOL 5 MG/ML
2 INJECTION INTRAMUSCULAR EVERY 6 HOURS PRN
Status: DISCONTINUED | OUTPATIENT
Start: 2020-01-01 | End: 2020-01-01 | Stop reason: HOSPADM

## 2020-01-01 RX ORDER — POTASSIUM CHLORIDE 1500 MG/1
20-40 TABLET, EXTENDED RELEASE ORAL
Status: DISCONTINUED | OUTPATIENT
Start: 2020-01-01 | End: 2020-01-01 | Stop reason: HOSPADM

## 2020-01-01 RX ORDER — LIDOCAINE 4 G/G
1 PATCH TOPICAL
Status: DISCONTINUED | OUTPATIENT
Start: 2020-01-01 | End: 2020-01-01 | Stop reason: HOSPADM

## 2020-01-01 RX ORDER — LANOLIN ALCOHOL/MO/W.PET/CERES
3000 CREAM (GRAM) TOPICAL EVERY MORNING
Status: DISCONTINUED | OUTPATIENT
Start: 2020-01-01 | End: 2020-01-01 | Stop reason: HOSPADM

## 2020-01-01 RX ORDER — POTASSIUM CHLORIDE 1.5 G/1.58G
20-40 POWDER, FOR SOLUTION ORAL
Status: DISCONTINUED | OUTPATIENT
Start: 2020-01-01 | End: 2020-01-01 | Stop reason: HOSPADM

## 2020-01-01 RX ORDER — DIVALPROEX SODIUM 500 MG/1
TABLET, EXTENDED RELEASE ORAL
Refills: 3 | COMMUNITY
Start: 2019-01-01

## 2020-01-01 RX ORDER — DIGOXIN 0.25 MG/ML
250 INJECTION INTRAMUSCULAR; INTRAVENOUS ONCE
Status: COMPLETED | OUTPATIENT
Start: 2020-01-01 | End: 2020-01-01

## 2020-01-01 RX ORDER — NALOXONE HYDROCHLORIDE 0.4 MG/ML
.1-.4 INJECTION, SOLUTION INTRAMUSCULAR; INTRAVENOUS; SUBCUTANEOUS
Status: DISCONTINUED | OUTPATIENT
Start: 2020-01-01 | End: 2020-01-01 | Stop reason: HOSPADM

## 2020-01-01 RX ORDER — PREDNISONE 20 MG/1
20 TABLET ORAL DAILY
Status: DISCONTINUED | OUTPATIENT
Start: 2020-01-01 | End: 2020-01-01 | Stop reason: HOSPADM

## 2020-01-01 RX ORDER — TORSEMIDE 20 MG/1
20 TABLET ORAL DAILY
COMMUNITY
End: 2020-01-01

## 2020-01-01 RX ORDER — WARFARIN SODIUM 1 MG/1
1 TABLET ORAL
Status: DISCONTINUED | OUTPATIENT
Start: 2020-01-01 | End: 2020-01-01 | Stop reason: DRUGHIGH

## 2020-01-01 RX ORDER — CEPHALEXIN 500 MG/1
500 CAPSULE ORAL 2 TIMES DAILY
Qty: 10 CAPSULE | Refills: 0 | Status: ON HOLD | OUTPATIENT
Start: 2020-01-01 | End: 2020-01-01

## 2020-01-01 RX ORDER — ACETAMINOPHEN 325 MG/1
650 TABLET ORAL EVERY 4 HOURS PRN
Qty: 30 TABLET | Refills: 0 | Status: SHIPPED | OUTPATIENT
Start: 2020-01-01

## 2020-01-01 RX ORDER — AMOXICILLIN 250 MG
2 CAPSULE ORAL 2 TIMES DAILY PRN
Status: DISCONTINUED | OUTPATIENT
Start: 2020-01-01 | End: 2020-01-01 | Stop reason: HOSPADM

## 2020-01-01 RX ORDER — METOPROLOL SUCCINATE 50 MG/1
50 TABLET, EXTENDED RELEASE ORAL DAILY
Status: DISCONTINUED | OUTPATIENT
Start: 2020-01-01 | End: 2020-01-01 | Stop reason: HOSPADM

## 2020-01-01 RX ORDER — PREDNISONE 5 MG/1
TABLET ORAL
COMMUNITY
Start: 2019-01-01 | End: 2020-01-01

## 2020-01-01 RX ORDER — WARFARIN SODIUM 1 MG/1
0.5 TABLET ORAL DAILY
Qty: 15 TABLET | Refills: 0 | Status: SHIPPED | OUTPATIENT
Start: 2020-01-01

## 2020-01-01 RX ORDER — TRAMADOL HYDROCHLORIDE 50 MG/1
50 TABLET ORAL EVERY 6 HOURS PRN
Status: DISCONTINUED | OUTPATIENT
Start: 2020-01-01 | End: 2020-01-01

## 2020-01-01 RX ORDER — ACETAMINOPHEN 325 MG/1
650 TABLET ORAL 2 TIMES DAILY
COMMUNITY

## 2020-01-01 RX ORDER — WARFARIN SODIUM 1 MG/1
0.5 TABLET ORAL DAILY
Qty: 15 TABLET | Refills: 0 | Status: SHIPPED | OUTPATIENT
Start: 2020-01-01 | End: 2020-01-01

## 2020-01-01 RX ORDER — DIVALPROEX SODIUM 500 MG/1
500 TABLET, EXTENDED RELEASE ORAL EVERY EVENING
Status: DISCONTINUED | OUTPATIENT
Start: 2020-01-01 | End: 2020-01-01 | Stop reason: HOSPADM

## 2020-01-01 RX ORDER — FUROSEMIDE 20 MG/1
10 TABLET ORAL DAILY
Status: DISCONTINUED | OUTPATIENT
Start: 2020-01-01 | End: 2020-01-01 | Stop reason: HOSPADM

## 2020-01-01 RX ORDER — TERAZOSIN 5 MG/1
5 CAPSULE ORAL AT BEDTIME
Status: DISCONTINUED | OUTPATIENT
Start: 2020-01-01 | End: 2020-01-01 | Stop reason: HOSPADM

## 2020-01-01 RX ORDER — SIMVASTATIN 40 MG
40 TABLET ORAL
Status: DISCONTINUED | OUTPATIENT
Start: 2020-01-01 | End: 2020-01-01 | Stop reason: HOSPADM

## 2020-01-01 RX ORDER — METOPROLOL SUCCINATE 50 MG/1
50 TABLET, EXTENDED RELEASE ORAL DAILY
Status: DISCONTINUED | OUTPATIENT
Start: 2020-01-01 | End: 2020-01-01

## 2020-01-01 RX ORDER — LORAZEPAM 2 MG/ML
0.25 INJECTION INTRAMUSCULAR EVERY 6 HOURS PRN
Status: DISCONTINUED | OUTPATIENT
Start: 2020-01-01 | End: 2020-01-01 | Stop reason: HOSPADM

## 2020-01-01 RX ORDER — SIMETHICONE 80 MG
80 TABLET,CHEWABLE ORAL EVERY 6 HOURS PRN
Status: DISCONTINUED | OUTPATIENT
Start: 2020-01-01 | End: 2020-01-01 | Stop reason: HOSPADM

## 2020-01-01 RX ORDER — LORAZEPAM 0.5 MG/1
0.5 TABLET ORAL EVERY 6 HOURS PRN
Qty: 30 TABLET | Refills: 0 | Status: SHIPPED | OUTPATIENT
Start: 2020-01-01

## 2020-01-01 RX ORDER — AMOXICILLIN 250 MG
1 CAPSULE ORAL 2 TIMES DAILY PRN
Status: DISCONTINUED | OUTPATIENT
Start: 2020-01-01 | End: 2020-01-01 | Stop reason: HOSPADM

## 2020-01-01 RX ORDER — SIMVASTATIN 40 MG
40 TABLET ORAL
COMMUNITY
Start: 2019-01-01 | End: 2020-01-01

## 2020-01-01 RX ORDER — PREDNISONE 1 MG/1
1 TABLET ORAL DAILY
Status: DISCONTINUED | OUTPATIENT
Start: 2020-01-01 | End: 2020-01-01 | Stop reason: HOSPADM

## 2020-01-01 RX ORDER — CEFTRIAXONE 2 G/1
2 INJECTION, POWDER, FOR SOLUTION INTRAMUSCULAR; INTRAVENOUS EVERY 24 HOURS
Status: DISCONTINUED | OUTPATIENT
Start: 2020-01-01 | End: 2020-01-01 | Stop reason: HOSPADM

## 2020-01-01 RX ORDER — POLYETHYLENE GLYCOL 3350 17 G/17G
17 POWDER, FOR SOLUTION ORAL DAILY PRN
Status: DISCONTINUED | OUTPATIENT
Start: 2020-01-01 | End: 2020-01-01 | Stop reason: HOSPADM

## 2020-01-01 RX ORDER — PREDNISONE 1 MG/1
1 TABLET ORAL DAILY
COMMUNITY

## 2020-01-01 RX ORDER — LEVOTHYROXINE SODIUM 75 UG/1
150 TABLET ORAL
Status: DISCONTINUED | OUTPATIENT
Start: 2020-01-01 | End: 2020-01-01 | Stop reason: HOSPADM

## 2020-01-01 RX ORDER — PREDNISONE 1 MG/1
TABLET ORAL
COMMUNITY
Start: 2019-01-01 | End: 2020-01-01

## 2020-01-01 RX ORDER — ACETAMINOPHEN 325 MG/1
650 TABLET ORAL EVERY 6 HOURS PRN
Status: DISCONTINUED | OUTPATIENT
Start: 2020-01-01 | End: 2020-01-01 | Stop reason: HOSPADM

## 2020-01-01 RX ORDER — PANTOPRAZOLE SODIUM 40 MG/1
40 TABLET, DELAYED RELEASE ORAL 2 TIMES DAILY
Status: DISCONTINUED | OUTPATIENT
Start: 2020-01-01 | End: 2020-01-01 | Stop reason: HOSPADM

## 2020-01-01 RX ORDER — PREDNISONE 5 MG/1
5 TABLET ORAL DAILY
Status: DISCONTINUED | OUTPATIENT
Start: 2020-01-01 | End: 2020-01-01 | Stop reason: HOSPADM

## 2020-01-01 RX ORDER — DIVALPROEX SODIUM 500 MG/1
500 TABLET, EXTENDED RELEASE ORAL AT BEDTIME
Status: DISCONTINUED | OUTPATIENT
Start: 2020-01-01 | End: 2020-01-01 | Stop reason: HOSPADM

## 2020-01-01 RX ORDER — MECLIZINE HCL 12.5 MG 12.5 MG/1
12.5-25 TABLET ORAL 3 TIMES DAILY PRN
Status: DISCONTINUED | OUTPATIENT
Start: 2020-01-01 | End: 2020-01-01

## 2020-01-01 RX ORDER — ONDANSETRON 2 MG/ML
4 INJECTION INTRAMUSCULAR; INTRAVENOUS EVERY 6 HOURS PRN
Status: DISCONTINUED | OUTPATIENT
Start: 2020-01-01 | End: 2020-01-01 | Stop reason: HOSPADM

## 2020-01-01 RX ORDER — MECLIZINE HCL 12.5 MG 12.5 MG/1
12.5 TABLET ORAL 3 TIMES DAILY PRN
COMMUNITY

## 2020-01-01 RX ORDER — ATROPINE SULFATE 10 MG/ML
2 SOLUTION/ DROPS OPHTHALMIC EVERY 4 HOURS PRN
Qty: 5 ML | Refills: 0 | Status: SHIPPED | OUTPATIENT
Start: 2020-01-01

## 2020-01-01 RX ORDER — METOPROLOL TARTRATE 25 MG/1
25 TABLET, FILM COATED ORAL ONCE
Status: COMPLETED | OUTPATIENT
Start: 2020-01-01 | End: 2020-01-01

## 2020-01-01 RX ORDER — CEFTRIAXONE 1 G/1
1 INJECTION, POWDER, FOR SOLUTION INTRAMUSCULAR; INTRAVENOUS ONCE
Status: COMPLETED | OUTPATIENT
Start: 2020-01-01 | End: 2020-01-01

## 2020-01-01 RX ORDER — MAGNESIUM SULFATE HEPTAHYDRATE 40 MG/ML
4 INJECTION, SOLUTION INTRAVENOUS EVERY 4 HOURS PRN
Status: DISCONTINUED | OUTPATIENT
Start: 2020-01-01 | End: 2020-01-01 | Stop reason: HOSPADM

## 2020-01-01 RX ORDER — LORAZEPAM 0.5 MG/1
0.25 TABLET ORAL EVERY 6 HOURS PRN
Status: DISCONTINUED | OUTPATIENT
Start: 2020-01-01 | End: 2020-01-01 | Stop reason: HOSPADM

## 2020-01-01 RX ORDER — MULTIVIT-MIN/IRON FUM/FOLIC AC 7.5 MG-4
TABLET ORAL
COMMUNITY
Start: 2015-05-19 | End: 2020-01-01

## 2020-01-01 RX ORDER — PREDNISONE 1 MG/1
1 TABLET ORAL DAILY
Status: DISCONTINUED | OUTPATIENT
Start: 2020-01-01 | End: 2020-01-01

## 2020-01-01 RX ORDER — CEFTRIAXONE 1 G/1
1 INJECTION, POWDER, FOR SOLUTION INTRAMUSCULAR; INTRAVENOUS EVERY 24 HOURS
Status: DISCONTINUED | OUTPATIENT
Start: 2020-01-01 | End: 2020-01-01

## 2020-01-01 RX ORDER — MECLIZINE HCL 12.5 MG 12.5 MG/1
12.5 TABLET ORAL 3 TIMES DAILY PRN
Status: DISCONTINUED | OUTPATIENT
Start: 2020-01-01 | End: 2020-01-01 | Stop reason: HOSPADM

## 2020-01-01 RX ORDER — ACETAMINOPHEN 325 MG/1
650 TABLET ORAL EVERY 8 HOURS
Status: DISCONTINUED | OUTPATIENT
Start: 2020-01-01 | End: 2020-01-01 | Stop reason: HOSPADM

## 2020-01-01 RX ORDER — HYDROMORPHONE HYDROCHLORIDE 2 MG/1
1-2 TABLET ORAL
Qty: 10 TABLET | Refills: 0 | Status: SHIPPED | OUTPATIENT
Start: 2020-01-01 | End: 2020-01-01

## 2020-01-01 RX ORDER — HYDROMORPHONE HYDROCHLORIDE 2 MG/1
2 TABLET ORAL
Status: DISCONTINUED | OUTPATIENT
Start: 2020-01-01 | End: 2020-01-01 | Stop reason: HOSPADM

## 2020-01-01 RX ORDER — ACETAMINOPHEN 325 MG/1
650 TABLET ORAL EVERY 4 HOURS PRN
Status: DISCONTINUED | OUTPATIENT
Start: 2020-01-01 | End: 2020-01-01 | Stop reason: HOSPADM

## 2020-01-01 RX ORDER — METOPROLOL TARTRATE 25 MG/1
25 TABLET, FILM COATED ORAL 4 TIMES DAILY
Status: DISCONTINUED | OUTPATIENT
Start: 2020-01-01 | End: 2020-01-01

## 2020-01-01 RX ORDER — FUROSEMIDE 20 MG
10 TABLET ORAL DAILY
Qty: 15 TABLET | Refills: 0 | Status: SHIPPED | OUTPATIENT
Start: 2020-01-01 | End: 2020-03-22

## 2020-01-01 RX ORDER — WARFARIN SODIUM 1 MG/1
1 TABLET ORAL
Status: ON HOLD | COMMUNITY
End: 2020-01-01

## 2020-01-01 RX ORDER — HYDROMORPHONE HYDROCHLORIDE 1 MG/ML
0.2 INJECTION, SOLUTION INTRAMUSCULAR; INTRAVENOUS; SUBCUTANEOUS
Status: DISCONTINUED | OUTPATIENT
Start: 2020-01-01 | End: 2020-01-01 | Stop reason: HOSPADM

## 2020-01-01 RX ORDER — ONDANSETRON 4 MG/1
4 TABLET, ORALLY DISINTEGRATING ORAL EVERY 6 HOURS PRN
Status: DISCONTINUED | OUTPATIENT
Start: 2020-01-01 | End: 2020-01-01 | Stop reason: HOSPADM

## 2020-01-01 RX ORDER — CEFTRIAXONE 1 G/1
1 INJECTION, POWDER, FOR SOLUTION INTRAMUSCULAR; INTRAVENOUS EVERY 24 HOURS
Status: DISCONTINUED | OUTPATIENT
Start: 2020-01-01 | End: 2020-01-01 | Stop reason: HOSPADM

## 2020-01-01 RX ORDER — BISACODYL 10 MG
10 SUPPOSITORY, RECTAL RECTAL DAILY PRN
Status: DISCONTINUED | OUTPATIENT
Start: 2020-01-01 | End: 2020-01-01 | Stop reason: HOSPADM

## 2020-01-01 RX ORDER — PREDNISONE 5 MG/1
5 TABLET ORAL DAILY
Status: DISCONTINUED | OUTPATIENT
Start: 2020-01-01 | End: 2020-01-01

## 2020-01-01 RX ORDER — MECLIZINE HCL 12.5 MG 12.5 MG/1
12.5 TABLET ORAL 2 TIMES DAILY PRN
Status: DISCONTINUED | OUTPATIENT
Start: 2020-01-01 | End: 2020-01-01 | Stop reason: HOSPADM

## 2020-01-01 RX ORDER — BISACODYL 10 MG
10 SUPPOSITORY, RECTAL RECTAL DAILY PRN
Qty: 10 SUPPOSITORY | Refills: 0 | Status: SHIPPED | OUTPATIENT
Start: 2020-01-01

## 2020-01-01 RX ORDER — DIGOXIN 125 MCG
125 TABLET ORAL DAILY
Status: DISCONTINUED | OUTPATIENT
Start: 2020-01-01 | End: 2020-01-01 | Stop reason: HOSPADM

## 2020-01-01 RX ORDER — LIDOCAINE 40 MG/G
CREAM TOPICAL
Status: DISCONTINUED | OUTPATIENT
Start: 2020-01-01 | End: 2020-01-01 | Stop reason: HOSPADM

## 2020-01-01 RX ORDER — FUROSEMIDE 20 MG
20 TABLET ORAL DAILY PRN
Status: ON HOLD | COMMUNITY
Start: 2020-01-01 | End: 2020-01-01

## 2020-01-01 RX ORDER — POTASSIUM CHLORIDE 7.45 MG/ML
10 INJECTION INTRAVENOUS
Status: DISCONTINUED | OUTPATIENT
Start: 2020-01-01 | End: 2020-01-01 | Stop reason: HOSPADM

## 2020-01-01 RX ORDER — HALOPERIDOL 1 MG/1
2 TABLET ORAL EVERY 6 HOURS PRN
Status: DISCONTINUED | OUTPATIENT
Start: 2020-01-01 | End: 2020-01-01 | Stop reason: HOSPADM

## 2020-01-01 RX ORDER — CEFDINIR 300 MG/1
300 CAPSULE ORAL 2 TIMES DAILY
Qty: 14 CAPSULE | Refills: 0 | Status: SHIPPED | OUTPATIENT
Start: 2020-01-01 | End: 2020-02-28

## 2020-01-01 RX ORDER — WARFARIN SODIUM 1 MG/1
1 TABLET ORAL DAILY
Status: ON HOLD
Start: 2020-01-01 | End: 2020-01-01

## 2020-01-01 RX ORDER — LEVOTHYROXINE SODIUM 150 UG/1
150 TABLET ORAL
Status: DISCONTINUED | OUTPATIENT
Start: 2020-01-01 | End: 2020-01-01 | Stop reason: HOSPADM

## 2020-01-01 RX ORDER — HALOPERIDOL 0.5 MG/1
0.5 TABLET ORAL EVERY 4 HOURS PRN
Qty: 30 TABLET | Refills: 0 | Status: SHIPPED | OUTPATIENT
Start: 2020-01-01

## 2020-01-01 RX ORDER — TERAZOSIN 5 MG/1
CAPSULE ORAL
COMMUNITY
Start: 2019-01-01 | End: 2020-01-01

## 2020-01-01 RX ORDER — POTASSIUM CL/LIDO/0.9 % NACL 10MEQ/0.1L
10 INTRAVENOUS SOLUTION, PIGGYBACK (ML) INTRAVENOUS
Status: DISCONTINUED | OUTPATIENT
Start: 2020-01-01 | End: 2020-01-01 | Stop reason: HOSPADM

## 2020-01-01 RX ORDER — SENNOSIDES A AND B 8.6 MG/1
1 TABLET, FILM COATED ORAL 2 TIMES DAILY PRN
Qty: 30 TABLET | Refills: 0 | Status: SHIPPED | OUTPATIENT
Start: 2020-01-01

## 2020-01-01 RX ORDER — DIGOXIN 125 MCG
125 TABLET ORAL DAILY
Qty: 30 TABLET | Refills: 1 | Status: SHIPPED | OUTPATIENT
Start: 2020-01-01

## 2020-01-01 RX ORDER — METOPROLOL SUCCINATE 50 MG/1
50 TABLET, EXTENDED RELEASE ORAL DAILY
Qty: 30 TABLET | Refills: 1 | Status: SHIPPED | OUTPATIENT
Start: 2020-01-01

## 2020-01-01 RX ORDER — SODIUM CHLORIDE 9 MG/ML
1000 INJECTION, SOLUTION INTRAVENOUS CONTINUOUS
Status: DISCONTINUED | OUTPATIENT
Start: 2020-01-01 | End: 2020-01-01 | Stop reason: HOSPADM

## 2020-01-01 RX ADMIN — PANTOPRAZOLE SODIUM 40 MG: 40 TABLET, DELAYED RELEASE ORAL at 09:19

## 2020-01-01 RX ADMIN — TERAZOSIN HYDROCHLORIDE ANHYDROUS 5 MG: 5 CAPSULE ORAL at 21:39

## 2020-01-01 RX ADMIN — CEFTRIAXONE 2 G: 2 INJECTION, POWDER, FOR SOLUTION INTRAMUSCULAR; INTRAVENOUS at 14:03

## 2020-01-01 RX ADMIN — PANTOPRAZOLE SODIUM 40 MG: 40 TABLET, DELAYED RELEASE ORAL at 21:38

## 2020-01-01 RX ADMIN — PREDNISONE 5 MG: 1 TABLET ORAL at 09:42

## 2020-01-01 RX ADMIN — METOPROLOL SUCCINATE 50 MG: 50 TABLET, EXTENDED RELEASE ORAL at 09:29

## 2020-01-01 RX ADMIN — ACETAMINOPHEN 650 MG: 325 TABLET, FILM COATED ORAL at 18:24

## 2020-01-01 RX ADMIN — TRAMADOL HYDROCHLORIDE 50 MG: 50 TABLET, FILM COATED ORAL at 09:41

## 2020-01-01 RX ADMIN — SIMVASTATIN 40 MG: 40 TABLET, FILM COATED ORAL at 18:39

## 2020-01-01 RX ADMIN — METOPROLOL TARTRATE 25 MG: 25 TABLET ORAL at 12:42

## 2020-01-01 RX ADMIN — HYDROMORPHONE HYDROCHLORIDE 2 MG: 2 TABLET ORAL at 10:58

## 2020-01-01 RX ADMIN — CEFTRIAXONE 1 G: 1 INJECTION, POWDER, FOR SOLUTION INTRAMUSCULAR; INTRAVENOUS at 13:52

## 2020-01-01 RX ADMIN — METOPROLOL TARTRATE 25 MG: 25 TABLET ORAL at 21:39

## 2020-01-01 RX ADMIN — ACETAMINOPHEN 650 MG: 325 TABLET, FILM COATED ORAL at 21:38

## 2020-01-01 RX ADMIN — SODIUM CHLORIDE 500 ML: 9 INJECTION, SOLUTION INTRAVENOUS at 11:44

## 2020-01-01 RX ADMIN — METOPROLOL TARTRATE 5 MG: 1 INJECTION, SOLUTION INTRAVENOUS at 08:12

## 2020-01-01 RX ADMIN — HYDROMORPHONE HYDROCHLORIDE 2 MG: 2 TABLET ORAL at 12:23

## 2020-01-01 RX ADMIN — PANTOPRAZOLE SODIUM 40 MG: 40 TABLET, DELAYED RELEASE ORAL at 09:42

## 2020-01-01 RX ADMIN — TRAMADOL HYDROCHLORIDE 50 MG: 50 TABLET, FILM COATED ORAL at 10:29

## 2020-01-01 RX ADMIN — PANTOPRAZOLE SODIUM 40 MG: 40 TABLET, DELAYED RELEASE ORAL at 20:14

## 2020-01-01 RX ADMIN — LEVOTHYROXINE SODIUM 150 MCG: 75 TABLET ORAL at 05:58

## 2020-01-01 RX ADMIN — SIMVASTATIN 40 MG: 40 TABLET, FILM COATED ORAL at 18:41

## 2020-01-01 RX ADMIN — METOPROLOL TARTRATE 25 MG: 25 TABLET ORAL at 17:53

## 2020-01-01 RX ADMIN — LEVOTHYROXINE SODIUM 150 MCG: 75 TABLET ORAL at 06:03

## 2020-01-01 RX ADMIN — ACETAMINOPHEN 650 MG: 325 TABLET, FILM COATED ORAL at 12:22

## 2020-01-01 RX ADMIN — DIGOXIN 125 MCG: 125 TABLET ORAL at 08:44

## 2020-01-01 RX ADMIN — TRAMADOL HYDROCHLORIDE 25 MG: 50 TABLET, FILM COATED ORAL at 08:52

## 2020-01-01 RX ADMIN — PREDNISONE 20 MG: 20 TABLET ORAL at 08:12

## 2020-01-01 RX ADMIN — PANTOPRAZOLE SODIUM 40 MG: 40 TABLET, DELAYED RELEASE ORAL at 09:29

## 2020-01-01 RX ADMIN — PANTOPRAZOLE SODIUM 40 MG: 40 TABLET, DELAYED RELEASE ORAL at 20:34

## 2020-01-01 RX ADMIN — DIVALPROEX SODIUM 500 MG: 500 TABLET, EXTENDED RELEASE ORAL at 21:38

## 2020-01-01 RX ADMIN — LIDOCAINE 1 PATCH: 560 PATCH PERCUTANEOUS; TOPICAL; TRANSDERMAL at 21:39

## 2020-01-01 RX ADMIN — DIVALPROEX SODIUM 500 MG: 500 TABLET, FILM COATED, EXTENDED RELEASE ORAL at 20:34

## 2020-01-01 RX ADMIN — MICONAZOLE NITRATE: 20 POWDER TOPICAL at 09:21

## 2020-01-01 RX ADMIN — PREDNISONE 20 MG: 20 TABLET ORAL at 08:41

## 2020-01-01 RX ADMIN — LEVOTHYROXINE SODIUM 150 MCG: 150 TABLET ORAL at 06:56

## 2020-01-01 RX ADMIN — LEVOTHYROXINE SODIUM 150 MCG: 150 TABLET ORAL at 09:19

## 2020-01-01 RX ADMIN — DIVALPROEX SODIUM 500 MG: 500 TABLET, FILM COATED, EXTENDED RELEASE ORAL at 19:28

## 2020-01-01 RX ADMIN — Medication 10 MG: at 09:19

## 2020-01-01 RX ADMIN — ACETAMINOPHEN 650 MG: 325 TABLET, FILM COATED ORAL at 10:02

## 2020-01-01 RX ADMIN — SIMVASTATIN 40 MG: 40 TABLET, FILM COATED ORAL at 16:09

## 2020-01-01 RX ADMIN — DICLOFENAC SODIUM 2 G: 10 GEL TOPICAL at 21:49

## 2020-01-01 RX ADMIN — DIGOXIN 125 MCG: 125 TABLET ORAL at 09:29

## 2020-01-01 RX ADMIN — DIVALPROEX SODIUM 500 MG: 500 TABLET, FILM COATED, EXTENDED RELEASE ORAL at 20:10

## 2020-01-01 RX ADMIN — DIGOXIN 125 MCG: 125 TABLET ORAL at 09:20

## 2020-01-01 RX ADMIN — METOPROLOL TARTRATE 25 MG: 25 TABLET ORAL at 22:06

## 2020-01-01 RX ADMIN — CEFTRIAXONE 1 G: 1 INJECTION, POWDER, FOR SOLUTION INTRAMUSCULAR; INTRAVENOUS at 14:20

## 2020-01-01 RX ADMIN — METOPROLOL SUCCINATE 50 MG: 50 TABLET, EXTENDED RELEASE ORAL at 08:44

## 2020-01-01 RX ADMIN — SODIUM CHLORIDE 250 ML: 9 INJECTION, SOLUTION INTRAVENOUS at 17:46

## 2020-01-01 RX ADMIN — PREDNISONE 20 MG: 20 TABLET ORAL at 08:44

## 2020-01-01 RX ADMIN — PREDNISONE 20 MG: 20 TABLET ORAL at 14:47

## 2020-01-01 RX ADMIN — DIGOXIN 250 MCG: 0.25 INJECTION INTRAMUSCULAR; INTRAVENOUS at 12:17

## 2020-01-01 RX ADMIN — CYANOCOBALAMIN TAB 1000 MCG 3000 MCG: 1000 TAB at 09:45

## 2020-01-01 RX ADMIN — ACETAMINOPHEN 650 MG: 325 TABLET, FILM COATED ORAL at 06:34

## 2020-01-01 RX ADMIN — MECLIZINE 12.5 MG: 12.5 TABLET ORAL at 07:10

## 2020-01-01 RX ADMIN — Medication 10 MG: at 09:41

## 2020-01-01 RX ADMIN — PANTOPRAZOLE SODIUM 40 MG: 40 TABLET, DELAYED RELEASE ORAL at 08:41

## 2020-01-01 RX ADMIN — DIVALPROEX SODIUM 500 MG: 500 TABLET, FILM COATED, EXTENDED RELEASE ORAL at 20:14

## 2020-01-01 RX ADMIN — METOPROLOL TARTRATE 25 MG: 25 TABLET ORAL at 18:15

## 2020-01-01 RX ADMIN — DIVALPROEX SODIUM 500 MG: 500 TABLET, EXTENDED RELEASE ORAL at 22:38

## 2020-01-01 RX ADMIN — DICLOFENAC SODIUM 2 G: 10 GEL TOPICAL at 09:21

## 2020-01-01 RX ADMIN — PREDNISONE 5 MG: 1 TABLET ORAL at 09:19

## 2020-01-01 RX ADMIN — METOPROLOL TARTRATE 25 MG: 25 TABLET ORAL at 18:27

## 2020-01-01 RX ADMIN — METOPROLOL TARTRATE 25 MG: 25 TABLET ORAL at 08:11

## 2020-01-01 RX ADMIN — LEVOTHYROXINE SODIUM 150 MCG: 75 TABLET ORAL at 05:41

## 2020-01-01 RX ADMIN — HUMAN ALBUMIN MICROSPHERES AND PERFLUTREN 7 ML: 10; .22 INJECTION, SOLUTION INTRAVENOUS at 16:00

## 2020-01-01 RX ADMIN — PANTOPRAZOLE SODIUM 40 MG: 40 TABLET, DELAYED RELEASE ORAL at 08:12

## 2020-01-01 RX ADMIN — MECLIZINE 25 MG: 12.5 TABLET ORAL at 14:02

## 2020-01-01 RX ADMIN — METOPROLOL TARTRATE 25 MG: 25 TABLET ORAL at 08:12

## 2020-01-01 RX ADMIN — ACETAMINOPHEN 650 MG: 325 TABLET, FILM COATED ORAL at 09:19

## 2020-01-01 RX ADMIN — SIMVASTATIN 40 MG: 40 TABLET, FILM COATED ORAL at 17:06

## 2020-01-01 RX ADMIN — CYANOCOBALAMIN TAB 1000 MCG 3000 MCG: 1000 TAB at 09:20

## 2020-01-01 RX ADMIN — PREDNISONE 1 MG: 1 TABLET ORAL at 09:19

## 2020-01-01 RX ADMIN — PREDNISONE 1 MG: 1 TABLET ORAL at 09:42

## 2020-01-01 RX ADMIN — MECLIZINE 25 MG: 12.5 TABLET ORAL at 21:39

## 2020-01-01 RX ADMIN — MECLIZINE 25 MG: 12.5 TABLET ORAL at 06:01

## 2020-01-01 RX ADMIN — PANTOPRAZOLE SODIUM 40 MG: 40 TABLET, DELAYED RELEASE ORAL at 21:10

## 2020-01-01 RX ADMIN — DIGOXIN 125 MCG: 125 TABLET ORAL at 09:05

## 2020-01-01 RX ADMIN — ACETAMINOPHEN 650 MG: 325 TABLET, FILM COATED ORAL at 12:57

## 2020-01-01 RX ADMIN — SODIUM CHLORIDE 1000 ML: 9 INJECTION, SOLUTION INTRAVENOUS at 19:28

## 2020-01-01 RX ADMIN — SIMVASTATIN 40 MG: 40 TABLET, FILM COATED ORAL at 16:55

## 2020-01-01 RX ADMIN — MICONAZOLE NITRATE: 20 POWDER TOPICAL at 12:57

## 2020-01-01 RX ADMIN — TRAMADOL HYDROCHLORIDE 50 MG: 50 TABLET, FILM COATED ORAL at 09:34

## 2020-01-01 RX ADMIN — PREDNISONE 20 MG: 20 TABLET ORAL at 09:29

## 2020-01-01 RX ADMIN — TERAZOSIN HYDROCHLORIDE ANHYDROUS 5 MG: 5 CAPSULE ORAL at 22:37

## 2020-01-01 RX ADMIN — ACETAMINOPHEN 650 MG: 325 TABLET, FILM COATED ORAL at 14:01

## 2020-01-01 RX ADMIN — PANTOPRAZOLE SODIUM 40 MG: 40 TABLET, DELAYED RELEASE ORAL at 19:27

## 2020-01-01 RX ADMIN — TRAMADOL HYDROCHLORIDE 50 MG: 50 TABLET, FILM COATED ORAL at 18:45

## 2020-01-01 RX ADMIN — TRAMADOL HYDROCHLORIDE 50 MG: 50 TABLET, FILM COATED ORAL at 00:27

## 2020-01-01 RX ADMIN — SIMVASTATIN 40 MG: 40 TABLET, FILM COATED ORAL at 17:53

## 2020-01-01 RX ADMIN — PANTOPRAZOLE SODIUM 40 MG: 40 TABLET, DELAYED RELEASE ORAL at 20:10

## 2020-01-01 RX ADMIN — LEVOTHYROXINE SODIUM 150 MCG: 75 TABLET ORAL at 06:34

## 2020-01-01 RX ADMIN — Medication 1500 MG: at 05:11

## 2020-01-01 RX ADMIN — CEFTRIAXONE 1 G: 1 INJECTION, POWDER, FOR SOLUTION INTRAMUSCULAR; INTRAVENOUS at 14:48

## 2020-01-01 RX ADMIN — CEFTRIAXONE 2 G: 2 INJECTION, POWDER, FOR SOLUTION INTRAMUSCULAR; INTRAVENOUS at 15:01

## 2020-01-01 RX ADMIN — DIGOXIN 125 MCG: 125 TABLET ORAL at 09:41

## 2020-01-01 RX ADMIN — PANTOPRAZOLE SODIUM 40 MG: 40 TABLET, DELAYED RELEASE ORAL at 08:44

## 2020-01-01 ASSESSMENT — ACTIVITIES OF DAILY LIVING (ADL)
ADLS_ACUITY_SCORE: 20
ADLS_ACUITY_SCORE: 22
ADLS_ACUITY_SCORE: 21
ADLS_ACUITY_SCORE: 20
ADLS_ACUITY_SCORE: 18
ADLS_ACUITY_SCORE: 21
ADLS_ACUITY_SCORE: 18
ADLS_ACUITY_SCORE: 21
ADLS_ACUITY_SCORE: 22
ADLS_ACUITY_SCORE: 18
ADLS_ACUITY_SCORE: 20
ADLS_ACUITY_SCORE: 22
ADLS_ACUITY_SCORE: 18
ADLS_ACUITY_SCORE: 20
ADLS_ACUITY_SCORE: 22
ADLS_ACUITY_SCORE: 22
ADLS_ACUITY_SCORE: 16
ADLS_ACUITY_SCORE: 19
ADLS_ACUITY_SCORE: 20
ADLS_ACUITY_SCORE: 22
ADLS_ACUITY_SCORE: 18
ADLS_ACUITY_SCORE: 22
ADLS_ACUITY_SCORE: 22
ADLS_ACUITY_SCORE: 19
ADLS_ACUITY_SCORE: 21
ADLS_ACUITY_SCORE: 18
ADLS_ACUITY_SCORE: 22
ADLS_ACUITY_SCORE: 19
ADLS_ACUITY_SCORE: 19
ADLS_ACUITY_SCORE: 21
ADLS_ACUITY_SCORE: 19

## 2020-01-01 ASSESSMENT — ENCOUNTER SYMPTOMS
SHORTNESS OF BREATH: 0
FEVER: 0
SHORTNESS OF BREATH: 0
SHORTNESS OF BREATH: 0
WEAKNESS: 1
DIZZINESS: 1
CHEST TIGHTNESS: 0
NAUSEA: 0
DIARRHEA: 0
ABDOMINAL PAIN: 0
DIZZINESS: 1
DIARRHEA: 0
VOMITING: 0
VOMITING: 0
LIGHT-HEADEDNESS: 1
COUGH: 0
FREQUENCY: 1
FATIGUE: 1
FEVER: 0

## 2020-01-01 ASSESSMENT — MIFFLIN-ST. JEOR
SCORE: 1612.51
SCORE: 1572.59
SCORE: 1623.4
SCORE: 1600.72
SCORE: 1560.8
SCORE: 1618.86
SCORE: 1614.33
SCORE: 1579.85

## 2020-01-31 PROBLEM — I48.91 ATRIAL FIBRILLATION WITH RAPID VENTRICULAR RESPONSE (H): Status: ACTIVE | Noted: 2020-01-01

## 2020-01-31 NOTE — ED PROVIDER NOTES
History     Chief Complaint:  Generalized Weakness      HPI   Sean Brunner is a 85 year old male, with history of atrial fibrillation on coumadin, cardiomyopathy, DVT, PE, and lower leg edema bilateral, who presents with generalized weakness. Per chart review, the patient was hospitalized here at [place] from 12/6/2019 to 12/11/2019 after numerous complaints including headaches and abdominal pain, workup revealed acute on chronic anemia. The patient's wife notes that he was too weak to get out of bed/wheelchair and that she was unable to help him to the bathroom. She states that she has noticed that around a day ago and since he has been a little off and has had some generalized weakness. She states that he had not wanted to get dressed and get his INR checked. She also notes that he has scraped his right arm on a window. His wife denies emesis, diarrhea, or recent falls and he denies any other complaints of pain. He states that his typical blood pressure, when he checks at home, is around 90/50. His wife also notes that he uses a cane and walker to get around.       Allergies:  No known drug allergies     Medications:    Warfarin  Omeprazole   Lasix  Levothyroxine  Lancets  Protonix  Simvastatin  Simethicone  Prednisone  Metoprolol succinate  Meclizine  Depokate  Terazosin    Past Medical History:    Atrial fibrillation   Bilateral leg edema  CAD  Cardiomyopathy  Carotid stenosis  COPD  Cellulitis of left lower extremity  Cor pulmonale  Diastolic dysfunction  Morbid obesity  Varicose veins pulmonary hypertension  Hypothyroidism  STEWART  Hypertension  GERD  DM2  Abdominal wall hematoma  Hiatal hernia    Past Surgical History:    Knee replacement, right  Arthroscopy knee, bilateral  Tonsillectomy  Appendectomy  Rotator cuff repair  Ankle fracture    Family History:    Cancer, brother  Heart disease, father, sudden death MI at age 74  Cancer, sister  Alzheimer's, mother    Social History:  Smoking status: Never  smoker  Alcohol use: No  Drug use: No  PCP: Oscar Hansenlisa Parekh  Presents to the ED with wife  Uses a cane and walker at home.    Marital Status:   [2]    Review of Systems   Constitutional: Positive for fatigue. Negative for fever.   Respiratory: Negative for chest tightness and shortness of breath.    Cardiovascular: Negative for chest pain.   Gastrointestinal: Negative for diarrhea and vomiting.   Neurological: Positive for dizziness and weakness.   All other systems reviewed and are negative.      Physical Exam     Patient Vitals for the past 24 hrs:   BP Temp Temp src Pulse Heart Rate Resp SpO2   01/31/20 1139 (!) 87/58 96.8  F (36  C) Oral 129 -- 18 97 %   01/31/20 1015 (!) 89/69 -- -- 105 95 -- --   01/31/20 1000 96/71 -- -- 116 114 -- 97 %   01/31/20 0945 91/63 -- -- 110 125 -- 97 %   01/31/20 0930 (!) 89/58 -- -- 102 117 -- 100 %   01/31/20 0915 93/69 -- -- 124 102 -- 90 %   01/31/20 0900 90/63 -- -- 101 131 -- 99 %   01/31/20 0741 99/64 98.6  F (37  C) Oral -- 145 16 100 %        Physical Exam  General: Patient is alert and cooperative.  Deconditioned, overweight.   HENT:  Normal nose, oropharynx. Moist oral mucosa.  Eyes: EOMI. Normal conjunctiva.  Neck:  Normal range of motion and appearance.   Cardiovascular:  tachycardic rate, irregular rhythm and normal heart sounds.   Pulmonary/Chest:  Effort normal. No wheezing or crackles.  Abdominal: Soft. No distension or tenderness.     Musculoskeletal: Normal range of motion. Symmetric lower leg edema.   Neurological: oriented, normal strength, sensation, and coordination. Hard of hearing.   Skin: Warm and dry. No rash or bruising.   Psychiatric: Normal mood and affect. Normal behavior and judgement.    Emergency Department Course     ECG:  ECG taken at 0750, ECG read at 0800  Atrial fibrillation with rapid ventricular response with premature ventricular or aberrantly conducted complexes.   Low voltage QRS  Cannot rule out anterior infarct, age  undetermined.   Abnormal ECG  Rate 139 bpm. NE interval * ms. QRS duration 92 ms. QT/QTc 266/404 ms. P-R-T axes * 23 214.    Imaging:  Radiology findings were communicated with the patient who voiced understanding of the findings.    Chest  PORT XR:  Portable chest. Lungs are clear. Heart is normal in size.  No pneumothorax. Trace amount of right pleural fluid is new since  prior exam.    Reading per radiology      Laboratory:  Laboratory findings were communicated with the patient who voiced understanding of the findings.    CBC: WBC 8.1, HGB 7.9(L),   CMP: Glucose 111(H), Albumin 2.7(L) o/w WNL (Creatinine 1.07)  Troponin (Collected 0833): <0.015   INR: 3.70(H)  Glucose by meter: 100(H)  UA: trace blood (A), moderate leukocyte esterase (A), WBC/HPF 13(H), RBC/HPF 6(H), mucous present (A)    Procedures    Interventions:  0811 Metoprolol tartrate, 25 mg, PO  0812 Metoprolol, 5 mg, IV    Emergency Department Course:   Nursing notes and vitals reviewed.    0742 I performed an exam of the patient as documented above.     0750 EKG was performed, see results above.    0806 IV was inserted and blood was drawn for laboratory testing, results above.     0808 The patient had a PORT Chest XR while in the emergency department, results above.      0900 The patient provided a urine sample here in the emergency department. This was sent for laboratory testing, findings above.     0910 I personally reviewed the results with the patient and answered all related questions prior to admission. Patient's heart rate has also improved to the 110's.     0945  I spoke to Dr. JOSUÉ Silva of the hospitalist service who accepts the patient for admission.        Impression & Plan      Medical Decision Making:  Sean Brunner is a 85 year old male who presents to the emergency department today for evaluation of generalized weakness and worsening dizziness.  Medical history is complex.  He has a history of chronic atrial  fibrillation and was recently hospitalized from December 6 through the 11th for evaluation of weakness, dizziness, and abdominal pain.  See records for details.  He has additionally acute on chronic anemia and chronic dizziness and right-sided heart failure.  He has not been febrile and currently denies chest pain, shortness of breath, or abdominal pain.  He is in A. fib with RVR upon arrival with blood pressure readings in the systolic low 90s.  His evaluation has included an EKG depicting A. fib with RVR, no obvious ischemic signs.  He has an undetectable troponin, normal CMP, supratherapeutic INR of 3.70.  Today's hemoglobin is 7.9 with a normal white blood cell count and platelet count.  Most recent comparison hemoglobin is 9.1 from December 9, 2019.  Urinalysis shows no convincing sign of infection.  He was medicated with IV and p.o. metoprolol with improvement in his elevated heart rate and a fairly stable blood pressure.  Clearly he requires admission for further medical management and probably ultimate placement into an assisted living or nursing care facility.  Symptoms are likely multifactorial and in part related to his poorly controlled A. fib and clinically apparent right-sided heart failure and underlying anemia. There is no clinically obvious infectious process or ED work-up to suggest an underlying occult infection at this time.    Diagnosis:    ICD-10-CM    1. Atrial fibrillation with RVR (H) I48.91 INR     UA with Microscopic   2. Acute on chronic anemia D64.9    3. Generalized weakness R53.1        Disposition:   The patient is admitted into the care of JOSUÉ Silva.     Scribe Disclosure:  ALAINA, Chelsea Llamas, am serving as a scribe at 0742 on 1/31/2020 to document services personally performed by Rosalio Sam MD based on my observations and the provider's statements to me.  [facility] EMERGENCY DEPARTMENT       Rosalio Sam MD  01/31/20 0604

## 2020-01-31 NOTE — PROVIDER NOTIFICATION
Spoke with dr zavaleta regarding low SBP and starting ordered dilt drip.  He states that he does NOT want the dilt drip to be started and that he will discontinue it.  The drip has not been started by this RN.

## 2020-01-31 NOTE — PLAN OF CARE
OBS transfer at 1340:  Pt/spouse were oriented to room, use of call light, calling for assistance, safety, poc, etc.  Pt/spouse verbalized understanding of this. Vss ex SBP soft and HR elevated, no co pain/cp/sob.   Tele AFIB RVR, no dilt drip per cardiology.  ANTONIO and prednisone started.  Knik, alarms on for safety, INC of urine, see assessment for skin details, lotion applied to BLE feet d/t dryness.  INR 3.7, K+ 3.6, mag add on pending, lactic 0.8, hgb 7.9.  Echo in process at this time. Cardiology consulted, see note for details.  Continue poc and monitoring.     Admit profile was not able to be completed this shift, Moustapha CHOUDHARY is aware and states he will complete.

## 2020-01-31 NOTE — H&P
[facility]  History and Physical      Patient Name: Sean Brunner MRN# [id number]   Age: 85 year old YOB: 1934     Date of Admission:1/31/2020    Primary care provider: Oscar Parekh  Date of Service: 1/31/2020         Assessment and Plan:   Sean Brunner is a 85 year old male with a history of Polymyalgia Rheumatica, Pulmonary HTN, Iron Deficiency, Arthritis, DM Type 2, Hiatel Hernia, GERD, Venous Stasis, Hypothyroidism, PE, DVT, Chronic Atrial Fibrillation on warfarin, Chronic Headaches who presents to the ED today 2/2 generalized weakness and fatigue.      Afib with RVR  Supratherapeutic INR - pt with a history of chronic atrial fibrillation on Metoprolol and Warfarin.  INR supra therapeutic at 3.7.  HR on arrival to the -140s which initially improved to  after IV and po Metoprolol.  Now increased back to 120s with bp remaining in the upper 80s to 90s.  Troponin negative and patient asymptomatic.  - start a Diltiazem gtt now given recurrent RVR.    - Cardiology consulted to assist with management given soft bp  - pharmacy consulted for warfarin dosing.  Hold this evening's dose and repeat INR in am.    Generalized Weakness, Fatigue   Possible UTI - pt presents with one day of increased generalized weakness and fatigue.  No recent falls, no URI symptoms or Abdominal pain.  Moves all extremities equally.  Speech is garbled, but family reports baseline his speech is not clear.  He is oriented x3.  Patient afebrile with a normal wbc.  BP reported to be at baseline of 90s systolic.  UA is abnormal concerning for possible UTI.  - start Ceftriaxone  - send urine and blood cultures  - given increased increased lethargy and supra therapeutic INR, I will order a CT head w/out contrast to rule out any acute pathology    CAD, CHF, HTN, HLD - hx of cor pulmonale with know 3-4+ TR and RV dysfunction and EF 55-60% 2/2017.  Hx of non obstructive CAD with last  "stress test negative 2017.  Pt with peripheral edema and exam and on imaging with trace right pleural fluid.  Stable on room air.  - holding pta Lasix for now given low bp  - Cardiology consulted as above  - continue pta Simvastatin  - check echocardiogram    Chronic Anemia - hgb on admission 7.9 down from recent baseline ~9.0.  No reports of any GI blood loss.  Pt evaluated by GI last month who felt \"that he is probably losing blood because of being on Coumadin for such a long time, he will lose a small amount of blood which will be imperceptible for him.  Because he is not actively bleeding, I do not think that I would want to do any invasive GI studies on him at this time\"  - continue to monitor serial hgb levels  - holding pta warfarin for now  - monitor stool output for signs of blood loss     GERD - continue pta Protonix     DM Type 2 - diet controlled.  Last A1C 12/2019 5.7     Hypothyroidism - continue pta Levothyroxine    Polymyalgia Rheumatica - continue pta Prednisone but will increased to 20mg daily in the setting of possible infection.    Hx Headaches - asymptomatic currently.  Continue pta Depakote       CODE: Full  Diet/IVF: liquids  GI ppx:  protonix  DVT ppx: on warfarin    Patient discussed with Dr. Luzma Silva MS PA-C  Physician Assistant   Hospitalist Service  Pager: [phone]           Chief Complaint:   Fatigue, Generalized Weakness         HPI:   85 year old male with a history of Polymyalgia Rheumatica, Pulmonary HTN, Iron Deficiency, Arthritis, DM Type 2, Hiatel Hernia, GERD, Venous Stasis, Hypothyroidism, PE, DVT, Chronic Atrial Fibrillation on warfarin, Chronic Headaches who presents to the ED today 2/2 generalized weakness and fatigue.    Patient lives with his wife.  She reports he was at his baseline yesterday which is ambulating with a walker.  He has had some chronic left hip pain after a fall about 3 months ago.  This morning, he woke up to use the bathroom and went " to sit in a recliner and had increased weakness and she reports he has increased lethargy today.  Patient is currently without complaints, but is sleepy.  Wife denies he has had any recent URI symptoms, cough, shortness of breath, abdominal pain, nausea, emesis, diarrhea, melena, hematochezia, blood in urine, rashes, fevers.  She reports he is compliant with his medications.  Patient and wife report his baseline blood pressure is 90 systolic.    ED work up revealed patient -140 which improved to  after IV and po Metoprolol, BP 90/60s, afebrile on room air.  Laboratory work up revealed hgb 7.9, albumin 2.7, INR 3.7 with otherwise normal CMP, Troponin, CBC.  UA with trace blood, Mod LE, mucous and 13 wbc.  CXR revealed trace right pleural effusion.  Patient received Lopressor 5mg IV and Metoprolol 25mg po and admitted for further management.         Past Medical History:     Past Medical History:   Diagnosis Date     Atrial fibrillation (H) 12/27/2011    Sees Dr Carcamo, avoid amio due to cor pulmonale, asymptomatic rates up to160 with exersion.        Bilateral leg edema     compression wraps     CAD (coronary artery disease)     Nonobstructive     Cardiomyopathy (H) 5/1/2012     Carotid stenosis      Cellulitis of left lower extremity 11/5/2018     COPD (chronic obstructive pulmonary disease) (H)      Cor pulmonale (H) 5/1/2012     Diastolic dysfunction      DM2 (diabetes mellitus, type 2) (H)      IYER (dyspnea on exertion)      GERD (gastroesophageal reflux disease)      HTN (hypertension)      Hypothyroidism      Leg wound, left      LV dysfunction 05/01/2012     Due to cor pulmonale and a fib related tachycardia      Morbid obesity (H) 5/1/2012     On supplemental oxygen by nasal cannula     at [facility]     STEWART (obstructive sleep apnea) 05/01/2012    Non compliant with CPAP     Pulmonary embolism (H)      Pulmonary HTN (H)      Varicose veins of bilateral lower extremities with other complications       Vitamin B12 deficiency disease 5/1/2012    On oral replacement           Past Surgical History:     Past Surgical History:   Procedure Laterality Date     JOINT REPLACEMENT            Social History:     Social History     Socioeconomic History     Marital status:      Spouse name: Not on file     Number of children: Not on file     Years of education: Not on file     Highest education level: Not on file   Occupational History     Not on file   Social Needs     Financial resource strain: Not on file     Food insecurity:     Worry: Not on file     Inability: Not on file     Transportation needs:     Medical: Not on file     Non-medical: Not on file   Tobacco Use     Smoking status: Never Smoker     Smokeless tobacco: Never Used   Substance and Sexual Activity     Alcohol use: No     Drug use: No     Sexual activity: Not on file   Lifestyle     Physical activity:     Days per week: Not on file     Minutes per session: Not on file     Stress: Not on file   Relationships     Social connections:     Talks on phone: Not on file     Gets together: Not on file     Attends Denominational service: Not on file     Active member of club or organization: Not on file     Attends meetings of clubs or organizations: Not on file     Relationship status: Not on file     Intimate partner violence:     Fear of current or ex partner: Not on file     Emotionally abused: Not on file     Physically abused: Not on file     Forced sexual activity: Not on file   Other Topics Concern     Parent/sibling w/ CABG, MI or angioplasty before 65F 55M? Not Asked   Social History Narrative     Not on file          Family History:   Brother - esophageal cancer       Allergies:    No Known Allergies       Medications:     Prior to Admission medications    Medication Sig Last Dose Taking? Auth Provider   cyanocolbalamin (VITAMIN  B-12) 1000 MCG tablet Take 3,000 mcg by mouth every morning 1/31/2020 Yes Unknown, Entered By History   diclofenac (VOLTAREN)  1 % topical gel Apply 2 g topically 4 times daily as needed for moderate pain Apply to hip Send dosing card with product.  Yes Manny Pastor MD   divalproex sodium extended-release (DEPAKOTE ER) 500 MG 24 hr tablet TAKE 1 TABLET BY MOUTH EACH EVENING AT BEDTIME 1/30/2020 Yes Reported, Patient   furosemide (LASIX) 20 MG tablet Take 20 mg by mouth every morning 1/31/2020 Yes Unknown, Entered By History   levothyroxine (SYNTHROID) 150 MCG tablet Take 150 mcg by mouth every morning (before breakfast)  1/31/2020 Yes Reported, Patient   meclizine (ANTIVERT) 12.5 MG tablet Take 12.5 mg by mouth 3 times daily as needed for dizziness  Yes Unknown, Entered By History   metoprolol succinate ER (TOPROL-XL) 25 MG 24 hr tablet Take 0.5 tablets (12.5 mg) by mouth every evening 1/30/2020 at bedtime Yes Manny Pastor MD   multivitamin, therapeutic with minerals (THERA-VIT-M) TABS tablet Take 1 tablet by mouth 2 times daily (with meals) 1/30/2020 Yes Reported, Patient   pantoprazole (PROTONIX) 40 MG EC tablet Take 1 tablet (40 mg) by mouth 2 times daily 1/31/2020 at am Yes Manny Pastor MD   predniSONE (DELTASONE) 1 MG tablet Take 1 mg by mouth daily Takes with 5mg tablet for total of 6 mg. 1/30/2020 Yes Unknown, Entered By History   predniSONE (DELTASONE) 5 MG tablet Take 5 mg by mouth daily Takes with 1 mg tablet for total of 6 mg. 1/30/2020 Yes Unknown, Entered By History   simethicone (MYLICON) 80 MG chewable tablet Take 1 tablet (80 mg) by mouth every 6 hours as needed for cramping or other (gas pains)  Yes Manny Pastor MD   simvastatin (ZOCOR) 40 MG tablet Take 40 mg by mouth daily (with dinner)  1/30/2020 Yes Reported, Patient   terazosin (HYTRIN) 5 MG capsule Take 5 mg by mouth At Bedtime 1/30/2020 Yes Unknown, Entered By History   traMADol (ULTRAM) 50 MG tablet Take 50 mg by mouth every 6 hours as needed for moderate pain   Yes Reported, Patient   warfarin ANTICOAGULANT (COUMADIN) 1 MG tablet Take 1.5  tablets (1.5 mg) by mouth three times a week Monday, Wednesday, and Friday 1/29/2020 Yes Manny Pastor MD   warfarin ANTICOAGULANT (COUMADIN) 1 MG tablet Take 1 tablet (1 mg) by mouth four times a week Sunday, Tuesday, Thursday, and Saturday 1/30/2020 Yes Manny Pastor MD          Review of Systems:   A complete ROS was performed and is negative other than what is stated in the HPI.       Physical Exam:   Blood pressure 91/64, pulse 129, temperature 96.8  F (36  C), temperature source Oral, resp. rate 18, SpO2 97 %. on room air  General: Alert, interactive, NAD, sleeping, hard of hearing, arousable  HEENT: AT/NC, sclera anicteric, PERRL, EOMI  Chest/Resp: clear to auscultation bilaterally, no crackles or wheezes  Heart/CV: regular rate and rhythm, no murmur  Abdomen/GI: Soft, nontender, nondistended. +BS.  No rebound or guarding.  Extremities/MSK: 1+ BLE edema with chronic venous stasis changes L>R  Skin: Warm and dry  Neuro: Lethargic but arousable, difficult to understand his speech but wife reports he is difficult at baseline.  Oriented to person, place and time.  Moves all extremities.         Labs:   ROUTINE ICU LABS (Last four results)  CMP  Recent Labs   Lab 01/31/20  0755      POTASSIUM 3.6   CHLORIDE 103   CO2 31   ANIONGAP 4   *   BUN 23   CR 1.07   GFRESTIMATED 63   GFRESTBLACK 73   TYSON 9.4   PROTTOTAL 7.4   ALBUMIN 2.7*   BILITOTAL 0.7   ALKPHOS 75   AST 17   ALT 15     CBC  Recent Labs   Lab 01/31/20  0755   WBC 8.1   RBC 2.82*   HGB 7.9*   HCT 25.4*   MCV 90   MCH 28.0   MCHC 31.1*   RDW 17.2*        INR  Recent Labs   Lab 01/31/20  0755   INR 3.70*     Arterial Blood GasNo lab results found in last 7 days.       Imaging/Procedures:     Results for orders placed or performed during the hospital encounter of 01/31/20   XR Chest Port 1 View    Narrative    CHEST PORTABLE ONE VIEW   1/31/2020 8:24 AM     HISTORY: Weak.    COMPARISON: Chest x-ray 12/6/2019.      Impression     IMPRESSION: Portable chest. Lungs are clear. Heart is normal in size.  No pneumothorax. Trace amount of right pleural fluid is new since  prior exam.    MELANI ENCARNACION MD

## 2020-01-31 NOTE — PLAN OF CARE
ROOM # 229    Living Situation (if not independent, order SW consult): [facility] with wife  Facility name:  : Dona wife- [phone]    Activity level at baseline: independent with walker or cane  Activity level on admit: Assist of two       Patient registered to observation; given Patient Bill of Rights; given the opportunity to ask questions about observation status and their plan of care.  Patient has been oriented to the observation room, bathroom and call light is in place.    Discussed discharge goals and expectations with patient/family.

## 2020-01-31 NOTE — PHARMACY-ANTICOAGULATION SERVICE
Clinical Pharmacy - Warfarin Dosing Consult     Pharmacy has been consulted to manage this patient s warfarin therapy.  Indication: Atrial Fibrillation  Therapy Goal: INR 2-3  Warfarin Prior to Admission: Yes  Warfarin PTA Regimen: 1.5 mg MWF, 1 mg TuThSaSu  Significant drug interactions: prednisone, levothyroxine    INR   Date Value Ref Range Status   01/31/2020 3.70 (H) 0.86 - 1.14 Final     Comment:     Results confirmed by repeat test   12/11/2019 2.15 (H) 0.86 - 1.14 Final       Recommend warfarin NO Dose Today.  Pharmacy will monitor Sean Brunnre daily and order warfarin doses to achieve specified goal.      Please contact pharmacy as soon as possible if the warfarin needs to be held for a procedure or if the warfarin goals change.

## 2020-01-31 NOTE — PHARMACY-ADMISSION MEDICATION HISTORY
Admission medication history interview status for this patient is complete. See [facility] admission navigator for allergy information, prior to admission medications and immunization status.     Medication history interview source(s):Patient's family member  Medication history resources (including written lists, pill bottles, clinic record): med list    Changes made to PTA medication list:  Added: none  Deleted: clindamycin gel, duplicate entries (prednisone 1mg, simvastatin, terazosin, multivitamins)  Changed: furosemide 40 mg every other day -> 20 mg daily.     Actions taken by pharmacist (provider contacted, etc):None     Additional medication history information:None    Medication reconciliation/reorder completed by provider prior to medication history? No    Prior to Admission medications    Medication Sig Last Dose Taking? Auth Provider   cyanocolbalamin (VITAMIN  B-12) 1000 MCG tablet Take 3,000 mcg by mouth every morning 1/31/2020 Yes Unknown, Entered By History   diclofenac (VOLTAREN) 1 % topical gel Apply 2 g topically 4 times daily as needed for moderate pain Apply to hip Send dosing card with product.  Yes Manny Pastor MD   divalproex sodium extended-release (DEPAKOTE ER) 500 MG 24 hr tablet TAKE 1 TABLET BY MOUTH EACH EVENING AT BEDTIME 1/30/2020 Yes Reported, Patient   furosemide (LASIX) 20 MG tablet Take 20 mg by mouth every morning 1/31/2020 Yes Unknown, Entered By History   levothyroxine (SYNTHROID) 150 MCG tablet Take 150 mcg by mouth every morning (before breakfast)  1/31/2020 Yes Reported, Patient   meclizine (ANTIVERT) 12.5 MG tablet Take 12.5 mg by mouth 3 times daily as needed for dizziness  Yes Unknown, Entered By History   metoprolol succinate ER (TOPROL-XL) 25 MG 24 hr tablet Take 0.5 tablets (12.5 mg) by mouth every evening 1/30/2020 at bedtime Yes Manny Pastor MD   multivitamin, therapeutic with minerals (THERA-VIT-M) TABS tablet Take 1 tablet by mouth 2 times daily (with meals)  1/30/2020 Yes Reported, Patient   pantoprazole (PROTONIX) 40 MG EC tablet Take 1 tablet (40 mg) by mouth 2 times daily 1/31/2020 at am Yes Manny Pastor MD   predniSONE (DELTASONE) 5 MG tablet Take 5 mg by mouth daily Takes with 1 mg tablet for total of 6 mg. 1/30/2020 Yes Unknown, Entered By History   predniSONE (MARKO) 1 MG tablet Take 1 mg by mouth daily Takes with 5 mg tablet for total of 6 mg. 1/30/2020 Yes Unknown, Entered By History   simethicone (MYLICON) 80 MG chewable tablet Take 1 tablet (80 mg) by mouth every 6 hours as needed for cramping or other (gas pains)  Yes Manny Pastor MD   simvastatin (ZOCOR) 40 MG tablet Take 40 mg by mouth daily (with dinner)  1/30/2020 Yes Reported, Patient   terazosin (HYTRIN) 5 MG capsule Take 5 mg by mouth At Bedtime 1/30/2020 Yes Unknown, Entered By History   traMADol (ULTRAM) 50 MG tablet Take 50 mg by mouth every 6 hours as needed for moderate pain   Yes Reported, Patient   warfarin ANTICOAGULANT (COUMADIN) 1 MG tablet Take 1.5 tablets (1.5 mg) by mouth three times a week Monday, Wednesday, and Friday 1/29/2020 Yes Manny Pastor MD   warfarin ANTICOAGULANT (COUMADIN) 1 MG tablet Take 1 tablet (1 mg) by mouth four times a week Sunday, Tuesday, Thursday, and Saturday 1/30/2020 Yes Manny Pastor MD

## 2020-01-31 NOTE — ED NOTES
[facility]  ED Nurse Handoff Report    Sean Brunner is a 85 year old male   ED Chief complaint: Generalized Weakness  . ED Diagnosis:   Final diagnoses:   Atrial fibrillation with RVR (H)   Acute on chronic anemia   Generalized weakness     Allergies: No Known Allergies    Code Status: Full Code  Activity level - Baseline/Home:  Independent. Activity Level - Current:   Assist X 2. Lift room needed: No. Bariatric: No   Needed: No   Isolation: No. Infection: Not Applicable.     Vital Signs:   Vitals:    01/31/20 0741   BP: 99/64   Resp: 16   Temp: 98.6  F (37  C)   TempSrc: Oral   SpO2: 100%       Cardiac Rhythm:  ,      Pain level:    Patient confused: No. Patient Falls Risk: Yes.   Elimination Status: Not in ED  Patient Report - Initial Complaint: Generalized Weakness. Focused Assessment: Sean Brunner is a 85 year old male, with history of atrial fibrillation on coumadin, cardiomyopathy, DVT, PE, and lower leg edema bilateral, who presents with generalized weakness. Per chart review, the patient was hospitalized here at [facility] from 12/6/2019 to 12/11/2019 after numerous complaints including headaches and abdominal pain, workup revealed acute on chronic anemia. The patient's wife notes that he was too weak to get out of bed/wheelchair and that she was unable to help him to the bathroom. She states that she has noticed that around a day ago and since he has been a little off and has had some generalized weakness. She states that he had not wanted to get dressed and get his INR checked. She also notes that he has scraped his right arm on a window. His wife denies emesis, diarrhea, or recent falls and he denies any other complaints of pain. He states that his typical blood pressure, when he checks at home, is around 90/50. His wife also notes that he uses a cane and walker to get around.    Tests Performed: EKG, Labs, Imaging. Abnormal Results:   Labs Ordered and Resulted from Time  of ED Arrival Up to the Time of Departure from the ED   GLUCOSE BY METER - Abnormal; Notable for the following components:       Result Value    Glucose 100 (*)     All other components within normal limits   CBC WITH PLATELETS DIFFERENTIAL - Abnormal; Notable for the following components:    RBC Count 2.82 (*)     Hemoglobin 7.9 (*)     Hematocrit 25.4 (*)     MCHC 31.1 (*)     RDW 17.2 (*)     All other components within normal limits   COMPREHENSIVE METABOLIC PANEL - Abnormal; Notable for the following components:    Glucose 111 (*)     Albumin 2.7 (*)     All other components within normal limits   TROPONIN I   INR   ROUTINE UA WITH MICROSCOPIC   PERIPHERAL IV CATHETER   CARDIAC CONTINUOUS MONITORING     XR Chest Port 1 View   Preliminary Result   IMPRESSION: Portable chest. Lungs are clear. Heart is normal in size.   No pneumothorax. Trace amount of right pleural fluid is new since   prior exam.        .   Treatments provided: See MAR  Family Comments: Wife at bedside  OBS brochure/video discussed/provided to patient:  Yes  ED Medications:   Medications   metoprolol (LOPRESSOR) injection 5 mg (5 mg Intravenous Given 1/31/20 0812)   metoprolol tartrate (LOPRESSOR) tablet 25 mg (25 mg Oral Given 1/31/20 0811)     Drips infusing:  No  For the majority of the shift, the patient's behavior Green. Interventions performed were N/A.     Severe Sepsis OR Septic Shock Diagnosis Present: No      ED Nurse Name/Phone Number: Mirella Mccormick RN,   8:46 AM    RECEIVING UNIT ED HANDOFF REVIEW    Above ED Nurse Handoff Report was reviewed: Yes  Reviewed by: Rebekah Yepez RN on January 31, 2020 at 11:02 AM

## 2020-01-31 NOTE — CONSULTS
"Cardiology consultation dictated    We were asked to address \"soft blood pressures\" and rapid ventricular response rate. Unfortunately, the patient appears to have severe right heart failure symptoms with JVD/HJR, anasarca, leg edema with chronic venous stasis ulcers. Right heart failure with venous/visceral congestion may explain some of his abdominal pain.      I would avoid calcium channel antagonists and use beta blockers, which are less likely to cause hypotension. I would strongly recommend you consider transfusion and follow hemoglobin levels in view of abdominal pain and hb 7.9, baseline appears to be nearer to 9.0. Repeat GI evaluation may be helpful if hemoglobin levels continue to fall.     Manoes      "

## 2020-01-31 NOTE — ED TRIAGE NOTES
"Pt states, \"I was to weak to get out of the recliner chair to go to the bathroom and goes to the bathroom a lot, get around with a walker at home and not able to this morning, was here about 6 weeks ago for the same thing.\" VSS and ABC's intact  "

## 2020-02-01 NOTE — PHARMACY-VANCOMYCIN DOSING SERVICE
Pharmacy Vancomycin Initial Note  Date of Service 2020  Patient's  1934  85 year old, male    Indication: G(+) Bacteremia    Current estimated CrCl = Estimated Creatinine Clearance: 56.7 mL/min (based on SCr of 1.07 mg/dL).    Creatinine for last 3 days  2020:  7:55 AM Creatinine 1.07 mg/dL    Recent Vancomycin Level(s) for last 3 days  No results found for requested labs within last 72 hours.      Vancomycin IV Administrations (past 72 hours)      No vancomycin orders with administrations in past 72 hours.                Nephrotoxins and other renal medications (From now, onward)    Start     Dose/Rate Route Frequency Ordered Stop    20 0500  vancomycin 1500 mg in 0.9% NaCl 250 ml intermittent infusion 1,500 mg      1,500 mg  over 90 Minutes Intravenous EVERY 12 HOURS 20 0448            Contrast Orders - past 72 hours (72h ago, onward)    Start     Dose/Rate Route Frequency Ordered Stop    20 1600  perflutren diluted 1mL to 2mL with saline (OPTISON) diluted injection 3 mL      3 mL Intravenous ONCE 20 1559 20 1600                Plan:  1.  Start vancomycin  1500 mg IV q12h.   2.  Goal Trough Level: 10-15 mg/L   3.  Pharmacy will check trough levels as appropriate in 3-5 Days.    4. Serum creatinine levels will be ordered a minimum of twice weekly.    5. [place] method utilized to dose vancomycin therapy: Method 2    Afshin Tamayo [facility]

## 2020-02-01 NOTE — CONSULTS
Consult Date:  01/31/2020      PRIMARY CARE PHYSICIAN:  Oscar Parekh MD      REQUESTING PHYSICIAN:  Dr. Segal.      HISTORY OF PRESENT ILLNESS:  Sean Brunner, an 85-year-old man with chronic right heart failure, persistent atrial fibrillation (on anticoagulation), chronic anemia (baseline hemoglobins between 9 and 10), polymyalgia rheumatica (on chronic steroid therapy), type 2 diabetes mellitus, hypothyroidism and chronic venous stasis ulcers, was evaluated in consultation at the request of Dr. Segal for elevated heart rates and hypotension.      Mr. Brunner's systolic blood pressures at home generally run in the range of 90 systolic.  He takes metoprolol ER 12.5 mg daily and warfarin for management of chronic atrial fibrillation.      The patient has a known history of at least moderate right ventricular dysfunction with severe 3-4+ tricuspid insufficiency and right ventricular chamber dilation.  The heart catheterization 2017 demonstrated mean PA pressures of 31 mmHg.  The patient has been followed in the Pulmonary Hypertension Clinic by Dr. Joyce.  He is not on specific therapy for pulmonary hypertension.  The patient has longstanding anasarca and bilateral leg edema with a previous history of venous insufficiency ulcers requiring aggressive intervention.  CT scans have shown no evidence of pulmonary embolus.  He has chronic sleep apnea with a CPAP device.  The patient has not had a repeat echo since 2017.      He was last hospitalized in early December between 12/6/2019 and 12/11/2019, abdominal discomfort.  He was seen by the GI service who did not advise colonoscopy and his last hemoglobin had been 9.1.      The patient presented to the emergency room this morning with a 2-3 day history of progressive weakness so bad the patient could not get out of his bed/wheelchair today.  He was brought to the emergency room by his wife.      ECG showed atrial fibrillation with ventricular response rates  between 120 and 140 initially.  Chest x-ray showed no acute changes.  The patient's hemoglobin was 7.9 with a white blood cell count of 8.1,  platelet count 163,000.  Troponins were negative.      The patient received a single oral dose of metoprolol and 5 of intravenous metoprolol.  The blood and urine cultures have been drawn and the patient has received empiric antibiotic therapy.  His chronic dose of steroids for treatment of polymyalgia rheumatica has been increased from 6 mg  to 20 mg on admission.  Echocardiogram has been ordered.      Because of the patient's soft blood pressure that limited the use of medications for control of ventricular response rate, we have been consulted.      Although the patient is fatigued, answers questions appropriately.  His speech is somewhat garbled, but his wife indicates that his speech is at baseline.  He knows where he is, the date and the place.  He denies chest discomfort or focal neurologic deficit in sensation, but admits he is very fatigued.      PAST MEDICAL HISTORY:   1.  Chronic right heart failure -- previous echo showing moderate right ventricular dysfunction with marked right ventricular enlargement and tricuspid insufficiency.   a.  Chronic anasarca with lower extremity edema and venous stasis ulcers.   b.  Previous right heart catheterization showing a mean PA pressure of 31.  No specific therapy advised for right-sided heart failure.   2.  Chronic atrial fibrillation -- on anticoagulation and beta blockade.   3.  Polymyalgia rheumatica -- on chronic steroid therapy.   4.  Diabetes mellitus.   5.  Chronic anemia -- baseline hemoglobins in the range of 9.   6.  History of pulmonary embolus and DVT -- Most recent CT scan showing no pulmonary embolus.   7.  Obesity.   8.  Chronic headaches with CT scan showing no definite pathology.   9.  Nonobstructive coronary artery disease.   10.  Status post knee replacements.      ALLERGIES:  NONE KNOWN.      HABITS:  The  patient does not abuse tobacco or alcohol.      SOCIAL HISTORY:  The patient is .  He and his wife have been  about 64 years.  They had 3 children, 1 who had Down syndrome and had developed dementia, another child who is 60 and did not , another child who was killed by a drunk  last 20 years ago.  The patient was an , but has been retired since age 58.  He has a very supportive wife who is hard of hearing at his bedside.      FAMILY HISTORY:  There is a family history of bypass surgery in 1 relative and history of esophageal cancer.      MEDICATIONS:   1.  Warfarin to maintain an INR between 2 and 3.   2.  Prednisone 20 mg given his first dose in the hospital.   3.  Terazosin 5 mg at bedtime.   3.  Levothyroxine 150 mcg daily.   4.  Ceftriaxone 1 gram IV q.24 hours.     Diltiazem has been discontinued, although it was going to be started.  He is not taking a beta blocker.      PHYSICAL EXAMINATION:   GENERAL:  Demonstrates a very pleasant 85-year-old man.  His speech is somewhat garbled, but he answers questions appropriately, knows where he is, the date and the time.  His wife indicates that his speech has always been this way.   LUNGS:  Clear to auscultation bilaterally.   CARDIOVASCULAR:  Shows an irregular S1 and S2.  The rhythm is tachycardic.   ABDOMEN:  Shows obesity with ascites.  I cannot palpate any internal organs.  The patient has a positive hepatojugular reflux and evidence of CV waves in his neck veins.   EXTREMITIES:  Show marked bilateral edema with evidence of chronic venous stasis changes.  The right leg is slightly more swollen than the left leg.  He has a 2-3+ pitting edema bilaterally.   NEUROLOGIC:  Cranial nerves II-XII are intact, although he is hard of hearing.  He moves all 4 extremities, but he shows with generalized weakness.   VITAL SIGNS:  His heart rate is about 129 at this time, blood pressure is 91/68, respiratory rate 16.      LABORATORY STUDIES:  I  have personally reviewed the patient's echocardiogram, although a formal report is pending, t patient has moderate to severe right ventricular chamber enlargement and severe right ventricular dysfunction.  He has 4+ tricuspid insufficiency due to annular dilatation.  Left ventricular systolic performance is very well preserved.  There is biatrial enlargement, more marked on the right side.  The patient is in atrial fibrillation with a rapid ventricular response rate.  His ECG shows atrial fibrillation with a ventricular response rate of about 140 beats per minute.  There is poor R-wave progression and nonspecific ST-T wave changes.  There is low limb lead voltage.  Potassium is 3.1, creatinine is 1.07, GFR 63.  His troponin is less than 0.015.  His hemoglobin is 7.9, white blood cell count is 8.1, platelet count is 653,000.  MCV is 90.      ASSESSMENT:  Mr. Brunner has severe right-sided heart failure complicated by anasarca and bilateral leg edema.  Although right-sided heart failure and atrial fibrillation are significantly contributing to the patient's malaise,  we should search for other factors that could be exacerbating his right heart failure such as anemia and infection. Diabetes and steroid therapy make him at risk for infection, a potentially reversible cause of decompensation.      There is little we can do for the right heart failure other than diuretics therapy and  and try to control the patient's ventricular response rate with beta blockers..  He is more anemic than he had been on previous hospitalizations, and transfusion may be reasonable along with GI evaluation. His hemoglobin levels should be tracked carefully.   I would avoid diltiazem, which is likely to cause further decompensation and instead favor low-dose beta blockade, anticoagulation and diuretics.Mechanical treatment of severe TR is really not a reasonable option. .       RECOMMENDATIONS:   1.  Agree with echocardiogram -- completed.   2.   Metoprolol 25 every 6 hours.   3.  Consider blood transfusion with diuresis as tolerated.   4.  Blood and urine cultures   5.  Agree with empiric antibiotic  treatment.            We have appreciated the opportunity to see your patient, Mr. Brunner.         THAD COLON MD             D: 2020   T: 2020   MT: GABRIEL      Name:     MAYITO BRUNNER   MRN:      [id number]        Account:       [id number]   :      1934           Consult Date:  2020      Document: [id number]       cc: Panfilo LEON PA-C

## 2020-02-01 NOTE — PROVIDER NOTIFICATION
Blood cultures positive for gram positive cocci in pairs and chains, per lab they are running ID test and will page back w/ results. Thank you.     Addendum: Received an update on blood cultures; lab identified streptococcus. Thank you.

## 2020-02-01 NOTE — PROGRESS NOTES
X-Cover    Notified about one out of two positive blood cultures with GPC in pairs and chains. Pt already on ceftriaxone for UTI, will add Vancomycin pending speciation

## 2020-02-01 NOTE — PLAN OF CARE
Pt was obs transfer this morning, soft bp of 99/75, A-Fib w/ RVR HR  bpm,  pt has had multiple >2.5 second pauses, MD garcia, pt A/OX4, garbled but logical speech noted, MD Luzma garcia, will continue to monitor for neuro changes, denies pain, +1-+3 BLE edema, hem-8.2 after recheck, INR-3.70, no timeline for discharge.      2200- BP 80/59, metoprolol and terazosin not given    Continued to frequently monitor pt's BP, 75/52 was the lowest value recorded, sbp continued to say in low 80's, pt asymptomatic and resting in bed.      2245- BP-94/63, will continue to monitor.

## 2020-02-01 NOTE — PROGRESS NOTES
"Cardiology Progress Note   Date of service: 20       Assessment and Plan:     1. Rapid atrial fibrillation, difficult to control secondary to hypotension    Blood pressure may be affected by infection, positive blood cultures and is on ceftriaxone currently.    Continue volume support as able.    Agree with adding digoxin.      If heart rates not controllable and limited by hypotension, may need to transfer to [facility] for AV node ablation and pacemaker implantation.  However, would want infection to clear before implanting any hardware.    We will continue to follow, heart rate control may be difficult and suboptimal until his infection is cleared.                 Interval History:   Sleeping comfortably, I did not wake patient              Review of Systems:   Cannot assess secondary to sleep           Physical Exam:     Vitals were reviewed  Blood pressure (!) 81/65, pulse 132, temperature 98  F (36.7  C), temperature source Oral, resp. rate 20, height 1.753 m (5' 9\"), weight 94.8 kg (209 lb), SpO2 97 %.  Temperatures:  Current - Temp: 98  F (36.7  C); Max - Temp  Av  F (36.7  C)  Min: 97.7  F (36.5  C)  Max: 98.3  F (36.8  C)  Respiration range: Resp  Av.6  Min: 16  Max: 20  Pulse range: Pulse  Av.8  Min: 121  Max: 132  Blood pressure range: Systolic (24hrs), Av , Min:71 , Max:99   ; Diastolic (24hrs), Av, Min:46, Max:75    Pulse oximetry range: SpO2  Av.8 %  Min: 92 %  Max: 97 %    Intake/Output Summary (Last 24 hours) at 2020 1246  Last data filed at 2020 0824  Gross per 24 hour   Intake 246 ml   Output 805 ml   Net -559 ml       Constitutional:   sleeping no apparent distress, and appears stated age     Eyes:   Lids and lashes normal,     Neck:   symmetrical, trachea midline, extremely large V wave consistent with his tricuspid regurgitation     Back:   symmetric     Lungs:   Symmetric     Cardiovascular:   Rapid atrial fibrillation     Abdomen:   benign            " Medications:     Current Facility-Administered Medications Ordered in Epic   Medication Dose Route Frequency Last Rate Last Dose     acetaminophen (TYLENOL) tablet 650 mg  650 mg Oral Q4H PRN   650 mg at 02/01/20 0634     bisacodyl (DULCOLAX) Suppository 10 mg  10 mg Rectal Daily PRN         cefTRIAXone (ROCEPHIN) 2 g vial to attach to  ml bag for ADULTS or NS 50 ml bag for PEDS  2 g Intravenous Q24H         diltiazem (CARDIZEM) 125 mg in sodium chloride 0.9 % 125 mL infusion  5-15 mg/hr Intravenous Continuous         divalproex sodium extended-release (DEPAKOTE ER) 24 hr tablet 500 mg  500 mg Oral QPM   500 mg at 01/31/20 2034     levothyroxine (SYNTHROID/LEVOTHROID) tablet 150 mcg  150 mcg Oral QAM AC   150 mcg at 02/01/20 0634     lidocaine (LMX4) cream   Topical Q1H PRN         lidocaine 1 % 0.1-1 mL  0.1-1 mL Other Q1H PRN         magnesium sulfate 4 g in 100 mL sterile water (premade)  4 g Intravenous Q4H PRN         melatonin tablet 1 mg  1 mg Oral At Bedtime PRN         metoprolol (LOPRESSOR) injection 5 mg  5 mg Intravenous Q5 Min PRN   5 mg at 01/31/20 0812     metoprolol tartrate (LOPRESSOR) tablet 25 mg  25 mg Oral 4x Daily   25 mg at 01/31/20 1827     naloxone (NARCAN) injection 0.1-0.4 mg  0.1-0.4 mg Intravenous Q2 Min PRN         ondansetron (ZOFRAN-ODT) ODT tab 4 mg  4 mg Oral Q6H PRN        Or     ondansetron (ZOFRAN) injection 4 mg  4 mg Intravenous Q6H PRN         pantoprazole (PROTONIX) EC tablet 40 mg  40 mg Oral BID   40 mg at 02/01/20 0841     Patient is already receiving anticoagulation with heparin, enoxaparin (LOVENOX), warfarin (COUMADIN)  or other anticoagulant medication   Does not apply Continuous PRN         polyethylene glycol (MIRALAX/GLYCOLAX) Packet 17 g  17 g Oral Daily PRN         potassium chloride (KLOR-CON) Packet 20-40 mEq  20-40 mEq Oral or Feeding Tube Q2H PRN         potassium chloride 10 mEq in 100 mL intermittent infusion with 10 mg lidocaine  10 mEq Intravenous  Q1H PRN         potassium chloride 10 mEq in 100 mL sterile water intermittent infusion (premix)  10 mEq Intravenous Q1H PRN         potassium chloride 20 mEq in 50 mL intermittent infusion  20 mEq Intravenous Q1H PRN         potassium chloride ER (K-DUR/KLOR-CON M) CR tablet 20-40 mEq  20-40 mEq Oral Q2H PRN         predniSONE (DELTASONE) tablet 20 mg  20 mg Oral Daily   20 mg at 02/01/20 0841     senna-docusate (SENOKOT-S/PERICOLACE) 8.6-50 MG per tablet 1 tablet  1 tablet Oral BID PRN        Or     senna-docusate (SENOKOT-S/PERICOLACE) 8.6-50 MG per tablet 2 tablet  2 tablet Oral BID PRN         simvastatin (ZOCOR) tablet 40 mg  40 mg Oral Daily with supper   40 mg at 01/31/20 1706     sodium chloride (PF) 0.9% PF flush 3 mL  3 mL Intracatheter q1 min prn         sodium chloride (PF) 0.9% PF flush 3 mL  3 mL Intracatheter Q8H   3 mL at 02/01/20 0843     terazosin (HYTRIN) capsule 5 mg  5 mg Oral At Bedtime         Warfarin Therapy Reminder (Check START DATE - warfarin may be starting in the FUTURE)  1 each Does not apply Continuous PRN         No current [facility]-ordered outpatient medications on file.                Data:   All laboratory data reviewed  Results for orders placed or performed during the hospital encounter of 01/31/20 (from the past 24 hour(s))   CT Head w/o Contrast    Narrative    CT SCAN OF THE HEAD WITHOUT CONTRAST   1/31/2020 1:24 PM     HISTORY: Altered mental status, unexplained; increased lethargy,  supratherapeutic INR.    TECHNIQUE:  Axial images of the head and coronal reformations without  IV contrast material.  Radiation dose for this scan was reduced using  automated exposure control, adjustment of the mA and/or kV according  to patient size, or iterative reconstruction technique.    COMPARISON: 12/6/2019    FINDINGS: Some cerebral atrophy is present. There is minimal  nonspecific white matter changes which are probably due to chronic  small vessel ischemic disease. There is also  persistent low-density in  the splenium of the corpus callosum similar to that seen on the prior  exam. This is not associated with volume loss. There is no evidence  for intracranial hemorrhage, mass effect, acute infarct, or skull  fracture. Vascular calcifications noted.      Impression    IMPRESSION:  1. No evidence for intracranial hemorrhage, acute infarct or definite  mass effect.  2. Cerebral atrophy with some presumed chronic small vessel ischemic  changes in the white matter.  3. Persistent low-density in the splenium of the corpus callosum  without volume loss. On MR dated 2019, there was abnormal T2  signal intensity in this region without enhancement. This is  nonspecific. If clinical symptoms are persistent or progressive,  continued surveillance with MR might be helpful in further evaluation.    MISAEL DOBBINS MD   Echocardiogram Complete    Narrative    [id number]  [facility]  [id number]  [id number]^CAROLYN^ELLY^S           [facility]  Echocardiography Laboratory  [address] [place] [address]        Name: MAYITO ANSARI  MRN: [id number]  : 1934  Study Date: 2020 03:52 PM  Age: 85 yrs  Gender: Male  Patient Location: [facility]  Reason For Study: Afib  Ordering Physician: ELLY LEON  Referring Physician: ERIKA TONEY  Performed By: Fabrizio Junior RDCS     BSA: 2.1 m2  Height: 69 in  Weight: 204 lb  BP: 91/66 mmHg  _____________________________________________________________________________  __        Procedure  Complete Portable Echo Adult. Optison (NDC #[id number]) given intravenously.  _____________________________________________________________________________  __        Interpretation Summary     The visual ejection fraction is estimated at 45-50%.  Flattened septum is consistent with RV pressure/volume overload.  The right ventricle is moderate to severely dilated.  The left atrium is moderate to severely dilated.  The right atrium is severely  "dilated.  Tricuspid valve fails to coapt  There is severe (4+) tricuspid regurgitation.  IVC diameter >2.1 cm collapsing <50% with sniff suggests a high RA pressure  estimated at 15 mmHg or greater.  Right ventricular systolic pressure could be underestimated due to incomplete  tricuspid regurgitation velocity envelope.  The aortic valve is not well visualized.  Moderate to severe valvular aortic stenosis.  Aortic stenosis present. It is likely \"low flow-low gradient\" significant  aortic stenosis (significantly reduced SVI noted)  The rhythm was rapid atrial fibrillation.  The right ventricular systolic function is moderate to severely reduced.  _____________________________________________________________________________  __        Left Ventricle  The left ventricle is normal in size. There is normal left ventricular wall  thickness. The visual ejection fraction is estimated at 45-50%. Diastolic  function not assessed due to atrial fibrillation. Flattened septum is  consistent with RV pressure/volume overload. There is no thrombus seen in the  left ventricle.     Right Ventricle  The right ventricle is moderate to severely dilated. The right ventricular  systolic function is moderate to severely reduced.     Atria  The left atrium is moderate to severely dilated. The right atrium is severely  dilated. There is no color Doppler evidence of an atrial shunt.     Mitral Valve  There is mild to moderate mitral annular calcification. There is trace mitral  regurgitation.        Tricuspid Valve  Tricuspid valve fails to coapt. There is severe (4+) tricuspid regurgitation.  IVC diameter >2.1 cm collapsing <50% with sniff suggests a high RA pressure  estimated at 15 mmHg or greater. The right ventricular systolic pressure is  approximated at 20.2 mmHg plus the right atrial pressure. Right ventricular  systolic pressure could be underestimated due to incomplete tricuspid  regurgitation velocity envelope.     Aortic " "Valve  The aortic valve is not well visualized. There is trace aortic regurgitation.  Moderate to severe valvular aortic stenosis. Aortic stenosis present. It is  likely \"low flow-low gradient\" significant aortic stenosis (significantly  reduced SVI noted).     Pulmonic Valve  The pulmonic valve is not well seen, but is grossly normal.     Vessels  Normal size aorta.     Pericardium  The pericardium appears normal.        Rhythm  The rhythm was rapid atrial fibrillation.  _____________________________________________________________________________  __  MMode/2D Measurements & Calculations     IVSd: 0.88 cm  LVIDd: 5.1 cm  LVIDs: 3.7 cm  LVPWd: 0.98 cm  FS: 26.9 %  LV mass(C)d: 168.3 grams  LV mass(C)dI: 80.8 grams/m2  Ao root diam: 3.2 cm  LA dimension: 4.4 cm  asc Aorta Diam: 3.2 cm  LA/Ao: 1.4  LVOT diam: 1.9 cm  LVOT area: 2.8 cm2  LA Volume (BP): 89.1 ml  LA Volume Index (BP): 42.8 ml/m2  RWT: 0.39           Doppler Measurements & Calculations  MV E max lei: 98.5 cm/sec  Ao V2 max: 279.9 cm/sec  Ao max P.0 mmHg  Ao V2 mean: 197.8 cm/sec  Ao mean P.9 mmHg  Ao V2 VTI: 48.9 cm  ROMERO(I,D): 0.97 cm2  ROMERO(V,D): 0.97 cm2  LV V1 max PG: 3.7 mmHg  LV V1 max: 96.2 cm/sec  LV V1 VTI: 16.7 cm  CO(LVOT): 6.4 l/min  CI(LVOT): 3.1 l/min/m2  SV(LVOT): 47.4 ml  SI(LVOT): 22.7 ml/m2  TR max lei: 222.6 cm/sec  TR max P.2 mmHg  AV Lei Ratio (DI): 0.34  ROMERO Index (cm2/m2): 0.46  E/E' avg: 10.0  Lateral E/e': 8.9  Medial E/e': 11.2              _____________________________________________________________________________  __        Report approved by: Colin Anderson 2020 05:06 PM      Hemoglobin   Result Value Ref Range    Hemoglobin 8.2 (L) 13.3 - 17.7 g/dL   Glucose by meter   Result Value Ref Range    Glucose 171 (H) 70 - 99 mg/dL   Basic metabolic panel   Result Value Ref Range    Sodium 139 133 - 144 mmol/L    Potassium 3.9 3.4 - 5.3 mmol/L    Chloride 105 94 - 109 mmol/L    Carbon Dioxide 29 20 - 32 " mmol/L    Anion Gap 5 3 - 14 mmol/L    Glucose 127 (H) 70 - 99 mg/dL    Urea Nitrogen 21 7 - 30 mg/dL    Creatinine 0.99 0.66 - 1.25 mg/dL    GFR Estimate 69 >60 mL/min/[1.73_m2]    GFR Estimate If Black 80 >60 mL/min/[1.73_m2]    Calcium 9.1 8.5 - 10.1 mg/dL   CBC with platelets   Result Value Ref Range    WBC 8.4 4.0 - 11.0 10e9/L    RBC Count 2.98 (L) 4.4 - 5.9 10e12/L    Hemoglobin 8.5 (L) 13.3 - 17.7 g/dL    Hematocrit 27.2 (L) 40.0 - 53.0 %    MCV 91 78 - 100 fl    MCH 28.5 26.5 - 33.0 pg    MCHC 31.3 (L) 31.5 - 36.5 g/dL    RDW 17.4 (H) 10.0 - 15.0 %    Platelet Count 170 150 - 450 10e9/L   INR   Result Value Ref Range    INR 3.26 (H) 0.86 - 1.14       All laboratory data reviewed  No results found for: CHOL  No results found for: HDL  No results found for: LDL  No results found for: TRIG  No results found for: CHOLHDLRATIO  TSH   Date Value Ref Range Status   09/06/2018 3.47 0.40 - 4.00 mU/L Final     Last Basic Metabolic Panel:  Lab Results   Component Value Date     02/01/2020      Lab Results   Component Value Date    POTASSIUM 3.9 02/01/2020     Lab Results   Component Value Date    CHLORIDE 105 02/01/2020     Lab Results   Component Value Date    TYSON 9.1 02/01/2020     Lab Results   Component Value Date    CO2 29 02/01/2020     Lab Results   Component Value Date    BUN 21 02/01/2020     Lab Results   Component Value Date    CR 0.99 02/01/2020     Lab Results   Component Value Date     02/01/2020     Lab Results   Component Value Date    WBC 8.4 02/01/2020     Lab Results   Component Value Date    RBC 2.98 02/01/2020     Lab Results   Component Value Date    HGB 8.5 02/01/2020     Lab Results   Component Value Date    HCT 27.2 02/01/2020     Lab Results   Component Value Date    MCV 91 02/01/2020     Lab Results   Component Value Date    MCH 28.5 02/01/2020     Lab Results   Component Value Date    MCHC 31.3 02/01/2020     Lab Results   Component Value Date    RDW 17.4 02/01/2020     Lab  Results   Component Value Date     02/01/2020

## 2020-02-01 NOTE — PLAN OF CARE
Alert, disoriented to date. VSS on RA ex systolic BP continues to run soft but is stable throughout night between 86-88. Cardiology following. Blood cultures positive; vanco started. Also continues on rocephin. Tele a fib RVR. Up w/ A2, gait belt, and walker. Using urinal in bed during night w/ good output. . Received PRN tylenol x1 for headache.

## 2020-02-01 NOTE — PROVIDER NOTIFICATION
Paged dr wilhelm: BP 70s/50s and -130s, pt is asymptomatic other than feeling sleepy. please advise.    Addendum 0935: recheck BP 81/59.    Addendum 1121: spoke with md regarding continued HR elevation in the 110-130 range despite slightly better BP earlier.  Asking md if digoxin can be ordered. He states that cardiology is following and would like to defer to them. After further discussion he states that he will order a one time dose of IV digoxin and if HR improves he will order another 6 hours later.  Will wait for orders and give. Continue poc and close monitoring.     Addendum 1320: spoke with dr wilhelm regarding HR being unaffected (120-130s) by x1 dose iv digoxin, see cardiology note for details, agrees with continued use. md states he will enter orders for this.

## 2020-02-01 NOTE — PROGRESS NOTES
[facility]    Medicine Progress Note - Hospitalist Service       Date of Admission:  1/31/2020  Assessment & Plan       Sean Brunner is an 85 year old male with a PMH of Polymyalgia Rheumatica, Pulmonary HTN, Iron Deficiency, Arthritis, DM Type 2, Hiatel Hernia, GERD, Venous Stasis, Hypothyroidism, PE, DVT, Chronic A. fib  on warfarin, Chronic Headaches who presented to the emergency room with generalized weakness and fatigue, found to have A. fib with RVR and admitted to the hospital for further evaluation management.        1.  Afib with RVR.  -Patient remained in RVR, cardiology evaluated the patient recommended to use beta-blocker and calcium channel blocker due to hypotension.  -Started on metoprolol 25 mg every 6 hours with parameters for blood pressure.  -Blood pressure remains soft and metoprolol is on hold.  -Ordered 1 dose of digoxin 0.25 mg IV x1.  -Cardiology consulted input appreciated.  -Continue telemonitoring .  -Troponin negative and patient asymptomatic.  -Echocardiogram showed left ventricular ejection fraction 45 to 50%, flattened septum consistent with right ventricular pressure and and volume overload, right ventricular systolic function is moderate to severely reduced,  TR, aortic stenosis.     2. Generalized Weakness, Fatigue/increased lethargy   -This could be multifactorial, including infection, right heart failure.  -Has intermittently garbled speech, baseline speech is not clear per family.   -No fever, WBC is within normal limit.  -Blood pressure is soft, worsened by rate control medication  -CT scan of the head negative for acute abnormality    3.  Possible UTI.  -UA showed WBC of 13, leukocyte esterase positive.  -Urine culture so far negative  -Blood culture 1 of 2 came back positive for strep this patient is.  -Continue ceftriaxone.  4.  1 of 2 blood culture came back positive for strep species.  -This is likely contaminant  -Discontinued vancomycin  -We will  "continue Rocephin     5.  CAD, CHF, HTN, HLD - hx of cor pulmonale, with right ventricular failure, decreased EF based on echocardiogram done on admission.  -Patient has peripheral edema   -Diuretics is on hold due to hypotension and to get room for rate control medication.  -Cardiology is consulted input appreciated.  - Continue pta Simvastatin     6. Chronic Anemia -globin was 7.9 on admission today it is 8.2.  -No indication for transfusion .  -No sign of active bleeding .  -Check hemoglobin tomorrow .  - Pt evaluated by GI last month who felt \"that he is probably losing blood because of being on Coumadin for such a long time, he will lose a small amount of blood which will be imperceptible for him.    -Continue to hold warfarin for now until INR is less than 2.  - Monitor stool output for signs of blood loss.  -Continue Protonix for GERD.    7.  Polymyalgia Rheumatica -patient is chronically on his prednisone 6 mg p.o. daily.  -Given hypotension increased to 20 mg p.o. daily for now.     Diet: Combination Diet Regular Diet Adult    DVT Prophylaxis: Warfarin  Corley Catheter: not present  Code Status: Full Code      Disposition Plan   Expected discharge: 2 to 3 days pending further improvement.  Entered: Panfilo Segal MD 02/01/2020, 12:45 PM     I discussed with patient at length the plan of care, all his question concerns addressed.    Panfilo Segal MD  Hospitalist Service  [facility]    ______________________________________________________________________    Interval History   Patient seen and examined, admitted yesterday with FRANCISCO melgar with RVR, generalized weakness and fatigue, shortness of breath.  Denied any nausea or vomiting, no pain.  Stated he still feels weak and tired.    Data reviewed today: I reviewed all medications, new labs and imaging results over the last 24 hours. I personally reviewed .    Physical Exam   Vital Signs: Temp: 98  F (36.7  C) Temp src: Oral BP: (!) " 81/65 Pulse: 132 Heart Rate: 122(tele) Resp: 20 SpO2: 97 % O2 Device: None (Room air) Oxygen Delivery: 2 LPM  Weight: 209 lbs 0 oz  General Appearance: Awake, not in distress.  Respiratory: Good air entry bilaterally, no wheezing crackles or rales.  Cardiovascular: S1 and S2 well heard, tachycardic, irregularly irregular.  GI: Soft, nontender, nondistended, positive bowel sound.  Skin: No rash, exanthems or petechia.  Extremity: +3 pedal pretibial and ankle edema bilateral.    Data   Recent Labs   Lab 02/01/20  0628 01/31/20  1807 01/31/20  0755   WBC 8.4  --  8.1   HGB 8.5* 8.2* 7.9*   MCV 91  --  90     --  163   INR 3.26*  --  3.70*     --  138   POTASSIUM 3.9  --  3.6   CHLORIDE 105  --  103   CO2 29  --  31   BUN 21  --  23   CR 0.99  --  1.07   ANIONGAP 5  --  4   TYSON 9.1  --  9.4   *  --  111*   ALBUMIN  --   --  2.7*   PROTTOTAL  --   --  7.4   BILITOTAL  --   --  0.7   ALKPHOS  --   --  75   ALT  --   --  15   AST  --   --  17   TROPI  --   --  <0.015     Recent Results (from the past 24 hour(s))   CT Head w/o Contrast    Narrative    CT SCAN OF THE HEAD WITHOUT CONTRAST   1/31/2020 1:24 PM     HISTORY: Altered mental status, unexplained; increased lethargy,  supratherapeutic INR.    TECHNIQUE:  Axial images of the head and coronal reformations without  IV contrast material.  Radiation dose for this scan was reduced using  automated exposure control, adjustment of the mA and/or kV according  to patient size, or iterative reconstruction technique.    COMPARISON: 12/6/2019    FINDINGS: Some cerebral atrophy is present. There is minimal  nonspecific white matter changes which are probably due to chronic  small vessel ischemic disease. There is also persistent low-density in  the splenium of the corpus callosum similar to that seen on the prior  exam. This is not associated with volume loss. There is no evidence  for intracranial hemorrhage, mass effect, acute infarct, or skull  fracture.  Vascular calcifications noted.      Impression    IMPRESSION:  1. No evidence for intracranial hemorrhage, acute infarct or definite  mass effect.  2. Cerebral atrophy with some presumed chronic small vessel ischemic  changes in the white matter.  3. Persistent low-density in the splenium of the corpus callosum  without volume loss. On MR dated 2019, there was abnormal T2  signal intensity in this region without enhancement. This is  nonspecific. If clinical symptoms are persistent or progressive,  continued surveillance with MR might be helpful in further evaluation.    MISAEL DOBBINS MD   Echocardiogram Complete    Narrative    [id number]  [id number]  [id number]  [id number]^CAROLYN^ELLY^S           [facility]  Echocardiography Laboratory  [address] [place] [address]        Name: MAYITO ANSARI  MRN: [id number]  : 1934  Study Date: 2020 03:52 PM  Age: 85 yrs  Gender: Male  Patient Location: [facility]  Reason For Study: Afib  Ordering Physician: ELLY LEON  Referring Physician: ERIKA TONEY  Performed By: Fabrizio Junior RDCS     BSA: 2.1 m2  Height: 69 in  Weight: 204 lb  BP: 91/66 mmHg  _____________________________________________________________________________  __        Procedure  Complete Portable Echo Adult. Optison (NDC #6303-8726) given intravenously.  _____________________________________________________________________________  __        Interpretation Summary     The visual ejection fraction is estimated at 45-50%.  Flattened septum is consistent with RV pressure/volume overload.  The right ventricle is moderate to severely dilated.  The left atrium is moderate to severely dilated.  The right atrium is severely dilated.  Tricuspid valve fails to coapt  There is severe (4+) tricuspid regurgitation.  IVC diameter >2.1 cm collapsing <50% with sniff suggests a high RA pressure  estimated at 15 mmHg or greater.  Right ventricular systolic pressure could be  "underestimated due to incomplete  tricuspid regurgitation velocity envelope.  The aortic valve is not well visualized.  Moderate to severe valvular aortic stenosis.  Aortic stenosis present. It is likely \"low flow-low gradient\" significant  aortic stenosis (significantly reduced SVI noted)  The rhythm was rapid atrial fibrillation.  The right ventricular systolic function is moderate to severely reduced.  _____________________________________________________________________________  __        Left Ventricle  The left ventricle is normal in size. There is normal left ventricular wall  thickness. The visual ejection fraction is estimated at 45-50%. Diastolic  function not assessed due to atrial fibrillation. Flattened septum is  consistent with RV pressure/volume overload. There is no thrombus seen in the  left ventricle.     Right Ventricle  The right ventricle is moderate to severely dilated. The right ventricular  systolic function is moderate to severely reduced.     Atria  The left atrium is moderate to severely dilated. The right atrium is severely  dilated. There is no color Doppler evidence of an atrial shunt.     Mitral Valve  There is mild to moderate mitral annular calcification. There is trace mitral  regurgitation.        Tricuspid Valve  Tricuspid valve fails to coapt. There is severe (4+) tricuspid regurgitation.  IVC diameter >2.1 cm collapsing <50% with sniff suggests a high RA pressure  estimated at 15 mmHg or greater. The right ventricular systolic pressure is  approximated at 20.2 mmHg plus the right atrial pressure. Right ventricular  systolic pressure could be underestimated due to incomplete tricuspid  regurgitation velocity envelope.     Aortic Valve  The aortic valve is not well visualized. There is trace aortic regurgitation.  Moderate to severe valvular aortic stenosis. Aortic stenosis present. It is  likely \"low flow-low gradient\" significant aortic stenosis (significantly  reduced SVI " noted).     Pulmonic Valve  The pulmonic valve is not well seen, but is grossly normal.     Vessels  Normal size aorta.     Pericardium  The pericardium appears normal.        Rhythm  The rhythm was rapid atrial fibrillation.  _____________________________________________________________________________  __  MMode/2D Measurements & Calculations     IVSd: 0.88 cm  LVIDd: 5.1 cm  LVIDs: 3.7 cm  LVPWd: 0.98 cm  FS: 26.9 %  LV mass(C)d: 168.3 grams  LV mass(C)dI: 80.8 grams/m2  Ao root diam: 3.2 cm  LA dimension: 4.4 cm  asc Aorta Diam: 3.2 cm  LA/Ao: 1.4  LVOT diam: 1.9 cm  LVOT area: 2.8 cm2  LA Volume (BP): 89.1 ml  LA Volume Index (BP): 42.8 ml/m2  RWT: 0.39           Doppler Measurements & Calculations  MV E max lei: 98.5 cm/sec  Ao V2 max: 279.9 cm/sec  Ao max P.0 mmHg  Ao V2 mean: 197.8 cm/sec  Ao mean P.9 mmHg  Ao V2 VTI: 48.9 cm  ROMERO(I,D): 0.97 cm2  ROMERO(V,D): 0.97 cm2  LV V1 max PG: 3.7 mmHg  LV V1 max: 96.2 cm/sec  LV V1 VTI: 16.7 cm  CO(LVOT): 6.4 l/min  CI(LVOT): 3.1 l/min/m2  SV(LVOT): 47.4 ml  SI(LVOT): 22.7 ml/m2  TR max lei: 222.6 cm/sec  TR max P.2 mmHg  AV Lei Ratio (DI): 0.34  ROMERO Index (cm2/m2): 0.46  E/E' avg: 10.0  Lateral E/e': 8.9  Medial E/e': 11.2              _____________________________________________________________________________  __        Report approved by: Colin Anderson 2020 05:06 PM        Medications     diltiazem (CARDIZEM) infusion ADULT       - MEDICATION INSTRUCTIONS -       Warfarin Therapy Reminder         cefTRIAXone  2 g Intravenous Q24H     divalproex sodium extended-release  500 mg Oral QPM     levothyroxine  150 mcg Oral QAM AC     metoprolol tartrate  25 mg Oral 4x Daily     pantoprazole  40 mg Oral BID     predniSONE  20 mg Oral Daily     simvastatin  40 mg Oral Daily with supper     sodium chloride (PF)  3 mL Intracatheter Q8H     terazosin  5 mg Oral At Bedtime

## 2020-02-01 NOTE — PLAN OF CARE
Vss ex SBP soft and HR elevated (see MAR and notes for details on pages/interventions), no co pain/cp/sob, tylenol given for HA.  neuro check remains unchanged from previous. Tele AFIB RVR, metoprolol held and IV digoxin given.  IV rocephin dose increased, vanco dc'd.  [place], alarms on for safety, INC of urine at times, see assessment for skin details, no changes from previous assessment on transfer.  INR 3.26, K+ 3.9, lactic 1.3, hgb 8.5. Cardiology following, see note for details. PT consulted, not yet seen today. Continue poc and close monitoring.

## 2020-02-01 NOTE — PROVIDER NOTIFICATION
Spoke with dr Dao of cardiology regarding this pt, update was given (elevated HR, meds given, etc) and asking md to suggest wether or not digoxin should be continued or something else.  He states that he will address RN concern when he rounds on the pt soon.

## 2020-02-02 NOTE — PLAN OF CARE
"/60 (BP Location: Right arm)   Pulse 73   Temp 98.1  F (36.7  C) (Oral)   Resp 16   Ht 1.753 m (5' 9\")   Wt 94.3 kg (208 lb)   SpO2 97%   BMI 30.72 kg/m     Neuro: A&Ox4  Respiratory: LS diminished  GI: WDL  : Uses urinal at bedside  Skin: Cuts on right arm from fall at home. Blanchable redness  Pain: Chronic headache treated with tramadol  IV: Saline locked. Abx - rocephin  Transfer: Ax2 with gaitbelt and walker. Unable to get out of bed today r/t dizziness. Meclizine given.   Diet: Regular  Tele: Afib RVR/CVR (hr )  Plan: Possible discharge in 2-3 days pending on improvement.     "

## 2020-02-02 NOTE — PROGRESS NOTES
"Cardiology Progress Note   Date of service: 20       Assessment and Plan:     1. Chronic atrial fibrillation with exacerbation of heart rate in the setting of hypotension and possible infection    Heart rates are improving with metoprolol and digoxin    Continue current regimen and continue supportive care.    Please reconsult if heart rates are not controllable and patient symptomatic.  We will sign off.                   Interval History:   Further improvement in blood pressure and heart rate on antibiotics and with metoprolol plus digoxin              Review of Systems:   As per subjective, otherwise 5 systems reviewed and negative.           Physical Exam:     Vitals were reviewed  Blood pressure 100/60, pulse 73, temperature 98.1  F (36.7  C), temperature source Oral, resp. rate 16, height 1.753 m (5' 9\"), weight 94.3 kg (208 lb), SpO2 97 %.  Temperatures:  Current - Temp: 98.1  F (36.7  C); Max - Temp  Av.8  F (36.6  C)  Min: 97.3  F (36.3  C)  Max: 98.1  F (36.7  C)  Respiration range: Resp  Av.5  Min: 16  Max: 20  Pulse range: Pulse  Av.8  Min: 73  Max: 133  Blood pressure range: Systolic (24hrs), Av , Min:82 , Max:100   ; Diastolic (24hrs), Av, Min:54, Max:71    Pulse oximetry range: SpO2  Av %  Min: 96 %  Max: 98 %    Intake/Output Summary (Last 24 hours) at 2020 1327  Last data filed at 2020 1237  Gross per 24 hour   Intake 1013 ml   Output 575 ml   Net 438 ml       Constitutional:   awake, alert, cooperative, no apparent distress, and appears stated age     Eyes:   Lids and lashes normal, pupils equal, round and reactive to light, extra ocular muscles intact, sclera clear, conjunctiva normal     Neck:   supple, symmetrical, trachea midline, no JVD     Back:   symmetric     Lungs:   symmetric     Cardiovascular:   Irregular with overall good rate control but bursts of tachycardia     Abdomen:   benign     Musculoskeletal:   motor strength is 5 out of 5 all " extremities bilaterally     Neurologic:   Grossly nonfocal     Skin:   normal skin color, texture, turgor            Medications:     Current Facility-Administered Medications Ordered in Epic   Medication Dose Route Frequency Last Rate Last Dose     acetaminophen (TYLENOL) tablet 650 mg  650 mg Oral Q4H PRN   650 mg at 02/01/20 1824     bisacodyl (DULCOLAX) Suppository 10 mg  10 mg Rectal Daily PRN         cefTRIAXone (ROCEPHIN) 2 g vial to attach to  ml bag for ADULTS or NS 50 ml bag for PEDS  2 g Intravenous Q24H 200 mL/hr at 02/01/20 1403 2 g at 02/01/20 1403     digoxin (LANOXIN) tablet 125 mcg  125 mcg Oral Daily   125 mcg at 02/02/20 0905     diltiazem (CARDIZEM) 125 mg in sodium chloride 0.9 % 125 mL infusion  5-15 mg/hr Intravenous Continuous         divalproex sodium extended-release (DEPAKOTE ER) 24 hr tablet 500 mg  500 mg Oral QPM   500 mg at 02/01/20 2010     levothyroxine (SYNTHROID/LEVOTHROID) tablet 150 mcg  150 mcg Oral QAM AC   150 mcg at 02/02/20 0603     lidocaine (LMX4) cream   Topical Q1H PRN         lidocaine 1 % 0.1-1 mL  0.1-1 mL Other Q1H PRN         magnesium sulfate 4 g in 100 mL sterile water (premade)  4 g Intravenous Q4H PRN         meclizine (ANTIVERT) tablet 12.5-25 mg  12.5-25 mg Oral TID PRN         melatonin tablet 1 mg  1 mg Oral At Bedtime PRN         metoprolol (LOPRESSOR) injection 5 mg  5 mg Intravenous Q5 Min PRN   5 mg at 01/31/20 0812     metoprolol tartrate (LOPRESSOR) tablet 25 mg  25 mg Oral 4x Daily   25 mg at 02/02/20 1242     naloxone (NARCAN) injection 0.1-0.4 mg  0.1-0.4 mg Intravenous Q2 Min PRN         ondansetron (ZOFRAN-ODT) ODT tab 4 mg  4 mg Oral Q6H PRN        Or     ondansetron (ZOFRAN) injection 4 mg  4 mg Intravenous Q6H PRN         pantoprazole (PROTONIX) EC tablet 40 mg  40 mg Oral BID   40 mg at 02/02/20 0812     Patient is already receiving anticoagulation with heparin, enoxaparin (LOVENOX), warfarin (COUMADIN)  or other anticoagulant medication    Does not apply Continuous PRN         polyethylene glycol (MIRALAX/GLYCOLAX) Packet 17 g  17 g Oral Daily PRN         potassium chloride (KLOR-CON) Packet 20-40 mEq  20-40 mEq Oral or Feeding Tube Q2H PRN         potassium chloride 10 mEq in 100 mL intermittent infusion with 10 mg lidocaine  10 mEq Intravenous Q1H PRN         potassium chloride 10 mEq in 100 mL sterile water intermittent infusion (premix)  10 mEq Intravenous Q1H PRN         potassium chloride 20 mEq in 50 mL intermittent infusion  20 mEq Intravenous Q1H PRN         potassium chloride ER (K-DUR/KLOR-CON M) CR tablet 20-40 mEq  20-40 mEq Oral Q2H PRN         predniSONE (DELTASONE) tablet 20 mg  20 mg Oral Daily   20 mg at 02/02/20 0812     senna-docusate (SENOKOT-S/PERICOLACE) 8.6-50 MG per tablet 1 tablet  1 tablet Oral BID PRN        Or     senna-docusate (SENOKOT-S/PERICOLACE) 8.6-50 MG per tablet 2 tablet  2 tablet Oral BID PRN         simvastatin (ZOCOR) tablet 40 mg  40 mg Oral Daily with supper   40 mg at 02/01/20 1609     sodium chloride (PF) 0.9% PF flush 3 mL  3 mL Intracatheter q1 min prn         sodium chloride (PF) 0.9% PF flush 3 mL  3 mL Intracatheter Q8H   3 mL at 02/02/20 0812     terazosin (HYTRIN) capsule 5 mg  5 mg Oral At Bedtime         traMADol (ULTRAM) tablet 50 mg  50 mg Oral Q6H PRN   50 mg at 02/02/20 1029     Warfarin Therapy Reminder (Check START DATE - warfarin may be starting in the FUTURE)  1 each Does not apply Continuous PRN         No current [facility]-ordered outpatient medications on file.                Data:   All laboratory data reviewed  Results for orders placed or performed during the hospital encounter of 01/31/20 (from the past 24 hour(s))   INR   Result Value Ref Range    INR 3.46 (H) 0.86 - 1.14   TSH   Result Value Ref Range    TSH 1.27 0.40 - 4.00 mU/L       All laboratory data reviewed  No results found for: CHOL  No results found for: HDL  No results found for: LDL  No results found for: TRIG  No results  found for: CHOLHDLRATIO  TSH   Date Value Ref Range Status   02/02/2020 1.27 0.40 - 4.00 mU/L Final     Last Basic Metabolic Panel:  Lab Results   Component Value Date     02/01/2020      Lab Results   Component Value Date    POTASSIUM 3.9 02/01/2020     Lab Results   Component Value Date    CHLORIDE 105 02/01/2020     Lab Results   Component Value Date    TYSON 9.1 02/01/2020     Lab Results   Component Value Date    CO2 29 02/01/2020     Lab Results   Component Value Date    BUN 21 02/01/2020     Lab Results   Component Value Date    CR 0.99 02/01/2020     Lab Results   Component Value Date     02/01/2020     Lab Results   Component Value Date    WBC 8.4 02/01/2020     Lab Results   Component Value Date    RBC 2.98 02/01/2020     Lab Results   Component Value Date    HGB 8.5 02/01/2020     Lab Results   Component Value Date    HCT 27.2 02/01/2020     Lab Results   Component Value Date    MCV 91 02/01/2020     Lab Results   Component Value Date    MCH 28.5 02/01/2020     Lab Results   Component Value Date    MCHC 31.3 02/01/2020     Lab Results   Component Value Date    RDW 17.4 02/01/2020     Lab Results   Component Value Date     02/01/2020

## 2020-02-02 NOTE — PLAN OF CARE
Sbp 80-90's this evening, on RA, HR  bpm, A/OX4 but forgetful, L eye 1 mm more dilated than R eye, hard of hearing, pt c/o 7/10 headache, tylenol given which didn't help the pain per pt report, MD paged for tramadol order, tramadol given once, HA is still 7/10, LS dim, +1/+2 BLE edema, A-Fib RVR on tele, INR-3.26, hem-8.5, discharge possible in 2-3 days.

## 2020-02-02 NOTE — PROGRESS NOTES
01/31/20 1136   Quick Adds   Type of Visit Initial PT Evaluation       Present no   Language   (some baseline slurred/garbled speech)   Living Environment   Lives With spouse   Living Arrangements house   Home Accessibility stairs to enter home   Number of Stairs, Main Entrance 1   Stair Railings, Main Entrance other (see comments)  (grab bar on wall next to door)   Transportation Anticipated family or friend will provide   Living Environment Comment Needs can be met on main level; spouse capable of assisting with meals/laundry/cleaning   Self-Care   Usual Activity Tolerance fair   Current Activity Tolerance fair   Regular Exercise No   Equipment Currently Used at Home cane, straight;walker, rolling   Functional Level Prior   Ambulation 1-->assistive equipment   Transferring 1-->assistive equipment   Toileting 1-->assistive equipment   Bathing 1-->assistive equipment   Communication 0-->understands/communicates without difficulty   Swallowing 0-->swallows foods/liquids without difficulty   Cognition 0 - no cognition issues reported   Fall history within last six months yes   Number of times patient has fallen within last six months 2   Which of the above functional risks had a recent onset or change? ambulation   Prior Functional Level Comment Pt toggles between FWW and SEC based on the day.  Mostly household distances, but does get out to [employer].   General Information   Onset of Illness/Injury or Date of Surgery - Date 01/31/20   Referring Physician Keesha Silva PA-C   Patient/Family Goals Statement return home   Pertinent History of Current Problem (include personal factors and/or comorbidities that impact the POC) Sean Brunner is an 85 year old male with a PMH of Polymyalgia Rheumatica, Pulmonary HTN, Iron Deficiency, Arthritis, DM Type 2, Hiatel Hernia, GERD, Venous Stasis, Hypothyroidism, PE, DVT, Chronic A. fib  on warfarin, Chronic Headaches who presented to the emergency  "room with generalized weakness and fatigue, found to have A. fib with RVR and admitted to the hospital for further evaluation management.  Being treated for UTI also.   Precautions/Limitations fall precautions   Heart Disease Risk Factors Overweight;Medical history;Gender;Age  (acute afib w/ RVR)   General Observations Pt   General Info Comments   (Pt reports \"dizziness\" frequently, not new)   Cognitive Status Examination   Orientation orientation to person, place and time   Level of Consciousness alert;confused   Follows Commands and Answers Questions 100% of the time  ([place])   Personal Safety and Judgment at risk behaviors demonstrated   Cognitive Comment Mild confusion/impuslivities noted   Pain Assessment   Patient Currently in Pain No   Posture    Posture Forward head position;Protracted shoulders   Range of Motion (ROM)   ROM Comment Within functional limits   Strength   Strength Comments Within functional limits   Bed Mobility   Bed Mobility Comments Supine>sit SBA, rail   Transfer Skills   Transfer Comments Sit<>stand CGA, FWW; moves quickly, verbal and tactile cues needed for safety   Gait   Gait Comments Ambulated 60 ft, CGA, FWW.  Verbal cues for safety, tends to get walker too far out front/flexed posturing, moves quickly, WBOS   Balance   Balance Comments impaired static and dynamic balance; requires UE support   Sensory Examination   Sensory Perception Comments reports no numbness/tingling   General Therapy Interventions   Planned Therapy Interventions gait training;transfer training;risk factor education;progressive activity/exercise   Clinical Impression   Criteria for Skilled Therapeutic Intervention yes, treatment indicated   PT Diagnosis impaired functional mobility   Influenced by the following impairments impaired strength, impaired balance, fatigue   Functional limitations due to impairments impaired gait; impaired transfers; impaired balance   Clinical Presentation Stable/Uncomplicated " "  Clinical Presentation Rationale medically stable; cardiology discharged; confusion cleared   Clinical Decision Making (Complexity) Low complexity   Therapy Frequency Daily   Predicted Duration of Therapy Intervention (days/wks) 2 days   Anticipated Equipment Needs at Discharge   (wife inquiring about w/c for use in home)   Anticipated Discharge Disposition Home with Home Therapy   Risk & Benefits of therapy have been explained Yes   Clinical Impression Comments Pt with significant increase in mobility today, anticipate to progress to home with further therapy    [facility] TM \"6 Clicks\"   2016, Trustees of [facility], under license to [employer].  All rights reserved.   6 Clicks Short Forms Basic Mobility Inpatient Short Form   [facility] AM-PAC  \"6 Clicks\" V.2 Basic Mobility Inpatient Short Form   1. Turning from your back to your side while in a flat bed without using bedrails? 4 - None   2. Moving from lying on your back to sitting on the side of a flat bed without using bedrails? 3 - A Little   3. Moving to and from a bed to a chair (including a wheelchair)? 3 - A Little   4. Standing up from a chair using your arms (e.g., wheelchair, or bedside chair)? 3 - A Little   5. To walk in hospital room? 3 - A Little   6. Climbing 3-5 steps with a railing? 3 - A Little   Basic Mobility Raw Score (Score out of 24.Lower scores equate to lower levels of function) 19   Total Evaluation Time   Total Evaluation Time (Minutes) 8     "

## 2020-02-02 NOTE — PROGRESS NOTES
[facility]    Medicine Progress Note - Hospitalist Service       Date of Admission:  1/31/2020  Assessment & Plan       Sean Brunner is an 85 year old male with a PMH of Polymyalgia Rheumatica, Pulmonary HTN, Iron Deficiency, Arthritis, DM Type 2, Hiatel Hernia, GERD, Venous Stasis, Hypothyroidism, PE, DVT, Chronic A. fib  on warfarin, Chronic Headaches who presented to the emergency room with generalized weakness and fatigue, found to have A. fib with RVR and admitted to the hospital for further evaluation management.        1.  Afib with RVR.  -Heart rate is better controlled today, mostly below 100.  -Cardiology consult input appreciated, started on beta-blocker.  Recommended to avoid calcium channel blocker due to hypotension.  -Continue metoprolol 25 mg every 6 hours with parameters for blood pressure.  -Blood pressure remains soft.  -Ordered 1 dose of digoxin 0.25 mg IV x1 on 02/01, started on 0.125 mg daily.  -Continue telemonitoring .  -Troponin negative and patient asymptomatic.  -Echocardiogram showed left ventricular ejection fraction 45 to 50%, flattened septum consistent with right ventricular pressure and and volume overload, right ventricular systolic function is moderate to severely reduced,  TR, aortic stenosis.     2. Generalized Weakness, Fatigue/increased lethargy   -This could be multifactorial, including infection, right heart failure.  -His speech is markedly improved today to baseline, his mental status also improved significantly.   -No fever, WBC is within normal limit.  -Blood pressure is soft, worsened by rate control medication  -CT scan of the head negative for acute abnormality    3.  Possible UTI.  -UA showed WBC of 13, leukocyte esterase positive.  -Urine culture so far negative  -Blood culture 1/2 came back positive for strep species, final identification is still pending.  -I suspect this is most likely contaminant  -Continue ceftriaxone.  -Continue  "Rocephin     5.  CAD, CHF, HTN, HLD - hx of cor pulmonale, with right ventricular failure, decreased EF based on echocardiogram done on admission.  -Patient has peripheral edema   -Diuretics is on hold due to hypotension and to get room for rate control medication.  -Cardiology is consulted input appreciated.  -Continue pta Simvastatin     6. Chronic Anemia -globin was 7.9 on admission today it is 8.5.  -No indication for transfusion .  -No sign of active bleeding .  -Check daily hemoglobin.  - Patient was evaluated by GI last month who felt \"that he is probably losing blood because of being on Coumadin for such a long time, he will lose a small amount of blood which will be imperceptible for him.    -Continue to hold warfarin for now until INR is about 2.  - Monitor stool for signs of blood loss.  -Continue Protonix for GERD.    7.  Polymyalgia Rheumatica -he is chronically on prednisone 6 mg p.o. daily.  -Given hypotension, increased the dose to 20 mg p.o. daily for now.  Will taper it down quickly.     Diet: Combination Diet Regular Diet Adult    DVT Prophylaxis: Warfarin  Corley Catheter: not present  Code Status: Full Code      Disposition Plan   Expected discharge: 2 to 3 days pending further improvement.  Entered: Panfilo Segal MD 02/02/2020, 11:50 AM     I discussed with patient the plan of care, he is more awake and conversant today and agreed with the plan.    Panfilo Segal MD  Hospitalist Service  [facility]    ______________________________________________________________________    Interval History   Patient seen and examined, admitted with FRANCISCO melgar with RVR, still in or out of RVR, his energy level improved, shortness of breath improved.  Denied any nausea or vomiting, no pain.  Stated he still feels weak and tired.    Data reviewed today: I reviewed all medications, new labs and imaging results over the last 24 hours. I personally reviewed .    Physical Exam   Vital Signs: " Temp: 98.1  F (36.7  C) Temp src: Oral BP: 92/67 Pulse: 73 Heart Rate: 80 Resp: 16 SpO2: 97 % O2 Device: None (Room air)    Weight: 208 lbs 0 oz  General Appearance: Awake, not in distress.  CHEST: Good air entry bilaterally, no wheezing crackles or rales.  CVS: S1 and S2 well heard, tachycardic, irregularly irregular.  GI: Soft, nontender, nondistended, positive bowel sound.  Skin: No rash, exanthems or petechia.  Ext: +3 pedal pretibial and ankle edema bilateral.    Data   Recent Labs   Lab 02/02/20  0616 02/01/20  0628 01/31/20  1807 01/31/20  0755   WBC  --  8.4  --  8.1   HGB  --  8.5* 8.2* 7.9*   MCV  --  91  --  90   PLT  --  170  --  163   INR 3.46* 3.26*  --  3.70*   NA  --  139  --  138   POTASSIUM  --  3.9  --  3.6   CHLORIDE  --  105  --  103   CO2  --  29  --  31   BUN  --  21  --  23   CR  --  0.99  --  1.07   ANIONGAP  --  5  --  4   TYSON  --  9.1  --  9.4   GLC  --  127*  --  111*   ALBUMIN  --   --   --  2.7*   PROTTOTAL  --   --   --  7.4   BILITOTAL  --   --   --  0.7   ALKPHOS  --   --   --  75   ALT  --   --   --  15   AST  --   --   --  17   TROPI  --   --   --  <0.015     No results found for this or any previous visit (from the past 24 hour(s)).  Medications     diltiazem (CARDIZEM) infusion ADULT       - MEDICATION INSTRUCTIONS -       Warfarin Therapy Reminder         cefTRIAXone  2 g Intravenous Q24H     digoxin  125 mcg Oral Daily     divalproex sodium extended-release  500 mg Oral QPM     levothyroxine  150 mcg Oral QAM AC     metoprolol tartrate  25 mg Oral 4x Daily     pantoprazole  40 mg Oral BID     predniSONE  20 mg Oral Daily     simvastatin  40 mg Oral Daily with supper     sodium chloride (PF)  3 mL Intracatheter Q8H     terazosin  5 mg Oral At Bedtime

## 2020-02-02 NOTE — PLAN OF CARE
VSS, BPs 90s/50s, 90s/60s. Tele afib in 70s. Pt does have intermittent dizziness with movement. Very [place]. Tramadol x1 for headache. +2 BLE edema. Continues on iv rocephin for + BC. A2 to ambulate, regular diet.

## 2020-02-02 NOTE — PLAN OF CARE
PT:  Sean Brunner is an 85 year old male with a PMH of Polymyalgia Rheumatica, Pulmonary HTN, Iron Deficiency, Arthritis, DM Type 2, Hiatel Hernia, GERD, Venous Stasis, Hypothyroidism, PE, DVT, Chronic A. fib  on warfarin, Chronic Headaches who presented to the emergency room with generalized weakness and fatigue, found to have A. fib with RVR and admitted to the hospital for further evaluation management.  Being treated for UTI.  At baseline, pt lives with his spouse in a home with one stair to enter from garage into one level living home.  Pt uses either a FWW or SEC for all mobility.  Mod I at baseline for mobility and basic ADLs.    Discharge Planner PT   Patient plan for discharge: home w/ home PT and family assist  Current status: Pt [place], agreeable to therapy, can be impulsive and mild confusion noted.  Just returned from bathroom, sitting EOB upon arrival.  Bed mobility SBA with rail, sit<>stand with FWW, CGA.  Pt ambulated in [place] 60 ft with FWW, CGA, verbal and tactile cues for safety.  Tested orthostatics prior to ambulation due to reports of earlier dizziness, negative (97/77 seated, 104/82 standing).  Pt reported dizziness towards end of session, /80.  Pt educated in seated exercise and safe progression of mobility in prep for discharge and at home.  Spouse present for session.  Pt left up in chair with chair alarm and call light.   Barriers to return to prior living situation: none noted  Recommendations for discharge: home w/ home therapy  Rationale for recommendations: Home PT recommended to address below baseline mobility and to address strength and mobility deficits following hospitalization for weakness and confusion, afib w/ RVR and UTI .  Home setting recommended due to increased caregiver support indicated for mobility outside of home with assistive device, limited activity tolerance and dizziness that comes and goes.         Entered by: Tatiana Caban 02/02/2020 3:40 PM

## 2020-02-03 NOTE — PROGRESS NOTES
[facility]  Referral received  from care transitions team.  Met with pt and spouse to discuss potential plans for HC.  Discharge date pending. Patient care support center processing referral. Spouse has 24 hour phone number for [facility] for any questions or concerns.  [facility] will follow for final discharge plans.  Home care orders and face to face documentation needed at time of discharge. Anticipate need for Home RN PT DANIEL.

## 2020-02-03 NOTE — PLAN OF CARE
VSS. Blood pressures on the softer side. Pain in left hip and head treated with tramadol and tylenol. Up - Ax1 with a gaitbelt and walker. Tele - afib RVR. Possible discharge 1-2 days.

## 2020-02-03 NOTE — PLAN OF CARE
VSS. Tele afib in 80s. Intermittent dizziness with movement, prn meclizine x1. SOB w/ exertion. Continues on rocephin. Regular diet, A1 w/ gait belt and walker to ambulate to bathroom.

## 2020-02-03 NOTE — PROGRESS NOTES
[facility]    Medicine Progress Note - Hospitalist Service       Date of Admission:  1/31/2020  Assessment & Plan       Sean Brunner is an 85 year old male with a PMH of Polymyalgia Rheumatica, Pulmonary HTN, Iron Deficiency, Arthritis, DM Type 2, Hiatel Hernia, GERD, Venous Stasis, Hypothyroidism, PE, DVT, Chronic A. fib  on warfarin, Chronic Headaches who presented to the emergency room with generalized weakness and fatigue, found to have A. fib with RVR and admitted to the hospital for further evaluation management.     1.  Chronic Afib with RVR, now better rate control. HR I the 70's -80's today.  -Heart rate is better controlled.  -Cardiology consult input appreciated, started on beta-blocker.  Recommended to avoid calcium channel blocker due to hypotension.  -Patient has been on metoprolol 25 mg every 6 hours with parameters for blood pressure, will change to Toprol-XL 50 mg twice daily with parameters.  -Blood pressure remains soft.  -Patient was given a dose of digoxin 0.25 mg IV x1 on 02/01, started on 0.125 mg daily 02/02.  -Continue telemonitoring .  -Troponin negative and patient asymptomatic.  -Echocardiogram showed left ventricular ejection fraction 45 to 50%, flattened septum consistent with right ventricular pressure and and volume overload, right ventricular systolic function is moderate to severely reduced,  TR, aortic stenosis.     2. Generalized Weakness, Fatigue/, back and hip pain.   -This could be multifactorial, including infection, right heart failure.  -His speech is markedly improved today to baseline, his mental status also improved significantly.   -No fever, WBC is within normal limit.  -Blood pressure is ok,,soft, worsened by rate control medication  -CT scan of the head negative for acute abnormality  - decreased tramadol dose from 25 to 12.5, tramadol to 25 mg PRN qid.  3.  Possible UTI.  -UA showed WBC of 13, leukocyte esterase positive.  -Urine culture so far  "negative  -Blood culture 1/2 came back positive for strep species, final identification is still pending.  -I suspect this is most likely contaminant  -Continue ceftriaxone.     4.  CAD/CHF/HTN/HLD - hx of cor pulmonale, with right ventricular failure, decreased EF based on echocardiogram done on admission.  -Patient has peripheral edema   -Diuretics is on hold due to hypotension and to get room for rate control medication.  -Cardiology is consulted input appreciated.  -Continue pta Simvastatin   - patient has pulsatile left Caroid artery and no work up noted in the care everyhwere, wife said it is not new for him but not to this size. Now bigger. US carotid ordered.    5. Chronic Anemia -Hgb was 7.9 on admission today it is 8.5.  -No indication for transfusion .  -No sign of active bleeding .  -Check daily hemoglobin.  - Patient was evaluated by GI last month who felt \"that he is probably losing blood because of being on Coumadin for such a long time, he will lose a small amount of blood which will be imperceptible for him.    -Continue to hold warfarin for now until INR is about 2.  - Monitor stool for signs of blood loss.  -Continue Protonix for GERD.    6.  Polymyalgia Rheumatica - He is chronically on prednisone 6 mg p.o. daily.  -Given hypotension, increased the dose to 20 mg p.o. daily for now.  Will taper it down quickly to home dose.     Diet: Combination Diet Regular Diet Adult    DVT Prophylaxis: Warfarin  Corley Catheter: not present  Code Status: Full Code      Disposition Plan   Expected discharge: 1-2 day, if Hr remains controlled on oral agent, PT recommended home PT and will order up on discharge.  Entered: Panfilo Segal MD 02/03/2020, 8:55 AM     I discussed with patient and his wife at bedside the plan of care. He is sleepy after started on meclizine.    Panfilo Segal MD  Hospitalist Service  [facility]  ______________________________________________________________________    Interval History   Patient seen and examined, admitted with FRANCISCO melgar with RVR, still in or out of RVR, his energy level improved, shortness of breath improved.  Denied any nausea or vomiting, no pain.  Stated he still feels weak and tired.    Data reviewed today: I reviewed all medications, new labs and imaging results over the last 24 hours. I personally reviewed .    Physical Exam   Vital Signs: Temp: 97.6  F (36.4  C) Temp src: Oral BP: 107/71 Pulse: 80 Heart Rate: 76 Resp: 18 SpO2: 97 % O2 Device: None (Room air)    Weight: 206 lbs 9.6 oz  General Appearance: Awake, not in distress.  CHEST: Good air entry bilaterally, no wheezing crackles or rales.  CVS: S1 and S2 well heard, tachycardic, irregularly irregular.  GI: Soft, nontender, nondistended, positive bowel sound.  Skin: No rash, exanthems or petechia.  Ext: +3 pedal pretibial and ankle edema bilateral.    Data   Recent Labs   Lab 02/03/20  0643 02/02/20  0616 02/01/20  0628 01/31/20  1807 01/31/20  0755   WBC  --   --  8.4  --  8.1   HGB 8.8*  --  8.5* 8.2* 7.9*   MCV  --   --  91  --  90   PLT  --   --  170  --  163   INR 2.78* 3.46* 3.26*  --  3.70*   NA  --   --  139  --  138   POTASSIUM  --   --  3.9  --  3.6   CHLORIDE  --   --  105  --  103   CO2  --   --  29  --  31   BUN  --   --  21  --  23   CR  --   --  0.99  --  1.07   ANIONGAP  --   --  5  --  4   TYSON  --   --  9.1  --  9.4   GLC  --   --  127*  --  111*   ALBUMIN  --   --   --   --  2.7*   PROTTOTAL  --   --   --   --  7.4   BILITOTAL  --   --   --   --  0.7   ALKPHOS  --   --   --   --  75   ALT  --   --   --   --  15   AST  --   --   --   --  17   TROPI  --   --   --   --  <0.015     No results found for this or any previous visit (from the past 24 hour(s)).  Medications     diltiazem (CARDIZEM) infusion ADULT       - MEDICATION INSTRUCTIONS -       Warfarin Therapy Reminder         cefTRIAXone  2 g Intravenous  Q24H     digoxin  125 mcg Oral Daily     divalproex sodium extended-release  500 mg Oral QPM     levothyroxine  150 mcg Oral QAM AC     metoprolol tartrate  25 mg Oral 4x Daily     pantoprazole  40 mg Oral BID     predniSONE  20 mg Oral Daily     simvastatin  40 mg Oral Daily with supper     sodium chloride (PF)  3 mL Intracatheter Q8H     terazosin  5 mg Oral At Bedtime

## 2020-02-03 NOTE — PLAN OF CARE
RN- VSS, afeb. Alert and oriented. [place], has R hearing aid but does not work well. BP soft, HS Hytrin held. Up in room with gait belt, walker, and SBA. Small BM this shift, voiding adequate amounts of urine. Afib rate 's. Meclizine PRN for dizziness. Bed alarm in place. Continue current POC.

## 2020-02-03 NOTE — PROVIDER NOTIFICATION
Blood cultures from right hand on 1/31 came show strep mitis and another gram positive cocci that is still being worked on. - md notified.     Md notified on large distended left neck vein - could you please come and assess.

## 2020-02-03 NOTE — CONSULTS
Care Transition Initial Assessment - RN        Met with: Patient and Family, wife Dona.  DATA   Active Problems:    Atrial fibrillation with rapid ventricular response (H)       Cognitive Status: awake and alert.  [facility]  Primary Care MD Name: Oscar Parekh  Contact information and PCP information verified: Yes  Lives With: spouse   Living Arrangements: house                 Insurance concerns: No Insurance issues identified  ASSESSMENT  Patient currently receives the following services:  None        Identified issues/concerns regarding health management: RN CTS following to arrange for home care at discharge. Patient and wife agreeable to home care. Patient has used [facility]  in the past and prefers to continue with same agency.   Pt/family was given the Medicare Compare list for Home Care, with associated star ratings to assist with choice for referrals/discharge planning Yes    Education was given to pt/family that star ratings are updated/maintained by Medicare and can be reviewed by visiting www.medicare.gov Yes  Patient and wife did not want any additional information at this time.   Referral sent to [facility] and contact information updated to [facility].   Patient lives at home with his wife and their 52 year old son who has Down Syndrome and Alzheimer's.   PLAN  Financial costs for the patient include home care coverage from Medicare with homebound status. Patient does not drive and has not for about a year .  Patient given options and choices for discharge Yes .  Patient/family is agreeable to the plan?  Yes: pending medical readiness.   Patient anticipates discharging to Home .  Resources List: Home Care     Patient anticipates needs for home equipment: No  Transportation/person available to transport on day of discharge  is TBD.   Plan/Disposition: Home   Appointments: None made at this time.       Care  (CTS) will continue to follow as  needed.    Perla Malcolm MA/RN Case Manager  Inpatient Care Coordination  [facility]   [phone]

## 2020-02-04 NOTE — PLAN OF CARE
AOX4, VSS, denies pain, up with A-1 with walker and GB, non compliant with call light, falls precaution in place, Tele AFib CVR, neuro intact, no c/o dizziness, on Rocephin IV, PT following, Carotid US kristina, mariajose Hytrin held per parameter,  will continue with POC.

## 2020-02-04 NOTE — PLAN OF CARE
A&O x4. VSS on RA. Tele Afib CVR. Assist of 1 w/ walker. Piv Sl. IV Rocephin. LS diminished. Q4hr neuro's; speech garbled and pt is generalized weak otherwise neuro's intact. C/o dizziness during 1 ambulation. BLE edema 1+. Hemoglobin 8.8; trending up. PT following. Plan to discharge today or tomorrow w/ home care. Continue to monitor.

## 2020-02-04 NOTE — PROGRESS NOTES
Discharge Planner   Discharge Plans in progress: The pt will discharge home today with resumption of [facility] RN/PT/OT services.  Sw updated [facility] via email that the pt will discharge today.       02/04/20 1307   Final Resources   Resources List Home Care   Home Care [facility] [phone], Fax: [phone]     Barriers to discharge plan: None.  Follow up plan: Sw will continue to be available as needed until discharge.        Entered by: Eugenie Anglin 02/04/2020 1:08 PM     RADHA Meza, [facility]  Inpatient Care Coordination  [facility] [place]  [phone]

## 2020-02-04 NOTE — PHARMACY-ANTICOAGULATION SERVICE
Clinical Pharmacy- Warfarin Discharge Note  This patient is currently on warfarin for the treatment of Atrial fibrillation.  INR Goal= 2-3      Warfarin PTA Regimen: 1.5 mg MWF, 1 mg TuThSaSu      Anticoagulation Dose History     Recent Dosing and Labs Latest Ref Rng & Units 12/10/2019 12/11/2019 1/31/2020 2/1/2020 2/2/2020 2/3/2020 2/4/2020    Warfarin 1 mg - 1 mg - - - - - -    INR 0.86 - 1.14 2.32(H) 2.15(H) 3.70(H) 3.26(H) 3.46(H) 2.78(H) 2.35(H)          Vitamin K doses administered during the last 7 days:    FFP administered during the last 7 days:    Agree with discharging the patient on a warfarin regimen of 1 mg daily, with INR to be checked on Friday 2/7/20; then adjust as necessary.

## 2020-02-04 NOTE — PLAN OF CARE
VSS on RA, A/OX4, LS dim, BLE edema +1/+2, pt c/o 6/10 L hip pain, tylenol and tramadol given, A-Fib CVR on tele, reviewed discharge paperwork w/ pt and son, pt and son verbalized understanding, pt discharged home at 1437, off the floor via wheelchair.

## 2020-02-04 NOTE — PLAN OF CARE
PT- attempted to see, he declined as he want his pain meds prior to walking, per him also plans to discharge today. No notes to this affect seen in chart.

## 2020-02-06 NOTE — DISCHARGE SUMMARY
[facility]  Hospitalist Discharge Summary       Date of Admission:  1/31/2020  Date of Discharge:  2/4/2020  2:38 PM  Discharging Provider: Panfilo Segal MD    Discharge Diagnoses   Chronic A. fib with RVR  Generalized weakness  Suspected urinary tract infection    Chronic medical condition.  Coronary artery disease  Congestive heart failure  Hypertension  Hyperlipidemia.  Chronic anemia  Polymyalgia rheumatica    Follow-ups Needed After Discharge   Follow-up Appointments     Follow-up and recommended labs and tests       Follow up with primary care provider, Oscar Parekh, within   7 days for hospital follow- up.  The following labs/tests are recommended:   CBC, BMP, dig level 1 week.  INR 02/07/2020 to adjust the coumadin dose.  .           Unresulted Labs Ordered in the Past 30 Days of this Admission     Date and Time Order Name Status Description    2/3/2020 1224 Blood culture Preliminary     2/3/2020 1224 Blood culture Preliminary     1/31/2020 1220 Blood culture Preliminary     1/31/2020 1220 Blood culture Preliminary         Discharge Disposition   Discharged to home  Condition at discharge: Stable    Hospital Course   Sean Brunner is an 85 year old male with a PMH of Polymyalgia Rheumatica, Pulmonary HTN, Iron Deficiency, Arthritis, DM Type 2, Hiatel Hernia, GERD, Venous Stasis, Hypothyroidism, PE, DVT, Chronic A. fib  on warfarin, Chronic Headaches who presented to the emergency room with generalized weakness and fatigue, found to have A. fib with RVR and admitted to the hospital for further evaluation management.     1.  Chronic Afib with RVR.  On presentation patient had rapid ventricular response, it was difficult to control even with rate control medications, patient started on Cardizem drip briefly which was stopped due to CHF as cardiologist preferred beta-blocker over calcium channel blocker.  Patient heart rate remained in 100-120s despite beta-blocker,  digoxin was added, heart rate improved.  Patient will be discharged on digoxin 0.125 mg p.o. daily.  Heart rate was in 70s and 80s upon discharge.  Patient was initially started on Metoprolol 25 mg every 6 hours with parameters for blood pressure, seen changed to Toprol-XL Toprol-XL 50 mg twice daily with parameters.  Patient tolerated the education, heart rate fairly controlled.Echocardiogram showed left ventricular ejection fraction 45 to 50%, flattened septum consistent with right ventricular pressure and and volume overload, right ventricular systolic function is moderate to severely reduced,  TR, aortic stenosis.     2. Generalized Weakness, Fatigue/, back and hip pain. This could be multifactorial, including infection, right heart failure.  Patient has garbled speech intermittently, which is baseline for him per his wife, this has also been improved.  There is no fever or chills, white count is within his normal limits. CT scan of the head negative for acute abnormality.  Adjusted his medication including decreasing the dose of tramadol.  Upon discharge his mental status is at baseline and his energy level improved.  3.  Possible UTI. UA showed WBC of 13, leukocyte esterase positive. Urine culture so far negative.Blood culture 1/2 came back positive for strep species, final identification is still pending. I suspect this is most likely contaminant.  Treated with ceftriaxone here, completed treatment and discharged on Keflex prescription..  4.  CAD/CHF/HTN/HLD - hx of cor pulmonale, with right ventricular failure, decreased EF based on echocardiogram done on admission.Patient has peripheral edema, diuretics is on hold due to hypotension and to get room for rate control medication. Cardiology is consulted input appreciated.  He is continued on his atorvastatin.  Patient has left-sided carotid bruit and pulsatile carotid artery, wife stated this is not new for him.  Did ultrasound done which showed less than 50%  stenosis.  There was no report of any other abnormality.  Patient has pulsatile left Caroid artery and no work up noted in the care everyhwere, wife said it is not new for him but not to this size. Now bigger. US carotid ordered.  5.  Polymyalgia Rheumatica - He is chronically on prednisone 6 mg p.o. daily.  Dose was increased here as patient was hypotensive, quickly tapered down to home dose upon discharge.    I discussed with patient at length the plan of care and discharge and follow-up I also discussed with his wife today earlier regarding discharge planning.  All their question concerns addressed showed understanding    Consultations This Hospital Stay   CARDIOLOGY IP CONSULT  PHARMACY TO DOSE WARFARIN  PHYSICAL THERAPY ADULT IP CONSULT  PHARMACY TO DOSE VANCO  SOCIAL WORK IP CONSULT    Code Status   Full Code    Time Spent on this Encounter   I, Panfilo Segal MD, personally saw the patient today and spent greater than 30 minutes discharging this patient.       Panfilo Segal MD  [facility]  ______________________________________________________________________    Physical Exam   Vital Signs:                    Weight: 207 lbs 0 oz  General Appearance:  Awake, not in distress.  CHEST: Good air entry bilaterally, no wheezing crackles or rales.  CVS: S1 and S2 well heard, tachycardic, irregularly irregular.  GI: Soft, nontender, nondistended, positive bowel sound.  Skin: No rash, exanthems or petechia.  Ext:  Trace edema bilateral lower extremity, significantly improved from admission.       Primary Care Physician   Oscar Parekh    Discharge Orders      Home care nursing referral      Home Care PT Referral for Hospital Discharge      Home Care OT Referral for Hospital Discharge      Home Care Social Service Referral for Hospital Discharge      Reason for your hospital stay    Jennfier with RVR, CHF.     Follow-up and recommended labs and tests     Follow up with primary  care provider, Oscar Parekh, within 7 days for hospital follow- up.  The following labs/tests are recommended: CBC, BMP, dig level 1 week.  INR 02/07/2020 to adjust the coumadin dose.  .     Activity    Your activity upon discharge: activity as tolerated     MD face to face encounter    Documentation of Face to Face and Certification for Home Health Services    I certify that patient: Sean Brunner is under my care and that I, or a nurse practitioner or physician's assistant working with me, had a face-to-face encounter that meets the physician face-to-face encounter requirements with this patient on: 2/4/2020.    This encounter with the patient was in whole, or in part, for the following medical condition, which is the primary reason for home health care: weakness, deconditioning.    I certify that, based on my findings, the following services are medically necessary home health services: Nursing, Occupational Therapy and Physical Therapy.    My clinical findings support the need for the above services because: Nurse is needed: To provide assessment and oversight required in the home to assure adherence to the medical plan due to: CHF, Afib with RVR, adjusted medications. Hydration, check vitals and nutational status.., Occupational Therapy Services are needed to assess and treat cognitive ability and address ADL safety due to impairment in cognitive function and activity level. and Physical Therapy Services are needed to assess and treat the following functional impairments, weakness, deconditioning.    Further, I certify that my clinical findings support that this patient is homebound (i.e. absences from home require considerable and taxing effort and are for medical reasons or [employer] services or infrequently or of short duration when for other reasons) because: Requires assistance of another person or specialized equipment to access medical services because patient: Is unable to exit home  safely on own due to: weakness, decline in cognitive function. and Is unable to operate assistive equipment on their own...    Based on the above findings. I certify that this patient is confined to the home and needs intermittent skilled nursing care, physical therapy and/or speech therapy.  The patient is under my care, and I have initiated the establishment of the plan of care.  This patient will be followed by a physician who will periodically review the plan of care.  Physician/Provider to provide follow up care: Oscar Parekh    Attending hospital physician (the Medicare certified [place] provider): Panfilo Segal MD  Physician Signature: See electronic signature associated with these discharge orders.  Date: 2/4/2020     Full Code     Diet    Follow this diet upon discharge: Orders Placed This Encounter      Combination Diet Regular Diet Adult, low salt diet       Significant Results and Procedures   Most Recent 3 CBC's:  Recent Labs   Lab Test 02/03/20  0643 02/01/20  0628 01/31/20  1807 01/31/20  0755  12/07/19  0712   WBC  --  8.4  --  8.1  --  5.2   HGB 8.8* 8.5* 8.2* 7.9*   < > 9.0*   MCV  --  91  --  90  --  92   PLT  --  170  --  163  --  125*    < > = values in this interval not displayed.     Most Recent 3 BMP's:  Recent Labs   Lab Test 02/01/20  0628 01/31/20  0755 12/07/19  0712    138 138   POTASSIUM 3.9 3.6 3.9   CHLORIDE 105 103 107   CO2 29 31 26   BUN 21 23 16   CR 0.99 1.07 0.92   ANIONGAP 5 4 5   TYSON 9.1 9.4 9.1   * 111* 88     Most Recent 2 LFT's:  Recent Labs   Lab Test 01/31/20  0755 12/07/19  0712   AST 17 26   ALT 15 12   ALKPHOS 75 69   BILITOTAL 0.7 0.4   ,   Results for orders placed or performed during the hospital encounter of 01/31/20   XR Chest Port 1 View    Narrative    CHEST PORTABLE ONE VIEW   1/31/2020 8:24 AM     HISTORY: Weak.    COMPARISON: Chest x-ray 12/6/2019.      Impression    IMPRESSION: Portable chest. Lungs are clear. Heart is  normal in size.  No pneumothorax. Trace amount of right pleural fluid is new since  prior exam.    MELANI ENCARNACION MD   CT Head w/o Contrast    Narrative    CT SCAN OF THE HEAD WITHOUT CONTRAST   1/31/2020 1:24 PM     HISTORY: Altered mental status, unexplained; increased lethargy,  supratherapeutic INR.    TECHNIQUE:  Axial images of the head and coronal reformations without  IV contrast material.  Radiation dose for this scan was reduced using  automated exposure control, adjustment of the mA and/or kV according  to patient size, or iterative reconstruction technique.    COMPARISON: 12/6/2019    FINDINGS: Some cerebral atrophy is present. There is minimal  nonspecific white matter changes which are probably due to chronic  small vessel ischemic disease. There is also persistent low-density in  the splenium of the corpus callosum similar to that seen on the prior  exam. This is not associated with volume loss. There is no evidence  for intracranial hemorrhage, mass effect, acute infarct, or skull  fracture. Vascular calcifications noted.      Impression    IMPRESSION:  1. No evidence for intracranial hemorrhage, acute infarct or definite  mass effect.  2. Cerebral atrophy with some presumed chronic small vessel ischemic  changes in the white matter.  3. Persistent low-density in the splenium of the corpus callosum  without volume loss. On MR dated 12/26/2019, there was abnormal T2  signal intensity in this region without enhancement. This is  nonspecific. If clinical symptoms are persistent or progressive,  continued surveillance with MR might be helpful in further evaluation.    MISAEL DOBBINS MD   US Carotid Bilateral    Narrative    BILATERAL DUPLEX CAROTID ULTRASOUND 2/3/2020 3:00 PM     HISTORY: Pulsatile and dilated left side carotid artery.    COMPARISON: None.    FINDINGS: There is mild atherosclerotic plaque in the carotid  bifurcations.  Flow velocities and waveforms show less than 50%  diameter stenosis in  "both the right and left internal carotid arteries  as assessed by each ICA PSV and EDV and ICA/DCCA ratio.  Antegrade  flow is present in both vertebral and external carotid arteries.      Impression    IMPRESSION: Less than 50% diameter stenosis of both internal carotid  arteries relative to the distal ICA diameters.    LU PATE MD   Echocardiogram Complete    Narrative    [id number]  [id number]  [id number]  [id number]^CAROLYN^ELLY^S           [facility]  Echocardiography Laboratory  [address] [place] [address]        Name: MAYITO ANSARI  MRN: [id number]  : 1934  Study Date: 2020 03:52 PM  Age: 85 yrs  Gender: Male  Patient Location: [facility]  Reason For Study: Afib  Ordering Physician: ELLY LEON  Referring Physician: ERIKA TONEY  Performed By: Fabrizio Junior RDCS     BSA: 2.1 m2  Height: 69 in  Weight: 204 lb  BP: 91/66 mmHg  _____________________________________________________________________________  __        Procedure  Complete Portable Echo Adult. Optison (NDC #4342-8997) given intravenously.  _____________________________________________________________________________  __        Interpretation Summary     The visual ejection fraction is estimated at 45-50%.  Flattened septum is consistent with RV pressure/volume overload.  The right ventricle is moderate to severely dilated.  The left atrium is moderate to severely dilated.  The right atrium is severely dilated.  Tricuspid valve fails to coapt  There is severe (4+) tricuspid regurgitation.  IVC diameter >2.1 cm collapsing <50% with sniff suggests a high RA pressure  estimated at 15 mmHg or greater.  Right ventricular systolic pressure could be underestimated due to incomplete  tricuspid regurgitation velocity envelope.  The aortic valve is not well visualized.  Moderate to severe valvular aortic stenosis.  Aortic stenosis present. It is likely \"low flow-low gradient\" significant  aortic stenosis " "(significantly reduced SVI noted)  The rhythm was rapid atrial fibrillation.  The right ventricular systolic function is moderate to severely reduced.  _____________________________________________________________________________  __        Left Ventricle  The left ventricle is normal in size. There is normal left ventricular wall  thickness. The visual ejection fraction is estimated at 45-50%. Diastolic  function not assessed due to atrial fibrillation. Flattened septum is  consistent with RV pressure/volume overload. There is no thrombus seen in the  left ventricle.     Right Ventricle  The right ventricle is moderate to severely dilated. The right ventricular  systolic function is moderate to severely reduced.     Atria  The left atrium is moderate to severely dilated. The right atrium is severely  dilated. There is no color Doppler evidence of an atrial shunt.     Mitral Valve  There is mild to moderate mitral annular calcification. There is trace mitral  regurgitation.        Tricuspid Valve  Tricuspid valve fails to coapt. There is severe (4+) tricuspid regurgitation.  IVC diameter >2.1 cm collapsing <50% with sniff suggests a high RA pressure  estimated at 15 mmHg or greater. The right ventricular systolic pressure is  approximated at 20.2 mmHg plus the right atrial pressure. Right ventricular  systolic pressure could be underestimated due to incomplete tricuspid  regurgitation velocity envelope.     Aortic Valve  The aortic valve is not well visualized. There is trace aortic regurgitation.  Moderate to severe valvular aortic stenosis. Aortic stenosis present. It is  likely \"low flow-low gradient\" significant aortic stenosis (significantly  reduced SVI noted).     Pulmonic Valve  The pulmonic valve is not well seen, but is grossly normal.     Vessels  Normal size aorta.     Pericardium  The pericardium appears normal.        Rhythm  The rhythm was rapid atrial " fibrillation.  _____________________________________________________________________________  __  MMode/2D Measurements & Calculations     IVSd: 0.88 cm  LVIDd: 5.1 cm  LVIDs: 3.7 cm  LVPWd: 0.98 cm  FS: 26.9 %  LV mass(C)d: 168.3 grams  LV mass(C)dI: 80.8 grams/m2  Ao root diam: 3.2 cm  LA dimension: 4.4 cm  asc Aorta Diam: 3.2 cm  LA/Ao: 1.4  LVOT diam: 1.9 cm  LVOT area: 2.8 cm2  LA Volume (BP): 89.1 ml  LA Volume Index (BP): 42.8 ml/m2  RWT: 0.39           Doppler Measurements & Calculations  MV E max lei: 98.5 cm/sec  Ao V2 max: 279.9 cm/sec  Ao max P.0 mmHg  Ao V2 mean: 197.8 cm/sec  Ao mean P.9 mmHg  Ao V2 VTI: 48.9 cm  ROMERO(I,D): 0.97 cm2  ROMERO(V,D): 0.97 cm2  LV V1 max PG: 3.7 mmHg  LV V1 max: 96.2 cm/sec  LV V1 VTI: 16.7 cm  CO(LVOT): 6.4 l/min  CI(LVOT): 3.1 l/min/m2  SV(LVOT): 47.4 ml  SI(LVOT): 22.7 ml/m2  TR max lei: 222.6 cm/sec  TR max P.2 mmHg  AV Lei Ratio (DI): 0.34  ROMERO Index (cm2/m2): 0.46  E/E' avg: 10.0  Lateral E/e': 8.9  Medial E/e': 11.2              _____________________________________________________________________________  __        Report approved by: Colin Anderson 2020 05:06 PM            Discharge Medications   Discharge Medication List as of 2020 12:55 PM      START taking these medications    Details   acetaminophen (TYLENOL) 325 MG tablet Take 2 tablets (650 mg) by mouth every 6 hours as needed for mild pain, Disp-60 tablet, R-0, E-Prescribe      cephALEXin (KEFLEX) 500 MG capsule Take 1 capsule (500 mg) by mouth 2 times daily for 5 days, Disp-10 capsule, R-0, E-Prescribe      digoxin (LANOXIN) 125 MCG tablet Take 1 tablet (125 mcg) by mouth daily, Disp-30 tablet, R-1, E-Prescribe         CONTINUE these medications which have CHANGED    Details   metoprolol succinate ER (TOPROL-XL) 50 MG 24 hr tablet Take 1 tablet (50 mg) by mouth daily, Disp-30 tablet, R-1, E-Prescribe      warfarin ANTICOAGULANT (COUMADIN) 1 MG tablet Take 1 tablet (1 mg) by mouth  daily Check INR on 02/07/2020 to adjust the dose., No Print Out         CONTINUE these medications which have NOT CHANGED    Details   cyanocolbalamin (VITAMIN  B-12) 1000 MCG tablet Take 3,000 mcg by mouth every morning, Historical      diclofenac (VOLTAREN) 1 % topical gel Apply 2 g topically 4 times daily as needed for moderate pain Apply to hip Send dosing card with product., Disp-1 Tube, R-3, E-Prescribe      divalproex sodium extended-release (DEPAKOTE ER) 500 MG 24 hr tablet TAKE 1 TABLET BY MOUTH EACH EVENING AT BEDTIME, R-3, Historical      furosemide (LASIX) 20 MG tablet Take 20 mg by mouth every morning, Historical      levothyroxine (SYNTHROID) 150 MCG tablet Take 150 mcg by mouth every morning (before breakfast) , Historical      meclizine (ANTIVERT) 12.5 MG tablet Take 12.5 mg by mouth 3 times daily as needed for dizziness, Historical      multivitamin, therapeutic with minerals (THERA-VIT-M) TABS tablet Take 1 tablet by mouth 2 times daily (with meals), Historical      pantoprazole (PROTONIX) 40 MG EC tablet Take 1 tablet (40 mg) by mouth 2 times daily, Disp-60 tablet, R-3, E-PrescribeTake twice daily for 30 days then once daily. This replaced Omeprazole.      !! predniSONE (DELTASONE) 1 MG tablet Take 1 mg by mouth daily Takes with 5mg tablet for total of 6 mg., Historical      !! predniSONE (DELTASONE) 5 MG tablet Take 5 mg by mouth daily Takes with 1 mg tablet for total of 6 mg., Historical      simethicone (MYLICON) 80 MG chewable tablet Take 1 tablet (80 mg) by mouth every 6 hours as needed for cramping or other (gas pains), Disp-30 tablet, R-3, E-Prescribe      simvastatin (ZOCOR) 40 MG tablet Take 40 mg by mouth daily (with dinner) , Historical      terazosin (HYTRIN) 5 MG capsule Take 5 mg by mouth At Bedtime, Historical      traMADol (ULTRAM) 50 MG tablet Take 50 mg by mouth every 6 hours as needed for moderate pain , Historical       !! - Potential duplicate medications found. Please discuss  with provider.        Allergies   No Known Allergies

## 2020-02-11 NOTE — ED PROVIDER NOTES
History     Chief Complaint:  Hypotension    HPI   Sean Brunner is a 85 year old male who presents with dizziness and a fall last night.  He reports that he has intermittently felt dizzy and lightheaded for years.  He states that he did hit his head when he fell last night.  He denies any loss of consciousness.  Denies headache.  He states that he had no other symptoms prior to falling or after falling such as chest pain, shortness of breath, abdominal pain, nausea vomiting diarrhea.  Throughout the morning he was noted to have blood pressures in the 80s up to 90.  He had a  visit as well as a physical therapist visit and confirmed these blood pressures.  Since they did not improve his wife contacted the on-call nurse at his clinic who told him to come in by 911.  She states since they live so close she brought him in on her own.  Was able to walk to the car on his own.  She has not felt that he is confused.    He admits to urinary frequency not clear if this is a change for him.  His wife reports that his appetite has been fine but that he does not drink a lot of fluids.  This is not unusual for him.  He has baseline lower extremity edema which appears normal and actually improved from baseline.    He took his morning medications with the exception of his Lasix.    His wife does not know what his blood pressure normally runs however reviewing his last 3 clinic visits blood pressure ranged from  systolic. The patient reports that his blood pressure is usually 90s over 50s.       PMD: jose guadalupe bartlett      Allergies:  No known drug allergies    Medications:    Voltaren  Lanoxin  Lasix  Depakote  Synthroid  Antivert  Toprol  Protonix  Prednisone  Mylicon  Zocor  Hytrin  Ultram  Coumadin    Past Medical History:    Atrial fibrillation (H)   Bilateral leg edema   CAD (coronary artery disease)   Cardiomyopathy (H)   Carotid stenosis   Cellulitis of left lower extremity   COPD (chronic obstructive  pulmonary disease) (H)   Cor pulmonale (H)   Diastolic dysfunction   DM2 (diabetes mellitus, type 2) (H)   IYER (dyspnea on exertion)   GERD (gastroesophageal reflux disease)   HTN (hypertension)   Hypothyroidism   Leg wound, left   LV dysfunction   Morbid obesity (H)   On supplemental oxygen by nasal cannula   STEWART (obstructive sleep apnea)   Pulmonary embolism (H)   Pulmonary HTN (H)   Varicose veins of bilateral lower extremities with other complications   Vitamin B12 deficiency disease     Past Surgical History:    Joint replacement    Family History:    History reviewed. No pertinent family history.     Social History:  Smoking status: Never  Alcohol use: No  Drug use: No  The patient presents to the emergency department by himself.  Marital Status:   [2]     Review of Systems   Constitutional: Negative for fever.   Respiratory: Negative for cough and shortness of breath.    Cardiovascular: Negative for chest pain.   Gastrointestinal: Negative for abdominal pain, diarrhea, nausea and vomiting.   Genitourinary: Positive for frequency.   All other systems reviewed and are negative.        Physical Exam     Patient Vitals for the past 24 hrs:   BP Temp Temp src Pulse Heart Rate Resp SpO2 Weight   02/11/20 2030 106/61 -- -- 72 -- -- 100 % --   02/11/20 2000 106/62 -- -- 81 72 10 100 % --   02/11/20 1945 124/85 -- -- 132 60 -- -- --   02/11/20 1915 109/62 -- -- 73 75 20 100 % --   02/11/20 1910 -- -- -- -- 73 16 99 % --   02/11/20 1900 120/63 -- -- -- -- -- -- --   02/11/20 1850 120/63 -- -- -- -- -- -- --   02/11/20 1830 106/76 -- -- 110 125 24 99 % --   02/11/20 1815 112/66 -- -- 82 81 15 99 % --   02/11/20 1800 114/66 -- -- 78 72 -- 98 % --   02/11/20 1745 99/68 -- -- 70 73 -- -- --   02/11/20 1730 105/83 -- -- -- -- -- -- --   02/11/20 1720 114/70 98.4  F (36.9  C) Temporal -- 70 18 99 % 90.7 kg (200 lb)     Physical Exam    Nursing note and vitals reviewed.    Constitutional: Pleasant and well groomed.           HENT: Tenderness to left posterior scalp   Mouth/Throat: Oropharynx is without swelling or erythema. Oral mucosa Dry.   Eyes: Conjunctivae are normal. No scleral icterus.    Neck: Neck supple. No midline posterior cervical tenderness.  No pain with moving his neck freely.  Cardiovascular: Normal rate, regular rhythm and intact distal pulses.    Pulmonary/Chest: Effort normal and breath sounds normal.   Abdominal: Soft.  No distension. There is no tenderness.   Musculoskeletal:  No edema, No calf tenderness  Genitourinary: no rash  Neurological:Alert and oriented to month and year.. Coordination normal. Left pupil is greater than the right but both are reactive.  Extraocular range of motion is intact.  He has no facial asymmetry.  Upper and lower extremity strength is symmetric.  Skin: Skin is warm and dry.   Psychiatric: Normal mood and affect.       Emergency Department Course   Imaging:  Radiographic findings were communicated with the patient who voiced understanding of the findings.    CT Head wo Contrast  IMPRESSION:  1.  Stable minimal changes of small vessel vascular disease.    As read by Radiology.    Laboratory:  CBC: HGB 8.2 (L), o/w WNL (WBC 8.3, )  BMP: CO2 24 (H), Anion <1 (L), Glucose 111 (H), o/w WNL (Creatinine 0.90)  INR 2.28 (H)  1745 Troponin I <0.015    UA: Leukocyte trace, o/w neg.    Interventions:  1746 NS 0.25L IV Bolus  1928 NS 1L IV Bolus    Emergency Department Course:  Past medical records, nursing notes, and vitals reviewed.  1721: I performed an exam of the patient and obtained history, as documented above.    IV inserted and blood drawn.    The patient was sent for a head CT while in the emergency department, findings above.    2031: I rechecked the patient. Findings and plan explained to the Patient. Patient discharged home with instructions regarding supportive care, medications, and reasons to return. The importance of close follow-up was reviewed.     Impression & Plan     Medical Decision Making:  Sean Brunner is a 85 year old male with a complicated past medical history as outlined above who has issues with frequently feeling lightheaded and dizzy.  Last night he fell.  Today he was noted to have blood pressures in the 80s and 90s by physical therapist and  who are visiting the home.  The lightheadedness is not an unusual issue for him.  He has been normotensive during his time in the emergency department.  He is anticoagulated.  Differential diagnosis included but was not limited to intracranial hemorrhage,  occult infection, electrolyte abnormality, dehydration.  CT of the head was negative for injury.  He is therapeutic on Coumadin.  ED evaluation is as noted above labs were reassuring and unremarkable. He remained normotensive during his time in the emergency department.  He is ambulated without recurrent symptoms.  He has a follow-up appointment tomorrow with his primary care physician.  They will discuss his blood pressure medications and Lasix.  Recommended that he hold his Lasix and metoprolol if his blood pressure is lower than normal and to bring his blood pressure cuff with him to demonstrate his baseline pressures to his primary care physician.      Diagnosis:    ICD-10-CM    1. Light headedness R42 Urine Culture Aerobic Bacterial   2. Closed head injury, initial encounter S09.90XA    3. Fall, initial encounter W19.XXXA    4. Anemia, unspecified type D64.9     baseline       Disposition:  discharged to home    Discharge Medications:  Discharge Medication List as of 2/11/2020  8:37 PM          Danelle Block MD  2/11/2020   [facility] EMERGENCY DEPARTMENT      Scribe Disclosure:  I, Bryant Augustine, am serving as a scribe at 10:22 PM on 2/11/2020 to document services personally performed by Danelle Block MD based on my observations and the provider's statements to me.    [facility] EMERGENCY DEPARTMENT        Danelle Block MD  02/12/20 6226

## 2020-02-11 NOTE — ED AVS SNAPSHOT
[facility] Emergency Department  [address]  [facility] [address]  Phone:  [phone]  Fax:  [phone]                                    Sean Brunner   MRN: [id number]    Department:  [facility] Emergency Department   Date of Visit:  2/11/2020           After Visit Summary Signature Page    I have received my discharge instructions, and my questions have been answered. I have discussed any challenges I see with this plan with the nurse or doctor.    ..........................................................................................................................................  Patient/Patient Representative Signature      ..........................................................................................................................................  Patient Representative Print Name and Relationship to Patient    ..................................................               ................................................  Date                                   Time    ..........................................................................................................................................  Reviewed by Signature/Title    ...................................................              ..............................................  Date                                               Time          22EPIC Rev 08/18

## 2020-02-11 NOTE — ED TRIAGE NOTES
Pt here with c/o low BP.  Phone triage nurse is as follows:      Patients wife was calling back, patient was triaged earlier due to dizziness, a fall and low b/ps it was recommended to call 911 and she did not do that, stated his b/p is up alittle now       No CP or SOB. ABC intact. A&O x4.

## 2020-02-11 NOTE — ED PROVIDER NOTES
History     Chief Complaint:  Hypotension    HPI   Sean Brunner is a 85 year old male who presents with ***    Allergies:  No known drug allergies    Medications:    Digoxin  Lasix  Levothyroxine  Metoprolol succinate  Protonix  Prednisone  Simethicone  Simvastatin  Terazosin  Tramadol  Coumadin     Past Medical History:    Atrial fibrillation  Bilateral leg edema  Coronary artery disease  Cardiomyopathy  Carotid stenosis  Cellulitis of LLE  COPD  Diastolic dysfunction  Type II diabetes mellitus  Dyspnea on exertion  GERD  Hypertension  Hypothyroidism  Left leg wound  LV dysfunction  Morbid obesity  On supplemental O2  PSA  PE  Varicose veins of bilateral lower extremities with other complications  Vitamin B12 deficiency disease    Past Surgical History:    Joint replacement    Family History:    History reviewed. No pertinent family history.     Social History:  Smoking status: ***  Alcohol use: ***  Drug use: ***  PCP: Oscar Parekh  Marital Status:       Review of Systems  ***    Physical Exam     Patient Vitals for the past 24 hrs:   BP Temp Temp src Heart Rate Resp SpO2 Weight   02/11/20 1720 114/70 98.4  F (36.9  C) Temporal 70 18 99 % 90.7 kg (200 lb)       Physical Exam  ***    Emergency Department Course   ECG:  ***    Imaging:  Radiographic findings were communicated with the patient who voiced understanding of the findings.  No orders to display   As read by Radiology.    Laboratory:  Labs Ordered and Resulted from Time of ED Arrival Up to the Time of Departure from the ED - No data to display    Procedures:  ***    Interventions:  Medications - No data to display    Emergency Department Course:  Past medical records, nursing notes, and vitals reviewed.  I performed an exam of the patient and obtained history, as documented above.    EKG obtained, findings above. ***    UA obtained, findings above. ***    IV inserted and blood drawn. ***    The patient was sent for a *** while  "in the emergency department, findings above.    I rechecked the patient. Explained findings to patient.    Findings and plan explained to the patient. Patient discharged home with instructions regarding supportive care, medications, and reasons to return. The importance of close follow-up was reviewed. The patient was prescribed ***    Impression & Plan       {trauma activation?:779989::\" \"}  CMS Diagnoses: {Sepsis/Septic Shock/Stemi/Stroke:903604::\" \"}       Medical Decision Making:  ***  Critical Care time:  {none or minutes:627889::\"none\"}    Diagnosis:  No diagnosis found.    Disposition:  {discharged to home/discharged to home with.../Admitted to...:653413}    Discharge Medications:  New Prescriptions    No medications on file       Rivka Lozano  2/11/2020   [facility] EMERGENCY DEPARTMENT    "

## 2020-02-12 NOTE — DISCHARGE INSTRUCTIONS
Hold tomorrow mornings metroprolol and lasix if blood pressure lower than normal. Bring blood pressure recordings with you to your appointment tomorrow.     Return to the ED with any new/concerning symptoms.

## 2020-02-12 NOTE — ED NOTES
Pt has call light multiple times to urinate in urinal. Pt able to stand at bedside independently and denies dizziness. MD Block updated.

## 2020-02-12 NOTE — ED NOTES
Ambulated pt with walker. Pt able to walk independently and reported no dizziness while walking. MD notified

## 2020-02-13 NOTE — RESULT ENCOUNTER NOTE
Final urine culture report is NEGATIVE per [facility] ED Lab Result protocol.    If NEGATIVE result, no change in treatment, per [facility] ED Lab Result protocol.

## 2020-02-14 NOTE — TELEPHONE ENCOUNTER
[facility] Call Center    Phone Message    May a detailed message be left on voicemail: yes     Reason for Call: Medication Question or concern regarding medication   Prescription Clarification  Name of Medication: metoprolol succinate ER (TOPROL-XL) 50 MG 24 hr tablet  Prescribing Provider: Dr. Segal   What on the order needs clarification? Nurse with [employer] calling because Shahriar was recently hospitalized and he is still feeling dizzy and generally not well. This prescription was changed when he went into the ED. The nurse was like a call back to discuss Shahriar's medication with one of his cardiologist's nurses. Please give her a call back at [phone] at your earliest convenience.          Action Taken: Message routed to:  [facility]):  Cardio    Travel Screening: Not Applicable

## 2020-02-14 NOTE — TELEPHONE ENCOUNTER
I called Jamilah back and let her know that Dr. Parekh is the originating prescriber  and to please follow up with his office.  Porsha Dawn RN on 2/14/2020 at 2:58 PM

## 2020-02-20 PROBLEM — I95.9 HYPOTENSION: Status: ACTIVE | Noted: 2020-01-01

## 2020-02-20 NOTE — ED NOTES
UPDATE:    I met with Pt and wife in person, and spoke with son, Mp, over the phone.  I gave them the [employer] list of TCU's, as neither [facility], nor [facility], is on the list.    Pt/family was given the Medicare Compare list for SNF, with associated star ratings to assist with choice for referrals/discharge planning : Yes.    Education was given to pt/family that star ratings are updated/maintained by Medicare and can be reviewed by visiting www.medicare.gov: Yes.    Tala Cobb RN Care Coordinator,  TEA PHN, CCM, SHARYN  Inpatient Care Coordination - Emergency Department  [facility]   [phone]

## 2020-02-20 NOTE — CONSULTS
Please see ED Note at 1:50 PM.    Tala Cobb  RN Care Coordinator,  TEA, PHN, CCM, SHARYN  Inpatient Care Coordination - Emergency Department  [facility]   [phone]

## 2020-02-20 NOTE — CONSULTS
Inpatient Cardiology Consultation:    Sean Brunner MRN#: [id number]   YOB: 1934 Age: 85 year old     Date of Admission: 2/20/2020  Consult indication: dizziness, hypotension         Assessment and Plan / Recommendations:     # Dizziness and lightheadedness. Multiple presentations for similar symptoms previously. Etiology unclear, possibly hypotension vs UTI vs other. Does not seem to be orthostatic in nature, but he does note that when he turns his head or bends over this sometimes brings this on. Question inner ear pathology?  # Hypotension. Hypovolemic on exam with improvement of symptoms with 500cc IVF administered in the ED. Has been on furosemide 20mg PO daily.  # Atrial fibrillation with RVR. Rate controlled on metoprolol an digoxin. Anticoagulated with warfarin  #  HFrEF, Cor pulmonale. LVEF 45-50%. Severe biatrial enlargement. 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  Unfortunately mechanical intervention for his severe TR would be associated with significant morbidity and mortality.  I offered them formal evaluation by a cardiac surgeon, however the patient and his wife decline this, which I think is reasonable.      He and his wife understand that he is dying.  Unfortunately they have an adult child with Down's syndrome, who is suffering from early Alzheimer's, though they do have another older son who seems to be caring for the rest of them.  They also had a daughter with Down's syndrome but she was fatally struck by a car when she was 28 years old.     At this time I recommend a Palliative care consultation.  In my conversation with the patient and his wife, it seems like they understand the trajectory of his decline, and he would prefer to pass away at home, and so I would recommend consideration for home hospice care.      Regarding his dizziness, I recommend treatment for possible UTI. He seemed to be better after he received 500cc of IVF in the ED, so it would be reasonable to decrease his furosemide from 20mg daily to 10 mg daily, with an extra 10mg daily PRN for increased dyspnea, weight gain, or increased leg swelling.    Cardiology will sign off, but please page our team if we can be of any further assistance.     Thank you for allowing our team to participate in the care of Sean Brunner. Please do not hesitate to page me with any questions or concerns.    Radames Baker MD  Cardiology  Pager:  [phone]  Text Page   February 20, 2020         History of Present Illness:   Chief complaint: dyspnea    Patient Sean Brunner is a 85 year old male with a past medical history significant for hypotension, permanent atrial fibrillation with RVR, HFrEF with corpulmonale, STEWART, COPD, prior VTE, PMR, who presents with several days of fatigue and intermittent dizziness. He was recently admitted here for similar symptoms, and was started on digoxin for improved rate control, which was limited by hypotension. He was also treated for a possible UTI at that time. Since discharge he has been cared for by a home health RN, who recommended presentation to the ED due to intermittent hypotension and weakness.     He received 500cc of fluid in the ED here with improvement of his symptoms. His dizziness seems less orthostatic in nature and the positional element of it seems more like BPPV. He denies any chest pain, pressure, or worsening dyspnea. No symptoms of orthopnea/PND. No fever, chills, NS, but he does complain of increased urinary frequency.          Past Medical History:   I have reviewed this patient's past medical history  Past Medical History:   Diagnosis Date     Atrial fibrillation (H) 12/27/2011    Sees Dr Carcamo, avoid amio due to cor pulmonale, asymptomatic rates up to160 with exersion.        Bilateral leg edema      compression wraps     CAD (coronary artery disease)     Nonobstructive     Cardiomyopathy (H) 5/1/2012     Carotid stenosis      Cellulitis of left lower extremity 11/5/2018     COPD (chronic obstructive pulmonary disease) (H)      Cor pulmonale (H) 5/1/2012     Diastolic dysfunction      DM2 (diabetes mellitus, type 2) (H)      IYER (dyspnea on exertion)      GERD (gastroesophageal reflux disease)      HTN (hypertension)      Hypothyroidism      Leg wound, left      LV dysfunction 05/01/2012     Due to cor pulmonale and a fib related tachycardia      Morbid obesity (H) 5/1/2012     On supplemental oxygen by nasal cannula     at [facility]     STEWART (obstructive sleep apnea) 05/01/2012    Non compliant with CPAP     Pulmonary embolism (H)      Pulmonary HTN (H)      Varicose veins of bilateral lower extremities with other complications      Vitamin B12 deficiency disease 5/1/2012    On oral replacement              Past Surgical History:   I have reviewed this patient's past surgical history   Past Surgical History:   Procedure Laterality Date     JOINT REPLACEMENT               Social History:   I have reviewed this patient's social history  Social History     Tobacco Use     Smoking status: Never Smoker     Smokeless tobacco: Never Used   Substance Use Topics     Alcohol use: No             Family History:   I have reviewed this patient's family history  No family history on file.          Allergies:   I have reviewed this patient's allergy history  No Known Allergies          Medications reviewed:   Prior to admission medications:  Prior to Admission medications    Medication Sig Start Date End Date Taking? Authorizing Provider   acetaminophen (TYLENOL) 325 MG tablet Take 2 tablets (650 mg) by mouth every 6 hours as needed for mild pain 2/4/20   Panfilo Segal MD   cephALEXin (KEFLEX) 500 MG capsule Take 1 capsule (500 mg) by mouth 2 times daily for 5 days 2/4/20 2/9/20  Panfilo Segal MD    cyanocolbalamin (VITAMIN  B-12) 1000 MCG tablet Take 3,000 mcg by mouth every morning    Unknown, Entered By History   diclofenac (VOLTAREN) 1 % topical gel Apply 2 g topically 4 times daily as needed for moderate pain Apply to hip Send dosing card with product. 12/11/19   Manny Pastor MD   digoxin (LANOXIN) 125 MCG tablet Take 1 tablet (125 mcg) by mouth daily 2/5/20   Panfilo Segal MD   divalproex sodium extended-release (DEPAKOTE ER) 500 MG 24 hr tablet TAKE 1 TABLET BY MOUTH EACH EVENING AT BEDTIME 11/25/19   Reported, Patient   furosemide (LASIX) 20 MG tablet Take 20 mg by mouth every morning 1/28/20   Unknown, Entered By History   levothyroxine (SYNTHROID) 150 MCG tablet Take 150 mcg by mouth every morning (before breakfast)     Reported, Patient   meclizine (ANTIVERT) 12.5 MG tablet Take 12.5 mg by mouth 3 times daily as needed for dizziness    Unknown, Entered By History   metoprolol succinate ER (TOPROL-XL) 50 MG 24 hr tablet Take 1 tablet (50 mg) by mouth daily 2/5/20   Panfilo Segal MD   multivitamin, therapeutic with minerals (THERA-VIT-M) TABS tablet Take 1 tablet by mouth 2 times daily (with meals)    Reported, Patient   pantoprazole (PROTONIX) 40 MG EC tablet Take 1 tablet (40 mg) by mouth 2 times daily 12/11/19   Manny Pastor MD   predniSONE (DELTASONE) 1 MG tablet Take 1 mg by mouth daily Takes with 5mg tablet for total of 6 mg.    Unknown, Entered By History   predniSONE (DELTASONE) 5 MG tablet Take 5 mg by mouth daily Takes with 1 mg tablet for total of 6 mg.    Unknown, Entered By History   simethicone (MYLICON) 80 MG chewable tablet Take 1 tablet (80 mg) by mouth every 6 hours as needed for cramping or other (gas pains) 12/11/19   Manny Pastor MD   simvastatin (ZOCOR) 40 MG tablet Take 40 mg by mouth daily (with dinner)     Reported, Patient   terazosin (HYTRIN) 5 MG capsule Take 5 mg by mouth At Bedtime    Unknown, Entered By History   traMADol (ULTRAM)  50 MG tablet Take 50 mg by mouth every 6 hours as needed for moderate pain     Reported, Patient   warfarin ANTICOAGULANT (COUMADIN) 1 MG tablet Take 1 tablet (1 mg) by mouth daily Check INR on 2020 to adjust the dose. 20   Panfilo Segal MD      Current medications:  No current Epic-ordered facility-administered medications on file.      Current Outpatient Medications Ordered in Epic   Medication     acetaminophen (TYLENOL) 325 MG tablet     cyanocolbalamin (VITAMIN  B-12) 1000 MCG tablet     diclofenac (VOLTAREN) 1 % topical gel     digoxin (LANOXIN) 125 MCG tablet     divalproex sodium extended-release (DEPAKOTE ER) 500 MG 24 hr tablet     furosemide (LASIX) 20 MG tablet     levothyroxine (SYNTHROID) 150 MCG tablet     meclizine (ANTIVERT) 12.5 MG tablet     metoprolol succinate ER (TOPROL-XL) 50 MG 24 hr tablet     multivitamin, therapeutic with minerals (THERA-VIT-M) TABS tablet     pantoprazole (PROTONIX) 40 MG EC tablet     predniSONE (DELTASONE) 1 MG tablet     predniSONE (DELTASONE) 5 MG tablet     simethicone (MYLICON) 80 MG chewable tablet     simvastatin (ZOCOR) 40 MG tablet     terazosin (HYTRIN) 5 MG capsule     traMADol (ULTRAM) 50 MG tablet     warfarin ANTICOAGULANT (COUMADIN) 1 MG tablet             Review of Systems:   A complete review of systems was performed and was negative except as mentioned in the HPI.          Physical Exam:   Vital signs were personally reviewed:  Temperatures:  Current - Temp: 98.6  F (37  C); Max - Temp  Av.6  F (37  C)  Min: 98.6  F (37  C)  Max: 98.6  F (37  C)  Respiration range: Resp  Av  Min: 14  Max: 14  Pulse range: Pulse  Av.6  Min: 75  Max: 152  Blood pressure range: Systolic (24hrs), Av , Min:90 , Max:140   ; Diastolic (24hrs), Av, Min:50, Max:112    Pulse oximetry range: SpO2  Av.8 %  Min: 97 %  Max: 100 %  No intake or output data in the 24 hours ending 20 1506  0 lbs 0 oz  There is no height or weight on  file to calculate BMI.   There is no height or weight on file to calculate BSA.    Constitutional: appears stated age, in no apparent distress, appears to be well nourished  Eyes: sclera anicteric, conjunctiva normal, no lesions on eyelids or lashes  ENT: normocephalic, without obvious abnormality, atraumatic, external ears without lesions,   Pulmonary: clear to auscultation bilaterally  Cardiovascular: JVP normal, irregularly irregular  Gastrointestinal: abdominal exam benign, non-tender, no rigidity, no guarding  Neurologic: awake, alert, face symmetrical, moves all extremities  Skin: no abnormal rashes or lesions on limited exam, nails normal without discoloration or clubbing, no jaundice  Psychiatric: affect is normal, answers questions appropriately, oriented to self and place         Laboratory tests:   Laboratory tests personally reviewed:   CMP  Recent Labs   Lab 02/20/20  1120      POTASSIUM 4.4   CHLORIDE 101   CO2 31   ANIONGAP 2*   *   BUN 15   CR 0.77   GFRESTIMATED 83   GFRESTBLACK >90   TYSON 9.2     CBC  Recent Labs   Lab 02/20/20  1120   WBC 10.4   RBC 2.92*   HGB 8.2*   HCT 27.1*   MCV 93   MCH 28.1   MCHC 30.3*   RDW 17.3*        INR  Recent Labs   Lab 02/20/20  1120   INR 4.52*     Lab Results   Component Value Date    TROPI <0.015 02/20/2020    TROPI <0.015 02/11/2020    TROPI <0.015 01/31/2020    TROPONIN 0.01 02/24/2017     No results for input(s): CHOL, HDL, LDL, TRIG, CHOLHDLRATIO in the last 41548 hours.  Lab Results   Component Value Date    A1C 5.7 12/06/2019    A1C 4.9 09/01/2018    A1C 5.6 01/04/2015    A1C 6.2 12/09/2014     TSH   Date Value Ref Range Status   02/02/2020 1.27 0.40 - 4.00 mU/L Final            Imaging and Additional Data:   Additional data personally reviewed:  Recent Results (from the past 4320 hour(s))   Echocardiogram Complete    Narrative    [id number]  [id number]  [id number]  [id number]^CAROLYN^ELLY^S           [facility]  Echocardiography  "Laboratory  [address] [place] [address]        Name: MAYITO ANSARI  MRN: [id number]  : 1934  Study Date: 2020 03:52 PM  Age: 85 yrs  Gender: Male  Patient Location: [facility]  Reason For Study: Afib  Ordering Physician: ELLY LEON  Referring Physician: ERIKA TONEY  Performed By: Fabrizio Junior RDCS     BSA: 2.1 m2  Height: 69 in  Weight: 204 lb  BP: 91/66 mmHg  _____________________________________________________________________________  __        Procedure  Complete Portable Echo Adult. Optison (NDC #3696-1612) given intravenously.  _____________________________________________________________________________  __        Interpretation Summary     The visual ejection fraction is estimated at 45-50%.  Flattened septum is consistent with RV pressure/volume overload.  The right ventricle is moderate to severely dilated.  The left atrium is moderate to severely dilated.  The right atrium is severely dilated.  Tricuspid valve fails to coapt  There is severe (4+) tricuspid regurgitation.  IVC diameter >2.1 cm collapsing <50% with sniff suggests a high RA pressure  estimated at 15 mmHg or greater.  Right ventricular systolic pressure could be underestimated due to incomplete  tricuspid regurgitation velocity envelope.  The aortic valve is not well visualized.  Moderate to severe valvular aortic stenosis.  Aortic stenosis present. It is likely \"low flow-low gradient\" significant  aortic stenosis (significantly reduced SVI noted)  The rhythm was rapid atrial fibrillation.  The right ventricular systolic function is moderate to severely reduced.  _____________________________________________________________________________  __        Left Ventricle  The left ventricle is normal in size. There is normal left ventricular wall  thickness. The visual ejection fraction is estimated at 45-50%. Diastolic  function not assessed due to atrial fibrillation. Flattened septum is  consistent " "with RV pressure/volume overload. There is no thrombus seen in the  left ventricle.     Right Ventricle  The right ventricle is moderate to severely dilated. The right ventricular  systolic function is moderate to severely reduced.     Atria  The left atrium is moderate to severely dilated. The right atrium is severely  dilated. There is no color Doppler evidence of an atrial shunt.     Mitral Valve  There is mild to moderate mitral annular calcification. There is trace mitral  regurgitation.        Tricuspid Valve  Tricuspid valve fails to coapt. There is severe (4+) tricuspid regurgitation.  IVC diameter >2.1 cm collapsing <50% with sniff suggests a high RA pressure  estimated at 15 mmHg or greater. The right ventricular systolic pressure is  approximated at 20.2 mmHg plus the right atrial pressure. Right ventricular  systolic pressure could be underestimated due to incomplete tricuspid  regurgitation velocity envelope.     Aortic Valve  The aortic valve is not well visualized. There is trace aortic regurgitation.  Moderate to severe valvular aortic stenosis. Aortic stenosis present. It is  likely \"low flow-low gradient\" significant aortic stenosis (significantly  reduced SVI noted).     Pulmonic Valve  The pulmonic valve is not well seen, but is grossly normal.     Vessels  Normal size aorta.     Pericardium  The pericardium appears normal.        Rhythm  The rhythm was rapid atrial fibrillation.  _____________________________________________________________________________  __  MMode/2D Measurements & Calculations     IVSd: 0.88 cm  LVIDd: 5.1 cm  LVIDs: 3.7 cm  LVPWd: 0.98 cm  FS: 26.9 %  LV mass(C)d: 168.3 grams  LV mass(C)dI: 80.8 grams/m2  Ao root diam: 3.2 cm  LA dimension: 4.4 cm  asc Aorta Diam: 3.2 cm  LA/Ao: 1.4  LVOT diam: 1.9 cm  LVOT area: 2.8 cm2  LA Volume (BP): 89.1 ml  LA Volume Index (BP): 42.8 ml/m2  RWT: 0.39           Doppler Measurements & Calculations  MV E max lorin: 98.5 cm/sec  Ao V2 max: " 279.9 cm/sec  Ao max P.0 mmHg  Ao V2 mean: 197.8 cm/sec  Ao mean P.9 mmHg  Ao V2 VTI: 48.9 cm  ROMERO(I,D): 0.97 cm2  ROMERO(V,D): 0.97 cm2  LV V1 max PG: 3.7 mmHg  LV V1 max: 96.2 cm/sec  LV V1 VTI: 16.7 cm  CO(LVOT): 6.4 l/min  CI(LVOT): 3.1 l/min/m2  SV(LVOT): 47.4 ml  SI(LVOT): 22.7 ml/m2  TR max lei: 222.6 cm/sec  TR max P.2 mmHg  AV Lei Ratio (DI): 0.34  ROMERO Index (cm2/m2): 0.46  E/E' avg: 10.0  Lateral E/e': 8.9  Medial E/e': 11.2              _____________________________________________________________________________  __        Report approved by: Colin Anderson 2020 05:06 PM

## 2020-02-20 NOTE — ED TRIAGE NOTES
Patient arrived at around 10:50 complaining of low blood pressures at home. Home health nurse came to assess and recommended being seen in ER. Patient has not been taking lasix without significant weight increase.  Pulse between  per home health nurse. History of afib. INR 4 this AM. ABCs intact. Alert and oriented X4. Family supportive at bedside.

## 2020-02-20 NOTE — H&P
[facility]    History and Physical  Hospitalist       Date of Admission:  2/20/2020    Assessment & Plan   Sean Brunner is a 85 year old male with a past medical history of PMR, pulmonary hypertension, iron deficiency anemia, arthritis, type 2 diabetes, GERD, venous stasis, hypothyroidism, 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  Atrial fibrillation, and chronic headaches who presents with generalized weakness, fatigue and hypotension.      Of note, patient was singly hospitalized from 1/31- 2/4 for atrial fibrillation with RVR possible, UTI and RV failure.    Patient was reportedly at home today and had low systolic blood pressures in the high 80s.  His pulse was also intermittently been in the 50s to 140s per his home health nurse.  He has been feeling generally weak, dizzy, lightheaded for several days.  These episodes are happening every day.  No loss of consciousness.  Seems to happen more when he gets up from a seated position, they also can happen while he is sitting still.  No chest pain and no shortness of breath.    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  Patient's UA was positive for leukocyte esterase and pyuria.  Although I believe his right heart failure is a larger contributor to her symptoms, it is possible that with treatment of possible UTI that he could symptomatically improved.  It does seem that he was likely a bit volume down on presentation as his symptoms and blood pressure did respond to a small 500 cc bolus.  He is on 20 mg of Lasix daily.  -Cardiology did discuss with the patient the severity of his underlying right heart failure.  Id did discuss this with cardiology as well.  I also did discuss the nature of the patient's end-stage RV failure with both Shahriar and his wife.  They do understand the severity of his disease process and that he is in the dying  process.  -We will continue his home medications for his heart failure and atrial fibrillation.  We will hold his Lasix for now given that he is volume down.  -I have asked for a palliative care consult to assist with symptom management and also to help arrange for home hospice as this is the patient's primary goal.  -Appreciate cardiology consultation  -Continue meclizine for possible inner ear contribution to dizziness.  -Ceftriaxone for possible UTI.  Will follow urine culture.  -Would not escalate cares further including transfer to the ICU, pressors.  Patient's CODE STATUS is also been changed to DNR/DNI.    Chronic Medical Conditions  #Afib w Supratherapeutic INR -pharmacy consulted for warfarin dosing.  No signs of active bleeding.  Continue beta-blockade and digoxin   #CAD, hyperlipidemia- continuing home statin, metoprolol with hold parameters,  #Chronic Anemia -stable  #GERD - continue PPI  #Hypothyroidism - continue pta Levothyroxine  #Polymyalgia Rheumatica -continue home prednisone  #Hx Headaches -not currently having symptoms.  We will continue home Depakote.  PRN Tylenol ordered    DVT Prophylaxis: warfarin   Code Status: DNR / DNI, confirmed with both patient and his wife.  Expected discharge: Hopeful for discharge tomorrow with hospice.  Appreciate palliative care assistance.    Ashkan Finnegan MD    Primary Care Physician   Oscar Parekh    Chief Complaint   Hypotension, generalized weakness    History is obtained from the patient, patient's chart and discussed with ER physician.    History of Present Illness   Sean Brunner is a 85 year old male with a past medical history of PMR, pulmonary hypertension, iron deficiency anemia, arthritis, type 2 diabetes, GERD, venous stasis, hypothyroidism, 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  Atrial fibrillation, and chronic headaches who presents with generalized weakness, fatigue and hypotension.    Of note, patient was singly hospitalized from  1/31- 2/4 for atrial fibrillation with RVR possible UTI.  He had an echo that showed a LVEF of 45 to 50% but moderate to severely reduced right ventricular function, right tricuspid regurgitation and aortic stenosis.  He was treated with beta-blockade and the addition of digoxin.  He was treated for his UTI with ceftriaxone and ultimately transitioned to Keflex.    Patient was reportedly at home today and had low blood pressures in the high 80s.  His pulse was also intermittently been in the 50s to 140s per his home health nurse.  He has been feeling generally weak, dizzy, lightheaded for several days.  These episodes are happening every day.  No loss of consciousness.  He is also been feeling short of breath.    In the ED, patient was afebrile and non-tachycardic with blood pressures in the 90s initially.  This did respond to 500 cc bolus.  Chest x-ray was obtained that showed increase in the right pleural effusion with right basilar opacities likely due to atelectasis.  His EKG showed atrial fibrillation with a PVC but no acute ST segment changes.  Lab work notable for a stable CBC.  BMP unremarkable.  Troponin was negative.  Lactic acid was normal.    He was seen by cardiology in the ED.  It was determined that he is likely a the end stages of right ventricular heart failure with severe tricuspid regurgitation.  For him mechanical intervention would result in significant morbidity mortality.  He was offered eval by cardiac surgery but declined.  Seems that he and his wife both do understand that he is in the process of dying.  Palliative care consultation was recommended.    Past Medical History    I have reviewed this patient's medical history and updated it with pertinent information if needed.   Past Medical History:   Diagnosis Date     Atrial fibrillation (H) 12/27/2011    Sees Dr Carcamo, avoid amio due to cor pulmonale, asymptomatic rates up to160 with exersion.        Bilateral leg edema     compression  wraps     CAD (coronary artery disease)     Nonobstructive     Cardiomyopathy (H) 5/1/2012     Carotid stenosis      Cellulitis of left lower extremity 11/5/2018     COPD (chronic obstructive pulmonary disease) (H)      Cor pulmonale (H) 5/1/2012     Diastolic dysfunction      DM2 (diabetes mellitus, type 2) (H)      IYER (dyspnea on exertion)      GERD (gastroesophageal reflux disease)      HTN (hypertension)      Hypothyroidism      Leg wound, left      LV dysfunction 05/01/2012     Due to cor pulmonale and a fib related tachycardia      Morbid obesity (H) 5/1/2012     On supplemental oxygen by nasal cannula     at [facility]     STEWART (obstructive sleep apnea) 05/01/2012    Non compliant with CPAP     Pulmonary embolism (H)      Pulmonary HTN (H)      Varicose veins of bilateral lower extremities with other complications      Vitamin B12 deficiency disease 5/1/2012    On oral replacement        Past Surgical History   I have reviewed this patient's surgical history and updated it with pertinent information if needed.  Past Surgical History:   Procedure Laterality Date     JOINT REPLACEMENT         Prior to Admission Medications   Prior to Admission Medications   Prescriptions Last Dose Informant Patient Reported? Taking?   acetaminophen (TYLENOL) 325 MG tablet   No No   Sig: Take 2 tablets (650 mg) by mouth every 6 hours as needed for mild pain   cephALEXin (KEFLEX) 500 MG capsule   No No   Sig: Take 1 capsule (500 mg) by mouth 2 times daily for 5 days   cyanocolbalamin (VITAMIN  B-12) 1000 MCG tablet   Yes No   Sig: Take 3,000 mcg by mouth every morning   diclofenac (VOLTAREN) 1 % topical gel   No No   Sig: Apply 2 g topically 4 times daily as needed for moderate pain Apply to hip Send dosing card with product.   digoxin (LANOXIN) 125 MCG tablet   No No   Sig: Take 1 tablet (125 mcg) by mouth daily   divalproex sodium extended-release (DEPAKOTE ER) 500 MG 24 hr tablet   Yes No   Sig: TAKE 1 TABLET BY MOUTH EACH EVENING AT  BEDTIME   furosemide (LASIX) 20 MG tablet   Yes No   Sig: Take 20 mg by mouth every morning   levothyroxine (SYNTHROID) 150 MCG tablet   Yes No   Sig: Take 150 mcg by mouth every morning (before breakfast)    meclizine (ANTIVERT) 12.5 MG tablet   Yes No   Sig: Take 12.5 mg by mouth 3 times daily as needed for dizziness   metoprolol succinate ER (TOPROL-XL) 50 MG 24 hr tablet   No No   Sig: Take 1 tablet (50 mg) by mouth daily   multivitamin, therapeutic with minerals (THERA-VIT-M) TABS tablet  Self Yes No   Sig: Take 1 tablet by mouth 2 times daily (with meals)   pantoprazole (PROTONIX) 40 MG EC tablet   No No   Sig: Take 1 tablet (40 mg) by mouth 2 times daily   predniSONE (DELTASONE) 1 MG tablet   Yes No   Sig: Take 1 mg by mouth daily Takes with 5mg tablet for total of 6 mg.   predniSONE (DELTASONE) 5 MG tablet   Yes No   Sig: Take 5 mg by mouth daily Takes with 1 mg tablet for total of 6 mg.   simethicone (MYLICON) 80 MG chewable tablet   No No   Sig: Take 1 tablet (80 mg) by mouth every 6 hours as needed for cramping or other (gas pains)   simvastatin (ZOCOR) 40 MG tablet   Yes No   Sig: Take 40 mg by mouth daily (with dinner)    terazosin (HYTRIN) 5 MG capsule   Yes No   Sig: Take 5 mg by mouth At Bedtime   traMADol (ULTRAM) 50 MG tablet   Yes No   Sig: Take 50 mg by mouth every 6 hours as needed for moderate pain    warfarin ANTICOAGULANT (COUMADIN) 1 MG tablet   No No   Sig: Take 1 tablet (1 mg) by mouth daily Check INR on 02/07/2020 to adjust the dose.      Facility-Administered Medications: None     Allergies   No Known Allergies    Social History   I have reviewed this patient's social history and updated it with pertinent information if needed. Sean GIL Myesha  reports that he has never smoked. He has never used smokeless tobacco. He reports that he does not drink alcohol or use drugs.    Family History   I have reviewed this patient's family history and updated it with pertinent information if needed.    Brother esophageal cancer    Review of Systems   The 10 point Review of Systems is negative other than noted in the HPI or here.     Physical Exam   Temp: 98.6  F (37  C) Temp src: Oral BP: 109/70 Pulse: 95 Heart Rate: 83 Resp: 14 SpO2: 98 % O2 Device: None (Room air)    Vital Signs with Ranges  Temp:  [98.6  F (37  C)] 98.6  F (37  C)  Pulse:  [] 95  Heart Rate:  [76-92] 83  Resp:  [14] 14  BP: ()/() 109/70  SpO2:  [97 %-100 %] 98 %  0 lbs 0 oz    Constitutional: Patient appears generally weak, elderly.  HEENT: Elevation of JVD noted at 45 degrees.  Mucous membranes appear a bit dry.  Respiratory: Diminished breath sounds on the right side.  Normal work of breathing.  No wheezes or crackles  Cardiovascular: Irregular, positive systolic murmur, warm and well-perfused  GI: Bowel sounds present, nontender  Lymph/Hematologic: Some scattered bruising  Skin: Warm.  No rashes  Musculoskeletal: Normal tone  Neurologic: Cranial nerves II through XII intact.  Answers questions appropriately.  Moves all extremities.  No tremor.  He is hard of hearing  Psychiatric: Calm    Data   Data reviewed today:  I personally reviewed   Recent Labs   Lab 02/20/20  1120   WBC 10.4   HGB 8.2*   MCV 93      INR 4.52*      POTASSIUM 4.4   CHLORIDE 101   CO2 31   BUN 15   CR 0.77   ANIONGAP 2*   TYSON 9.2   *   TROPI <0.015       Recent Results (from the past 24 hour(s))   XR Chest 2 Views    Narrative    CHEST TWO VIEWS 2/20/2020 11:39 AM     HISTORY: hypotension, dizziness    COMPARISON: Chest x-ray 1/31/2020       Impression    IMPRESSION: Interval increase in size of the right pleural effusion  since 1/31/2020. There are right basilar opacities, likely due to  atelectasis. The cardiac silhouette is mildly enlarged, with normal  pulmonary vasculature.    MOISES LUCIANO MD

## 2020-02-20 NOTE — PHARMACY-ADMISSION MEDICATION HISTORY
Admission medication history interview status for this patient is complete. See EPIC admission navigator for allergy information, prior to admission medications and immunization status.     Medication history interview source(s):Family  Medication history resources (including written lists, pill bottles, clinic record):pts med list and epic med list  Primary pharmacy:    Changes made to PTA medication list:  Added: none  Deleted: none  Changed: Lasix and Tylenol directions    Actions taken by pharmacist (provider contacted, etc):None     Additional medication history information:None    Medication reconciliation/reorder completed by provider prior to medication history?  No     Do you take OTC medications (eg tylenol, ibuprofen, fish oil, eye/ear drops, etc)? Yes     For patients on insulin therapy: No        Prior to Admission medications    Medication Sig Last Dose Taking? Auth Provider   acetaminophen 325 MG PO tablet Take 650 mg by mouth 2 times daily 2/20/2020 at am Yes Unknown, Entered By History   cyanocolbalamin (VITAMIN  B-12) 1000 MCG tablet Take 3,000 mcg by mouth every morning 2/20/2020 at am Yes Unknown, Entered By History   furosemide (LASIX) 20 MG tablet Take 20 mg by mouth daily as needed (if gain more than 3 lbs/day or 5 lbs/week, then every other day)  Past Week at Unknown time Yes Unknown, Entered By History   warfarin ANTICOAGULANT 0.5 mg PO TABS half-tab Take by mouth once a week On Thursdays  Yes Unknown, Entered By History   warfarin ANTICOAGULANT 1 MG PO tablet Take 1 mg by mouth Daily 6 days a week except on Thursdays 2/19/2020 at pm Yes Unknown, Entered By History   diclofenac (VOLTAREN) 1 % topical gel Apply 2 g topically 4 times daily as needed for moderate pain Apply to hip Send dosing card with product.   Manny Pastor MD   digoxin (LANOXIN) 125 MCG tablet Take 1 tablet (125 mcg) by mouth daily 2/20/2020 at am  Panfilo Segal MD   divalproex sodium extended-release  (DEPAKOTE ER) 500 MG 24 hr tablet TAKE 1 TABLET BY MOUTH EACH EVENING AT BEDTIME 2/19/2020 at HS  Reported, Patient   levothyroxine (SYNTHROID) 150 MCG tablet Take 150 mcg by mouth every morning (before breakfast)  2/20/2020 at am  Reported, Patient   meclizine (ANTIVERT) 12.5 MG tablet Take 12.5 mg by mouth 3 times daily as needed for dizziness   Unknown, Entered By History   metoprolol succinate ER (TOPROL-XL) 50 MG 24 hr tablet Take 1 tablet (50 mg) by mouth daily 2/20/2020 at am  Panfilo Segal MD   multivitamin, therapeutic with minerals (THERA-VIT-M) TABS tablet Take 1 tablet by mouth 2 times daily (with meals) 2/20/2020 at am  Reported, Patient   pantoprazole (PROTONIX) 40 MG EC tablet Take 1 tablet (40 mg) by mouth 2 times daily 2/20/2020 at am  Manny Pastor MD   predniSONE (DELTASONE) 1 MG tablet Take 1 mg by mouth daily Takes with 5mg tablet for total of 6 mg. 2/20/2020 at am  Unknown, Entered By History   predniSONE (DELTASONE) 5 MG tablet Take 5 mg by mouth daily Takes with 1 mg tablet for total of 6 mg. 2/20/2020 at am  Unknown, Entered By History   simethicone (MYLICON) 80 MG chewable tablet Take 1 tablet (80 mg) by mouth every 6 hours as needed for cramping or other (gas pains)   Manny Pastor MD   simvastatin (ZOCOR) 40 MG tablet Take 40 mg by mouth daily (with dinner)  2/19/2020 at pm  Reported, Patient   terazosin (HYTRIN) 5 MG capsule Take 5 mg by mouth At Bedtime 2/19/2020 at HS  Unknown, Entered By History   traMADol (ULTRAM) 50 MG tablet Take 50 mg by mouth every 6 hours as needed for moderate pain    Reported, Patient

## 2020-02-20 NOTE — PHARMACY-ANTICOAGULATION SERVICE
Clinical Pharmacy - Warfarin Dosing Consult     Pharmacy has been consulted to manage this patient s warfarin therapy.  Indication: Atrial Fibrillation  Therapy Goal: INR 2-3  Warfarin Prior to Admission: Yes  Warfarin PTA Regimen: 1mg daily except on Thurs 0.5 mg  Significant drug interactions: levothyroxine, Protonix, prednisone, Zocor, tramadol  Recent documented change in oral intake/nutrition: Unknown    INR   Date Value Ref Range Status   02/20/2020 4.52 (H) 0.86 - 1.14 Final   02/11/2020 2.28 (H) 0.86 - 1.14 Final       Recommend NO warfarin  today.  Pharmacy will monitor Sean Brunner daily and order warfarin doses to achieve specified goal.      Please contact pharmacy as soon as possible if the warfarin needs to be held for a procedure or if the warfarin goals change.

## 2020-02-20 NOTE — ED NOTES
ED Care Manager Note      Met with: Patient and wife, Dona and son, Mp.    Data:     Reason for ED visit:   Hypotension and elevated INR.  Cognitive Status: awake and alert.  Primary Care Clinic [facility]: [facility]  Primary Care MD Name: ROCIO Parekh  Contact information and PCP information verified: Yes  Lives With: child(arabella), adult, child(arabella), dependent, spouse   Quality of Family Relationships: helpful, involved, supportive  Description of Support System: Involved, Supportive   Who is your support system?: Wife, Children, Other (specify)([facility])     Insurance concerns: No Insurance issues identified     Assessment:    Identified issues/concerns regarding health management:     Pt has frequent ED visits and hospitalizations as his heart disease is becoming more unstable.  Wife and son express an understanding as to the difficulty of managing the disease at this time.  Pt has a lot of support in place at this time.  Pt lives in a [facility] with his wife, and two adult sons.  One son, Mp, goes to work every day, and assists Pt with medication administration, wrapping legs and ankles for edema, and assisting around the home.  The other son has Down Syndrome, and goes to a day program.  Dona, the wife states that when they built their home they designed it to be handicap accessible, and it appears that the family is planning to all stay in place and age together.  If Pt were to go to a facility, the family's wish would be, that it be short term, and then have Pt return to home.    Pt currently has home care provided [facility].  Skilled nurse visit 2/week.  PT 2/week. And OT 2/week.  Last week, [facility] social worker, Jada, [phone], met with the family, and offered to assist with placing Pt in an [facility] or LTC, but family declined.  This morning, Pt was visited by his [facility] home care nurse, and by a [employer] nurse from his  "insurance provider,  Jamilah, [phone].  Both nurses told wife that d/t hypotension and elevated INR level, that Pt should come inpatient and then go to TCU until blood pressure is stabilized.  Pt has stayed at [facility] and [facility] TCUs in the past, and found them acceptable.  Pt does not like [facility], and would prefer a private room.  [facility] has their own list of TCU's that they will authorize before admission.    Pt is exploring his VA benefits, and has an appointment at the [facility] PCP clinic on 2/28/2020.  Pt has an appointment with [facility] to get his new hearing aids on 3/18/2020.  Pt uses a rolling walker to walk.   Pt is incontinent.  Pt appears gaunt and dehydrated.    Action/Resources:    I phoned [facility] social worker, Jada, [phone], and she is not working on any type of placement for Pt at this time, even though PCP visit notes from 2/17/2020, state that PCP authorized SW visit to facilitate [facility].  I phoned [facility] nurse, Jmailah, [phone], and she is not working on any type of placement for Pt, as that is not part of her job.  If Pt is recommended to go to TCU, we would use our usual process.  I educated family that Pt may not have stable cardiac management at this time in his life, and that this instability may be his  \"new normal\".   I educated family to keep Pt well hydrated and on a high protein diet at home.    Plan:    Will continue to follow and assist as needed.      Tala Cobb RN Care Coordinator,  TEA, PHN, CCM, SHARYN  Inpatient Care Coordination - Emergency Department  [facility]   [phone]    "

## 2020-02-20 NOTE — ED PROVIDER NOTES
History     Chief Complaint:  Hypotension    The history is provided by the patient and a relative.      Sean Brunner is a 85 year old male on Coumadin who presents to the emergency department today with hypotension. The patient had very low blood pressure today at 88/48. His pulse has been between  per Home Health nurse. He has been having intermittent dizziness and light-headedness, though he currently denies this. He reports episodes of this every day. He denies syncope. Home care saw him today and recommended he be seen for the ED. He denies shortness of breath. The patient was in the hospital 5 weeks ago and has had Home care since then to make sure he is doing well and that his medications are working well. He stopped Lasix 1 week ago and his weight has been steady.     Allergies:  No Known Drug Allergies     Medications:    Lanoxin  Digoxin   Depakote  Lasix - currently stopped, for 1 week  Synthroid   Antivert   Metoprolol  Protonix   Deltasone   Mylicon  Zocor   Hytrin  Ultram  Coumadin     Past Medical History:    Abdominal wall hematoma  AGE (acute gastroenteritis)  Atrial fibrillation  Atrial fibrillation with rapid ventricular response   Bilateral leg edema  CAD (coronary artery disease)  Cardiomyopathy  Carotid stenosis  Cellulitis  COPD (chronic obstructive pulmonary disease)   Cor pulmonale   Diastolic dysfunction  DM2 (diabetes mellitus, type 2)  IYER (dyspnea on exertion)  GERD (gastroesophageal reflux disease)  HTN (hypertension)  Hypothyroidism  Leg wound, left  LV dysfunction  Morbid obesity   On supplemental oxygen by nasal cannula  STEWART (obstructive sleep apnea)  Pulmonary embolism   Pulmonary HTN   Polymyalgia rheumatica   Varicose veins of bilateral lower extremities with other complications  Vitamin B12 deficiency disease     Past Surgical History:    Joint replacement      Family History:    History reviewed. No pertinent family history.     Social History:  The patient was  "accompanied to the ED by son and wife.  Smoking Status: Never  Smokeless Tobacco: Never  Alcohol Use: No   Marital Status:      Review of Systems   Respiratory: Negative for shortness of breath.    Neurological: Positive for dizziness (not currently) and light-headedness (not currently). Negative for syncope.   All other systems reviewed and are negative.    Physical Exam     Patient Vitals for the past 24 hrs:   BP Temp Temp src Pulse Heart Rate Resp SpO2 Height Weight   02/20/20 1618 108/53 97.5  F (36.4  C) Oral -- 79 12 98 % 1.753 m (5' 9\") 90.4 kg (199 lb 6.4 oz)   02/20/20 1500 98/71 -- -- 87 79 -- 100 % -- --   02/20/20 1330 109/70 -- -- 95 83 -- 98 % -- --   02/20/20 1300 94/55 -- -- 78 89 -- 100 % -- --   02/20/20 1250 103/64 -- -- 93 84 -- 100 % -- --   02/20/20 1245 (!) 140/112 -- -- 152 82 -- 99 % -- --   02/20/20 1230 95/73 -- -- 92 87 -- 98 % -- --   02/20/20 1215 105/76 -- -- 88 89 -- 98 % -- --   02/20/20 1200 96/60 -- -- 86 92 -- 99 % -- --   02/20/20 1145 90/50 -- -- 75 -- -- 100 % -- --   02/20/20 1130 100/58 -- -- 89 76 -- 99 % -- --   02/20/20 1120 101/72 -- -- -- 77 -- 97 % -- --   02/20/20 1103 96/57 98.6  F (37  C) Oral 128 -- 14 -- -- --      Physical Exam  Constitutional: Vital signs reviewed as above.   Head: No external signs of trauma noted.  Eyes: Pupils are equal, round, and reactive to light.   Neck: No JVD noted. No bruit noted.  Cardiovascular: Normal rate, irregular rhythm and normal heart sounds.  No murmur heard. Equal B/L peripheral pulses.  Pulmonary/Chest: Effort normal and breath sounds normal. No respiratory distress. Patient has no wheezes. Patient has no rales.   Gastrointestinal: Soft. There is no tenderness.   Musculoskeletal/Extremities: No edema noted (legs are wrapped).  Neurological: Patient is alert. 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   Skin: Skin is warm and dry. There is no diaphoresis noted.   Psychiatric: The patient appears " calm.    Emergency Department Course   ECG:  Indication: hypotension  Completed at 1111.  Read at 1113.   Atrial fibrillation with premature ventricular or aberrantly conducted complexes  Low voltage QRS  Cannot rule out anterior infarct, age undetermined   Abnormal ECG   RVR resolved compared to 1/31/2020  Rate 86 bpm. KS interval *. QRS duration 98. QT/QTc 916/378. P-R-T axes * 43 -28.     Imaging:  Radiology findings were communicated with the patient and family who voiced understanding of the findings.  XR Chest 2 Views   Final Result   IMPRESSION: Interval increase in size of the right pleural effusion   since 1/31/2020. There are right basilar opacities, likely due to   atelectasis. The cardiac silhouette is mildly enlarged, with normal   pulmonary vasculature.      MOISES LUCIANO MD      Report per radiology      Laboratory:  Laboratory findings were communicated with the patient and family who voiced understanding of the findings.  UA: clear, yellow urine with moderate Leukocyte esterase, 14 WBC, few bacteria o/w WNL   Urine Culture Aerobic Bacterial: Pending   INR: 4.52   Digoxin level: 1.0   BMP: 2 anion gap, 131 glucose o/w WNL (Creatinine 0.77)   CBC: AWNL (WBC 10.4, HGB 8.2, )   Lactic Acid: 0.7      Interventions:  1144: 0.9% NaCl 500mL IV Bolus   1352: Rocephin 1 g IV      Emergency Department Course:  Nursing notes and vitals reviewed.  1115: I performed an exam of the patient as documented above.   IV was inserted and blood was drawn for laboratory testing, results above.  The patient was sent for a Chest XR while in the emergency department, results above.   The patient provided a urine sample here in the emergency department. This was sent for laboratory testing, findings above.   1327: Patient rechecked and updated.    1442: Findings and plan explained to the Patient who consents to admission. Discussed the patient with Perla Watson accepting for Dr. Aranda, who will admit the patient to an  Observation bed for further monitoring, evaluation, and treatment.   1419: I spoke with Perlanithin Watson again about the patient.   I personally reviewed the laboratory and imaging results with the Patient and answered all related questions prior to admission.     Impression & Plan    Medical Decision Making:  This 85-year-old male patient presents the ED due to low blood pressure.  Please see the HPI and exam for specifics.  The patient's blood pressure improved with IV fluids in the emergency department.  His urinalysis is notable for pyuria and moderate leukocyte Estrace though it may not represent a true infection.  Urine culture is pending.  Given that the patient feels we discussed disposition and family would be more comfortable with him being monitored in the hospital in terms of his blood pressure.  Case was discussed with the hospitalist team who then discussed the case with cardiology.  Cardiology came and talked with the patient and based on his comorbidities and health conditions, the end result of the discussion will likely be a palliative care and hospice discussion.  Patient will be placed in the hospital for further monitoring, care, and discussion of these things.    Diagnosis:    ICD-10-CM    1. Hypotension, unspecified hypotension type I95.9 Urine Culture     Lactic acid whole blood   2. Generalized muscle weakness M62.81    3. Acute cystitis without hematuria N30.00        Disposition:  Admitted to Observation     Scribe Disclosure:  I, Bernie Stroud MD, am serving as a scribe at 11:11 AM on 2/20/2020 to document services personally performed by Christian Ya DO based on my observations and the provider's statements to me.    2/20/2020   [facility] EMERGENCY DEPARTMENT       Christian Ya DO  02/20/20 1735

## 2020-02-20 NOTE — ED NOTES
[facility]  ED Nurse Handoff Report    Sean Brunner is a 85 year old male   ED Chief complaint: Hypotension  . ED Diagnosis:   Final diagnoses:   Hypotension, unspecified hypotension type   Generalized muscle weakness   Acute cystitis without hematuria     Allergies: No Known Allergies    Code Status: Full Code  Activity level - Baseline/Home:  Independent. Activity Level - Current:   Assist X 1. Lift room needed: No. Bariatric: No   Needed: No   Isolation: No. Infection: Not Applicable.     Vital Signs:   Vitals:    02/20/20 1245 02/20/20 1250 02/20/20 1300 02/20/20 1330   BP: (!) 140/112 103/64 94/55 109/70   Pulse: 152 93 78 95   Resp:       Temp:       TempSrc:       SpO2: 99% 100% 100% 98%       Cardiac Rhythm:  ,      Pain level:    Patient confused: No. Patient Falls Risk: Yes.   Elimination Status: Has voided- urinal  Patient Report - Initial Complaint: Hypotension. Focused Assessment: afib on tele, resp WNL   Tests Performed: EKG, chest xray, blood work, UA/UC. Abnormal Results:   Labs Ordered and Resulted from Time of ED Arrival Up to the Time of Departure from the ED   CBC WITH PLATELETS DIFFERENTIAL - Abnormal; Notable for the following components:       Result Value    RBC Count 2.92 (*)     Hemoglobin 8.2 (*)     Hematocrit 27.1 (*)     MCHC 30.3 (*)     RDW 17.3 (*)     Absolute Neutrophil 8.6 (*)     Absolute Lymphocytes 0.7 (*)     All other components within normal limits   INR - Abnormal; Notable for the following components:    INR 4.52 (*)     All other components within normal limits   BASIC METABOLIC PANEL - Abnormal; Notable for the following components:    Anion Gap 2 (*)     Glucose 131 (*)     All other components within normal limits   ROUTINE UA WITH MICROSCOPIC - Abnormal; Notable for the following components:    Leukocyte Esterase Urine Moderate (*)     WBC Urine 14 (*)     Bacteria Urine Few (*)     All other components within normal limits   DIGOXIN LEVEL    TROPONIN I   LACTIC ACID WHOLE BLOOD   PERIPHERAL IV CATHETER   URINE CULTURE AEROBIC BACTERIAL     XR Chest 2 Views   Final Result   IMPRESSION: Interval increase in size of the right pleural effusion   since 1/31/2020. There are right basilar opacities, likely due to   atelectasis. The cardiac silhouette is mildly enlarged, with normal   pulmonary vasculature.      MOISES LUCIANO MD        .   Treatments provided: Cefrtrioxone, fluid bolus  Family Comments: Wife and son at bedside  OBS brochure/video discussed/provided to patient:  Yes  ED Medications:   Medications   cefTRIAXone (ROCEPHIN) 1 g vial to attach to  mL bag for ADULTS or NS 50 mL bag for PEDS (has no administration in time range)   0.9% sodium chloride BOLUS (0 mLs Intravenous Stopped 2/20/20 1209)     Drips infusing:  No  For the majority of the shift, the patient's behavior Green. Interventions performed were NA.    Sepsis treatment initiated: No       ED Nurse Name/Phone Number: Casandra Rojas RN,   1:43 PM  RECEIVING UNIT ED HANDOFF REVIEW    Above ED Nurse Handoff Report was reviewed: Yes  Reviewed by: Brionna Lynne RN on February 20, 2020 at 3:58 PM

## 2020-02-21 NOTE — CONSULTS
[facility]    Palliative Care Consultation   Text Page    Date of Admission:  2/20/2020    Assessment & Plan   Sean Brunner is a 85 year old male who was admitted on 2/20/2020. I was asked to see the patient for goals of care, decisional support, transition to a comfort plan.    Recommendations:   Please see assessments below for rationale.  1.Decisional Capacity -  Intact. Patient does not have an advance directive. Include patient in decision making as much as possible and involve next of kin as surrogate decision maker  attempt consensus with patient.  Wife Dona by legal succession  There is a POLST filed ou today and on the paper chart  .  2. Pain- left hip pain 2/2 to previous fall, h/o of headaches, PMR   Schedule diclofenac gel tid 2 grams   Lidocaine patch to left hip at hs    Hydromorphone 2 mg po every 3 hrs prn IV 0.2 mg every 2 hrs prn    Continue prednisone as chronic   Acetaminophen 650 mg every 8 hrs   Dialproex 500 mg at hs -headache daily preventive   3. Dyspnea 2/2 heart failure, COPD    Supplemental oxygen   Gentle diureses for symptom management    Hydromorphone for dyspnea and air hunger    ativan 0.25 mg every 6 hrs prn anxiety po /IV   Haldol 2 mg po/iv  delirium    4. Dysuria and frequency 2/2 UTI and past hx     Antibiotics IV Ceftriaxone Q 24 hrs left gram then po for symptom management   5. Spiritual Care- Oriented to Spiritual Health as part of Palliative Care team. Consultation placed for  to follow.  6. Care Planning- SW consultation placed for probable hospice care.   -likely home hospice for tomorrow  -comfort eating      Findings & plan of care discussed with: Bedside Nurse , Hospitalist Savita Barnett RN and Hospice Team, Hospitalist Ashkan Finnegan MD, Social work Arminda Aguilar care coordinator   Thank you for involving us in the patient's care.     Kyara Conte, APRN, CNP, ACHPN  Pain Management and Palliative Care  [facility]  "[facility]  Pgr: [phone]    Time Spent on this Encounter   Total unit/floor time 120  minutes, time consisted of the following, examination of the patient, reviewing the record and completing documentation. >50% of time spent in counseling and coordination of care.  Time spend counseling with patient and family consisted of the following topics, goals of care, medical decision making regarding change in direction of care to comfort plan, education about diagnosis, education about prognosis, care planning for discharge and symptom management.  Time spent in coordination of care with team members as listed above.       Palliative Care Assessment:  Sean Brunner is a 85 year old patient admitted with symptoms of generalized weakness, shortness of breath, chronic left hip pain and dysuria. He has a high readmission rate with 4 noted in the past 4-6 weeks.  The patient was seen by cardiology for severe tricuspid regurgitation. The echocardiogram show diminished LVEF at about 45 % and chest films noting opacities supporting fluid overload.  The patient has a UTI, his hip pain occurred after a recent fall. Orthopedic out patient visits notes no fracture.  He is extremely hard of hearing in the left ear due to a  service injury. When I entered the room he stated the \"doctors told me my heart is failing, my heart is dying, I want to go home and die.\"  Family arrived ( son and spouse) They are in agreement with transition to hospice, with preference in the home.  His spouse is frail and exhibits caregiver stress.  We discussed the demands of 24/7 hospice care in the home.  They still want to try in home hospice. It is important that a back up plan also be included, with respite care.  Especially if patient has a longer than anticipated time in hospice care.  The patient  Although over weight has had weight loss is cachetic and had a decline in functional capacity.  He has complex medical history " with cardiomyopathy, Cor pulomale, dystonic dysfunction, A/fib, DM2, STEWART, O2 dependence and IYER. He has had a pulmonary embolism in the past, on anticoagulants.  Recommend in transition to hospice that a careful medication review take place as some of his medications although restorative in nature would also support a comfort plan and assist in symptom management.    Symptoms:   Pain  Left hip  Dyspnea  One exertion on supplemental O2  Anorexia    Fatigue    Debility   Cachexia     Social:            Living situation:  With spouse         Support system:  Spouse and son       Actual/Potential Caregiver:  Same        Functional status:  Decline, needs assistance        Financial concerns:  None noted        Substance use disorder (past / present):  No        Occupation:  Retired , former         Hobbies:      Mental Health:   Some anxiety associated with dyspena  Denies depression     Coping:    with help of family, noting care giver stress in spouse     Spiritual/Gnosticism:    Spiritual background:  Nondenominational   Spiritual needs:  None identified   Sense/Meaning Making:  Not actively attending, will plan on communion and anointing of the sick when home     Prognostic Information:   This has been discussed with family. Limited.  Life expectancy also dependent on oral and fluid intake anticipate weeks to a few months.    Advance Care Planning:           Decision making capacity:  Intact        Disease understanding:  Good understanding        Goals of Care:  Comfort plan        Preferred way of decision making:  With family involved        Health care directive:  no       Health care agent:  By legal succession his spouse Dona       Code Status:  Comfort Care       POLST Physician orders for life-sustaining treatment (POLST) form indicates no aggressive treatment. Completed today       History of Present Illness   History is obtained from the patient, electronic health record, patient's son and  "patient's spouse    Sean Brunner is a 85 year old male who presents with shortness of breath, left hip pain (chronic) and generalized weakness.  He has a complex medical history, recent recurrent admissions, fall at home and greater debility over the past few months.  He was told by the medical team ,he is not able to have surgery for TC severe regurgitation and it would not fix the underlying pathology.  He understands he is in end stage heart failure and that he is \"dying\" .  He c/o left hip pain, not different from the chronic pain, shortness of breath, urinary frequency, urgency.  He is restless showing some mild to moderate anxiousness.  He c/o dizziness, vertiginous in nature, stating nothing helps and the condition is not new. The patient dehyrated, cachectic in appearance. There is a h/o weight loss, but his BMI is slightly elevated. The patient is hard of hearing, but mentation is grossly intact.  He denies chest pain, dyspnea at rest. He is in oxygen chronically. He is not febrile, denies chills or malaise.       Decision-Making & Goals of Care Discussion:  Discussed on February 21, 2020 with Kyara MONACOC,APRN, CNP, ACHPN:  I met with the patient initially as I was summoned to the room by the nurse to assist in improving patient symptoms.  He tells me that his heart is failing, he is dying and wants to go home.   Later, I met with family and patient. Introduced myself and the nature of my visit.  I was here to assist with improving the symptoms that are bothersome to him. I asked their understanding of what is occurring to \"Shahriar\". They confirmed that he does not have any further treatment options for his heart and breathing, but were not clear of the next steps and how long he would live.  I brought up hospice option and ascertained, they had a good understanding and familiarity with hospice. We talked about where hospice care takes place and what the\"hospsice insurance benefit " "covered\"  The patient would prefer to be at home, I reviewed extensively what type of care and the demands of home hospice care.  In the end this is what they desired.  We also reviewed prognostic and the uncertainty of when the patient's death would occur.  I introduced to them that other experts in hospice transitioning ( social work and hospice coordinator ) would be meeting with them soon.      Past Medical History   I have reviewed this patient's medical history and updated it with pertinent information if needed.   Past Medical History:   Diagnosis Date     Atrial fibrillation (H) 12/27/2011    Sees Dr Carcamo, avoid amio due to cor pulmonale, asymptomatic rates up to160 with exersion.        Bilateral leg edema     compression wraps     CAD (coronary artery disease)     Nonobstructive     Cardiomyopathy (H) 5/1/2012     Carotid stenosis      Cellulitis of left lower extremity 11/5/2018     COPD (chronic obstructive pulmonary disease) (H)      Cor pulmonale (H) 5/1/2012     Diastolic dysfunction      DM2 (diabetes mellitus, type 2) (H)      IYER (dyspnea on exertion)      GERD (gastroesophageal reflux disease)      HTN (hypertension)      Hypothyroidism      Leg wound, left      LV dysfunction 05/01/2012     Due to cor pulmonale and a fib related tachycardia      Morbid obesity (H) 5/1/2012     On supplemental oxygen by nasal cannula     at [facility]     STEWART (obstructive sleep apnea) 05/01/2012    Non compliant with CPAP     Pulmonary embolism (H)      Pulmonary HTN (H)      Varicose veins of bilateral lower extremities with other complications      Vitamin B12 deficiency disease 5/1/2012    On oral replacement        Past Surgical History   I have reviewed this patient's surgical history and updated it with pertinent information if needed.  Past Surgical History:   Procedure Laterality Date     JOINT REPLACEMENT         Prior to Admission Medications   Prior to Admission Medications   Prescriptions Last Dose " Informant Patient Reported? Taking?   acetaminophen 325 MG PO tablet 2/20/2020 at am  Yes Yes   Sig: Take 650 mg by mouth 2 times daily   cyanocolbalamin (VITAMIN  B-12) 1000 MCG tablet 2/20/2020 at am  Yes Yes   Sig: Take 3,000 mcg by mouth every morning   diclofenac (VOLTAREN) 1 % topical gel   No No   Sig: Apply 2 g topically 4 times daily as needed for moderate pain Apply to hip Send dosing card with product.   digoxin (LANOXIN) 125 MCG tablet 2/20/2020 at am  No No   Sig: Take 1 tablet (125 mcg) by mouth daily   divalproex sodium extended-release (DEPAKOTE ER) 500 MG 24 hr tablet 2/19/2020 at HS  Yes No   Sig: TAKE 1 TABLET BY MOUTH EACH EVENING AT BEDTIME   furosemide (LASIX) 20 MG tablet Past Week at Unknown time  Yes Yes   Sig: Take 20 mg by mouth daily as needed (if gain more than 3 lbs/day or 5 lbs/week, then every other day)    levothyroxine (SYNTHROID) 150 MCG tablet 2/20/2020 at am  Yes No   Sig: Take 150 mcg by mouth every morning (before breakfast)    meclizine (ANTIVERT) 12.5 MG tablet   Yes No   Sig: Take 12.5 mg by mouth 3 times daily as needed for dizziness   metoprolol succinate ER (TOPROL-XL) 50 MG 24 hr tablet 2/20/2020 at am  No No   Sig: Take 1 tablet (50 mg) by mouth daily   multivitamin, therapeutic with minerals (THERA-VIT-M) TABS tablet 2/20/2020 at am Self Yes No   Sig: Take 1 tablet by mouth 2 times daily (with meals)   pantoprazole (PROTONIX) 40 MG EC tablet 2/20/2020 at am  No No   Sig: Take 1 tablet (40 mg) by mouth 2 times daily   predniSONE (DELTASONE) 1 MG tablet 2/20/2020 at am  Yes No   Sig: Take 1 mg by mouth daily Takes with 5mg tablet for total of 6 mg.   predniSONE (DELTASONE) 5 MG tablet 2/20/2020 at am  Yes No   Sig: Take 5 mg by mouth daily Takes with 1 mg tablet for total of 6 mg.   simethicone (MYLICON) 80 MG chewable tablet   No No   Sig: Take 1 tablet (80 mg) by mouth every 6 hours as needed for cramping or other (gas pains)   simvastatin (ZOCOR) 40 MG tablet  2/19/2020 at pm  Yes No   Sig: Take 40 mg by mouth daily (with dinner)    terazosin (HYTRIN) 5 MG capsule 2/19/2020 at HS  Yes No   Sig: Take 5 mg by mouth At Bedtime   traMADol (ULTRAM) 50 MG tablet   Yes No   Sig: Take 50 mg by mouth every 6 hours as needed for moderate pain    warfarin ANTICOAGULANT 0.5 mg PO TABS half-tab   Yes Yes   Sig: Take by mouth once a week On Thursdays   warfarin ANTICOAGULANT 1 MG PO tablet 2/19/2020 at pm  Yes Yes   Sig: Take 1 mg by mouth Daily 6 days a week except on Thursdays      Facility-Administered Medications: None     Allergies   No Known Allergies    Social History   I have updated and reviewed the following Social History Narrative:        Family History   I have reviewed this patient's family history and updated it with pertinent information if needed.   No family history on file.    Review of Systems   The 10 point Review of Systems is negative other than noted in the HPI or here.     Palliative Symptom Review (0=no symptom/no concern, 1=mild, 2=moderate, 3=severe):      Pain: 2-moderate      Fatigue: 3-severe      Nausea: 0-none      Constipation: 0-none      Diarrhea: 0-none      Depressive Symptoms: 0-none      Anxiety: 2-moderate      Drowsiness: 1-mild      Poor Appetite: 2-moderate      Shortness of Breath: 3-severe      Insomnia: 1-mild      Other: edema distal lower extremities  2-moderate      Overall (0 good/no concerns, 3 very poor):   3     Physical Exam   Temp:  [96.5  F (35.8  C)-98.9  F (37.2  C)] 98.9  F (37.2  C)  Pulse:  [] 87  Heart Rate:  [] 120  Resp:  [12-28] 28  BP: ()/() 101/55  SpO2:  [92 %-100 %] 96 %  197 lbs 12.8 oz  GEN:  Alert, oriented x 3, appears comfortable, NAD.  HEENT:  Normocephalic/atraumatic, no scleral icterus, no nasal discharge, mouth dry , [place], AS  CV:  RRR, S1, S2; no murmurs or other irregularities noted.  +3 DP/PT pulses bilatererally; distal edema BLE.  RESP:  Clear to auscultation bilaterally without  rales/rhonchi/wheezing/retractions.  Symmetric chest rise on inhalation noted.  Reduced respiratory effort.  ABD:  Rounded, soft, non-tender/non-distended.  +BS  EXT:  Edema & pulses as noted above.  CMS intact x 4.     M/S:   Tender to palpation left hip.  MELENDREZ x 4   SKIN:  Dry to touch, no exanthems noted in the visualized areas.    NEURO: Symmetric strength +5/5.  Sensation to touch intact all extremities.   There is no area of allodynia or hyperesthesia.  Psych:  Normal affect.  anxious Calm, cooperative, conversant appropriately.     Delirium Screen/CAM:  Delirium = (#1 and #2 = YES) + (#3 and/or #4)   1) Acute onset and fluctuating course:   No   (acute change in mental status from baseline over last 24 hours)  2) Inattention:   No   (difficulty focusing, distractible, can't follow conversation)  3) Disorganized thinking:   No   (score only if #1 and #2 are YES)  (rambling/irrelevant conversation, unclear/illogical thoughts, inconsistency)  4) Altered level of consciousness:   No   (score only if #1 and #2 are YES)  (other than alert, calm, cooperative)    Delirium/CAM score: 0/4  Interpretation:  1)  Delirium:  Absent  2)  Type:  None   3)  Severity:  none    Data   Results for orders placed or performed during the hospital encounter of 02/20/20 (from the past 24 hour(s))   INR   Result Value Ref Range    INR 3.74 (H) 0.86 - 1.14

## 2020-02-21 NOTE — CONSULTS
Discharge Planner   Discharge Plans in progress: Mai met with the pt, his wife Dona, and son Bryan.  They would like the pt to go home with  Hospice.  Mai arranged for the  Hospice Liaison to meet with the pt and family at 1330 today.    Mai provided the pt and family with alternative resources for the pt and family in case the pt is no longer to remain at home.    Pt/family was not given the Medicare Compare list for Hospice, with associated star ratings to assist with choice for referrals/discharge planning because they knew they wanted to use [facility] because they already use [facility].    Education was given to pt/family that star ratings are updated/maintained by Medicare and can be reviewed by visiting www.medicare.gov - Yes.    Mai spoke with mai Elias at [facility] [phone], who said that she has some concerns about the pt and wondering if the pt needs to see palliative or consider hospice.  Jada said that the pt was supposed to go to an appointment earlier in the week to establish with a [facility] P[facility], but did not make it.  The appointment is recheduled for 2/28/2020, but concerned the pt will not be able to make it to this appointment either.  The pt does not have any benefits at this time for medical care.  Mai updated Jada that the pt is going to discharge home with  Hospice tomorrow.     Mai spoke with the [facility] Liaison, Anahy [phone], to determine if the pt has hospice benefits through the [facility].  Anahy said that the pt does have VA benefits because he just signed onto their healthcare services.  He does not need to see a PCP for this.  The pt would need to go to a VA contracted facility for it to be covered if the pt needs a LTC facility.      02/21/20 1403   Final Resources   Resources List Home Care   Home Care [facility] [phone], Fax: [phone]   Hospice [facility] [phone], Fax: [phone]     Barriers to discharge plan: None.  Follow up plan: Mai will continue  with discharge planning and will be available as needed until discharge.       Entered by: Eugenie Anglin 02/21/2020 2:04 PM     RADHA Meza, [facility]  Inpatient Care Coordination  [facility]  [phone]      ADDENDUM 1706:  The pt's family will provide transport and [facility] will meet with pt and family at their home tomorrow afternoon.

## 2020-02-21 NOTE — PROGRESS NOTES
[facility]    Hospitalist Progress Note      Assessment & Plan   Sean Brunner is a 85 year old male with a past medical history of PMR, pulmonary hypertension, iron deficiency anemia, arthritis, type 2 diabetes, GERD, venous stasis, hypothyroidism, 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  Atrial fibrillation, and chronic headaches who presents with generalized weakness, fatigue and hypotension.       #Generalized Weakness: Patient's end-stage RV failure with severe tricuspid regurgitation is likely principal cause of patient's recurrent admissions and decompensated state.  Patient's UA was positive for leukocyte esterase and pyuria.  Although I believe his right heart failure is a larger contributor to her symptoms, it is possible that with treatment of possible UTI that he could symptomatically improved.   -Palliative care was consulted and did meet with the patient today.  Patient is transitioning to comfort measures and hopeful for home with hospice.  This will likely take at least a day to arrange.  If patient is not able to go home with hospice then will likely need placement.  -We will continue his home medications for his heart failure and atrial fibrillation.    I have restarted Lasix at reduced dose of 10 mg daily.  -PRN pain and anxiety medications have been added  -Continue meclizine for possible inner ear contribution to dizziness.  -Ceftriaxone for possible UTI.  Will follow urine culture.  -Patient is DNR/DNI, would not escalate cares further.  Focus on comfort.  No nocturnal vitals.     Chronic Medical Conditions  #Afib w Supratherapeutic INR -no signs of active bleeding.  Will hold off on further blood draws..  #CAD, hyperlipidemia- will stop statin, will continue metoprolol as above as RVR would likely cause him to be  #Chronic Anemia -stable  #GERD - continue PPI  #Hypothyroidism - continue pta Levothyroxine  #Polymyalgia Rheumatica -continue home prednisone  #Hx Headaches -not  currently having symptoms.  We will continue home Depakote.  PRN Tylenol ordered     DVT Prophylaxis: warfarin   Code Status: DNR / DNI, confirmed with both patient and his wife.  Expected discharge:  Working with social work and palliative care.  Hopeful for home with hospice.  Likely would take 24 hours to arrange.    Ashkan Finnegan MD  Text Page    Interval History   No acute events overnight.  Patient was having significant increased frequency of urination this a.m.  Felt generally weak.  No pain.    -Data reviewed today: I reviewed all new labs and imaging results over the last 24 hours.    Physical Exam   Temp: 98.9  F (37.2  C) Temp src: Oral BP: 101/55 Pulse: 87 Heart Rate: 120 Resp: 22 SpO2: 96 % O2 Device: None (Room air)    Vitals:    02/20/20 1618 02/21/20 0700   Weight: 90.4 kg (199 lb 6.4 oz) 89.7 kg (197 lb 12.8 oz)     Vital Signs with Ranges  Temp:  [96.5  F (35.8  C)-98.9  F (37.2  C)] 98.9  F (37.2  C)  Pulse:  [] 87  Heart Rate:  [] 120  Resp:  [12-28] 22  BP: ()/() 101/55  SpO2:  [92 %-100 %] 96 %  I/O last 3 completed shifts:  In: 250 [P.O.:250]  Out: 1125 [Urine:1125]    Constitutional: Patient appears generally weak, elderly.  HEENT: +elevation of JVD noted  Respiratory:  Some increased work of breathing this a.m., improved later in the morning.  Cardiovascular: Irregular, positive systolic murmur, warm and well-perfused  GI: Bowel sounds present, nontender  Skin: Warm.  No rashes  Musculoskeletal: Normal tone  Neurologic:  ANO.  Answers questions appropriately.  Calm    Medications     - MEDICATION INSTRUCTIONS -       Warfarin Therapy Reminder         acetaminophen  650 mg Oral Q8H     cefTRIAXone  1 g Intravenous Q24H     cyanocobalamin  3,000 mcg Oral QAM     diclofenac  2 g Topical TID     digoxin  125 mcg Oral Daily     divalproex sodium extended-release  500 mg Oral At Bedtime     furosemide  10 mg Oral Daily     levothyroxine  150 mcg Oral QAM AC     lidocaine  1  patch Transdermal Q24h    And     lidocaine   Transdermal Q8H     metoprolol succinate ER  50 mg Oral Daily     pantoprazole  40 mg Oral BID     predniSONE  1 mg Oral Daily     predniSONE  5 mg Oral Daily     simvastatin  40 mg Oral Daily with supper     sodium chloride (PF)  3 mL Intracatheter Q8H     terazosin  5 mg Oral At Bedtime     warfarin-No DOSE today  1 each Does not apply no dose today (warfarin)       Data   Recent Labs   Lab 02/21/20  0610 02/20/20  1120   WBC 9.4 10.4   HGB 8.4* 8.2*   MCV 93 93    173   INR 4.27* 4.52*    134   POTASSIUM 4.4 4.4   CHLORIDE 103 101   CO2 31 31   BUN 14 15   CR 0.76 0.77   ANIONGAP 2* 2*   TYSON 9.3 9.2   GLC 95 131*   TROPI  --  <0.015       No results found for this or any previous visit (from the past 24 hour(s)).

## 2020-02-21 NOTE — PLAN OF CARE
A&OX3. Disoriented to time. LS diminished.SOB with activities. BP soft : 108/53, HR 79 and 02 98% on room air. Denied pain. Up with assist of 1 with gait belt for transferring. + 2 edema to bilateral lower extremities. Compression socks intact.  Abdomen distended and round. Abdominal folds moist, cleaned and powder applied. Blanchable redness to coccyx.   Regular diet. Continent of B&B.  Patient is worried about his up coming meeting with palliative care.  Wife is requesting to have her son be around during the meeting with palliative care. This writer left sticky note for Md regarding this concern. Possible discharge home tomorrow. Continue POC.

## 2020-02-21 NOTE — PLAN OF CARE
Pt is alert and oriented x3.VSS,denies pain, c/o baseline dizziness, meclizine administered x1 this shift. Bed alarm in place and call light is within reach.

## 2020-02-21 NOTE — PROGRESS NOTES
CTS identifies patient as high risk due to elevated LIZANDRO score. Currently admitted for generalized weakness due to end-stage RV failure with severe tricuspid regurgitation with UTI , this is her/his 3 admission in 6 months. Per chart review, pt resides at home with wife. Patient and family have chosen to enter hospice.      Will follow along with SW for DC planning.     Radha Aguilar RN, BSN, CTS  [facility]  [phone]

## 2020-02-21 NOTE — CONSULTS
Writer met with pt, his wife Dona and son Mp to discuss hospice benefit and philosophy.  Pt wants to go home with support of hospice, and family feels they can provide 24 hour for the patient.  Writer ordered hospital bed, 1/2 rails, obt, commode and transport wheelchair with cushion for delivery by 830pm tonight to pt's home. Planning for discharge to home tomorrow, 2/22/2020.  Consents are signed for hospice care to start upon hospital discharge.  POLST was done by Kyara Perez today, and son Mp has a copy.      Please order the following comfort meds and send them with pt when he discharges:    Acetaminophen 650mg supp, give 1 supp DE every 4 hours PRN pain/fever  Bisacodyl 10mg supp, give 1 supp DE daily PRN constipation  Lorazepam 0.5mg tab, give 0.25-0.5mg (1/2 to 1 tab) po/sl every 4 hours PRN anxiety/restlessness  Haloperidol 0.5mg tab, give 0.5-1mg (1-2 tabs) po/sl every 6 hours PRN nausea/agitation  Senna 8.6mg tab, give 1-2 tabs po BID PRN constipation  Atropine 1% drops, give 2-4 drops po/sl every 2 hours PRN to dry secretions    Please contact  Hospice if there are any changes in the discharge plan.  Wife and son report they feel that they can safely transport pt home in private vehicle.  Pt able to get up with walker and standby assistance.    Thank you for this consult    Jim Hollingsworth RN,  Hospice liason  [phone]

## 2020-02-21 NOTE — PLAN OF CARE
VSS but on lower side, held metoprolol per parameters, afebrile. On IV Rocephin for UTI. Added PO Lasix. Up with A1 gait belt. A&Ox3 but forgetful. Palliative to see pt today. INR 4.27. Pt c/o hip pain gave PO ultram, PO Dilaudid given and scheduled tylenol.  Palliative saw pt and added some meds to get pt comfortable.       Pt prompt sepsis protocol, paged md, do not order Lactic.

## 2020-02-22 NOTE — DISCHARGE SUMMARY
"[facility]    Discharge Summary  Hospitalist    Date of Admission:  2/20/2020  Date of Discharge:  2/22/2020  Discharging Provider: Ashkan Finnegan MD  Date of Service (when I saw the patient): 02/22/20    Discharge Diagnoses    #Generalized weakness  #Chronic heart failure with reduced ejection fraction, core pulmonal, severe tricuspid regurgitation, severe right ventricular failure  #Dizziness-chronic  #Atrial fibrillation on anticoagulation  #Possible UTI  #COPD  #Prior VTE  #PMR    History of Present Illness   \"Sean Brunner is a 85 year old male with a past medical history of PMR, pulmonary hypertension, iron deficiency anemia, arthritis, type 2 diabetes, GERD, venous stasis, hypothyroidism, 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  Atrial fibrillation, and chronic headaches who presents with generalized weakness, fatigue and hypotension.     Of note, patient was singly hospitalized from 1/31- 2/4 for atrial fibrillation with RVR possible UTI.  He had an echo that showed a LVEF of 45 to 50% but moderate to severely reduced right ventricular function, right tricuspid regurgitation and aortic stenosis.  He was treated with beta-blockade and the addition of digoxin.  He was treated for his UTI with ceftriaxone and ultimately transitioned to Keflex.     Patient was reportedly at home today and had low blood pressures in the high 80s.  His pulse was also intermittently been in the 50s to 140s per his home health nurse.  He has been feeling generally weak, dizzy, lightheaded for several days.  These episodes are happening every day.  No loss of consciousness.  He is also been feeling short of breath.     In the ED, patient was afebrile and non-tachycardic with blood pressures in the 90s initially.  This did respond to 500 cc bolus.  Chest x-ray was obtained that showed increase in the right pleural effusion with right basilar opacities likely due to atelectasis.  His EKG showed atrial fibrillation with a " "PVC but no acute ST segment changes.  Lab work notable for a stable CBC.  BMP unremarkable.  Troponin was negative.  Lactic acid was normal.     He was seen by cardiology in the ED.  It was determined that he is likely a the end stages of right ventricular heart failure with severe tricuspid regurgitation.  For him mechanical intervention would result in significant morbidity mortality.  He was offered eval by cardiac surgery but declined.  Seems that he and his wife both do understand that he is in the process of dying.  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  Atrial fibrillation, and chronic headaches who presents with generalized weakness, fatigue and hypotension mainly secondary to end-stage right ventricular failure.  He was discharged with home hospice.     #Generalized Weakness: Patient's end-stage RV failure with severe tricuspid regurgitation is likely principal cause of patient's recurrent admissions and decompensated state.  Patient's UA was positive for leukocyte esterase and pyuria.  Although I believe his right heart failure is a larger contributor to her symptoms, it is possible that with treatment of possible UTI that he could symptomatically improved.  The end-stage nature of patient's RV failure was discussed by both myself, cardiology and palliative care.  Family meeting was held on 2/21 and it was determined that it would be best to focus on symptom management.  As such, home hospice was arranged.  He was provided with medications at discharge for symptom management by palliative care.  We did continue his heart failure medications as stopping them abruptly would lead to worsening symptoms.  He was discharged on a reduced Lasix dose of 10 mg " daily.  He could take an additional 10 mg if needed for fluid overload as determined by home hospice.  He will complete a 7-day course for possible UTI as well.    #Afib w Supratherapeutic INR -no signs of active bleeding.    His INR has been supratherapeutic on multiple admissions.  He should continue to hold his warfarin and start reduced dose of 0.5 mg daily after INR check on Monday, 2/24.  In the future could be discussed whether he wants to continue with  #Chronic Anemia -stable  #GERD - continue PPI  #Hypothyroidism - continue pta Levothyroxine  #Polymyalgia Rheumatica -continue home prednisone  #Hx Headaches -not currently having symptoms.  We will continue home Depakote.  PRN medications available.    Ashkan Finnegan MD    Code Status   DNR / DNI       Primary Care Physician   Oscar Parekh    Physical Exam   Temp: 96.3  F (35.7  C) Temp src: Oral BP: 100/76 Pulse: 115 Heart Rate: 69 Resp: 20 SpO2: 92 % O2 Device: None (Room air)    Vitals:    02/20/20 1618 02/21/20 0700 02/22/20 0522   Weight: 90.4 kg (199 lb 6.4 oz) 89.7 kg (197 lb 12.8 oz) 88.5 kg (195 lb 3.2 oz)     Vital Signs with Ranges  Temp:  [96.3  F (35.7  C)-100.3  F (37.9  C)] 96.3  F (35.7  C)  Pulse:  [115] 115  Heart Rate:  [] 69  Resp:  [18-22] 20  BP: ()/(54-76) 100/76  SpO2:  [92 %-94 %] 92 %  I/O last 3 completed shifts:  In: 640 [P.O.:640]  Out: 1475 [Urine:1475]    Patient resting comfortably.  No acute distress.  No increased work of breathing.  Extremities are warm and well-perfused.  Abdomen is nondistended.  Is able to move all of his extremities.  No tremors noted.  He is calm    Discharge Disposition   Discharged to home with hospice  Condition at discharge: Stable    Consultations This Hospital Stay   CARE COORDINATOR IP CONSULT  CARDIOLOGY IP CONSULT  PHARMACY TO DOSE WARFARIN  PALLIATIVE CARE ADULT IP CONSULT  SOCIAL WORK IP CONSULT    Time Spent on this Encounter   I, Ashkan Finnegan MD, personally saw the  patient today and spent greater than 30 minutes discharging this patient.    Discharge Orders      Home care nursing referral      Reason for your hospital stay    You were hospitalized for generalized weakness, lightheadedness related to end-stage heart failure.  You were seen by cardiology and palliative care.  We discussed focusing on symptom management at this point and you were discharged home with hospice care.     Follow-up and recommended labs and tests     You will have home hospice available.  You should have an INR checked on Monday, 2/24.     Activity    Your activity upon discharge: activity as tolerated     DNR/DNI     Diet    Follow this diet upon discharge: Orders Placed This Encounter      Regular Diet Adult     Discharge Medications   Current Discharge Medication List      START taking these medications    Details   atropine 1 % ophthalmic solution Place 2 drops under the tongue every 4 hours as needed for secretions [facility]  Qty: 5 mL, Refills: 0    [facility]  Associated Diagnoses: Cor pulmonale (H)      bisacodyl (DULCOLAX) 10 MG suppository Place 1 suppository (10 mg) rectally daily as needed for constipation  Qty: 10 suppository, Refills: 0    Comments[facility]  Associated Diagnoses: Cor pulmonale (H)      cefdinir (OMNICEF) 300 MG capsule Take 1 capsule (300 mg) by mouth 2 times daily for 7 days  Qty: 14 capsule, Refills: 0    Associated Diagnoses: Acute cystitis without hematuria      haloperidol (HALDOL) 0.5 MG tablet Take 1 tablet (0.5 mg) by mouth every 4 hours as needed for agitation  Qty: 30 tablet, Refills: 0    Comments: [facility]  Associated Diagnoses: Cor pulmonale (H)      HYDROmorphone (DILAUDID) 2 MG tablet Take 0.5-1 tablets (1-2 mg) by mouth every 2 hours as needed for pain  Qty: 10 tablet, Refills: 0    Associated Diagnoses: Pain      LORazepam (ATIVAN) 0.5 MG tablet Take 1 tablet (0.5 mg) by mouth, place  under tongue or insert rectally every 6 hours as needed for agitation, anxiety, muscle spasms, nausea, pain, seizures, sleep or vomiting  Qty: 30 tablet, Refills: 0    Comments: [facility]  Associated Diagnoses: Cor pulmonale (H)      senna (SENOKOT) 8.6 MG tablet Take 1 tablet by mouth 2 times daily as needed for constipation  Qty: 30 tablet, Refills: 0    Comments: [facility] dismissa  Associated Diagnoses: Cor pulmonale (H)         CONTINUE these medications which have CHANGED    Details   !! acetaminophen (TYLENOL) 325 MG tablet Take 2 tablets (650 mg) by mouth every 4 hours as needed for mild pain  Qty: 30 tablet, Refills: 0    [facility]  Associated Diagnoses: Cor pulmonale (H)      furosemide (LASIX) 20 MG tablet Take 0.5 tablets (10 mg) by mouth daily  Qty: 15 tablet, Refills: 0    Associated Diagnoses: Cardiomyopathy, unspecified type (H)      warfarin ANTICOAGULANT (COUMADIN) 1 MG tablet Take 0.5 tablets (0.5 mg) by mouth daily Hold Warfarin on Sunday 02/23.  Check INR on 02/24 before resumption at reduced dose as above.  Qty: 15 tablet, Refills: 0    Associated Diagnoses: Atrial fibrillation, unspecified type (H)       !! - Potential duplicate medications found. Please discuss with provider.      CONTINUE these medications which have NOT CHANGED    Details   !! acetaminophen 325 MG PO tablet Take 650 mg by mouth 2 times daily      cyanocolbalamin (VITAMIN  B-12) 1000 MCG tablet Take 3,000 mcg by mouth every morning      diclofenac (VOLTAREN) 1 % topical gel Apply 2 g topically 4 times daily as needed for moderate pain Apply to hip Send dosing card with product.  Qty: 1 Tube, Refills: 3    Associated Diagnoses: Hip pain, left      digoxin (LANOXIN) 125 MCG tablet Take 1 tablet (125 mcg) by mouth daily  Qty: 30 tablet, Refills: 1    Associated Diagnoses: Atrial fibrillation with rapid ventricular response (H)      divalproex sodium extended-release (DEPAKOTE  ER) 500 MG 24 hr tablet TAKE 1 TABLET BY MOUTH EACH EVENING AT BEDTIME  Refills: 3      levothyroxine (SYNTHROID) 150 MCG tablet Take 150 mcg by mouth every morning (before breakfast)       meclizine (ANTIVERT) 12.5 MG tablet Take 12.5 mg by mouth 3 times daily as needed for dizziness      metoprolol succinate ER (TOPROL-XL) 50 MG 24 hr tablet Take 1 tablet (50 mg) by mouth daily  Qty: 30 tablet, Refills: 1    Associated Diagnoses: Atrial fibrillation with rapid ventricular response (H)      multivitamin, therapeutic with minerals (THERA-VIT-M) TABS tablet Take 1 tablet by mouth 2 times daily (with meals)      pantoprazole (PROTONIX) 40 MG EC tablet Take 1 tablet (40 mg) by mouth 2 times daily  Qty: 60 tablet, Refills: 3    Comments: Take twice daily for 30 days then once daily. This replaced Omeprazole.  Associated Diagnoses: Gastritis with hemorrhage, unspecified chronicity, unspecified gastritis type      !! predniSONE (DELTASONE) 1 MG tablet Take 1 mg by mouth daily Takes with 5mg tablet for total of 6 mg.      !! predniSONE (DELTASONE) 5 MG tablet Take 5 mg by mouth daily Takes with 1 mg tablet for total of 6 mg.      simethicone (MYLICON) 80 MG chewable tablet Take 1 tablet (80 mg) by mouth every 6 hours as needed for cramping or other (gas pains)  Qty: 30 tablet, Refills: 3    Associated Diagnoses: Dyspepsia      simvastatin (ZOCOR) 40 MG tablet Take 40 mg by mouth daily (with dinner)       terazosin (HYTRIN) 5 MG capsule Take 5 mg by mouth At Bedtime      traMADol (ULTRAM) 50 MG tablet Take 50 mg by mouth every 6 hours as needed for moderate pain        !! - Potential duplicate medications found. Please discuss with provider.        Allergies   No Known Allergies  Data   Most Recent 3 CBC's:  Recent Labs   Lab Test 02/21/20  0610 02/20/20  1120 02/11/20  1745   WBC 9.4 10.4 8.3   HGB 8.4* 8.2* 8.2*   MCV 93 93 91    173 165      Most Recent 3 BMP's:  Recent Labs   Lab Test 02/21/20  0610  02/20/20  1120 02/11/20  1745    134 136   POTASSIUM 4.4 4.4 4.5   CHLORIDE 103 101 103   CO2 31 31 34*   BUN 14 15 16   CR 0.76 0.77 0.90   ANIONGAP 2* 2* <1*   TYSON 9.3 9.2 8.8   GLC 95 131* 111*     Most Recent 2 LFT's:  Recent Labs   Lab Test 01/31/20  0755 12/07/19  0712   AST 17 26   ALT 15 12   ALKPHOS 75 69   BILITOTAL 0.7 0.4     Most Recent INR's and Anticoagulation Dosing History:  Anticoagulation Dose History     Recent Dosing and Labs Latest Ref Rng & Units 2/1/2020 2/2/2020 2/3/2020 2/4/2020 2/11/2020 2/20/2020 2/21/2020    INR 0.86 - 1.14 3.26(H) 3.46(H) 2.78(H) 2.35(H) 2.28(H) 4.52(H) 4.27(H)        Most Recent 3 Troponin's:  Recent Labs   Lab Test 02/20/20  1120 02/11/20  1745 01/31/20  0755  02/24/17  1114   TROPI <0.015 <0.015 <0.015   < >  --    TROPONIN  --   --   --   --  0.01    < > = values in this interval not displayed.     Most Recent Cholesterol Panel:No lab results found.  Most Recent 6 Bacteria Isolates From Any Culture (See EPIC Reports for Culture Details):  Recent Labs   Lab Test 02/20/20  1213 02/11/20  1745 02/03/20  1241 02/03/20  1236 01/31/20  1246 01/31/20  1233   CULT <10,000 colonies/mL  mixed urogenital cornel  Susceptibility testing not routinely done   No growth No growth No growth Cultured on the 1st day of incubation:  Streptococcus salivarius group  *  Critical Value/Significant Value, preliminary result only, called to and read back by  JACOB SCHULTZ RN [id number] 02.01.2020 CF    Cultured on the 1st day of incubation:  Streptococcus mitis group  *  Cultured on the 1st day of incubation:  Rothia mucilaginosa  *  Critical Value/Significant Value called to and read back by  Shreya Cole RN [id number] at 1215 on 2.3.20 rd.    (Note)  POSITIVE for STREPTOCOCCUS SPECIES OTHER THAN pneumococcus, anginosus  group, S. pyogenes and S. agalactiae. Performed using [place]  multiplex nucleic acid test. Final identification and antimicrobial  susceptibility testing will be  verified by standard methods.    Specimen tested with [facility] multiplex, gram-positive blood culture  nucleic acid test for the following targets: Staph aureus, Staph  epidermidis, Staph lugdunensis, other Staph species, Enterococcus  faecalis, Enterococcus faecium, Streptococcus species, S. agalactiae,  S. anginosus grp., S. pneumoniae, S. pyogenes, Listeria sp., mecA  (methicillin resistance) and Zaria/B (vancomycin resistance).    Critical Value/Significant Value called to and read back by Kenna Morse RN, 2.1.20 @ 5819 pt.   No growth     Most Recent TSH, T4 and A1c Labs:  Recent Labs   Lab Test 02/02/20  0616 12/06/19  1349  01/03/15  1653   TSH 1.27  --    < > 0.36*   T4  --   --   --  1.29   A1C  --  5.7*   < >  --     < > = values in this interval not displayed.

## 2020-02-22 NOTE — PROGRESS NOTES
Discharge Planner   Discharge Plans in progress: left list of [facility] contracted facilities at bedside.  Spoke with bedside nurse about additional resource  Barriers to discharge plan: none  Follow up plan: will continue to follow for care coordination.    Radha Aguilar RN, BSN, PHN, CTS  Care Coordinator  [facility]  [phone]           Entered by: Radha Aguilar 02/22/2020 8:58 AM

## 2020-02-22 NOTE — PHARMACY-ANTICOAGULATION SERVICE
Clinical Pharmacy- Warfarin Discharge Note  This patient is currently on warfarin for the treatment of Atrial fibrillation.  INR Goal= 2-3  Expected length of therapy undetermined.    Warfarin PTA Regimen: 1mg daily except on Thurs 0.5 mg      Anticoagulation Dose History     Recent Dosing and Labs Latest Ref Rng & Units 2/2/2020 2/3/2020 2/4/2020 2/11/2020 2/20/2020 2/21/2020 2/22/2020    INR 0.86 - 1.14 3.46(H) 2.78(H) 2.35(H) 2.28(H) 4.52(H) 4.27(H) 3.74(H)          Vitamin K doses administered during the last 7 days:   FFP administered during the last 7 days:     Patient will hold warfarin for the next two days and have an INR checked on Monday, 2/24/20.  Expect to resume warfarin at 0.5 mg daily when INR is under 3.

## 2020-02-22 NOTE — PLAN OF CARE
Alert and oriented x4. Forgetful. Lethargic. Orders for no VS overnight. Resting comfortably throughout shift. Refusing scheduled tylenol overnight. Infrequent nonproductive cough. Diminished but clear LS throughout. Pale bruises. Will continue to monitor.

## 2020-02-22 NOTE — PROGRESS NOTES
Wife Dona called wanting update re discharge   [phone].  She is anticipating spouse will discharge home today on hospice.    Will update primary RN, Savita Henson.

## 2020-02-22 NOTE — PLAN OF CARE
A/O   Assist x2, belt and walker. Unsteady gait.  VSS afebrile.  LS clear, diminished. # bruising DT falls at home. . Appetite good.   BLE edema 2+

## 2020-02-22 NOTE — PROGRESS NOTES
"[facility] Progress Note  FR Med Surg 3    Met with pt Shahriar and family (2 sons and wife) per Palliative consult. Pt was sitting upright in his bed eating lunch. When asked how he was feeling, pt responded \"I'm going home soon...they're sending me there to die!\" Wife informed this author that a hospice meeting will take place this afternoon prior to anticipated discharge. Family appeared stoic during conversation.    Wife shared that they are [employer] and have been active in attending services on Sundays. Pt and family denied any further need of support at this time.    [facility] remains available per pt/family request.      Linh Aleman   Intern    "

## 2020-02-22 NOTE — PLAN OF CARE
Vs on lower side, held metoprolol due to parameters not met. C/o left hip pain, scheduled and PRN pain meds given. IV rocephin for UTI. Palliative saw pt yesterday, plan for home today with hospice. Went over discharge paperwork with pt's wife and son. Questions asked and answered. Verbalized understanding. Pt discharged with all medications,paperwork and belongings at 1300 per wc.

## 2025-02-26 NOTE — PLAN OF CARE
PT: PT eval completed. Pt is an 85 yr old male with h/o AFIB on coumadin, h/o DVT, PE, chronic head aches presented with nausea, abdominal pain and  worsening head aches found to have acute on chronic anemia at 8.8 and possible chlecystitis on CT abdomen and small rectus abdominis hematoma.  Pt lives with wife and son in 1 level home with basement. Pt has 1 stair to enter. Pt inde with SEC at home. Note does have FWW at home.   Discharge Planner PT   Patient plan for discharge: Home with family  Current status: Pt was cued for supine to sit, able to perform inde. Pt was cued for sit<>stand, CGA/sba without assistive device. Pt reports he can walk some without his cane and wanted to show therapist. Pt furniture walking. Pt educated better to use SEC. Pt was cued for safety in BR. Pt was cued for 150 feet of ambulation with SEC with crouched pattern through thoracic spine, 1 LOB with attempts to adjust sock. Pt was able to perform 8 stairs, mildly impulsive with reciprocal pattern. Pt was cued for step to pattern. B rails. Pt needing seated rest break. Before 150 more feet of ambulation. BP stable HR higher with activity as expected 130s with total of 300 feet of ambulation.   Barriers to return to prior living situation: poor balance  Recommendations for discharge: use of walker at home, assist with ADLs from son  Rationale for recommendations: Pt currently mildly unsteady with SEC. Pt would benefit from 1 more PT session inpt to trial 4WW for increased stability. Tolerating activity from CV standpoint today.        Entered by: Omaira Jamil 12/09/2019 4:24 PM        10 (severe pain)